# Patient Record
Sex: MALE | Race: WHITE | HISPANIC OR LATINO | Employment: PART TIME | URBAN - METROPOLITAN AREA
[De-identification: names, ages, dates, MRNs, and addresses within clinical notes are randomized per-mention and may not be internally consistent; named-entity substitution may affect disease eponyms.]

---

## 2019-09-09 ENCOUNTER — OFFICE VISIT - HEALTHEAST (OUTPATIENT)
Dept: ADDICTION MEDICINE | Facility: CLINIC | Age: 29
End: 2019-09-09

## 2019-09-09 ENCOUNTER — AMBULATORY - HEALTHEAST (OUTPATIENT)
Dept: ADDICTION MEDICINE | Facility: CLINIC | Age: 29
End: 2019-09-09

## 2019-09-09 DIAGNOSIS — F15.20 AMPHETAMINE USE DISORDER, SEVERE (H): ICD-10-CM

## 2019-09-09 DIAGNOSIS — F12.20 CANNABIS USE DISORDER, MODERATE, DEPENDENCE (H): ICD-10-CM

## 2019-09-11 ENCOUNTER — OFFICE VISIT - HEALTHEAST (OUTPATIENT)
Dept: ADDICTION MEDICINE | Facility: CLINIC | Age: 29
End: 2019-09-11

## 2019-09-11 ENCOUNTER — AMBULATORY - HEALTHEAST (OUTPATIENT)
Dept: ADDICTION MEDICINE | Facility: CLINIC | Age: 29
End: 2019-09-11

## 2019-09-11 DIAGNOSIS — F15.20 AMPHETAMINE USE DISORDER, SEVERE (H): ICD-10-CM

## 2019-09-16 ENCOUNTER — OFFICE VISIT - HEALTHEAST (OUTPATIENT)
Dept: ADDICTION MEDICINE | Facility: CLINIC | Age: 29
End: 2019-09-16

## 2019-09-16 DIAGNOSIS — F15.20 AMPHETAMINE USE DISORDER, SEVERE (H): ICD-10-CM

## 2019-09-17 ENCOUNTER — AMBULATORY - HEALTHEAST (OUTPATIENT)
Dept: ADDICTION MEDICINE | Facility: CLINIC | Age: 29
End: 2019-09-17

## 2019-09-18 ENCOUNTER — OFFICE VISIT - HEALTHEAST (OUTPATIENT)
Dept: ADDICTION MEDICINE | Facility: CLINIC | Age: 29
End: 2019-09-18

## 2019-09-18 DIAGNOSIS — F15.20 AMPHETAMINE USE DISORDER, SEVERE (H): ICD-10-CM

## 2019-09-23 ENCOUNTER — OFFICE VISIT - HEALTHEAST (OUTPATIENT)
Dept: ADDICTION MEDICINE | Facility: CLINIC | Age: 29
End: 2019-09-23

## 2019-09-23 DIAGNOSIS — F15.20 AMPHETAMINE USE DISORDER, SEVERE (H): ICD-10-CM

## 2019-09-24 ENCOUNTER — AMBULATORY - HEALTHEAST (OUTPATIENT)
Dept: ADDICTION MEDICINE | Facility: CLINIC | Age: 29
End: 2019-09-24

## 2019-10-01 ENCOUNTER — AMBULATORY - HEALTHEAST (OUTPATIENT)
Dept: ADDICTION MEDICINE | Facility: CLINIC | Age: 29
End: 2019-10-01

## 2019-10-02 ENCOUNTER — OFFICE VISIT - HEALTHEAST (OUTPATIENT)
Dept: ADDICTION MEDICINE | Facility: CLINIC | Age: 29
End: 2019-10-02

## 2019-10-02 ENCOUNTER — AMBULATORY - HEALTHEAST (OUTPATIENT)
Dept: ADDICTION MEDICINE | Facility: CLINIC | Age: 29
End: 2019-10-02

## 2019-10-02 ENCOUNTER — AMBULATORY - HEALTHEAST (OUTPATIENT)
Dept: LAB | Facility: CLINIC | Age: 29
End: 2019-10-02

## 2019-10-02 DIAGNOSIS — F15.20 AMPHETAMINE USE DISORDER, SEVERE (H): ICD-10-CM

## 2019-10-02 LAB
AMPHETAMINES UR QL SCN: ABNORMAL
BARBITURATES UR QL: ABNORMAL
BENZODIAZ UR QL: ABNORMAL
CANNABINOIDS UR QL SCN: ABNORMAL
COCAINE UR QL: ABNORMAL
CREAT UR-MCNC: 169.8 MG/DL
ETHANOL UR CFM-MCNC: <10 MG/DL
METHADONE UR QL SCN: ABNORMAL
OPIATES UR QL SCN: ABNORMAL
OXYCODONE UR QL: ABNORMAL
PCP UR QL SCN: ABNORMAL

## 2019-10-07 ENCOUNTER — OFFICE VISIT - HEALTHEAST (OUTPATIENT)
Dept: ADDICTION MEDICINE | Facility: CLINIC | Age: 29
End: 2019-10-07

## 2019-10-07 DIAGNOSIS — F15.20 AMPHETAMINE USE DISORDER, SEVERE (H): ICD-10-CM

## 2019-10-08 ENCOUNTER — AMBULATORY - HEALTHEAST (OUTPATIENT)
Dept: ADDICTION MEDICINE | Facility: CLINIC | Age: 29
End: 2019-10-08

## 2019-10-09 ENCOUNTER — OFFICE VISIT - HEALTHEAST (OUTPATIENT)
Dept: ADDICTION MEDICINE | Facility: CLINIC | Age: 29
End: 2019-10-09

## 2019-10-09 ENCOUNTER — COMMUNICATION - HEALTHEAST (OUTPATIENT)
Dept: ADDICTION MEDICINE | Facility: CLINIC | Age: 29
End: 2019-10-09

## 2019-10-09 DIAGNOSIS — F15.20 AMPHETAMINE USE DISORDER, SEVERE (H): ICD-10-CM

## 2019-10-14 ENCOUNTER — AMBULATORY - HEALTHEAST (OUTPATIENT)
Dept: LAB | Facility: CLINIC | Age: 29
End: 2019-10-14

## 2019-10-14 ENCOUNTER — OFFICE VISIT - HEALTHEAST (OUTPATIENT)
Dept: ADDICTION MEDICINE | Facility: CLINIC | Age: 29
End: 2019-10-14

## 2019-10-14 DIAGNOSIS — F15.20 AMPHETAMINE USE DISORDER, SEVERE (H): ICD-10-CM

## 2019-10-14 LAB
AMPHETAMINES UR QL SCN: NORMAL
BARBITURATES UR QL: NORMAL
BENZODIAZ UR QL: NORMAL
CANNABINOIDS UR QL SCN: NORMAL
COCAINE UR QL: NORMAL
CREAT UR-MCNC: 23.8 MG/DL
ETHANOL UR CFM-MCNC: <10 MG/DL
METHADONE UR QL SCN: NORMAL
OPIATES UR QL SCN: NORMAL
OXYCODONE UR QL: NORMAL
PCP UR QL SCN: NORMAL

## 2019-10-15 ENCOUNTER — AMBULATORY - HEALTHEAST (OUTPATIENT)
Dept: ADDICTION MEDICINE | Facility: CLINIC | Age: 29
End: 2019-10-15

## 2019-10-16 ENCOUNTER — AMBULATORY - HEALTHEAST (OUTPATIENT)
Dept: LAB | Facility: CLINIC | Age: 29
End: 2019-10-16

## 2019-10-16 ENCOUNTER — OFFICE VISIT - HEALTHEAST (OUTPATIENT)
Dept: ADDICTION MEDICINE | Facility: CLINIC | Age: 29
End: 2019-10-16

## 2019-10-16 DIAGNOSIS — F15.20 AMPHETAMINE USE DISORDER, SEVERE (H): ICD-10-CM

## 2019-10-16 LAB
AMPHETAMINES UR QL SCN: NORMAL
BARBITURATES UR QL: NORMAL
BENZODIAZ UR QL: NORMAL
CANNABINOIDS UR QL SCN: NORMAL
COCAINE UR QL: NORMAL
CREAT UR-MCNC: 23.9 MG/DL
ETHANOL UR CFM-MCNC: <10 MG/DL
METHADONE UR QL SCN: NORMAL
OPIATES UR QL SCN: NORMAL
OXYCODONE UR QL: NORMAL
PCP UR QL SCN: NORMAL

## 2019-10-21 ENCOUNTER — OFFICE VISIT - HEALTHEAST (OUTPATIENT)
Dept: ADDICTION MEDICINE | Facility: CLINIC | Age: 29
End: 2019-10-21

## 2019-10-21 DIAGNOSIS — F15.20 AMPHETAMINE USE DISORDER, SEVERE (H): ICD-10-CM

## 2019-10-22 ENCOUNTER — AMBULATORY - HEALTHEAST (OUTPATIENT)
Dept: ADDICTION MEDICINE | Facility: CLINIC | Age: 29
End: 2019-10-22

## 2019-10-25 ENCOUNTER — OFFICE VISIT - HEALTHEAST (OUTPATIENT)
Dept: ADDICTION MEDICINE | Facility: CLINIC | Age: 29
End: 2019-10-25

## 2019-10-25 DIAGNOSIS — F15.20 AMPHETAMINE USE DISORDER, SEVERE (H): ICD-10-CM

## 2019-10-28 ENCOUNTER — OFFICE VISIT - HEALTHEAST (OUTPATIENT)
Dept: ADDICTION MEDICINE | Facility: CLINIC | Age: 29
End: 2019-10-28

## 2019-10-28 DIAGNOSIS — F15.20 AMPHETAMINE USE DISORDER, SEVERE (H): ICD-10-CM

## 2019-10-30 ENCOUNTER — OFFICE VISIT - HEALTHEAST (OUTPATIENT)
Dept: ADDICTION MEDICINE | Facility: CLINIC | Age: 29
End: 2019-10-30

## 2019-10-30 DIAGNOSIS — F15.20 AMPHETAMINE USE DISORDER, SEVERE (H): ICD-10-CM

## 2019-10-31 ENCOUNTER — AMBULATORY - HEALTHEAST (OUTPATIENT)
Dept: ADDICTION MEDICINE | Facility: CLINIC | Age: 29
End: 2019-10-31

## 2019-11-01 ENCOUNTER — OFFICE VISIT - HEALTHEAST (OUTPATIENT)
Dept: ADDICTION MEDICINE | Facility: CLINIC | Age: 29
End: 2019-11-01

## 2019-11-01 DIAGNOSIS — F15.20 AMPHETAMINE USE DISORDER, SEVERE (H): ICD-10-CM

## 2019-11-05 ENCOUNTER — AMBULATORY - HEALTHEAST (OUTPATIENT)
Dept: ADDICTION MEDICINE | Facility: CLINIC | Age: 29
End: 2019-11-05

## 2019-11-08 ENCOUNTER — AMBULATORY - HEALTHEAST (OUTPATIENT)
Dept: ADDICTION MEDICINE | Facility: CLINIC | Age: 29
End: 2019-11-08

## 2019-11-08 ENCOUNTER — OFFICE VISIT - HEALTHEAST (OUTPATIENT)
Dept: ADDICTION MEDICINE | Facility: CLINIC | Age: 29
End: 2019-11-08

## 2019-11-08 ENCOUNTER — AMBULATORY - HEALTHEAST (OUTPATIENT)
Dept: LAB | Facility: CLINIC | Age: 29
End: 2019-11-08

## 2019-11-08 DIAGNOSIS — F15.20 AMPHETAMINE USE DISORDER, SEVERE (H): ICD-10-CM

## 2019-11-08 LAB
AMPHETAMINES UR QL SCN: NORMAL
BARBITURATES UR QL: NORMAL
BENZODIAZ UR QL: NORMAL
CANNABINOIDS UR QL SCN: NORMAL
COCAINE UR QL: NORMAL
CREAT UR-MCNC: 22.3 MG/DL
ETHANOL UR CFM-MCNC: <10 MG/DL
METHADONE UR QL SCN: NORMAL
OPIATES UR QL SCN: NORMAL
OXYCODONE UR QL: NORMAL
PCP UR QL SCN: NORMAL

## 2019-11-12 ENCOUNTER — AMBULATORY - HEALTHEAST (OUTPATIENT)
Dept: ADDICTION MEDICINE | Facility: CLINIC | Age: 29
End: 2019-11-12

## 2019-11-13 ENCOUNTER — OFFICE VISIT - HEALTHEAST (OUTPATIENT)
Dept: ADDICTION MEDICINE | Facility: CLINIC | Age: 29
End: 2019-11-13

## 2019-11-13 DIAGNOSIS — F15.20 AMPHETAMINE USE DISORDER, SEVERE (H): ICD-10-CM

## 2019-11-14 ENCOUNTER — OFFICE VISIT - HEALTHEAST (OUTPATIENT)
Dept: ADDICTION MEDICINE | Facility: CLINIC | Age: 29
End: 2019-11-14

## 2019-11-14 DIAGNOSIS — F15.20 AMPHETAMINE USE DISORDER, SEVERE (H): ICD-10-CM

## 2019-11-15 ENCOUNTER — OFFICE VISIT - HEALTHEAST (OUTPATIENT)
Dept: ADDICTION MEDICINE | Facility: CLINIC | Age: 29
End: 2019-11-15

## 2019-11-15 DIAGNOSIS — F15.20 AMPHETAMINE USE DISORDER, SEVERE (H): ICD-10-CM

## 2019-11-18 ENCOUNTER — OFFICE VISIT - HEALTHEAST (OUTPATIENT)
Dept: ADDICTION MEDICINE | Facility: CLINIC | Age: 29
End: 2019-11-18

## 2019-11-18 DIAGNOSIS — F15.20 AMPHETAMINE USE DISORDER, SEVERE (H): ICD-10-CM

## 2019-11-19 ENCOUNTER — AMBULATORY - HEALTHEAST (OUTPATIENT)
Dept: ADDICTION MEDICINE | Facility: CLINIC | Age: 29
End: 2019-11-19

## 2019-11-20 ENCOUNTER — OFFICE VISIT - HEALTHEAST (OUTPATIENT)
Dept: ADDICTION MEDICINE | Facility: CLINIC | Age: 29
End: 2019-11-20

## 2019-11-20 DIAGNOSIS — F15.20 AMPHETAMINE USE DISORDER, SEVERE (H): ICD-10-CM

## 2019-11-21 ENCOUNTER — AMBULATORY - HEALTHEAST (OUTPATIENT)
Dept: BEHAVIORAL HEALTH | Facility: CLINIC | Age: 29
End: 2019-11-21

## 2019-11-22 ENCOUNTER — AMBULATORY - HEALTHEAST (OUTPATIENT)
Dept: ADDICTION MEDICINE | Facility: CLINIC | Age: 29
End: 2019-11-22

## 2019-11-26 ENCOUNTER — AMBULATORY - HEALTHEAST (OUTPATIENT)
Dept: ADDICTION MEDICINE | Facility: CLINIC | Age: 29
End: 2019-11-26

## 2019-12-02 ENCOUNTER — COMMUNICATION - HEALTHEAST (OUTPATIENT)
Dept: ADDICTION MEDICINE | Facility: CLINIC | Age: 29
End: 2019-12-02

## 2019-12-03 ENCOUNTER — AMBULATORY - HEALTHEAST (OUTPATIENT)
Dept: ADDICTION MEDICINE | Facility: CLINIC | Age: 29
End: 2019-12-03

## 2019-12-04 ENCOUNTER — COMMUNICATION - HEALTHEAST (OUTPATIENT)
Dept: ADDICTION MEDICINE | Facility: CLINIC | Age: 29
End: 2019-12-04

## 2019-12-05 ENCOUNTER — COMMUNICATION - HEALTHEAST (OUTPATIENT)
Dept: ADDICTION MEDICINE | Facility: CLINIC | Age: 29
End: 2019-12-05

## 2019-12-06 ENCOUNTER — OFFICE VISIT - HEALTHEAST (OUTPATIENT)
Dept: ADDICTION MEDICINE | Facility: CLINIC | Age: 29
End: 2019-12-06

## 2019-12-06 ENCOUNTER — AMBULATORY - HEALTHEAST (OUTPATIENT)
Dept: LAB | Facility: CLINIC | Age: 29
End: 2019-12-06

## 2019-12-06 DIAGNOSIS — F15.20 AMPHETAMINE USE DISORDER, SEVERE (H): ICD-10-CM

## 2019-12-06 LAB
AMPHETAMINES UR QL SCN: NORMAL
BARBITURATES UR QL: NORMAL
BENZODIAZ UR QL: NORMAL
CANNABINOIDS UR QL SCN: NORMAL
COCAINE UR QL: NORMAL
CREAT UR-MCNC: 42.4 MG/DL
ETHANOL UR CFM-MCNC: <10 MG/DL
METHADONE UR QL SCN: NORMAL
OPIATES UR QL SCN: NORMAL
OXYCODONE UR QL: NORMAL
PCP UR QL SCN: NORMAL

## 2019-12-09 ENCOUNTER — OFFICE VISIT - HEALTHEAST (OUTPATIENT)
Dept: ADDICTION MEDICINE | Facility: CLINIC | Age: 29
End: 2019-12-09

## 2019-12-09 DIAGNOSIS — F15.20 AMPHETAMINE USE DISORDER, SEVERE (H): ICD-10-CM

## 2019-12-10 ENCOUNTER — AMBULATORY - HEALTHEAST (OUTPATIENT)
Dept: ADDICTION MEDICINE | Facility: CLINIC | Age: 29
End: 2019-12-10

## 2019-12-17 ENCOUNTER — AMBULATORY - HEALTHEAST (OUTPATIENT)
Dept: ADDICTION MEDICINE | Facility: CLINIC | Age: 29
End: 2019-12-17

## 2019-12-17 ENCOUNTER — COMMUNICATION - HEALTHEAST (OUTPATIENT)
Dept: ADDICTION MEDICINE | Facility: CLINIC | Age: 29
End: 2019-12-17

## 2019-12-18 ENCOUNTER — OFFICE VISIT - HEALTHEAST (OUTPATIENT)
Dept: ADDICTION MEDICINE | Facility: CLINIC | Age: 29
End: 2019-12-18

## 2019-12-18 DIAGNOSIS — F15.20 AMPHETAMINE USE DISORDER, SEVERE (H): ICD-10-CM

## 2019-12-20 ENCOUNTER — OFFICE VISIT - HEALTHEAST (OUTPATIENT)
Dept: ADDICTION MEDICINE | Facility: CLINIC | Age: 29
End: 2019-12-20

## 2019-12-20 DIAGNOSIS — F15.20 AMPHETAMINE USE DISORDER, SEVERE (H): ICD-10-CM

## 2019-12-24 ENCOUNTER — AMBULATORY - HEALTHEAST (OUTPATIENT)
Dept: ADDICTION MEDICINE | Facility: CLINIC | Age: 29
End: 2019-12-24

## 2019-12-30 ENCOUNTER — OFFICE VISIT - HEALTHEAST (OUTPATIENT)
Dept: ADDICTION MEDICINE | Facility: CLINIC | Age: 29
End: 2019-12-30

## 2019-12-30 DIAGNOSIS — F15.20 AMPHETAMINE USE DISORDER, SEVERE (H): ICD-10-CM

## 2019-12-31 ENCOUNTER — AMBULATORY - HEALTHEAST (OUTPATIENT)
Dept: ADDICTION MEDICINE | Facility: CLINIC | Age: 29
End: 2019-12-31

## 2020-01-03 ENCOUNTER — OFFICE VISIT - HEALTHEAST (OUTPATIENT)
Dept: ADDICTION MEDICINE | Facility: CLINIC | Age: 30
End: 2020-01-03

## 2020-01-03 DIAGNOSIS — F15.20 AMPHETAMINE USE DISORDER, SEVERE (H): ICD-10-CM

## 2020-01-07 ENCOUNTER — AMBULATORY - HEALTHEAST (OUTPATIENT)
Dept: ADDICTION MEDICINE | Facility: CLINIC | Age: 30
End: 2020-01-07

## 2020-01-08 ENCOUNTER — AMBULATORY - HEALTHEAST (OUTPATIENT)
Dept: BEHAVIORAL HEALTH | Facility: CLINIC | Age: 30
End: 2020-01-08

## 2020-01-08 ENCOUNTER — OFFICE VISIT - HEALTHEAST (OUTPATIENT)
Dept: ADDICTION MEDICINE | Facility: CLINIC | Age: 30
End: 2020-01-08

## 2020-01-08 DIAGNOSIS — F15.20 AMPHETAMINE USE DISORDER, SEVERE (H): ICD-10-CM

## 2020-01-10 ENCOUNTER — OFFICE VISIT - HEALTHEAST (OUTPATIENT)
Dept: ADDICTION MEDICINE | Facility: CLINIC | Age: 30
End: 2020-01-10

## 2020-01-10 DIAGNOSIS — F15.20 AMPHETAMINE USE DISORDER, SEVERE (H): ICD-10-CM

## 2020-01-13 ENCOUNTER — COMMUNICATION - HEALTHEAST (OUTPATIENT)
Dept: ADDICTION MEDICINE | Facility: CLINIC | Age: 30
End: 2020-01-13

## 2020-01-14 ENCOUNTER — AMBULATORY - HEALTHEAST (OUTPATIENT)
Dept: ADDICTION MEDICINE | Facility: CLINIC | Age: 30
End: 2020-01-14

## 2020-01-15 ENCOUNTER — OFFICE VISIT - HEALTHEAST (OUTPATIENT)
Dept: ADDICTION MEDICINE | Facility: CLINIC | Age: 30
End: 2020-01-15

## 2020-01-15 DIAGNOSIS — F15.20 AMPHETAMINE USE DISORDER, SEVERE (H): ICD-10-CM

## 2020-01-17 ENCOUNTER — OFFICE VISIT - HEALTHEAST (OUTPATIENT)
Dept: ADDICTION MEDICINE | Facility: CLINIC | Age: 30
End: 2020-01-17

## 2020-01-17 ENCOUNTER — AMBULATORY - HEALTHEAST (OUTPATIENT)
Dept: ADDICTION MEDICINE | Facility: CLINIC | Age: 30
End: 2020-01-17

## 2020-01-17 DIAGNOSIS — F15.20 AMPHETAMINE USE DISORDER, SEVERE (H): ICD-10-CM

## 2020-01-22 ENCOUNTER — AMBULATORY - HEALTHEAST (OUTPATIENT)
Dept: ADDICTION MEDICINE | Facility: CLINIC | Age: 30
End: 2020-01-22

## 2020-01-24 ENCOUNTER — OFFICE VISIT - HEALTHEAST (OUTPATIENT)
Dept: ADDICTION MEDICINE | Facility: CLINIC | Age: 30
End: 2020-01-24

## 2020-01-24 DIAGNOSIS — F15.20 AMPHETAMINE USE DISORDER, SEVERE (H): ICD-10-CM

## 2020-01-27 ENCOUNTER — OFFICE VISIT - HEALTHEAST (OUTPATIENT)
Dept: ADDICTION MEDICINE | Facility: CLINIC | Age: 30
End: 2020-01-27

## 2020-01-27 DIAGNOSIS — F15.20 AMPHETAMINE USE DISORDER, SEVERE (H): ICD-10-CM

## 2020-01-28 ENCOUNTER — AMBULATORY - HEALTHEAST (OUTPATIENT)
Dept: ADDICTION MEDICINE | Facility: CLINIC | Age: 30
End: 2020-01-28

## 2020-01-30 ENCOUNTER — AMBULATORY - HEALTHEAST (OUTPATIENT)
Dept: ADDICTION MEDICINE | Facility: CLINIC | Age: 30
End: 2020-01-30

## 2020-01-31 ENCOUNTER — OFFICE VISIT - HEALTHEAST (OUTPATIENT)
Dept: ADDICTION MEDICINE | Facility: CLINIC | Age: 30
End: 2020-01-31

## 2020-01-31 DIAGNOSIS — F15.20 AMPHETAMINE USE DISORDER, SEVERE (H): ICD-10-CM

## 2020-02-04 ENCOUNTER — AMBULATORY - HEALTHEAST (OUTPATIENT)
Dept: ADDICTION MEDICINE | Facility: CLINIC | Age: 30
End: 2020-02-04

## 2020-02-06 ENCOUNTER — OFFICE VISIT - HEALTHEAST (OUTPATIENT)
Dept: ADDICTION MEDICINE | Facility: CLINIC | Age: 30
End: 2020-02-06

## 2020-02-06 DIAGNOSIS — F15.20 AMPHETAMINE USE DISORDER, SEVERE (H): ICD-10-CM

## 2020-02-07 ENCOUNTER — COMMUNICATION - HEALTHEAST (OUTPATIENT)
Dept: ADDICTION MEDICINE | Facility: CLINIC | Age: 30
End: 2020-02-07

## 2020-02-11 ENCOUNTER — AMBULATORY - HEALTHEAST (OUTPATIENT)
Dept: ADDICTION MEDICINE | Facility: CLINIC | Age: 30
End: 2020-02-11

## 2020-02-19 ENCOUNTER — AMBULATORY - HEALTHEAST (OUTPATIENT)
Dept: ADDICTION MEDICINE | Facility: CLINIC | Age: 30
End: 2020-02-19

## 2020-02-20 ENCOUNTER — OFFICE VISIT - HEALTHEAST (OUTPATIENT)
Dept: ADDICTION MEDICINE | Facility: CLINIC | Age: 30
End: 2020-02-20

## 2020-02-20 DIAGNOSIS — F15.20 AMPHETAMINE USE DISORDER, SEVERE (H): ICD-10-CM

## 2020-02-26 ENCOUNTER — AMBULATORY - HEALTHEAST (OUTPATIENT)
Dept: ADDICTION MEDICINE | Facility: CLINIC | Age: 30
End: 2020-02-26

## 2020-02-27 ENCOUNTER — OFFICE VISIT - HEALTHEAST (OUTPATIENT)
Dept: ADDICTION MEDICINE | Facility: CLINIC | Age: 30
End: 2020-02-27

## 2020-02-27 DIAGNOSIS — F15.20 AMPHETAMINE USE DISORDER, SEVERE (H): ICD-10-CM

## 2020-03-05 ENCOUNTER — AMBULATORY - HEALTHEAST (OUTPATIENT)
Dept: ADDICTION MEDICINE | Facility: CLINIC | Age: 30
End: 2020-03-05

## 2020-03-05 ENCOUNTER — AMBULATORY - HEALTHEAST (OUTPATIENT)
Dept: LAB | Facility: CLINIC | Age: 30
End: 2020-03-05

## 2020-03-05 DIAGNOSIS — F15.20 AMPHETAMINE USE DISORDER, SEVERE (H): ICD-10-CM

## 2020-03-05 LAB
AMPHETAMINES UR QL SCN: NORMAL
BARBITURATES UR QL: NORMAL
BENZODIAZ UR QL: NORMAL
CANNABINOIDS UR QL SCN: NORMAL
COCAINE UR QL: NORMAL
CREAT UR-MCNC: 41 MG/DL
ETHANOL UR CFM-MCNC: <10 MG/DL
METHADONE UR QL SCN: NORMAL
OPIATES UR QL SCN: NORMAL
OXYCODONE UR QL: NORMAL
PCP UR QL SCN: NORMAL

## 2020-03-06 ENCOUNTER — COMMUNICATION - HEALTHEAST (OUTPATIENT)
Dept: ADDICTION MEDICINE | Facility: CLINIC | Age: 30
End: 2020-03-06

## 2020-03-12 ENCOUNTER — AMBULATORY - HEALTHEAST (OUTPATIENT)
Dept: ADDICTION MEDICINE | Facility: CLINIC | Age: 30
End: 2020-03-12

## 2020-03-17 ENCOUNTER — COMMUNICATION - HEALTHEAST (OUTPATIENT)
Dept: ADDICTION MEDICINE | Facility: CLINIC | Age: 30
End: 2020-03-17

## 2020-03-19 ENCOUNTER — AMBULATORY - HEALTHEAST (OUTPATIENT)
Dept: ADDICTION MEDICINE | Facility: CLINIC | Age: 30
End: 2020-03-19

## 2020-03-19 ENCOUNTER — COMMUNICATION - HEALTHEAST (OUTPATIENT)
Dept: ADDICTION MEDICINE | Facility: CLINIC | Age: 30
End: 2020-03-19

## 2020-03-25 ENCOUNTER — COMMUNICATION - HEALTHEAST (OUTPATIENT)
Dept: ADDICTION MEDICINE | Facility: CLINIC | Age: 30
End: 2020-03-25

## 2020-03-26 ENCOUNTER — AMBULATORY - HEALTHEAST (OUTPATIENT)
Dept: ADDICTION MEDICINE | Facility: CLINIC | Age: 30
End: 2020-03-26

## 2020-03-26 ENCOUNTER — OFFICE VISIT - HEALTHEAST (OUTPATIENT)
Dept: ADDICTION MEDICINE | Facility: CLINIC | Age: 30
End: 2020-03-26

## 2020-03-26 DIAGNOSIS — F15.20 AMPHETAMINE USE DISORDER, SEVERE (H): ICD-10-CM

## 2020-04-02 ENCOUNTER — COMMUNICATION - HEALTHEAST (OUTPATIENT)
Dept: ADDICTION MEDICINE | Facility: CLINIC | Age: 30
End: 2020-04-02

## 2020-04-03 ENCOUNTER — AMBULATORY - HEALTHEAST (OUTPATIENT)
Dept: ADDICTION MEDICINE | Facility: CLINIC | Age: 30
End: 2020-04-03

## 2021-06-01 NOTE — PROGRESS NOTES
"Intake Note:     D) Ricky Rowland is a 29 y.o.   Other male who is referred to DOP via Probation with funding from [employer] [place]. Patient orientated x 3. Patient meets criteria for Cannabis Use Disorder, moderate (F12.20) and Stimulant Use Disorder, severe amphetamine type substance (F15.20).  Patient appears appropriate for DOP.   A) Met with patient for 50 minutes.  Completed intake assessment and preliminary paperwork. Patient was given and explained counselor & supervisor license number and contact info, Patient Bill of Rights, program rules/regulations, Program Abuse Prevention Plan, confidentiality & HIPPA policies, grievance procedure, presented ROIs, TB & HIV/AIDS policies & resources, and Vulnerable Adult policy.   Conducted Vulnerable Adult Assessment.   R)No special Vulnerable Adult needed at this time.  Patient signed and agreed to counselor & supervisor license number and contact info, Patient Bill of Rights, group rules/regulations, Program Abuse Prevention Plan, confidentiality & HIPPA policies, grievance procedure,  ROIs, TB & HIV/AIDS policies & resources, and Vulnerable Adult policy. Patient scored Low Risk on C-SSRS screen. Patient denied suicidal ideation/intent/plan/means at this time.     Opioid Use Disorder: No   Provided \"Options for Opioid Treatment in [place] and Overdose Prevention\" No     Dimension #1 - Withdrawal Potential - Risk 1. Patient reports daily use of methamphetamine with his last endorsed use being on 9/9/19, about 1 hour prior to intake. Patient endorses weekly use of alcohol, with his last use being on 9/7/19. He denies any other pattern of other substance use at this time. Patient denies current withdrawal symptoms or concerns. Patient shows no physical signs or symptoms of intoxication or withdrawal. He may be at risk of moderate symptoms due to his recent use. He does not appear to be at risk of severe withdrawal at this time. Patient is oriented x4. "     Dimension #2 - Biomedical - Risk 0. Patient denies health issues or concerns. He reports that he is unsure if he has a PCP at this time, but does have insurance and so appears able to get his medical needs met and addressed as necessary. Patient denies immediate medical needs or concerns. He appears to be in adequate physical health and functioning at this time.     Dimension #3 - Emotional , Behavioral and Cognitive - Risk 2. Patient reports mental health diagnoses of ADHD, OCD, and depression. He denies current mental health services or medications. His mental health does not appear stable or managed at this time. Patient endorses using meth due to not having medication for his ADHD. Patient does appear to struggle with impulse control as evidence by his substance use despite being on probation, as well as his endorsed problems with gambling. Patient denies any suicidal or homicidal thoughts or plans. Patient is oriented x4.      Dimension #4 - Readiness for Change - Risk 2. Patient reports he is here due to probation. He states that he does feel that he is required to be here, and he does not feel that his use is a problem. Patient verbalizes that he is unsure if he will be able to stop using despite being in treatment. He does not appear genuinely motivated for treatment or change at this time. Patient verbalizes that he is not willing to follow recommendations at this time if he is referred to a higher level of care, although did verbalize understanding that a referral to a higher level of care would be imminent if he continued to use. He appears to lack motivation for treatment and change at this time, and is in treatment solely due to probation.     Dimension #5 - Relapse Potential - Risk 4. Patient reports active daily use of meth at this time. He therefore currently appears to lack the necessary skills to arrest his use and prevent relapse without support and structure. Patient appears to lack some  insight into his use and the impact it has had on his life as he continues to use despite probation. Patient ongoing use up until this intake appears to indicate an increased risk of relapse at this time. Patient does appear to have some knowledge of addiction and recovery as he endorses numerous treatments within the past year. It does not appear that he utilizes that knowledge to help maintain sobriety at this time.     Dimension #6 - Recovery Environment - Risk 3. Patient is currently unemployed and so lacks structured and meaningful activity for his daily life. Patient reports a stable place to live at this time, however he indicated that his significant other does also use, so it does not appear that it is the most supportive living environment at this time. Patient reports that he does not currently have any support in his life for his sobriety. Patient is currently on probation for terroristic threats.     T) Explained counselor & supervisor license number and contact info, Patient Bill of Rights, program rules/regulations, Program Abuse Prevention Plan, confidentiality & HIPPA policies, grievance procedure, presented ROIs, TB & HIV/AIDS policies & resources, and Vulnerable Adult policy. Patient expected to start group on 9/11/19.      AMERICA Briones  9/9/2019, 10:43 AM

## 2021-06-01 NOTE — PROGRESS NOTES
Ricky Rowland attended 3 hours of group today.     The group topic was Medical Aspects, patient was responsive to topic.     Patients engagement in the group session: medium     Total group size: 7      Roberto Richter  10/2/2019, 12:32 PM

## 2021-06-01 NOTE — PROGRESS NOTES
Individual Treatment Plan    Patient  Name: Ricky Rowland  MRN: [id number]   : 1990  Admit Date: 19  Date of Initial Service Plan: 19  Tentative Discharge Date: 3/13/20  [facility]  Diagnoses: Stimulant Use Disorder, Amphetamine Type, Severe (F15.20), Cannabis Use Disorder, Moderate (F12.20)    Dimension 1: Acute Intoxication/Withdrawal Potential, Risk level: 1  Problem Statement from Comprehensive Assessment:   Patient reports daily use of methamphetamine with his last endorsed use being on 19, about 1 hour prior to intake. Patient endorses weekly use of alcohol, with his last use being on 19. He denies any other pattern of other substance use at this time. Patient denies current withdrawal symptoms or concerns. Patient shows no physical signs or symptoms of intoxication or withdrawal. He may be at risk of moderate symptoms due to his recent use. He does not appear to be at risk of severe withdrawal at this time. Patient is oriented x4.     Problem: Patient reports date of last use of methamphetamine to be 19, and last use of cannabis to be 19.  Goal: Maintain abstinence  Must be reached to complete treatment? Yes  Methods/Strategies (must include amount and frequency):   1. Attend group Monday, Wednesday and Friday 9:30am-12:30pm.   2. Share thoughts, feelings, and urges to use.   Target Date: 3/13/20  Completion Date:       Dimension 2: Biomedical Conditions/Complications, Risk level: 0  Problem Statement from Comprehensive Assessment:  Patient denies health issues or concerns. He reports that he is unsure if he has a PCP at this time, but does have insurance and so appears able to get his medical needs met and addressed as necessary. Patient denies immediate medical needs or concerns. He appears to be in adequate physical health and functioning at this time.       Problem: Patient reports no current health issues or concerns.  Goal: Maintain stable physical  health and functioning.  Must be reached to complete treatment? yes  Methods/Strategies (must include amount and frequency):   1 Obtain a PCP.  2. Follow recommendations from your personal care provider regarding medical concerns.   3. Inform staff immediately of any changes in your health that may affect your active participation in group therapy or attendance.   Target Date: 3/13/20  Completion Date:     Problem: Patient reports current tobacco use.   Goal: Patient to receive information about smoking cessation.   Must be reached to complete treatment? No  Methods/Strategies (must include amount and frequency):   1. Staff to provide patient with nicotine cessation information and help on how to quit use.   2. Patient to report any progress on stopping nicotine use to staff.   Target Date: 3/13/20  Completion Date:       Dimension 3: Emotional/Behavioral/Cognitive, Risk level: 2  Problem Statement from Comprehensive Assessment:  Patient reports mental health diagnoses of ADHD, OCD, and depression. He denies current mental health services or medications. His mental health does not appear stable or managed at this time. Patient endorses using meth due to not having medication for his ADHD. Patient does appear to struggle with impulse control as evidence by his substance use despite being on probation, as well as his endorsed problems with gambling. Patient denies any suicidal or homicidal thoughts or plans. Patient is oriented x4.     Problem: Patient has  ADHD, OCD, and depression diagnoses   Goal: Manage mental health symptoms   Must be reached to complete treatment? Yes  Methods/Strategies (must include amount and frequency):   1. Patient to obtain mental health services as appropriate (therapy, psychiatry, etc).  2. Patient to take all mental health medication as prescribed.  3. Patient to report any new or worsening mental health symptoms or concerns to counselor immediately.   Target Date: 3/13/20  Completion  Date:     Dimension 4: Readiness to Change, Risk level 2  Problem Statement from Comprehensive Assessment:  Patient reports he is here due to probation. He states that he does feel that he is required to be here, and he does not feel that his use is a problem. Patient verbalizes that he is unsure if he will be able to stop using despite being in treatment. He does not appear genuinely motivated for treatment or change at this time. Patient verbalizes that he is not willing to follow recommendations at this time if he is referred to a higher level of care, although did verbalize understanding that a referral to a higher level of care would be imminent if he continued to use. He appears to lack motivation for treatment and change at this time, and is in treatment solely due to probation.     Problem: Patient reports he is here fore treatment due to probation.   Goal: Follow through with intentions to treat chemical dependency concerns while meeting OP treatment expectations in order to graduate successfully from the program.   Must be reached to complete treatment? Yes   Methods/Strategies (must include amount and frequency):   1. Complete all requested assignments.   2. If for any reason you will not be in group or will be tardy please contact staff at [phone]  Target Date: 3/13/20  Completion Date:     Dimension 5: Relapse/Continued Use/Continued Problem Potential, Risk level: 4  Problem Statement from Comprehensive Assessment:  Patient reports active daily use of meth at this time. He therefore currently appears to lack the necessary skills to arrest his use and prevent relapse without support and structure. Patient appears to lack some insight into his use and the impact it has had on his life as he continues to use despite probation. Patient ongoing use up until this intake appears to indicate an increased risk of relapse at this time. Patient does appear to have some knowledge of addiction and recovery  as he endorses numerous treatments within the past year. It does not appear that he utilizes that knowledge to help maintain sobriety at this time.     Problem: Patient lacks skills to maintain sobriety.   Goal: Patient to gain skills to help prevent relapse.   Must be reached to complete treatment? Yes  Methods/Strategies (must include amount and frequency):   1. Patient to share in daily check-in any urges and addictive thinking to better understand his pattern of use and to prevent relapse in the future.   2. Patient to identify 5 beliefs and/or behaviors that have blocked him from staying sober in the past. Identify at least 5 alternative thoughts and behaviors more conducive to recovery.  3. Patient to identify 5 needs that were met by substance use, and 5 ways those needs can be met without substance use.  4. Patient to identify at least 5 personal triggers and high-risk situations for relapse.  5. Patient to learn and implement at least 5 personal coping strategies to manage urges to use.  Target Date: 3/13/20  Completion Date:       Dimension 6: Recovery Environment, Risk level: 3  Problem Statement from Comprehensive Assessment:  Patient is currently unemployed and so lacks structured and meaningful activity for his daily life. Patient reports a stable place to live at this time, however he indicated that his significant other does also use, so it does not appear that it is the most supportive living environment at this time. Patient reports that he does not currently have any support in his life for his sobriety. Patient is currently on probation for terroristic threats.      Problem: Lack of sober support and structure.   Goal: Gain sober support and structure.   Must be reached to complete treatment? yes  Methods/Strategies (must include amount and frequency):   1. Explore and identify sober support meetings to regularly attend.   2. Obtain a sponsor prior to phase II of DOP.   3. Patient to develop  weekly schedule of activities, and include other activities that you think will benefit your recovery. Examples could be scheduled time with family, working out, or other activities that you find enjoyable.  Target Date: 3/13/20  Completion Date:     Problem: On probation for a terroristic threats charge   Goal: Comply with probation expectations.   Must be reached to complete treatment? Yes   Methods/Strategies (must include amount and frequency):   1. Attend all scheduled meetings with PO.   2. Comply with all UA requests.  3. Inform counselor immediately of any changes or updates to probation.   Target Date: 3/13/20  Completion Date:        Resources  Resources to which the patient is being referred for problems when problems are to be addressed concurrently by another provider: any providers as identified throughout treatment.       By signing this document, I am acknowledging that I was actively and directly involved in the development of my treatment plan.           Patient  Signature_________________________________________         Date__________________        Staff Signature  AMERICA Briones    Date: 9/9/2019, 11:36 AM

## 2021-06-01 NOTE — PROGRESS NOTES
"Weekly Progress Note  Rowland Ricky  1990  [id number]      D) Pt attended 2 groups this week with 1 absences. Patient attended 0 individual sessions this week. Intake was on 9/9/2019. A) Staff facilitated groups and reviewed tx progress. Ass[facility] for [facility]. R) No VAP needed at this time.   Any significant events, defines as events that impact patients relationship with others inside and outside of treatment Yes: Patient reports probation with [facility]  Indicate any changes or monitoring of physical or mental health problems Yes: Patient reports ADHD, OCD, and Depression.    Indicate involvement by any outside supports Yes: Patient support from family, friends, and probation  IAPP reviewed and modified as needed. NA  Pt working on the following dimensions:  Dimension #1 - Withdrawal Potential - Risk 1. Patient reports daily use of methamphetamine with his last endorsed use being on 9/9/19, about 1 hour prior to intake. Patient endorses weekly use of alcohol, with his last use being on 9/7/19. He denies any other pattern of other substance use at this time. Patient denies current withdrawal symptoms or concerns. Patient shows no physical signs or symptoms of intoxication or withdrawal. He may be at risk of moderate symptoms due to his recent use. He does not appear to be at risk of severe withdrawal at this time. Patient is oriented x4.  Specific goals from treatment plan addressed this week:  1. Maintain abstinence (1:1)  Effectiveness of strategies:  1. Effective: Patient attended group on Wednesday and Monday this week, from 9:30am-12:30pm. Patient reports last date of use was \"two weeks age\", but denies any withdrawal symptoms at this time.    Dimension #2 - Biomedical - Risk 0. Patient denies health issues or concerns. He reports that he is unsure if he has a PCP at this time, but does have insurance and so appears able to get his medical needs met and addressed as necessary. Patient denies immediate medical " needs or concerns. He appears to be in adequate physical health and functioning at this time.  Specific goals from treatment plan addressed this week:  1. Maintain stable physical health and functioning (2:1)  2. Patient to receive information about smoking cessation (2:2)  Effectiveness of strategies:  1. Effective: Patient reports dental care needs but is able to address concerns. He will inform staff immediately of any changes in physical health that may affect active participation in group therapy.   2. Effective: Patient and staff have discussed nicotine cessation information and help on how to quit use. 9/24: Patient report has verbalized that he has not desire to stop smoking cigarettes at this time.    Dimension #3 - Emotional/Behavioral/Cognitive - Risk 2. Patient reports mental health diagnoses of ADHD, OCD, and depression. He denies current mental health services or medications. His mental health does not appear stable or managed at this time. Patient endorses using meth due to not having medication for his ADHD. Patient does appear to struggle with impulse control as evidence by his substance use despite being on probation, as well as his endorsed problems with gambling. Patient denies any suicidal or homicidal thoughts or plans. Patient is oriented x4.  Specific goals from treatment plan addressed this week:  1. Manage mental health symptoms (3:1)  Effectiveness of strategies:  1. Effective: Patient will be linked with [facility] health services as needed (therapy, psychiatry, etc). Patient will report any new or worsening mental health symptoms or concerns to counselor immediately.    Dimension #4 - Treatment Acceptance/Resistance - Risk 2. Patient reports he is here due to probation. He states that he does feel that he is required to be here, and he does not feel that his use is a problem. Patient verbalizes that he is unsure if he will be able to stop using despite being in treatment. He does  "not appear genuinely motivated for treatment or change at this time. Patient verbalizes that he is not willing to follow recommendations at this time if he is referred to a higher level of care, although did verbalize understanding that a referral to a higher level of care would be imminent if he continued to use. He appears to lack motivation for treatment and change at this time, and is in treatment solely due to probation.  Specific goals from treatment plan addressed this week:  1. Follow through with intentions to treat chemical dependency concerns while meeting OP treatment expectations in order to graduate successfully from the program (4:1)  Effectiveness of strategies:  1. Effective: Patient attended 2 group sessions this week. He will complete all requested assignments, and contacts staff at [phone] if unable to attend group for any reason.     Dimension #5 - Relapse Potential - Risk 4. Patient reports active daily use of meth at this time. He therefore currently appears to lack the necessary skills to arrest his use and prevent relapse without support and structure. Patient appears to lack some insight into his use and the impact it has had on his life as he continues to use despite probation. Patient ongoing use up until this intake appears to indicate an increased risk of relapse at this time. Patient does appear to have some knowledge of addiction and recovery as he endorses numerous treatments within the past year. It does not appear that he utilizes that knowledge to help maintain sobriety at this time  Specific goals from treatment plan addressed this week:  1. Patient to gain skills to help prevent relapse (5:1)  Effectiveness of strategies:  1. Effective: Patient reports last illicit use was \"two weeks ago.\" He has began to share in daily check-in any urges and addictive thinking to better understand his pattern of use and to prevent relapse in the future. Patient expresses ability to " identify 5 beliefs, or behaviors that have challenged him from staying sober in the past, and is also able to identify at least 5 alternative thoughts and behaviors more conducive to recovery. Patient will continue to identify needs that were met by substance use, and ways those needs can be met without substance use. Patient to identify at least 5 personal triggers and high-risk situations for relapse, and implement at least 5 personal coping strategies to manage urges to use    Dimension #6 - Recovery Environment - Risk 3. Patient is currently unemployed and so lacks structured and meaningful activity for his daily life. Patient reports a stable place to live at this time, however he indicated that his significant other does also use, so it does not appear that it is the most supportive living environment at this time. Patient reports that he does not currently have any support in his life for his sobriety. Patient is currently on probation for terroristic threats  Specific goals from treatment plan addressed this week:  1. Gain sober support and structure (6:1)  2. Comply with probation expectations (6:2)  Effectiveness of strategies:  1. Emerging: Patient is willing to explore and identify sober support meetings to regularly attend. Patient will obtain a sponsor prior to phase II of DOP, and develop weekly schedule of activities, including other activities that will benefit his recovery. Examples could be scheduled time with family, working out, or other activities that you find enjoyable  2. Patient will attend all scheduled meetings with PO. Comply with all UA requests, and inform counselor immediately of any changes or updates to probation    T) Treatment plan updated no.  Patient notified and in agreement NA.  Patient educated on Acceptance and Relapse. Patient has completed 12 of 84 program hours at this time. Projected discharge date is 3/13/2020. [facility] plan [facility].     Roberto BENZ  "[place]  9/24/2019, 9:56 AM        Psycho-Educational Curriculum  Date Attended  Psycho-Educational Curriculum  Date Attended    Acceptance   Shame/Guilt     1st Step   Anger/Rage     Affirmations   Mental Health     Automatic Negative Thoughts   Anxiety     Cross Addiction   Co-Occurring Disorders     Stages of Change   Paola/Bipolar     Anonymous People-Video 9/13/2019     Radical Acceptance 9/18/2019           Relapse   Trauma      Addictive Thoughts   Victim Identity     Coping Skills   Sober Structure     Relapse Prevention   Continuum of Care     The Stages of Relapse 9/23/2019                 Medical Aspects   Non-12 Step Support     Brain/Neurotransmitters   Priorities     Medication Compliance   Spirituality     MARÍA Alcohol/Drug Research   Weekend Planner       The 8 Dimensions of Wellness 9/11/2019         Physical Health   Educational Videos     Post Acute Withdrawal   1st Step     Pregnancy and Drug Use   2nd Step     Sexual Health   Assertive Communication     Short-Term/Long-Term Effects   My name is Red THOMSON    Relationships   Cross Addiction     Assertive Communication   God As We Understood Him     Boundaries   HBO Relapse     Codependence    HBO What Is Addiction     Defense Mechanisms    Medical Aspects 1     Family Roles   Medical Aspects 2     Goodbye Letter   [employer] Geographic: Stress     Intimacy   PBS Depression Out of the Shadows     Needs/Dealbreakers in Relationships   The Anonymous People    Socialization Skills   Macy     Feelings   Nickolas Sheth \"Highjacked Brain\"    ABC Model of Emotion   Antonio Novak Humor in Tx    Grief and Loss   The Mindfulness Movie    Healthy vs. Unhealthy Feelings   Red THOMSON documentary     Meditation/Mindfulness  Weekly     Overconfidence/Complacency       Resentments       Stress         "

## 2021-06-01 NOTE — PROGRESS NOTES
"Patient has been placed on attendance/treatment success contract due to struggles with attendance. The patient has had 5 \"No shows\", since 9/13/2019. Patient expressed understanding and agreed that he has exhausted his absences from group, and cannot miss any more group sessions, unless it is absolutely necessary, or he will be discharged. Patient read the contract and signed.  "

## 2021-06-01 NOTE — PROGRESS NOTES
"Addiction Services - Initial Services Plan     Patient  Name: Ricky Rowland  MRN: [id number]   : 1990  Admit Date: 2019       Patient describes their immediate need: To learn recovery skills to prevent relapse. Patient denies all other immediate needs.     Are there any immediate Safety Needs such as (physical, stability, mobility):  Pt is able to get medical care as needed. Pt denies immediate concerns.     Immediate Health Needs and Plan:   Continue care with PCP, and seek medical attention as necessary.  Take all medication as prescribed    Vulnerable Adult: No     Issues to be addressed in the first sessions:   Ground techniques  Anger management  Sober network.     Patient strengths and needs:   Strengths identified as \"I'm strong, quick thinker, outgoing, and caring.\"  Needs identified as \"temper, addiction, and prioritizing.\"     Plan for patient for time between intake and completion of the treatment plan:   Attend all group therapy sessions as directed, complete all written and oral assignments as directed, and remain clean and sober. A relapse, if any, must be reported to staff immediately in order to ensure you are receiving the proper level of care. If you cannot attend a group therapy session you must call contact information provided in intake folder and leave a message before or during group hours.         Vulnerable Adult Review   [X] Review of the Facility Abuse Prevention plan was reviewed with the patient   [X] No Individual Abuse Plan is necessary   [ ] In addition to the Facility Abuse Prevention plan, an Individual Abuse Plan will be put in place       Staff Name/Title: AMERICA Briones   Date: 2019  Time: 10:43 AM        "

## 2021-06-01 NOTE — PROGRESS NOTES
"Weekly Progress Note: The patient did not attend group sessions for the week of 9/25/19 to 9/30/2019.   Ricky Rowland  1990  [id number]      D) Pt attended 2 groups this week with 1 absences. Patient attended 0 individual sessions this week. Intake was on 9/9/2019. A) Staff facilitated groups and reviewed tx progress. Assessed for [facility]. R) No VAP needed at this time.   Any significant events, defines as events that impact patients relationship with others inside and outside of treatment Yes: Patient reports probation with [facility]  Indicate any changes or monitoring of physical or mental health problems Yes: Patient reports ADHD, OCD, and Depression.    Indicate involvement by any outside supports Yes: Patient support from family, friends, and probation  IAPP reviewed and modified as needed. NA  Pt working on the following dimensions:  Dimension #1 - Withdrawal Potential - Risk 1. Patient reports daily use of methamphetamine with his last endorsed use being on 9/9/19, about 1 hour prior to intake. Patient endorses weekly use of alcohol, with his last use being on 9/7/19. He denies any other pattern of other substance use at this time. Patient denies current withdrawal symptoms or concerns. Patient shows no physical signs or symptoms of intoxication or withdrawal. He may be at risk of moderate symptoms due to his recent use. He does not appear to be at risk of severe withdrawal at this time. Patient is oriented x4.  Specific goals from treatment plan addressed this week:  1. Maintain abstinence (1:1)  Effectiveness of strategies:  1. Emerging: Patient did not attend group this week, from 9:30am-12:30pm. Patient reports last date of use was \"two weeks age\", but denies any withdrawal symptoms at this time.    Dimension #2 - Biomedical - Risk 0. Patient denies health issues or concerns. He reports that he is unsure if he has a PCP at this time, but does have insurance and so appears able to get his medical needs " met and addressed as necessary. Patient denies immediate medical needs or concerns. He appears to be in adequate physical health and functioning at this time.  Specific goals from treatment plan addressed this week:  1. Maintain stable physical health and functioning (2:1)  2. Patient to receive information about smoking cessation (2:2)  Effectiveness of strategies:  1. Emerging: Patient reports dental care needs but is able to address concerns. He will inform staff immediately of any changes in physical health that may affect active participation in group therapy.   2. Effective: Patient and staff have discussed nicotine cessation information and help on how to quit use. 9/24: Patient report has verbalized that he has not desire to stop smoking cigarettes at this time.    Dimension #3 - Emotional/Behavioral/Cognitive - Risk 2. Patient reports mental health diagnoses of ADHD, OCD, and depression. He denies current mental health services or medications. His mental health does not appear stable or managed at this time. Patient endorses using meth due to not having medication for his ADHD. Patient does appear to struggle with impulse control as evidence by his substance use despite being on probation, as well as his endorsed problems with gambling. Patient denies any suicidal or homicidal thoughts or plans. Patient is oriented x4.  Specific goals from treatment plan addressed this week:  1. Manage mental health symptoms (3:1)  Effectiveness of strategies:  1. Effective: Patient will be linked with [facility] health services as needed (therapy, psychiatry, etc). Patient will report any new or worsening mental health symptoms or concerns to counselor immediately.    Dimension #4 - Treatment Acceptance/Resistance - Risk 2. Patient reports he is here due to probation. He states that he does feel that he is required to be here, and he does not feel that his use is a problem. Patient verbalizes that he is unsure if he  will be able to stop using despite being in treatment. He does not appear genuinely motivated for treatment or change at this time. Patient verbalizes that he is not willing to follow recommendations at this time if he is referred to a higher level of care, although did verbalize understanding that a referral to a higher level of care would be imminent if he continued to use. He appears to lack motivation for treatment and change at this time, and is in treatment solely due to probation.  Specific goals from treatment plan addressed this week:  1. Follow through with intentions to treat chemical dependency concerns while meeting OP treatment expectations in order to graduate successfully from the program (4:1)  Effectiveness of strategies:  1. Not effective: The patient did not attend group sessions for the week of 9/25/19 to 9/30/2019. He will be scheduled for a 1 x 1 counseling session and put on attendance success contract to adhere to attendance policy for treatment. Patient will continue to work on all requested assignments, and contacts staff at [phone] if unable to attend group for any reason.     Dimension #5 - Relapse Potential - Risk 4. Patient reports active daily use of meth at this time. He therefore currently appears to lack the necessary skills to arrest his use and prevent relapse without support and structure. Patient appears to lack some insight into his use and the impact it has had on his life as he continues to use despite probation. Patient ongoing use up until this intake appears to indicate an increased risk of relapse at this time. Patient does appear to have some knowledge of addiction and recovery as he endorses numerous treatments within the past year. It does not appear that he utilizes that knowledge to help maintain sobriety at this time  Specific goals from treatment plan addressed this week:  1. Patient to gain skills to help prevent relapse (5:1)  Effectiveness of  "strategies:  1. Emerging: Patient reports last illicit use was \"two weeks ago.\" He has began to share in daily check-in any urges and addictive thinking to better understand his pattern of use and to prevent relapse in the future. Patient expresses ability to identify 5 beliefs, or behaviors that have challenged him from staying sober in the past, and is also able to identify at least 5 alternative thoughts and behaviors more conducive to recovery. Patient will continue to identify needs that were met by substance use, and ways those needs can be met without substance use. Patient to identify at least 5 personal triggers and high-risk situations for relapse, and implement at least 5 personal coping strategies to manage urges to use    Dimension #6 - Recovery Environment - Risk 3. Patient is currently unemployed and so lacks structured and meaningful activity for his daily life. Patient reports a stable place to live at this time, however he indicated that his significant other does also use, so it does not appear that it is the most supportive living environment at this time. Patient reports that he does not currently have any support in his life for his sobriety. Patient is currently on probation for terroristic threats  Specific goals from treatment plan addressed this week:  1. Gain sober support and structure (6:1)  2. Comply with probation expectations (6:2)  Effectiveness of strategies:  1. Emerging: Patient is willing to explore and identify sober support meetings to regularly attend. Patient will obtain a sponsor prior to phase II of DOP, and develop weekly schedule of activities, including other activities that will benefit his recovery. Examples could be scheduled time with family, working out, or other activities that you find enjoyable  2. Emerging: Patient will attend all scheduled meetings with PO. Comply with all UA requests, and inform counselor immediately of any changes or updates to " "probation    T) Treatment plan updated no.  Patient notified and in agreement NA.  Patient educated on topic this week due to non-attendance. Patient has completed 12 of 84 program hours at this time. Projected discharge date is 3/13/2020. [facility] plan [facility].     Roberto Richter  10/1/2019, 2:17 PM        Psycho-Educational Curriculum  Date Attended  Psycho-Educational Curriculum  Date Attended    Acceptance   Shame/Guilt     1st Step   Anger/Rage     Affirmations   Mental Health     Automatic Negative Thoughts   Anxiety     Cross Addiction   Co-Occurring Disorders     Stages of Change   Paola/Bipolar     Anonymous People-Video 9/13/2019     Radical Acceptance 9/18/2019           Relapse   Trauma      Addictive Thoughts   Victim Identity     Coping Skills   Sober Structure     Relapse Prevention   Continuum of Care     The Stages of Relapse 9/23/2019                 Medical Aspects   Non-12 Step Support     Brain/Neurotransmitters   Priorities     Medication Compliance   Spirituality     MARÍA Alcohol/Drug Research   Weekend Planner       The 8 Dimensions of Wellness 9/11/2019         Physical Health   Educational Videos     Post Acute Withdrawal   1st Step     Pregnancy and Drug Use   2nd Step     Sexual Health   Assertive Communication     Short-Term/Long-Term Effects   My name is Red THOMSON    Relationships   Cross Addiction     Assertive Communication   God As We Understood Him     Boundaries   HBO Relapse     Codependence    HBO What Is Addiction     Defense Mechanisms    Medical Aspects 1     Family Roles   Medical Aspects 2     Goodbye Letter   [employer] Geographic: Stress     Intimacy   PBS Depression Out of the Shadows     Needs/Dealbreakers in Relationships   The Anonymous People    Socialization Skills   Superior     Feelings   Nickolas Sheth \"Highjacked Brain\"    ABC Model of Emotion   Antonio Novak Humor in Tx    Grief and Loss   The Mindfulness Movie    Healthy vs. Unhealthy Feelings  "  Red GARCIA. documentary     Meditation/Mindfulness  Weekly     Overconfidence/Complacency       Resentments       Stress

## 2021-06-01 NOTE — PROGRESS NOTES
Ricky Rowland attended 3 hours of group today.     The group topic was Acceptance, patient was responsive to topic.     Patients engagement in the group session: medium     Total group size: 8      Roberto Richter  9/18/2019, 1:55 PM

## 2021-06-01 NOTE — PROGRESS NOTES
Ricky Rowland attended 3 hours of group today.     The group topic was Relapse, patient was responsive to topic.     Patients engagement in the group session: medium     Total group size: 7      Roberto Richter  9/23/2019, 1:48 PM

## 2021-06-01 NOTE — PROGRESS NOTES
Weekly Progress Note  Ricky Rowland  1990  [id number]      D) Pt attended 0 groups this week with 0 absences. Patient attended 1 individual sessions this week for intake on 9/9/2019. A) Staff facilitated groups and reviewed tx progress. [facility] for [facility]. R) No VAP needed at this time.   Any significant events, defines as events that impact patients relationship with others inside and outside of treatment Yes: Patient reports probation with [facility]  Indicate any changes or monitoring of physical or mental health problems Yes: Patient reports ADHD, OCD, and Depression.    Indicate involvement by any outside supports Yes: Patient support from family, friends, and probation  IAPP reviewed and modified as needed. NA  Pt working on the following dimensions:  Dimension #1 - Withdrawal Potential - Risk 1. Patient reports daily use of methamphetamine with his last endorsed use being on 9/9/19, about 1 hour prior to intake. Patient endorses weekly use of alcohol, with his last use being on 9/7/19. He denies any other pattern of other substance use at this time. Patient denies current withdrawal symptoms or concerns. Patient shows no physical signs or symptoms of intoxication or withdrawal. He may be at risk of moderate symptoms due to his recent use. He does not appear to be at risk of severe withdrawal at this time. Patient is oriented x4.  Specific goals from treatment plan addressed this week:  1. Maintain abstinence (1:1)  Effectiveness of strategies:  1. Attend group Monday, Wednesday and Friday 9:30am-12:30pm. Share thoughts, feelings, and urges to use     Dimension #2 - Biomedical - Risk 0. Patient denies health issues or concerns. He reports that he is unsure if he has a PCP at this time, but does have insurance and so appears able to get his medical needs met and addressed as necessary. Patient denies immediate medical needs or concerns. He appears to be in adequate physical health and functioning at this  time.  Specific goals from treatment plan addressed this week:  1. Maintain stable physical health and functioning (2:1)  2. Patient to receive information about smoking cessation (2:2)  Effectiveness of strategies:  1. Obtain a PCP. Follow recommendations from your personal care provider regarding medical concerns. Inform staff immediately of any changes in your health that may affect your active participation in group therapy or attendance   2. Staff to provide patient with nicotine cessation information and help on how to quit use. Patient to report any progress on stopping nicotine use to staff  Dimension #3 - Emotional/Behavioral/Cognitive - Risk 2. Patient reports mental health diagnoses of ADHD, OCD, and depression. He denies current mental health services or medications. His mental health does not appear stable or managed at this time. Patient endorses using meth due to not having medication for his ADHD. Patient does appear to struggle with impulse control as evidence by his substance use despite being on probation, as well as his endorsed problems with gambling. Patient denies any suicidal or homicidal thoughts or plans. Patient is oriented x4.  Specific goals from treatment plan addressed this week:  1. Manage mental health symptoms (3:1)  Effectiveness of strategies:  1. Patient to obtain mental health services as appropriate (therapy, psychiatry, etc). Patient to take all mental health medication as prescribed. Patient to report any new or worsening mental health symptoms or concerns to counselor immediately.    Dimension #4 - Treatment Acceptance/Resistance - Risk 2. Patient reports he is here due to probation. He states that he does feel that he is required to be here, and he does not feel that his use is a problem. Patient verbalizes that he is unsure if he will be able to stop using despite being in treatment. He does not appear genuinely motivated for treatment or change at this time. Patient  verbalizes that he is not willing to follow recommendations at this time if he is referred to a higher level of care, although did verbalize understanding that a referral to a higher level of care would be imminent if he continued to use. He appears to lack motivation for treatment and change at this time, and is in treatment solely due to probation.  Specific goals from treatment plan addressed this week:  1. Follow through with intentions to treat chemical dependency concerns while meeting OP treatment expectations in order to graduate successfully from the program (4:1)  Effectiveness of strategies:  1. Complete all requested assignments. If for any reason you will not be in group or will be tardy please contact staff at [phone])     Dimension #5 - Relapse Potential - Risk 4. Patient reports active daily use of meth at this time. He therefore currently appears to lack the necessary skills to arrest his use and prevent relapse without support and structure. Patient appears to lack some insight into his use and the impact it has had on his life as he continues to use despite probation. Patient ongoing use up until this intake appears to indicate an increased risk of relapse at this time. Patient does appear to have some knowledge of addiction and recovery as he endorses numerous treatments within the past year. It does not appear that he utilizes that knowledge to help maintain sobriety at this time  Specific goals from treatment plan addressed this week:  1. Patient to gain skills to help prevent relapse (5:1)  Effectiveness of strategies:  1. Patient to share in daily check-in any urges and addictive thinking to better understand his pattern of use and to prevent relapse in the future. Patient to identify 5 beliefs and/or behaviors that have blocked him from staying sober in the past. Identify at least 5 alternative thoughts and behaviors more conducive to recovery. Patient to identify 5 needs that were  met by substance use, and 5 ways those needs can be met without substance use. Patient to identify at least 5 personal triggers and high-risk situations for relapse. Patient to learn and implement at least 5 personal coping strategies to manage urges to use    Dimension #6 - Recovery Environment - Risk 3. Patient is currently unemployed and so lacks structured and meaningful activity for his daily life. Patient reports a stable place to live at this time, however he indicated that his significant other does also use, so it does not appear that it is the most supportive living environment at this time. Patient reports that he does not currently have any support in his life for his sobriety. Patient is currently on probation for terroristic threats  Specific goals from treatment plan addressed this week:  1. Gain sober support and structure (6:1)  2. Comply with probation expectations (6:2)  Effectiveness of strategies:  1. Explore and identify sober support meetings to regularly attend. Obtain a sponsor prior to phase II of DOP. Patient to develop weekly schedule of activities, and include other activities that you think will benefit your recovery. Examples could be scheduled time with family, working out, or other activities that you find enjoyable  2. Attend all scheduled meetings with PO. Comply with all UA requests. Inform counselor immediately of any changes or updates to probation    T) Treatment plan updated no.  Patient notified and in agreement NA.  Patient educated on Intake. Patient has completed 0 of 84 program hours at this time. Projected discharge date is 3/13/2020. [facility] plan [facility].     Roberto Richter  9/11/2019, 3:10 PM        Psycho-Educational Curriculum  Date Attended  Psycho-Educational Curriculum  Date Attended    Acceptance   Shame/Guilt     1st Step   Anger/Rage     Affirmations   Mental Health     Automatic Negative Thoughts   Anxiety     Cross Addiction    "Co-Occurring Disorders     Stages of Change   Paola/Bipolar     Relapse   Trauma      Addictive Thoughts   Victim Identity     Coping Skills   Sober Structure     Relapse Prevention   Continuum of Care     Medical Aspects   Non-12 Step Support     Brain/Neurotransmitters   Priorities     Medication Compliance   Spirituality     MARÍA Alcohol/Drug Research   Weekend Planner     Physical Health   Educational Videos     Post Acute Withdrawal   1st Step     Pregnancy and Drug Use   2nd Step     Sexual Health   Assertive Communication     Short-Term/Long-Term Effects   My name is Red THOMSON    Relationships   Cross Addiction     Assertive Communication   God As We Understood Him     Boundaries   HBO Relapse     Codependence    HBO What Is Addiction     Defense Mechanisms    Medical Aspects 1     Family Roles   Medical Aspects 2     Goodbye Letter   [employer]: Stress     Intimacy   PBS Depression Out of the Shadows     Needs/Dealbreakers in Relationships   The Anonymous People    Socialization Skills   [place]     Feelings   Nickolas Sheth \"Highjacked Brain\"    ABC Model of Emotion   Antonio Novak Humor in Tx    Grief and Loss   The Mindfulness Movie    Healthy vs. Unhealthy Feelings   Red THOMSON documentary     Meditation/Mindfulness  Weekly     Overconfidence/Complacency       Resentments       Stress         "

## 2021-06-01 NOTE — PROGRESS NOTES
Ricky Rowland attended 3 hours of group today.     The group topic was Acceptance, patient was responsive to topic.     Patients engagement in the group session: medium     Total group size: 6      Roberto Richter  9/16/2019, 2:24 PM

## 2021-06-01 NOTE — PROGRESS NOTES
Weekly Progress Note  Ricky Rowland  1990  [id number]      D) Pt attended 2 groups this week with 1 absences. Patient attended 0 individual sessions this week. Intake was on 9/9/2019. A) Staff facilitated groups and reviewed tx progress. [facility] for [facility]. R) No VAP needed at this time.   Any significant events, defines as events that impact patients relationship with others inside and outside of treatment Yes: Patient reports probation with [facility]  Indicate any changes or monitoring of physical or mental health problems Yes: Patient reports ADHD, OCD, and Depression.    Indicate involvement by any outside supports Yes: Patient support from family, friends, and probation  IAPP reviewed and modified as needed. NA  Pt working on the following dimensions:  Dimension #1 - Withdrawal Potential - Risk 1. Patient reports daily use of methamphetamine with his last endorsed use being on 9/9/19, about 1 hour prior to intake. Patient endorses weekly use of alcohol, with his last use being on 9/7/19. He denies any other pattern of other substance use at this time. Patient denies current withdrawal symptoms or concerns. Patient shows no physical signs or symptoms of intoxication or withdrawal. He may be at risk of moderate symptoms due to his recent use. He does not appear to be at risk of severe withdrawal at this time. Patient is oriented x4.  Specific goals from treatment plan addressed this week:  1. Maintain abstinence (1:1)  Effectiveness of strategies:  1. Effective: Patient attended group on Wednesday and Monday this week, from 9:30am-12:30pm, and shared his thoughts, feelings, and urges to use. Patient denies any withdrawal symptoms at this time.    Dimension #2 - Biomedical - Risk 0. Patient denies health issues or concerns. He reports that he is unsure if he has a PCP at this time, but does have insurance and so appears able to get his medical needs met and addressed as necessary. Patient denies immediate  medical needs or concerns. He appears to be in adequate physical health and functioning at this time.  Specific goals from treatment plan addressed this week:  1. Maintain stable physical health and functioning (2:1)  2. Patient to receive information about smoking cessation (2:2)  Effectiveness of strategies:  1. Effective: Patient will obtain a PCP and follow recommendations regarding any medical concerns. Patient will inform staff immediately of any changes in physical health that may affect active participation in group therapy.   2. Effective: Patient and staff have discussed nicotine cessation information and help on how to quit use. Patient to report progress to staff as needed    Dimension #3 - Emotional/Behavioral/Cognitive - Risk 2. Patient reports mental health diagnoses of ADHD, OCD, and depression. He denies current mental health services or medications. His mental health does not appear stable or managed at this time. Patient endorses using meth due to not having medication for his ADHD. Patient does appear to struggle with impulse control as evidence by his substance use despite being on probation, as well as his endorsed problems with gambling. Patient denies any suicidal or homicidal thoughts or plans. Patient is oriented x4.  Specific goals from treatment plan addressed this week:  1. Manage mental health symptoms (3:1)  Effectiveness of strategies:  1. Effective: Patient will obtain mental health services as appropriate (therapy, psychiatry, etc), and take all mental health medications as prescribed. Patient will report any new or worsening mental health symptoms or concerns to counselor immediately.    Dimension #4 - Treatment Acceptance/Resistance - Risk 2. Patient reports he is here due to probation. He states that he does feel that he is required to be here, and he does not feel that his use is a problem. Patient verbalizes that he is unsure if he will be able to stop using despite being in  treatment. He does not appear genuinely motivated for treatment or change at this time. Patient verbalizes that he is not willing to follow recommendations at this time if he is referred to a higher level of care, although did verbalize understanding that a referral to a higher level of care would be imminent if he continued to use. He appears to lack motivation for treatment and change at this time, and is in treatment solely due to probation.  Specific goals from treatment plan addressed this week:  1. Follow through with intentions to treat chemical dependency concerns while meeting OP treatment expectations in order to graduate successfully from the program (4:1)  Effectiveness of strategies:  1. Effective: Patient will complete all requested assignments, and contacts staff at [phone] if unable to attend group for any reason.     Dimension #5 - Relapse Potential - Risk 4. Patient reports active daily use of meth at this time. He therefore currently appears to lack the necessary skills to arrest his use and prevent relapse without support and structure. Patient appears to lack some insight into his use and the impact it has had on his life as he continues to use despite probation. Patient ongoing use up until this intake appears to indicate an increased risk of relapse at this time. Patient does appear to have some knowledge of addiction and recovery as he endorses numerous treatments within the past year. It does not appear that he utilizes that knowledge to help maintain sobriety at this time  Specific goals from treatment plan addressed this week:  1. Patient to gain skills to help prevent relapse (5:1)  Effectiveness of strategies:  1. Effective: Patient has began to share in daily check-in any urges and addictive thinking to better understand his pattern of use and to prevent relapse in the future. Patient will identify 5 beliefs and/or behaviors that have challenged him from staying sober in the  past, and identify at least 5 alternative thoughts and behaviors more conducive to recovery. Patient to identify 5 needs that were met by substance use, and 5 ways those needs can be met without substance use. Patient to identify at least 5 personal triggers and high-risk situations for relapse, and implement at least 5 personal coping strategies to manage urges to use    Dimension #6 - Recovery Environment - Risk 3. Patient is currently unemployed and so lacks structured and meaningful activity for his daily life. Patient reports a stable place to live at this time, however he indicated that his significant other does also use, so it does not appear that it is the most supportive living environment at this time. Patient reports that he does not currently have any support in his life for his sobriety. Patient is currently on probation for terroristic threats  Specific goals from treatment plan addressed this week:  1. Gain sober support and structure (6:1)  2. Comply with probation expectations (6:2)  Effectiveness of strategies:  1. Effective: Patient will explore and identify sober support meetings to regularly attend. Patient will obtain a sponsor prior to phase II of DOP, and develop weekly schedule of activities, including other activities that will benefit his recovery. Examples could be scheduled time with family, working out, or other activities that you find enjoyable  2. Patient will attend all scheduled meetings with PO. Comply with all UA requests, and inform counselor immediately of any changes or updates to probation    T) Treatment plan updated no.  Patient notified and in agreement NA.  Patient educated on Sober Structure and Acceptance. Patient has completed 6 of 84 program hours at this time. Projected discharge date is 3/13/2020. [facility] plan [facility].     Roberto Richter  9/17/2019, 9:59 AM        Psycho-Educational Curriculum  Date Attended  Psycho-Educational Curriculum   "Date Attended    Acceptance   Shame/Guilt     1st Step   Anger/Rage     Affirmations   Mental Health     Automatic Negative Thoughts   Anxiety     Cross Addiction   Co-Occurring Disorders     Stages of Change   Paola/Bipolar     Anonymous People-Video 9/13/2019                 Relapse   Trauma      Addictive Thoughts   Victim Identity     Coping Skills   Sober Structure     Relapse Prevention   Continuum of Care     Medical Aspects   Non-12 Step Support     Brain/Neurotransmitters   Priorities     Medication Compliance   Spirituality     MARÍA Alcohol/Drug Research   Weekend Planner       The 8 Dimensions of Wellness 9/11/2019         Physical Health   Educational Videos     Post Acute Withdrawal   1st Step     Pregnancy and Drug Use   2nd Step     Sexual Health   Assertive Communication     Short-Term/Long-Term Effects   My name is Red THOMSON    Relationships   Cross Addiction     Assertive Communication   God As We Understood Him     Boundaries   HBO Relapse     Codependence    HBO What Is Addiction     Defense Mechanisms    Medical Aspects 1     Family Roles   Medical Aspects 2     Goodbye Letter   [employer]: Stress     Intimacy   PBS Depression Out of the Shadows     Needs/Dealbreakers in Relationships   The Anonymous People    Socialization Skills   [place]     Feelings   Nickolas Sheth \"Highjacked Brain\"    ABC Model of Emotion   Antonio Novak Humor in Tx    Grief and Loss   The Mindfulness Movie    Healthy vs. Unhealthy Feelings   Red THOMSON documentary     Meditation/Mindfulness  Weekly     Overconfidence/Complacency       Resentments       Stress         "

## 2021-06-01 NOTE — PROGRESS NOTES
Ricky Rowland attended 3 hours of group today. The patient attended first DOP session today    The group topic was Sober Structure, patient was responsive to topic.     Patients engagement in the group session: medium     Total group size: 5      Roberto Richter  9/11/2019, 12:45 PM

## 2021-06-02 NOTE — PROGRESS NOTES
Ricky Rowland attended 3 hours of group today.     The group topic was Feelings, patient was responsive to topic.     Patients engagement in the group session: medium     Total group size: 7      Roberto Richter  10/7/2019, 12:54 PM

## 2021-06-02 NOTE — PROGRESS NOTES
Ricky Rowland attended 3 hours of group today.     The group topic was Mental Health, patient was responsive to topic.     Patient's engagement in the group session: medium     Total group size: 4      Robetro Richter  10/25/2019, 1:44 PM

## 2021-06-02 NOTE — PROGRESS NOTES
Ricky Rowland attended 3 hours of group today.     The group topic was Relationships, patient was responsive to topic.     Patient's engagement in the group session: medium     Total group size: 5      Roberto Richter  10/16/2019, 12:51 PM

## 2021-06-02 NOTE — PROGRESS NOTES
Weekly Progress Note: The patient did not attend group sessions for the week of 9/25/19 to 9/30/2019.   Ricky Rowland  1990  [id number]      D) Pt attended 2 groups this week with 1 absences. Patient attended 0 individual sessions this week. Intake was on 9/9/2019. A) Staff facilitated groups and reviewed tx progress. Assessed for [facility]. R) No VAP needed at this time.   Any significant events, defines as events that impact patients relationship with others inside and outside of treatment Yes: Patient reports probation with [facility]  Indicate any changes or monitoring of physical or mental health problems Yes: Patient reports ADHD, OCD, and Depression.    Indicate involvement by any outside supports Yes: Patient support from family, friends, and probation  IAPP reviewed and modified as needed. NA  Pt working on the following dimensions:  Dimension #1 - Withdrawal Potential - Risk 1. Patient reports daily use of methamphetamine with his last endorsed use being on 9/9/19, about 1 hour prior to intake. Patient endorses weekly use of alcohol, with his last use being on 9/7/19. He denies any other pattern of other substance use at this time. Patient denies current withdrawal symptoms or concerns. Patient shows no physical signs or symptoms of intoxication or withdrawal. He may be at risk of moderate symptoms due to his recent use. He does not appear to be at risk of severe withdrawal at this time. Patient is oriented x4.  Specific goals from treatment plan addressed this week:  1. Maintain abstinence (1:1)  Effectiveness of strategies:  1. Emerging: Patient maintains last date of use as 10/1/2019, and denies any withdrawal/intoxication concerns at this time.     Dimension #2 - Biomedical - Risk 0. Patient denies health issues or concerns. He reports that he is unsure if he has a PCP at this time, but does have insurance and so appears able to get his medical needs met and addressed as necessary. Patient denies  immediate medical needs or concerns. He appears to be in adequate physical health and functioning at this time.  Specific goals from treatment plan addressed this week:  1. Maintain stable physical health and functioning (2:1)  2. Patient to receive information about smoking cessation (2:2)  Effectiveness of strategies:  1. Emerging: Patient reports need to repair broken tooth, but says he is able to access services to address situation. Patient will initiate services and follow recommendations for treatment.  2. 9/24: Patient has verbalized that he has no desire to stop smoking cigarettes at this time.    Dimension #3 - Emotional/Behavioral/Cognitive - Risk 2. Patient reports mental health diagnoses of ADHD, OCD, and depression. He denies current mental health services or medications. His mental health does not appear stable or managed at this time. Patient endorses using meth due to not having medication for his ADHD. Patient does appear to struggle with impulse control as evidence by his substance use despite being on probation, as well as his endorsed problems with gambling. Patient denies any suicidal or homicidal thoughts or plans. Patient is oriented x4.  Specific goals from treatment plan addressed this week:  1. Manage mental health symptoms (3:1)  Effectiveness of strategies:  1. Effective: Patient reports ADHD, OCD, and Depression. Patient will be connected with [facility] services as needed (therapy, psychiatry, etc), to establish care.    Dimension #4 - Treatment Acceptance/Resistance - Risk 2. Patient reports he is here due to probation. He states that he does feel that he is required to be here, and he does not feel that his use is a problem. Patient verbalizes that he is unsure if he will be able to stop using despite being in treatment. He does not appear genuinely motivated for treatment or change at this time. Patient verbalizes that he is not willing to follow recommendations at this  time if he is referred to a higher level of care, although did verbalize understanding that a referral to a higher level of care would be imminent if he continued to use. He appears to lack motivation for treatment and change at this time, and is in treatment solely due to probation.  Specific goals from treatment plan addressed this week:  1. Follow through with intentions to treat chemical dependency concerns while meeting OP treatment expectations in order to graduate successfully from the program (4:1)  Effectiveness of strategies:  1. Emerging: Patient attended 2 group sessions this week. Patient has verbalized his commitment to adhere to treatment expectations and complete treatment accordingly. Patient will continue to work on assignments, and contacts staff at [phone] if unable to attend group for any reason.     Dimension #5 - Relapse Potential - Risk 4. Patient reports active daily use of meth at this time. He therefore currently appears to lack the necessary skills to arrest his use and prevent relapse without support and structure. Patient appears to lack some insight into his use and the impact it has had on his life as he continues to use despite probation. Patient ongoing use up until this intake appears to indicate an increased risk of relapse at this time. Patient does appear to have some knowledge of addiction and recovery as he endorses numerous treatments within the past year. It does not appear that he utilizes that knowledge to help maintain sobriety at this time  Specific goals from treatment plan addressed this week:  1. Patient to gain skills to help prevent relapse (5:1)  Effectiveness of strategies:  1. Emerging: Patient denies any relapse/continued use and reports last illicit use on 10/1/2019. Patient will continue to identify his personal triggers and high-risk situations for relapse, and implement personal coping strategies to prevent future use.    Dimension #6 - Recovery  Environment - Risk 3. Patient is currently unemployed and so lacks structured and meaningful activity for his daily life. Patient reports a stable place to live at this time, however he indicated that his significant other does also use, so it does not appear that it is the most supportive living environment at this time. Patient reports that he does not currently have any support in his life for his sobriety. Patient is currently on probation for terroristic threats  Specific goals from treatment plan addressed this week:  1. Gain sober support and structure (6:1)  2. Comply with probation expectations (6:2)  Effectiveness of strategies:  1. Emerging: Patient reports ability to explore and identify sober support meetings to regularly attend. Patient will obtain a sponsor prior to phase II of DOP, and develop weekly schedule of activities that will benefit his recovery.  2. Emerging: Patient is willing to attend all scheduled meetings with PO and omply with all UA requests. Patient will inform counselor immediately of any changes or updates to probation.   T) Treatment plan updated no.  Patient notified and in agreement NA.  Patient educated on Feelings and Relationships. Patient has completed 24 of 84 program hours at this time. Projected discharge date is 3/13/2020. [facility] plan [facility].     Roberto Richter  10/15/2019, 11:37 AM        Psycho-Educational Curriculum  Date Attended  Psycho-Educational Curriculum  Date Attended    Acceptance   Shame/Guilt     1st Step   Anger/Rage     Affirmations   Mental Health     Automatic Negative Thoughts   Anxiety     Cross Addiction   Co-Occurring Disorders     Stages of Change   Paola/Bipolar     Anonymous People-Video 9/13/2019     Radical Acceptance 9/18/2019           Relapse   Trauma      Addictive Thoughts   Victim Identity     Coping Skills   Sober Structure     Relapse Prevention   Continuum of Care     The Stages of Relapse 9/23/2019          "        Medical Aspects   Non-12 Step Support     Brain/Neurotransmitters   Priorities     Medication Compliance   Spirituality     MARÍA Alcohol/Drug Research   Weekend Planner     Spirituality 10/2/2019 The 8 Dimensions of Wellness 9/11/2019         Physical Health   Educational Videos     Post Acute Withdrawal   1st Step     Pregnancy and Drug Use   2nd Step     Sexual Health   Assertive Communication     Short-Term/Long-Term Effects   My name is Red THOMSON    Relationships   Cross Addiction     Healthy Vs Unhealthy Relationships 10/14/2019           Assertive Communication   God As We Understood Him     Boundaries   HBO Relapse     Codependence    HBO What Is Addiction     Defense Mechanisms    Medical Aspects 1     Family Roles   Medical Aspects 2     Goodbye Letter   [employer] Geographic: Stress     Intimacy   PBS Depression Out of the Shadows     Needs/Dealbreakers in Relationships   The Anonymous People    Socialization Skills   [place]     Feelings   Nickolas Sheth \"Highjacked Brain\"    Identifying Feelings 10/7/2019     Life Satisfaction Checklist 10/9/2019     ABC Model of Emotion   Antonio Novak Humor in Tx    Grief and Loss   The Mindfulness Movie    Healthy vs. Unhealthy Feelings   Red THOMSON documentary     Meditation/Mindfulness  Weekly     Overconfidence/Complacency       Resentments       Stress         "

## 2021-06-02 NOTE — PROGRESS NOTES
Weekly Progress Note: The patient did not attend group sessions for the week of 9/25/19 to 9/30/2019.   Ricky Rowland  1990  [id number]      D) Pt attended 2 groups this week with 1 absences. Patient attended 0 individual sessions this week. Intake was on 9/9/2019. A) Staff facilitated groups and reviewed tx progress. Assessed for [facility]. R) No VAP needed at this time.   Any significant events, defines as events that impact patients relationship with others inside and outside of treatment Yes: Patient reports probation with [facility]  Indicate any changes or monitoring of physical or mental health problems Yes: Patient reports ADHD, OCD, and Depression.    Indicate involvement by any outside supports Yes: Patient support from family, friends, and probation  IAPP reviewed and modified as needed. NA  Pt working on the following dimensions:  Dimension #1 - Withdrawal Potential - Risk 1. Patient reports daily use of methamphetamine with his last endorsed use being on 9/9/19, about 1 hour prior to intake. Patient endorses weekly use of alcohol, with his last use being on 9/7/19. He denies any other pattern of other substance use at this time. Patient denies current withdrawal symptoms or concerns. Patient shows no physical signs or symptoms of intoxication or withdrawal. He may be at risk of moderate symptoms due to his recent use. He does not appear to be at risk of severe withdrawal at this time. Patient is oriented x4.  Specific goals from treatment plan addressed this week:  1. Maintain abstinence (1:1)  Effectiveness of strategies:  1. Emerging: Patient reports last date of use was 10/1/2019, but denies any withdrawal symptoms and does no appear to be intoxicated at this time.     Dimension #2 - Biomedical - Risk 0. Patient denies health issues or concerns. He reports that he is unsure if he has a PCP at this time, but does have insurance and so appears able to get his medical needs met and addressed as  necessary. Patient denies immediate medical needs or concerns. He appears to be in adequate physical health and functioning at this time.  Specific goals from treatment plan addressed this week:  1. Maintain stable physical health and functioning (2:1)  2. Patient to receive information about smoking cessation (2:2)  Effectiveness of strategies:  1. Emerging: Patient reports dental care needs for a broken tooth, but is able to access services to address situation. He will follow recommendations for treatment and inform staff immediately of any changes in physical health.  2. 9/24: Patient has verbalized that he has no desire to stop smoking cigarettes at this time.    Dimension #3 - Emotional/Behavioral/Cognitive - Risk 2. Patient reports mental health diagnoses of ADHD, OCD, and depression. He denies current mental health services or medications. His mental health does not appear stable or managed at this time. Patient endorses using meth due to not having medication for his ADHD. Patient does appear to struggle with impulse control as evidence by his substance use despite being on probation, as well as his endorsed problems with gambling. Patient denies any suicidal or homicidal thoughts or plans. Patient is oriented x4.  Specific goals from treatment plan addressed this week:  1. Manage mental health symptoms (3:1)  Effectiveness of strategies:  1. Effective: Patient will be connected with [facility] services as needed (therapy, psychiatry, etc). Patient will report any new or worsening mental health symptoms or concerns to counselor immediately. Patient reports no significant concerns for this week.    Dimension #4 - Treatment Acceptance/Resistance - Risk 2. Patient reports he is here due to probation. He states that he does feel that he is required to be here, and he does not feel that his use is a problem. Patient verbalizes that he is unsure if he will be able to stop using despite being in  treatment. He does not appear genuinely motivated for treatment or change at this time. Patient verbalizes that he is not willing to follow recommendations at this time if he is referred to a higher level of care, although did verbalize understanding that a referral to a higher level of care would be imminent if he continued to use. He appears to lack motivation for treatment and change at this time, and is in treatment solely due to probation.  Specific goals from treatment plan addressed this week:  1. Follow through with intentions to treat chemical dependency concerns while meeting OP treatment expectations in order to graduate successfully from the program (4:1)  Effectiveness of strategies:  1. Emerging: The patient attended 2 group sessions this week. Patient has reiterated his commitment to adhere to treatment expectations and complete treatment accordingly. Patient will continue to work on assignments, and contacts staff at [phone] if unable to attend group for any reason.     Dimension #5 - Relapse Potential - Risk 4. Patient reports active daily use of meth at this time. He therefore currently appears to lack the necessary skills to arrest his use and prevent relapse without support and structure. Patient appears to lack some insight into his use and the impact it has had on his life as he continues to use despite probation. Patient ongoing use up until this intake appears to indicate an increased risk of relapse at this time. Patient does appear to have some knowledge of addiction and recovery as he endorses numerous treatments within the past year. It does not appear that he utilizes that knowledge to help maintain sobriety at this time  Specific goals from treatment plan addressed this week:  1. Patient to gain skills to help prevent relapse (5:1)  Effectiveness of strategies:  1. Emerging: Patient reports last illicit use was 10/1/2019. Patient denies any further use. Patient will continue  to identify his personal triggers and high-risk situations for relapse, and implement personal coping strategies to manage urges to use.    Dimension #6 - Recovery Environment - Risk 3. Patient is currently unemployed and so lacks structured and meaningful activity for his daily life. Patient reports a stable place to live at this time, however he indicated that his significant other does also use, so it does not appear that it is the most supportive living environment at this time. Patient reports that he does not currently have any support in his life for his sobriety. Patient is currently on probation for terroristic threats  Specific goals from treatment plan addressed this week:  1. Gain sober support and structure (6:1)  2. Comply with probation expectations (6:2)  Effectiveness of strategies:  1. Emerging: Patient is willing to explore and identify sober support meetings to regularly attend. Patient will obtain a sponsor prior to phase II of DOP, and develop weekly schedule of activities, including other activities that will benefit his recovery. Examples could be scheduled time with family, working out, or other activities that you find enjoyable No concerns reported for this week!  2. Emerging: Patient will attend all scheduled meetings with PO. Comply with all UA requests, and inform counselor immediately of any changes or updates to probation. Patient has no concerns at this time.    T) Treatment plan updated no.  Patient notified and in agreement NA.  Patient educated on Medical Aspects and Feelings. Patient has completed 18 of 84 program hours at this time. Projected discharge date is 3/13/2020. [facility] plan [facility].     Roberto Richter  10/8/2019, 9:15 AM        Psycho-Educational Curriculum  Date Attended  Psycho-Educational Curriculum  Date Attended    Acceptance   Shame/Guilt     1st Step   Anger/Rage     Affirmations   Mental Health     Automatic Negative Thoughts   Anxiety   "   Cross Addiction   Co-Occurring Disorders     Stages of Change   Paola/Bipolar     Anonymous People-Video 9/13/2019     Radical Acceptance 9/18/2019           Relapse   Trauma      Addictive Thoughts   Victim Identity     Coping Skills   Sober Structure     Relapse Prevention   Continuum of Care     The Stages of Relapse 9/23/2019                 Medical Aspects   Non-12 Step Support     Brain/Neurotransmitters   Priorities     Medication Compliance   Spirituality     MARÍA Alcohol/Drug Research   Weekend Planner     Spirituality 10/2/2019 The 8 Dimensions of Wellness 9/11/2019         Physical Health   Educational Videos     Post Acute Withdrawal   1st Step     Pregnancy and Drug Use   2nd Step     Sexual Health   Assertive Communication     Short-Term/Long-Term Effects   My name is Red THOMSON    Relationships   Cross Addiction     Assertive Communication   God As We Understood Him     Boundaries   HBO Relapse     Codependence    HBO What Is Addiction     Defense Mechanisms    Medical Aspects 1     Family Roles   Medical Aspects 2     Goodbye Letter   [employer] Geographic: Stress     Intimacy   PBS Depression Out of the Shadows     Needs/Dealbreakers in Relationships   The Anonymous People    Socialization Skills   [place]     Feelings   Nickolas Sheth \"Highjacked Brain\"    Identifying Feelings 10/7/2019           ABC Model of Emotion   Antonio Novak Humor in Tx    Grief and Loss   The Mindfulness Movie    Healthy vs. Unhealthy Feelings   Red THOMSON documentary     Meditation/Mindfulness  Weekly     Overconfidence/Complacency       Resentments       Stress         "

## 2021-06-02 NOTE — PROGRESS NOTES
Ricky Rowland attended 3 hours of group today.     The group topic was Feelings, patient was responsive to topic.     Patients engagement in the group session: medium     Total group size: 8      Roberto Richter  10/9/2019, 12:42 PM

## 2021-06-02 NOTE — PROGRESS NOTES
Ricky Rowland attended 2 hours of group today.     The group topic was Sober Structure, patient was responsive to topic.     Patient's engagement in the group session: medium     Total group size: 8      Roberto Richter  10/28/2019, 12:49 PM

## 2021-06-02 NOTE — PROGRESS NOTES
Patient was scheduled for 1 x 1 counseling session with his counselor today at 1:30 PM but did not show. The was scheduled for a 1 x 1 session last week and also failed to show. Patient does not offer any tangible reasons for missing appointments and does not call. The patient is on a treatment success contract which was explained to him before he signed. Patient is on probation with [facility]. Writer called to contact patient at 2:10 PM, but he did not answer. He will be given the opportunity tomorrow, 10/23/2019, during group time, to give reason why he did not show for today's appointment.

## 2021-06-02 NOTE — PROGRESS NOTES
Ricky Rowland attended 2 hours of group today.     The group topic was Mental Health, patient was responsive to topic.     Patient's engagement in the group session: medium     Total group size: 5      Roberto Richter  10/21/2019, 12:39 PM

## 2021-06-02 NOTE — PROGRESS NOTES
Weekly Progress Note: Late Entry for week of 10/23/19 to 10/28/2019.   Ricky Rowland  1990  [id number]      D) Pt attended 2 groups this week with 1 absences. Patient attended 0 individual sessions this week. Intake was on 9/9/2019. A) Staff facilitated groups and reviewed tx progress. Assessed for [facility]. R) No VAP needed at this time.   Any significant events, defines as events that impact patients relationship with others inside and outside of treatment Yes: Patient reports probation with [facility]  Indicate any changes or monitoring of physical or mental health problems Yes: Patient reports ADHD, OCD, and Depression.    Indicate involvement by any outside supports Yes: Patient support from family, friends, and probation  IAPP reviewed and modified as needed. NA  Pt working on the following dimensions:  Dimension #1 - Withdrawal Potential - Risk 1. Patient reports daily use of methamphetamine with his last endorsed use being on 9/9/19, about 1 hour prior to intake. Patient endorses weekly use of alcohol, with his last use being on 9/7/19. He denies any other pattern of other substance use at this time. Patient denies current withdrawal symptoms or concerns. Patient shows no physical signs or symptoms of intoxication or withdrawal. He may be at risk of moderate symptoms due to his recent use. He does not appear to be at risk of severe withdrawal at this time. Patient is oriented x4.  Specific goals from treatment plan addressed this week:  1. Maintain abstinence (1:1)  Effectiveness of strategies:  1. Effective: Patient reported last time of use on 10/1/2019, and denies any withdrawal/intoxication concerns at this time. Patient shared that he is making efforts to be more compliant with treatment expectations.    Dimension #2 - Biomedical - Risk 0. Patient denies health issues or concerns. He reports that he is unsure if he has a PCP at this time, but does have insurance and so appears able to get his medical  needs met and addressed as necessary. Patient denies immediate medical needs or concerns. He appears to be in adequate physical health and functioning at this time.  Specific goals from treatment plan addressed this week:  1. Maintain stable physical health and functioning (2:1)  2. Patient to receive information about smoking cessation (2:2)  Effectiveness of strategies:  1. Emerging: Patient reports that his physical health is stable, with no immediate concerns this week. Patient reports ability to access health care services as needed.   2. 9/24: Patient has verbalized that he has no desire to stop smoking cigarettes at this time.    Dimension #3 - Emotional/Behavioral/Cognitive - Risk 2. Patient reports mental health diagnoses of ADHD, OCD, and depression. He denies current mental health services or medications. His mental health does not appear stable or managed at this time. Patient endorses using meth due to not having medication for his ADHD. Patient does appear to struggle with impulse control as evidence by his substance use despite being on probation, as well as his endorsed problems with gambling. Patient denies any suicidal or homicidal thoughts or plans. Patient is oriented x4.  Specific goals from treatment plan addressed this week:  1. Manage mental health symptoms (3:1)  Effectiveness of strategies:  1. Effective: Patient reports ADHD, OCD, and Depression. Patient was scheduled for a 1 x 1 counseling session to discuss mental health services but did not show. He will be reschedule as soon as possible.    Dimension #4 - Treatment Acceptance/Resistance - Risk 2. Patient reports he is here due to probation. He states that he does feel that he is required to be here, and he does not feel that his use is a problem. Patient verbalizes that he is unsure if he will be able to stop using despite being in treatment. He does not appear genuinely motivated for treatment or change at this time. Patient  verbalizes that he is not willing to follow recommendations at this time if he is referred to a higher level of care, although did verbalize understanding that a referral to a higher level of care would be imminent if he continued to use. He appears to lack motivation for treatment and change at this time, and is in treatment solely due to probation.  Specific goals from treatment plan addressed this week:  1. Follow through with intentions to treat chemical dependency concerns while meeting OP treatment expectations in order to graduate successfully from the program (4:1)  Effectiveness of strategies:  1. Emerging: Patient attended 2 group sessions this week, with 1 excuse absence due to a self-reported illness. Patient will continue to contacts staff at [phone] if unable to attend group for any reason.     Dimension #5 - Relapse Potential - Risk 4. Patient reports active daily use of meth at this time. He therefore currently appears to lack the necessary skills to arrest his use and prevent relapse without support and structure. Patient appears to lack some insight into his use and the impact it has had on his life as he continues to use despite probation. Patient ongoing use up until this intake appears to indicate an increased risk of relapse at this time. Patient does appear to have some knowledge of addiction and recovery as he endorses numerous treatments within the past year. It does not appear that he utilizes that knowledge to help maintain sobriety at this time  Specific goals from treatment plan addressed this week:  1. Patient to gain skills to help prevent relapse (5:1)  Effectiveness of strategies:  1. Emerging: Patient denies any relapse/continued use since 10/1/2019. Patient reports ability to identify personal triggers and high-risk situations for relapse. Patient is able to implement personal coping strategies to prevent future use.    Dimension #6 - Recovery Environment - Risk 3.  Patient is currently unemployed and so lacks structured and meaningful activity for his daily life. Patient reports a stable place to live at this time, however he indicated that his significant other does also use, so it does not appear that it is the most supportive living environment at this time. Patient reports that he does not currently have any support in his life for his sobriety. Patient is currently on probation for terroristic threats  Specific goals from treatment plan addressed this week:  1. Gain sober support and structure (6:1)  2. Comply with probation expectations (6:2)  Effectiveness of strategies:  1. Emerging: Patient is willing to identify and discuss sober support meetings in group. Patient also verbalized willingness to obtain a sponsor prior to phase II of DOP. He will continue to identify and implement activities that will benefit his recovery.  2. Emerging: Patient is willing to attend scheduled meetings with PO and comply with all UA requests. Patient reports stable housing at this time but no employment. He will inform counselor of any changes in his current living arrangement that may interfere with treatment or probation requirements.  T) Treatment plan updated no.  Patient notified and in agreement NA.  Patient educated on Mental Health and Sober Support. Patient has completed 34 of 84 program hours at this time. Projected discharge date is 3/13/2020. [facility] plan [facility].     Roberto Richter  10/31/2019, 1:45 PM        Psycho-Educational Curriculum  Date Attended  Psycho-Educational Curriculum  Date Attended    Acceptance   Shame/Guilt     1st Step   Anger/Rage     Affirmations   Mental Health     Automatic Negative Thoughts   Anxiety     Cross Addiction   Co-Occurring Disorders     Stages of Change   Paola/Bipolar     Anonymous People-Video 9/13/2019 Positive Traits 10/21/2019   Radical Acceptance 9/18/2019 MH Benefits of Exercise 10/25/2019         Relapse    "Trauma      Addictive Thoughts   Victim Identity     Coping Skills   Sober Structure     Relapse Prevention   Continuum of Care     The Stages of Relapse 9/23/2019 Building Social Support 10/28/2019               Medical Aspects   Non-12 Step Support     Brain/Neurotransmitters   Priorities     Medication Compliance   Spirituality     MARÍA Alcohol/Drug Research   Weekend Planner     Spirituality 10/2/2019 The 8 Dimensions of Wellness 9/11/2019         Physical Health   Educational Videos     Post Acute Withdrawal   1st Step     Pregnancy and Drug Use   2nd Step     Sexual Health   Assertive Communication     Short-Term/Long-Term Effects   My name is Red THOMSON    Relationships   Cross Addiction     Healthy Vs Unhealthy Relationships 10/14/2019     Forming Stable Relationships 10/16/2019     Assertive Communication   God As We Understood Him     Boundaries   HBO Relapse     Codependence    HBO What Is Addiction     Defense Mechanisms    Medical Aspects 1     Family Roles   Medical Aspects 2     Goodbye Letter   [employer] Geographic: Stress     Intimacy   PBS Depression Out of the Shadows     Needs/Dealbreakers in Relationships   The Anonymous People    Socialization Skills   [place]     Feelings   Nickolas Sheth \"Highjacked Brain\"    Identifying Feelings 10/7/2019     Life Satisfaction Checklist 10/9/2019     ABC Model of Emotion   Antonio Novak Humor in Tx    Grief and Loss   The Mindfulness Movie    Healthy vs. Unhealthy Feelings   Red THOMSON documentary     Meditation/Mindfulness  Weekly     Overconfidence/Complacency       Resentments       Stress         "

## 2021-06-02 NOTE — TELEPHONE ENCOUNTER
Writer faxed patient's treatment verification to [facility], per patient's request. Writer also called and talked with PO. He was informed that the treatment verification was faxed. PO with contact writer if fax does not go through.      Roberto Richter [facility]

## 2021-06-02 NOTE — PROGRESS NOTES
Weekly Progress Note: The patient did not attend group sessions for the week of 9/25/19 to 9/30/2019.   Ricky Rowland  1990  [id number]      D) Pt attended 2 groups this week with 1 absences. Patient attended 0 individual sessions this week. Intake was on 9/9/2019. A) Staff facilitated groups and reviewed tx progress. Assessed for [facility]. R) No VAP needed at this time.   Any significant events, defines as events that impact patients relationship with others inside and outside of treatment Yes: Patient reports probation with [facility]  Indicate any changes or monitoring of physical or mental health problems Yes: Patient reports ADHD, OCD, and Depression.    Indicate involvement by any outside supports Yes: Patient support from family, friends, and probation  IAPP reviewed and modified as needed. NA  Pt working on the following dimensions:  Dimension #1 - Withdrawal Potential - Risk 1. Patient reports daily use of methamphetamine with his last endorsed use being on 9/9/19, about 1 hour prior to intake. Patient endorses weekly use of alcohol, with his last use being on 9/7/19. He denies any other pattern of other substance use at this time. Patient denies current withdrawal symptoms or concerns. Patient shows no physical signs or symptoms of intoxication or withdrawal. He may be at risk of moderate symptoms due to his recent use. He does not appear to be at risk of severe withdrawal at this time. Patient is oriented x4.  Specific goals from treatment plan addressed this week:  1. Maintain abstinence (1:1)  Effectiveness of strategies:  1. Emerging: Patient reports abstinence since last date of use on 10/1/2019. Patient denies any withdrawal/intoxication concerns at this time.     Dimension #2 - Biomedical - Risk 0. Patient denies health issues or concerns. He reports that he is unsure if he has a PCP at this time, but does have insurance and so appears able to get his medical needs met and addressed as necessary.  Patient denies immediate medical needs or concerns. He appears to be in adequate physical health and functioning at this time.  Specific goals from treatment plan addressed this week:  1. Maintain stable physical health and functioning (2:1)  2. Patient to receive information about smoking cessation (2:2)  Effectiveness of strategies:  1. Emerging: Patient reports no physical health concerns this week. Patient reports ability to access services to address any health concerns. Patient reports no new concerns at this time.  2. 9/24: Patient has verbalized that he has no desire to stop smoking cigarettes at this time.    Dimension #3 - Emotional/Behavioral/Cognitive - Risk 2. Patient reports mental health diagnoses of ADHD, OCD, and depression. He denies current mental health services or medications. His mental health does not appear stable or managed at this time. Patient endorses using meth due to not having medication for his ADHD. Patient does appear to struggle with impulse control as evidence by his substance use despite being on probation, as well as his endorsed problems with gambling. Patient denies any suicidal or homicidal thoughts or plans. Patient is oriented x4.  Specific goals from treatment plan addressed this week:  1. Manage mental health symptoms (3:1)  Effectiveness of strategies:  1. Effective: Patient reports ADHD, OCD, and Depression. Patient will have a 1 x 1 counseling session to discuss mental health services and to establish care as needed.    Dimension #4 - Treatment Acceptance/Resistance - Risk 2. Patient reports he is here due to probation. He states that he does feel that he is required to be here, and he does not feel that his use is a problem. Patient verbalizes that he is unsure if he will be able to stop using despite being in treatment. He does not appear genuinely motivated for treatment or change at this time. Patient verbalizes that he is not willing to follow recommendations at  this time if he is referred to a higher level of care, although did verbalize understanding that a referral to a higher level of care would be imminent if he continued to use. He appears to lack motivation for treatment and change at this time, and is in treatment solely due to probation.  Specific goals from treatment plan addressed this week:  1. Follow through with intentions to treat chemical dependency concerns while meeting OP treatment expectations in order to graduate successfully from the program (4:1)  Effectiveness of strategies:  1. Emerging: Patient attended 2 group sessions this week. Patient has reiterated his commitment to adhere to treatment expectations and complete as expected. Patient will continue to work on assignments, and contacts staff at [phone] if unable to attend group for any reason.     Dimension #5 - Relapse Potential - Risk 4. Patient reports active daily use of meth at this time. He therefore currently appears to lack the necessary skills to arrest his use and prevent relapse without support and structure. Patient appears to lack some insight into his use and the impact it has had on his life as he continues to use despite probation. Patient ongoing use up until this intake appears to indicate an increased risk of relapse at this time. Patient does appear to have some knowledge of addiction and recovery as he endorses numerous treatments within the past year. It does not appear that he utilizes that knowledge to help maintain sobriety at this time  Specific goals from treatment plan addressed this week:  1. Patient to gain skills to help prevent relapse (5:1)  Effectiveness of strategies:  1. Emerging: Patient reports no relapse/continued use  At this time. Patient reports last illicit use on 10/1/2019, with no further use since. Patient is able to identify personal triggers and high-risk situations for relapse, and implement personal coping strategies to prevent future  use.    Dimension #6 - Recovery Environment - Risk 3. Patient is currently unemployed and so lacks structured and meaningful activity for his daily life. Patient reports a stable place to live at this time, however he indicated that his significant other does also use, so it does not appear that it is the most supportive living environment at this time. Patient reports that he does not currently have any support in his life for his sobriety. Patient is currently on probation for terroristic threats  Specific goals from treatment plan addressed this week:  1. Gain sober support and structure (6:1)  2. Comply with probation expectations (6:2)  Effectiveness of strategies:  1. Emerging: Patient reports ability to explore and identify sober support meetings as needed. He is willing to obtain a sponsor prior to phase II of DOP, and develop weekly schedule of activities that will benefit his recovery.  2. Emerging: Patient will attend all scheduled meetings with PO and comply with all UA requests. Patient will inform counselor immediately of any changes or updates to probation. Patient has no changes to report today.  T) Treatment plan updated no.  Patient notified and in agreement NA.  Patient educated on Relationships and Mental Health. Patient has completed 29 of 84 program hours at this time. Projected discharge date is 3/13/2020. [facility].     Roberto Richter  10/22/2019, 10:25 AM        Psycho-Educational Curriculum  Date Attended  Psycho-Educational Curriculum  Date Attended    Acceptance   Shame/Guilt     1st Step   Anger/Rage     Affirmations   Mental Health     Automatic Negative Thoughts   Anxiety     Cross Addiction   Co-Occurring Disorders     Stages of Change   Paola/Bipolar     Anonymous People-Video 9/13/2019 Positive Traits 10/21/2019   Radical Acceptance 9/18/2019           Relapse   Trauma      Addictive Thoughts   Victim Identity     Coping Skills   Sober Structure    "  Relapse Prevention   Continuum of Care     The Stages of Relapse 9/23/2019                 Medical Aspects   Non-12 Step Support     Brain/Neurotransmitters   Priorities     Medication Compliance   Spirituality     MARÍA Alcohol/Drug Research   Weekend Planner     Spirituality 10/2/2019 The 8 Dimensions of Wellness 9/11/2019         Physical Health   Educational Videos     Post Acute Withdrawal   1st Step     Pregnancy and Drug Use   2nd Step     Sexual Health   Assertive Communication     Short-Term/Long-Term Effects   My name is Red THOMSON    Relationships   Cross Addiction     Healthy Vs Unhealthy Relationships 10/14/2019     Forming Stable Relationships 10/16/2019     Assertive Communication   God As We Understood Him     Boundaries   HBO Relapse     Codependence    HBO What Is Addiction     Defense Mechanisms    Medical Aspects 1     Family Roles   Medical Aspects 2     Goodbye Letter   [employer] Geographic: Stress     Intimacy   PBS Depression Out of the Shadows     Needs/Dealbreakers in Relationships   The Anonymous People    Socialization Skills   [place]     Feelings   Nickolas Sheth \"Highjacked Brain\"    Identifying Feelings 10/7/2019     Life Satisfaction Checklist 10/9/2019     ABC Model of Emotion   Antonio Novak Humor in Tx    Grief and Loss   The Mindfulness Movie    Healthy vs. Unhealthy Feelings   Red THOMSON documentary     Meditation/Mindfulness  Weekly     Overconfidence/Complacency       Resentments       Stress         "

## 2021-06-02 NOTE — PROGRESS NOTES
Ricky Rowland attended 3 hours of group today.     The group topic was Relationships, patient was responsive to topic.     Patient's engagement in the group session: medium     Total group size: 6      Roberto Richter  10/14/2019, 12:47 PM

## 2021-06-02 NOTE — PROGRESS NOTES
Ricky Rowland attended 3 hours of group today.     The group topic was Sober Structure, patient was responsive to topic.     Patient's engagement in the group session: medium     Total group size: 4      Roberto Richter  11/1/2019, 12:45 PM

## 2021-06-02 NOTE — PROGRESS NOTES
Ricky Rowland attended 3 hours of group today.     The group topic was Sober Structure, patient was responsive to topic.     Patient's engagement in the group session: medium     Total group size: 5      Roberto Richter  10/30/2019, 1:28 PM

## 2021-06-03 NOTE — PROGRESS NOTES
Patient submitted a UA sample today for a lab test and the result was negative for all mood altering substances. Writer will continue to monitor and work with patient to ensure that patient does no return to illicit drugs use.

## 2021-06-03 NOTE — TELEPHONE ENCOUNTER
Writer attempted to contact patient on 12/3/2019, but patient did not answer. The patient's PO called from [facility] to report that he had not heard from the patient for weeks and was concerned. He wanted to know if the patient was still active in treatment. Patient is being considered for discharge today if he does not show up for group, or contact his counselor to give reason why he has not been attending group.      AMERICA Durand  
Lior

## 2021-06-03 NOTE — PROGRESS NOTES
Weekly Progress Note:   Ricky Rowland  1990  [id number]      D) Pt attended 1 groups this week with 2 absences. Patient attended 0 individual sessions this week. Intake was on 9/9/2019. A) Staff facilitated groups and reviewed tx progress. Ass[facility] for [facility]. R) No VAP needed at this time.   Any significant events, defines as events that impact patients relationship with others inside and outside of treatment Yes: Patient reports probation with [facility]  Indicate any changes or monitoring of physical or mental health problems Yes: Patient reports ADHD, OCD, and Depression.    Indicate involvement by any outside supports Yes: Patient support from family, friends, and probation  IAPP reviewed and modified as needed. NA  Pt working on the following dimensions:  Dimension #1 - Withdrawal Potential - Risk 1. Patient reports daily use of methamphetamine with his last endorsed use being on 9/9/19, about 1 hour prior to intake. Patient endorses weekly use of alcohol, with his last use being on 9/7/19. He denies any other pattern of other substance use at this time. Patient denies current withdrawal symptoms or concerns. Patient shows no physical signs or symptoms of intoxication or withdrawal. He may be at risk of moderate symptoms due to his recent use. He does not appear to be at risk of severe withdrawal at this time. Patient is oriented x4.  Specific goals from treatment plan addressed this week:  1. Maintain abstinence (1:1)  Effectiveness of strategies:  1. Effective: Patient reports no withdrawal/intoxication concerns this week. Patient reports last use of any mood-altering substances was on 10/1/2019.    Dimension #2 - Biomedical - Risk 0. Patient denies health issues or concerns. He reports that he is unsure if he has a PCP at this time, but does have insurance and so appears able to get his medical needs met and addressed as necessary. Patient denies immediate medical needs or concerns. He appears to be in  adequate physical health and functioning at this time.  Specific goals from treatment plan addressed this week:  1. Maintain stable physical health and functioning (2:1)  2. Patient to receive information about smoking cessation (2:2)  Effectiveness of strategies:  1. Emerging: Patient reports no concerns with his current physical health. Patient has current health insurance and is able to get health care services as needed.   2. 9/24: Patient has verbalized that he has no desire to stop smoking cigarettes at this time.    Dimension #3 - Emotional/Behavioral/Cognitive - Risk 2. Patient reports mental health diagnoses of ADHD, OCD, and depression. He denies current mental health services or medications. His mental health does not appear stable or managed at this time. Patient endorses using meth due to not having medication for his ADHD. Patient does appear to struggle with impulse control as evidence by his substance use despite being on probation, as well as his endorsed problems with gambling. Patient denies any suicidal or homicidal thoughts or plans. Patient is oriented x4.  Specific goals from treatment plan addressed this week:  1. Manage mental health symptoms (3:1)  Effectiveness of strategies:  1. Effective: Patient reports ADHD, OCD, and Depression. Staff will encourage patient to discuss establishing mental health services at [facility].    Dimension #4 - Treatment Acceptance/Resistance - Risk 2. Patient reports he is here due to probation. He states that he does feel that he is required to be here, and he does not feel that his use is a problem. Patient verbalizes that he is unsure if he will be able to stop using despite being in treatment. He does not appear genuinely motivated for treatment or change at this time. Patient verbalizes that he is not willing to follow recommendations at this time if he is referred to a higher level of care, although did verbalize understanding that a  referral to a higher level of care would be imminent if he continued to use. He appears to lack motivation for treatment and change at this time, and is in treatment solely due to probation.  Specific goals from treatment plan addressed this week:  1. Follow through with intentions to treat chemical dependency concerns while meeting OP treatment expectations in order to graduate successfully from the program (4:1)  Effectiveness of strategies:  1. Emerging: Patient attended 1 group sessions this week, with 2 absences. Patient has completed 43 hours of treatment time in [facility]. He will continue to contacts staff at [phone] whenever he is unable to attend group for any reason.     Dimension #5 - Relapse Potential - Risk 4. Patient reports active daily use of meth at this time. He therefore currently appears to lack the necessary skills to arrest his use and prevent relapse without support and structure. Patient appears to lack some insight into his use and the impact it has had on his life as he continues to use despite probation. Patient ongoing use up until this intake appears to indicate an increased risk of relapse at this time. Patient does appear to have some knowledge of addiction and recovery as he endorses numerous treatments within the past year. It does not appear that he utilizes that knowledge to help maintain sobriety at this time  Specific goals from treatment plan addressed this week:  1. Patient to gain skills to help prevent relapse (5:1)  Effectiveness of strategies:  1. Emerging: Patient reports complete abstinence, with no relapse/continued use since 10/1/2019. Patient has verbalized ability to identify high-risk situations for relapse, and utilize appropriatel coping skills to prevent future use.    Dimension #6 - Recovery Environment - Risk 3. Patient is currently unemployed and so lacks structured and meaningful activity for his daily life. Patient reports a stable place to live at this  time, however he indicated that his significant other does also use, so it does not appear that it is the most supportive living environment at this time. Patient reports that he does not currently have any support in his life for his sobriety. Patient is currently on probation for terroristic threats  Specific goals from treatment plan addressed this week:  1. Gain sober support and structure (6:1)  2. Comply with probation expectations (6:2)  Effectiveness of strategies:  1. Emerging: Patient is able to identify sober support meetings that he may attend. Patient appears to be open to the idea of a sponsor prior to completing phase IIl of DOP. Patient is currently unemployed.  2. Emerging: Patient reports compliance with PO, ans is willing to provide all requested UAs. Patient reports stable housing at this time. He will inform counselor of any changes in his current living arrangement that may interfere with treatment or probation requirements. Patient reports that he and his SO are expectation a baby anytime from  Now.  T) Treatment plan updated no.  Patient notified and in agreement NA.  Patient educated on Acceptance. Patient has completed 43 of 84 program hours at this time. Projected discharge date is 3/13/2020. [facility] plan [facility].     Roberto Richter  11/12/2019, 10:02 AM        Psycho-Educational Curriculum  Date Attended  Psycho-Educational Curriculum  Date Attended    Acceptance   Shame/Guilt     1st Step   Anger/Rage     Affirmations   Mental Health     Automatic Negative Thoughts   Anxiety     Cross Addiction   Co-Occurring Disorders     Stages of Change   Paola/Bipolar     Anonymous People-Video 9/13/2019 Positive Traits 10/21/2019   Radical Acceptance 9/18/2019 MH Benefits of Exercise 10/25/2019   Use History 11/8/2019     Relapse   Trauma      Addictive Thoughts   Victim Identity     Coping Skills   Sober Structure     Relapse Prevention   Continuum of Care     The Stages of  "Relapse 9/23/2019 Building Social Support 10/28/2019     Showing Appreciation 10/30/2019     Setting Boundaries 11/1/2019   Medical Aspects   Non-12 Step Support     Brain/Neurotransmitters   Priorities     Medication Compliance   Spirituality     MARÍA Alcohol/Drug Research   Weekend Planner     Spirituality 10/2/2019 The 8 Dimensions of Wellness 9/11/2019         Physical Health   Educational Videos     Post Acute Withdrawal   1st Step     Pregnancy and Drug Use   2nd Step     Sexual Health   Assertive Communication     Short-Term/Long-Term Effects   My name is Red THOMSON    Relationships   Cross Addiction     Healthy Vs Unhealthy Relationships 10/14/2019     Forming Stable Relationships 10/16/2019     Assertive Communication   God As We Understood Him     Boundaries   HBO Relapse     Codependence    HBO What Is Addiction     Defense Mechanisms    Medical Aspects 1     Family Roles   Medical Aspects 2     Goodbye Letter   [employer] Geographic: Stress     Intimacy   PBS Depression Out of the Shadows     Needs/Dealbreakers in Relationships   The Anonymous People    Socialization Skills   [place]     Feelings   Nickolas Sheth \"Highjacked Brain\"    Identifying Feelings 10/7/2019     Life Satisfaction Checklist 10/9/2019     ABC Model of Emotion   Antonio Novak Humor in Tx    Grief and Loss   The Mindfulness Movie    Healthy vs. Unhealthy Feelings   Red THOMSON documentary     Meditation/Mindfulness  Weekly     Overconfidence/Complacency       Resentments       Stress         "

## 2021-06-03 NOTE — PROGRESS NOTES
Weekly Progress Note:   Ricky Rowland  1990  [id number]      D) Pt attended 2 groups this week with 1 absences. Patient attended 0 individual sessions this week. Intake was on 9/9/2019. A) Staff facilitated groups and reviewed tx progress. [facility] for [facility]. R) No VAP needed at this time.   Any significant events, defines as events that impact patients relationship with others inside and outside of treatment Yes: Patient reports probation with [facility]  Indicate any changes or monitoring of physical or mental health problems Yes: Patient reports ADHD, OCD, and Depression.    Indicate involvement by any outside supports Yes: Patient support from family, friends, and probation  IAPP reviewed and modified as needed. NA  Pt working on the following dimensions:  Dimension #1 - Withdrawal Potential - Risk 1. Patient reports daily use of methamphetamine with his last endorsed use being on 9/9/19, about 1 hour prior to intake. Patient endorses weekly use of alcohol, with his last use being on 9/7/19. He denies any other pattern of other substance use at this time. Patient denies current withdrawal symptoms or concerns. Patient shows no physical signs or symptoms of intoxication or withdrawal. He may be at risk of moderate symptoms due to his recent use. He does not appear to be at risk of severe withdrawal at this time. Patient is oriented x4.  Specific goals from treatment plan addressed this week:  1. Maintain abstinence (1:1)  Effectiveness of strategies:  1. Effective: Patient denies any withdrawal/intoxication concerns this week. Patient reports last date of use on 10/1/2019, with no further use since.    Dimension #2 - Biomedical - Risk 0. Patient denies health issues or concerns. He reports that he is unsure if he has a PCP at this time, but does have insurance and so appears able to get his medical needs met and addressed as necessary. Patient denies immediate medical needs or concerns. He appears to be in  adequate physical health and functioning at this time.  Specific goals from treatment plan addressed this week:  1. Maintain stable physical health and functioning (2:1)  2. Patient to receive information about smoking cessation (2:2)  Effectiveness of strategies:  1. Emerging: Patient reports no current concerns with his physical health. Patient reports ability to get health care services as needed.   2. 9/24: Patient has verbalized that he has no desire to stop smoking cigarettes at this time.    Dimension #3 - Emotional/Behavioral/Cognitive - Risk 2. Patient reports mental health diagnoses of ADHD, OCD, and depression. He denies current mental health services or medications. His mental health does not appear stable or managed at this time. Patient endorses using meth due to not having medication for his ADHD. Patient does appear to struggle with impulse control as evidence by his substance use despite being on probation, as well as his endorsed problems with gambling. Patient denies any suicidal or homicidal thoughts or plans. Patient is oriented x4.  Specific goals from treatment plan addressed this week:  1. Manage mental health symptoms (3:1)  Effectiveness of strategies:  1. Effective: Patient reports ADHD, OCD, and Depression. Patient was scheduled for a 1 x 1 counseling session twice to discuss mental health services but did not show. Staff will continue to have discussions with patient regarding mental health services.    Dimension #4 - Treatment Acceptance/Resistance - Risk 2. Patient reports he is here due to probation. He states that he does feel that he is required to be here, and he does not feel that his use is a problem. Patient verbalizes that he is unsure if he will be able to stop using despite being in treatment. He does not appear genuinely motivated for treatment or change at this time. Patient verbalizes that he is not willing to follow recommendations at this time if he is referred to a  higher level of care, although did verbalize understanding that a referral to a higher level of care would be imminent if he continued to use. He appears to lack motivation for treatment and change at this time, and is in treatment solely due to probation.  Specific goals from treatment plan addressed this week:  1. Follow through with intentions to treat chemical dependency concerns while meeting OP treatment expectations in order to graduate successfully from the program (4:1)  Effectiveness of strategies:  1. Emerging: Patient attended 2 group sessions this week, with 1 excuse absence due to a court date for his significant other. Patient has completed 40 hours of treatment time in [facility]. He will continue to contacts staff at [phone] whenever he is unable to attend group for any reason.     Dimension #5 - Relapse Potential - Risk 4. Patient reports active daily use of meth at this time. He therefore currently appears to lack the necessary skills to arrest his use and prevent relapse without support and structure. Patient appears to lack some insight into his use and the impact it has had on his life as he continues to use despite probation. Patient ongoing use up until this intake appears to indicate an increased risk of relapse at this time. Patient does appear to have some knowledge of addiction and recovery as he endorses numerous treatments within the past year. It does not appear that he utilizes that knowledge to help maintain sobriety at this time  Specific goals from treatment plan addressed this week:  1. Patient to gain skills to help prevent relapse (5:1)  Effectiveness of strategies:  1. Emerging: Patient reports last relapse/continued use was on 10/1/2019. Patient is able to identify high-risk situations for relapse, and utilize appropriatel coping skills to prevent future use.    Dimension #6 - Recovery Environment - Risk 3. Patient is currently unemployed and so lacks structured and  meaningful activity for his daily life. Patient reports a stable place to live at this time, however he indicated that his significant other does also use, so it does not appear that it is the most supportive living environment at this time. Patient reports that he does not currently have any support in his life for his sobriety. Patient is currently on probation for terroristic threats  Specific goals from treatment plan addressed this week:  1. Gain sober support and structure (6:1)  2. Comply with probation expectations (6:2)  Effectiveness of strategies:  1. Emerging: Patient is able to identify and discuss sober support meetings in group. He has also verbalized willingness to obtain a sponsor prior to completing phase IIl of DOP. Patient is currently unemployed.  2. Emerging: Patient has reported compliance with PO. Patient will comply with all requested UAs. Patient reports stable housing at this time. He will inform counselor of any changes in his current living arrangement that may interfere with treatment or probation requirements.  T) Treatment plan updated no.  Patient notified and in agreement NA.  Patient educated on Sober Support. Patient has completed 40 of 84 program hours at this time. Projected discharge date is 3/13/2020. [facility] plan [facility].     Roberto Richter  11/5/2019, 11:34 AM        Psycho-Educational Curriculum  Date Attended  Psycho-Educational Curriculum  Date Attended    Acceptance   Shame/Guilt     1st Step   Anger/Rage     Affirmations   Mental Health     Automatic Negative Thoughts   Anxiety     Cross Addiction   Co-Occurring Disorders     Stages of Change   Paola/Bipolar     Anonymous People-Video 9/13/2019 Positive Traits 10/21/2019   Radical Acceptance 9/18/2019 MH Benefits of Exercise 10/25/2019         Relapse   Trauma      Addictive Thoughts   Victim Identity     Coping Skills   Sober Structure     Relapse Prevention   Continuum of Care     The Stages  "of Relapse 9/23/2019 Building Social Support 10/28/2019     Showing Appreciation 10/30/2019     Setting Boundaries 11/1/2019   Medical Aspects   Non-12 Step Support     Brain/Neurotransmitters   Priorities     Medication Compliance   Spirituality     MARÍA Alcohol/Drug Research   Weekend Planner     Spirituality 10/2/2019 The 8 Dimensions of Wellness 9/11/2019         Physical Health   Educational Videos     Post Acute Withdrawal   1st Step     Pregnancy and Drug Use   2nd Step     Sexual Health   Assertive Communication     Short-Term/Long-Term Effects   My name is Red THOMSON    Relationships   Cross Addiction     Healthy Vs Unhealthy Relationships 10/14/2019     Forming Stable Relationships 10/16/2019     Assertive Communication   God As We Understood Him     Boundaries   HBO Relapse     Codependence    HBO What Is Addiction     Defense Mechanisms    Medical Aspects 1     Family Roles   Medical Aspects 2     Goodbye Letter   [employer]: Stress     Intimacy   PBS Depression Out of the Shadows     Needs/Dealbreakers in Relationships   The Anonymous People    Socialization Skills   [place]     Feelings   Nickolas Sheth \"Highjacked Brain\"    Identifying Feelings 10/7/2019     Life Satisfaction Checklist 10/9/2019     ABC Model of Emotion   Antonio Novak Humor in Tx    Grief and Loss   The Mindfulness Movie    Healthy vs. Unhealthy Feelings   Red THOMSON documentary     Meditation/Mindfulness  Weekly     Overconfidence/Complacency       Resentments       Stress         "

## 2021-06-03 NOTE — PROGRESS NOTES
Weekly Progress Note:   Ricky Rowland  1990  [id number]      D) Pt attended 1 groups this week with 2 absences. Patient attended 0 individual sessions this week. Intake was on 9/9/2019. A) Staff facilitated groups and reviewed tx progress. Assessed for [facility]. R) No VAP needed at this time.   Any significant events, defines as events that impact patients relationship with others inside and outside of treatment Yes: Patient reports probation with [facility]  Indicate any changes or monitoring of physical or mental health problems Yes: Patient reports ADHD, OCD, and Depression.    Indicate involvement by any outside supports Yes: Patient support from family, friends, and probation  IAPP reviewed and modified as needed. NA  Pt working on the following dimensions:  Dimension #1 - Withdrawal Potential - Risk 1. Patient reports daily use of methamphetamine with his last endorsed use being on 9/9/19, about 1 hour prior to intake. Patient endorses weekly use of alcohol, with his last use being on 9/7/19. He denies any other pattern of other substance use at this time. Patient denies current withdrawal symptoms or concerns. Patient shows no physical signs or symptoms of intoxication or withdrawal. He may be at risk of moderate symptoms due to his recent use. He does not appear to be at risk of severe withdrawal at this time. Patient is oriented x4.  Specific goals from treatment plan addressed this week:  1. Maintain abstinence (1:1)  Effectiveness of strategies:  1. Effective: Patient reports no illicit use since 10/1/2019, with no withdrawal/intoxication concerns reported at this time.    Dimension #2 - Biomedical - Risk 0. Patient denies health issues or concerns. He reports that he is unsure if he has a PCP at this time, but does have insurance and so appears able to get his medical needs met and addressed as necessary. Patient denies immediate medical needs or concerns. He appears to be in adequate physical health and  functioning at this time.  Specific goals from treatment plan addressed this week:  1. Maintain stable physical health and functioning (2:1)  2. Patient to receive information about smoking cessation (2:2)  Effectiveness of strategies:  1. Emerging: Patient has no current concerns about his physical. He reports health insurance through [place], [place], and is able to get health care services as needed.  2. 9/24: Patient has verbalized that he has no desire to stop smoking cigarettes at this time. Reports smoking daily.    Dimension #3 - Emotional/Behavioral/Cognitive - Risk 2. Patient reports mental health diagnoses of ADHD, OCD, and depression. He denies current mental health services or medications. His mental health does not appear stable or managed at this time. Patient endorses using meth due to not having medication for his ADHD. Patient does appear to struggle with impulse control as evidence by his substance use despite being on probation, as well as his endorsed problems with gambling. Patient denies any suicidal or homicidal thoughts or plans. Patient is oriented x4.  Specific goals from treatment plan addressed this week:  1. Manage mental health symptoms (3:1)  Effectiveness of strategies:  1. Effective: Patient reports ADHD, OCD, and Depression. Patient was scheduled for mental health assessment at [facility] on 11/21/19, but he did not show. He will be rescheduled asap.    Dimension #4 - Treatment Acceptance/Resistance - Risk 2. Patient reports he is here due to probation. He states that he does feel that he is required to be here, and he does not feel that his use is a problem. Patient verbalizes that he is unsure if he will be able to stop using despite being in treatment. He does not appear genuinely motivated for treatment or change at this time. Patient verbalizes that he is not willing to follow recommendations at this time if he is referred to a higher level of care, although did  verbalize understanding that a referral to a higher level of care would be imminent if he continued to use. He appears to lack motivation for treatment and change at this time, and is in treatment solely due to probation.  Specific goals from treatment plan addressed this week:  1. Follow through with intentions to treat chemical dependency concerns while meeting OP treatment expectations in order to graduate successfully from the program (4:1)  Effectiveness of strategies:  1. Emerging: Patient attended 1 group sessions this week, with 0 absences. Patient has completed 56 hours of treatment at this time. He will continue to contacts staff at [phone] whenever he is unable to attend group for any reason.     Dimension #5 - Relapse Potential - Risk 4. Patient reports active daily use of meth at this time. He therefore currently appears to lack the necessary skills to arrest his use and prevent relapse without support and structure. Patient appears to lack some insight into his use and the impact it has had on his life as he continues to use despite probation. Patient ongoing use up until this intake appears to indicate an increased risk of relapse at this time. Patient does appear to have some knowledge of addiction and recovery as he endorses numerous treatments within the past year. It does not appear that he utilizes that knowledge to help maintain sobriety at this time  Specific goals from treatment plan addressed this week:  1. Patient to gain skills to help prevent relapse (5:1)  Effectiveness of strategies:  1. Emerging: Patient reports continued abstinence, with no relapse/continued use since 10/1/2019. Patient is able to identify high-risk situations for relapse, and utilize appropriate coping skills to prevent future use.      Dimension #6 - Recovery Environment - Risk 3. Patient is currently unemployed and so lacks structured and meaningful activity for his daily life. Patient reports a stable  place to live at this time, however he indicated that his significant other does also use, so it does not appear that it is the most supportive living environment at this time. Patient reports that he does not currently have any support in his life for his sobriety. Patient is currently on probation for terroristic threats  Specific goals from treatment plan addressed this week:  1. Gain sober support and structure (6:1)  2. Comply with probation expectations (6:2)  Effectiveness of strategies:  1. Emerging: Patient is able to identify sober support meetings to attend. He appears to be open to the idea of a sponsor prior to completing phase IIl of DOP. Patient is currently unemployed, but has his financial needs met.   2. Emerging: Patient reports compliance with PO, and is willing to provide all requested UAs. Patient reports stable housing at this time. He will inform counselor of any changes in his current living arrangement that may interfere with treatment or probation requirements. Patient has no current concerns in this area.  T) Treatment plan updated no.  Patient notified and in agreement NA.  Patient educated on Medical Aspects. Patient has completed 56 of 84 program hours at this time. Projected discharge date is 3/13/2020. [facility] plan [facility].     Roberto Richter  11/26/2019, 10:54 AM        Psycho-Educational Curriculum  Date Attended  Psycho-Educational Curriculum  Date Attended    Acceptance   Shame/Guilt     1st Step   Anger/Rage     Affirmations   Mental Health     Automatic Negative Thoughts   Anxiety     Cross Addiction   Co-Occurring Disorders     Stages of Change   Paola/Bipolar     Anonymous People-Video 9/13/2019 Positive Traits 10/21/2019   Radical Acceptance 9/18/2019 MH Benefits of Exercise 10/25/2019   Use History 11/8/2019     Relapse   Trauma      Addictive Thoughts   Victim Identity     Coping Skills   Sober Structure     Relapse Prevention   Continuum of Care    "  The Stages of Relapse 9/23/2019 Building Social Support 10/28/2019   Shame and Guilt 11/13/2019 Showing Appreciation 10/30/2019   Man-in-the-Glass 11/15/2019 Setting Boundaries 11/1/2019         Medical Aspects   Non-12 Step Support     Brain/Neurotransmitters   Priorities     Medication Compliance   Spirituality     MARÍA Alcohol/Drug Research   Weekend Planner     Spirituality 10/2/2019 The 8 Dimensions of Wellness 9/11/2019   Self-Care Assessment 11/18/2019     Symptoms of Stress 11/20/2019           Physical Health   Educational Videos     Post Acute Withdrawal   1st Step     Pregnancy and Drug Use   2nd Step     Sexual Health   Assertive Communication     Short-Term/Long-Term Effects   My name is Red THOMSON    Relationships   Cross Addiction     Healthy Vs Unhealthy Relationships 10/14/2019     Forming Stable Relationships 10/16/2019     Assertive Communication   God As We Understood Him     Boundaries   HBO Relapse     Codependence    HBO What Is Addiction     Defense Mechanisms    Medical Aspects 1     Family Roles   Medical Aspects 2     Goodbye Letter   National Geographic: Stress     Intimacy   PBS Depression Out of the Shadows     Needs/Dealbreakers in Relationships   The Anonymous People    Socialization Skills   [place]     Feelings   Nickolas Sheth \"Highjacked Brain\"    Identifying Feelings 10/7/2019     Life Satisfaction Checklist 10/9/2019     ABC Model of Emotion   Antonio Novak Humor in Tx    Grief and Loss   The Mindfulness Movie    Healthy vs. Unhealthy Feelings   Red THOMSON documentary     Meditation/Mindfulness  Weekly     Overconfidence/Complacency       Resentments       Stress         "

## 2021-06-03 NOTE — TELEPHONE ENCOUNTER
Patient's PO called from [facility] to inquire about how patient is doing in treatment. PO reports that he has not heard from the patient lately and wondered if patient is still active in treatment. The patient last presented to treatment on 11/20/2019, and has not attended since. Writer will try to contact patient on 12/3/2019, and will consider a discharge if unable to contact patient.      AMERICA Durand

## 2021-06-03 NOTE — PROGRESS NOTES
Met with this patient for a 1 x 1 counseling session today, to discuss concerns over his poor attendance to group. The patient started DOP treatment on 9/11/2019, for Substance Use Disorder and has completed 47 hours. Patient reports main reason for inconsistent attendance to group is the pregnancy of his significant other who is expecting to give birth to the baby any day from now. He reports that his pregnant girlfriend is having lots of complications and he has to stay around to help her with chores and other things she may want. However, he states his commitment to follow through with treatment because probation requires him to do so or he goes to [facility]. Patient also states that he also wants to complete treatment because he wants a better life for himself. Patient reports that he is expecting his second child and he wants to be a good father and be in his children's lives.   Patient reports that he has not seen a doctor in years, for his physical check and would like to set appointment to see a doctor at [facility] outpatient clinic in [place]. He also was encouraged to establish mental health services to address any issues with his diagnoses of ADHD, OCD, and Depression. He was given all the necessary information he needs to set these appointments and he promised to begin immediately after the 1 x 1 session today. Patient has current health insurance through [place], with supplemental insurance through [facility]. Patient reports that he lives with his girlfriend in a stable housing environment, but has some stress from his pregnant girlfriend who appears to be very demanding. He is currently unemployment and thinks he may not be able to find a job soon, due to being on probation for terrorristic threat charges against him, until 2021. Patient has promised to improve his attendance and participation in treatment because he finds treatment to be beneficial.

## 2021-06-03 NOTE — PROGRESS NOTES
Ricky Rowland attended 3 hours of group today.     The group topic was Medical Aspects, patient was responsive to topic.     Patient's engagement in the group session: medium     Total group size: 6      Roberto Richter  11/20/2019, 3:12 PM

## 2021-06-03 NOTE — PROGRESS NOTES
Ricky Rowland attended 3 hours of group today. Patient was scheduled for mental health evaluation on Thursday, 11/21/2019.    The group topic was Relapse, patient was responsive to topic.     Patient's engagement in the group session: medium     Total group size: 5      Roberto Richter  11/15/2019, 1:37 PM

## 2021-06-03 NOTE — PROGRESS NOTES
Ricky Rowland attended 3 hours of group today.     The group topic was Acceptance, patient was responsive to topic.     Patient's engagement in the group session: medium     Total group size: 6      Roberto Richter  11/8/2019, 12:51 PM

## 2021-06-03 NOTE — PROGRESS NOTES
Ricky Rowland attended 3 hours of group today.     The group topic was Relapse, patient was responsive to topic.     Patient's engagement in the group session: medium     Total group size: 4      Roberto Richter  11/13/2019, 1:14 PM

## 2021-06-03 NOTE — PROGRESS NOTES
Ricky Rowland attended 3 hours of group today.     The group topic was Medical Aspects, patient was responsive to topic.     Patient's engagement in the group session: medium     Total group size: 7      Roberto Richter  11/18/2019, 1:43 PM

## 2021-06-03 NOTE — PROGRESS NOTES
Weekly Progress Note:   Ricky Rowland  1990  [id number]      D) Pt attended 3 groups this week with 0 absences. Patient attended 1 individual sessions this week. Intake was on 9/9/2019. A) Staff facilitated groups and reviewed tx progress. Assessed for [facility]. R) No VAP needed at this time.   Any significant events, defines as events that impact patients relationship with others inside and outside of treatment Yes: Patient reports probation with [facility]  Indicate any changes or monitoring of physical or mental health problems Yes: Patient reports ADHD, OCD, and Depression.    Indicate involvement by any outside supports Yes: Patient support from family, friends, and probation  IAPP reviewed and modified as needed. NA  Pt working on the following dimensions:  Dimension #1 - Withdrawal Potential - Risk 1. Patient reports daily use of methamphetamine with his last endorsed use being on 9/9/19, about 1 hour prior to intake. Patient endorses weekly use of alcohol, with his last use being on 9/7/19. He denies any other pattern of other substance use at this time. Patient denies current withdrawal symptoms or concerns. Patient shows no physical signs or symptoms of intoxication or withdrawal. He may be at risk of moderate symptoms due to his recent use. He does not appear to be at risk of severe withdrawal at this time. Patient is oriented x4.  Specific goals from treatment plan addressed this week:  1. Maintain abstinence (1:1)  Effectiveness of strategies:  1. Effective: Patient reports no withdrawal/intoxication at this time; says last illicit use was on10/1/2019.     Dimension #2 - Biomedical - Risk 0. Patient denies health issues or concerns. He reports that he is unsure if he has a PCP at this time, but does have insurance and so appears able to get his medical needs met and addressed as necessary. Patient denies immediate medical needs or concerns. He appears to be in adequate physical health and functioning at  this time.  Specific goals from treatment plan addressed this week:  1. Maintain stable physical health and functioning (2:1)  2. Patient to receive information about smoking cessation (2:2)  Effectiveness of strategies:  1. Emerging: Patient reports current health insurance through [place], [place], and is able to get health care services as needed.  Patient reports no concerns at this time.  2. 9/24: Patient has verbalized that he has no desire to stop smoking cigarettes at this time.    Dimension #3 - Emotional/Behavioral/Cognitive - Risk 2. Patient reports mental health diagnoses of ADHD, OCD, and depression. He denies current mental health services or medications. His mental health does not appear stable or managed at this time. Patient endorses using meth due to not having medication for his ADHD. Patient does appear to struggle with impulse control as evidence by his substance use despite being on probation, as well as his endorsed problems with gambling. Patient denies any suicidal or homicidal thoughts or plans. Patient is oriented x4.  Specific goals from treatment plan addressed this week:  1. Manage mental health symptoms (3:1)  Effectiveness of strategies:  1. Effective: Patient reports ADHD, OCD, and Depression. Patient has scheduled mental health appointment for services at [facility]. Patient demonstrates ability to fully function.    Dimension #4 - Treatment Acceptance/Resistance - Risk 2. Patient reports he is here due to probation. He states that he does feel that he is required to be here, and he does not feel that his use is a problem. Patient verbalizes that he is unsure if he will be able to stop using despite being in treatment. He does not appear genuinely motivated for treatment or change at this time. Patient verbalizes that he is not willing to follow recommendations at this time if he is referred to a higher level of care, although did verbalize understanding that a referral  to a higher level of care would be imminent if he continued to use. He appears to lack motivation for treatment and change at this time, and is in treatment solely due to probation.  Specific goals from treatment plan addressed this week:  1. Follow through with intentions to treat chemical dependency concerns while meeting OP treatment expectations in order to graduate successfully from the program (4:1)  Effectiveness of strategies:  1. Emerging: Patient attended 3 group sessions this week, with 0 absences. Patient attended 1 individual session, has completed 53 hours of treatment at this time. He will continue to contacts staff at [phone] whenever he is unable to attend group for any reason.     Dimension #5 - Relapse Potential - Risk 4. Patient reports active daily use of meth at this time. He therefore currently appears to lack the necessary skills to arrest his use and prevent relapse without support and structure. Patient appears to lack some insight into his use and the impact it has had on his life as he continues to use despite probation. Patient ongoing use up until this intake appears to indicate an increased risk of relapse at this time. Patient does appear to have some knowledge of addiction and recovery as he endorses numerous treatments within the past year. It does not appear that he utilizes that knowledge to help maintain sobriety at this time  Specific goals from treatment plan addressed this week:  1. Patient to gain skills to help prevent relapse (5:1)  Effectiveness of strategies:  1. Emerging: Patient reports continued abstinence, with no relapse/continued use since 10/1/2019. Patient demonstrates ability to identify high-risk situations for relapse, and utilize appropriate coping skills to prevent future use. Patient reports motivation for change at 8/10.    Dimension #6 - Recovery Environment - Risk 3. Patient is currently unemployed and so lacks structured and meaningful  activity for his daily life. Patient reports a stable place to live at this time, however he indicated that his significant other does also use, so it does not appear that it is the most supportive living environment at this time. Patient reports that he does not currently have any support in his life for his sobriety. Patient is currently on probation for terroristic threats  Specific goals from treatment plan addressed this week:  1. Gain sober support and structure (6:1)  2. Comply with probation expectations (6:2)  Effectiveness of strategies:  1. Emerging: Patient is able to identify sober support meetings but has not reported attending any. He appears to be open to the idea of a sponsor prior to completing phase IIl of DOP. Patient is currently unemployed, but has his financial needs met. He does not report any new concerns at this time.  2. Emerging: Patient reports compliance with PO, ans is willing to provide all requested UAs. Patient reports stable housing at this time. He will inform counselor of any changes in his current living arrangement that may interfere with treatment or probation requirements. Patient reports that he and his SO are expectation a baby anytime from now. Patient reports no new concerns for this week.  T) Treatment plan updated no.  Patient notified and in agreement NA.  Patient educated on Relapse and Medical Aspects. Patient has completed 53 of 84 program hours at this time. Projected discharge date is 3/13/2020. [facility].     Roberto Richter  11/19/2019, 1:26 PM        Psycho-Educational Curriculum  Date Attended  Psycho-Educational Curriculum  Date Attended    Acceptance   Shame/Guilt     1st Step   Anger/Rage     Affirmations   Mental Health     Automatic Negative Thoughts   Anxiety     Cross Addiction   Co-Occurring Disorders     Stages of Change   Paola/Bipolar     Anonymous People-Video 9/13/2019 Positive Traits 10/21/2019   Radical  "Acceptance 9/18/2019 MH Benefits of Exercise 10/25/2019   Use History 11/8/2019     Relapse   Trauma      Addictive Thoughts   Victim Identity     Coping Skills   Sober Structure     Relapse Prevention   Continuum of Care     The Stages of Relapse 9/23/2019 Building Social Support 10/28/2019   Shame and Guilt 11/13/2019 Showing Appreciation 10/30/2019   Man-in-the-Glass 11/15/2019 Setting Boundaries 11/1/2019         Medical Aspects   Non-12 Step Support     Brain/Neurotransmitters   Priorities     Medication Compliance   Spirituality     MARÍA Alcohol/Drug Research   Weekend Planner     Spirituality 10/2/2019 The 8 Dimensions of Wellness 9/11/2019   Self-Care Assessment 11/18/2019                 Physical Health   Educational Videos     Post Acute Withdrawal   1st Step     Pregnancy and Drug Use   2nd Step     Sexual Health   Assertive Communication     Short-Term/Long-Term Effects   My name is Red THOMSON    Relationships   Cross Addiction     Healthy Vs Unhealthy Relationships 10/14/2019     Forming Stable Relationships 10/16/2019     Assertive Communication   God As We Understood Him     Boundaries   HBO Relapse     Codependence    HBO What Is Addiction     Defense Mechanisms    Medical Aspects 1     Family Roles   Medical Aspects 2     Goodbye Letter   National Geographic: Stress     Intimacy   PBS Depression Out of the Shadows     Needs/Dealbreakers in Relationships   The Anonymous People    Socialization Skills   [place]     Feelings   Nickolas Sheth \"Highjacked Brain\"    Identifying Feelings 10/7/2019     Life Satisfaction Checklist 10/9/2019     ABC Model of Emotion   Antnoio Novak Humor in Tx    Grief and Loss   The Mindfulness Movie    Healthy vs. Unhealthy Feelings   Red THOMSON documentary     Meditation/Mindfulness  Weekly     Overconfidence/Complacency       Resentments       Stress         "

## 2021-06-03 NOTE — PROGRESS NOTES
"Patient was a \"No Show\" for his mental health appointment with Prabhakar RAMIRES Yesterday. He also was a [id number]-[id number]-[id number]-Show for group today. Staff called to contact patient today at telephone number [phone], but there was no answer.          AMERICA Durand  "

## 2021-06-03 NOTE — PROGRESS NOTES
The patient did not attend group sessions this week. No progress note will generated for this week due to 0 group attendance.

## 2021-06-04 NOTE — TELEPHONE ENCOUNTER
Patient called late yesterday, 12/4/2019, to report that he has not been presenting to treatment because his pregnant girlfriend has been having complications and they had been at the hospital. Patient reports that their baby was delivered on the early morning of yesterday and both mother and child are doing well. Patient will resume treatment on 12/6/2019.    AMERICA Durand

## 2021-06-04 NOTE — PROGRESS NOTES
"Met with this patient in a 1 x 1 counseling session today for about 65 minutes. Patient was one of only two people that presented for DOP group today. He participated in a check-in session and also discussed the topic on 'Relapse Prevention Plan.' Patient discussed his biggest lesson learned in 2019; that, \"I have helped and taken care of people all my life and this new year is time for me to take care of myself.\" The patient had the opportunity to reflect on his life by looking at his current responsibilities, including having two children to take care of. Patient states that his youngest son was born on 12/3/2019, and that he has to do things right to be able to support his children. Patient denies any current crisis in his life, except his ongoing probation.  "

## 2021-06-04 NOTE — PROGRESS NOTES
Weekly Progress Note:   Ricky Rowland  1990  [id number]      D) Pt attended 2 groups this week with 0 absences. Patient attended 0 individual sessions this week. Intake was on 9/9/2019. A) Staff facilitated groups and reviewed tx progress. Assessed for [facility]. R) No VAP needed at this time.   Any significant events, defines as events that impact patients relationship with others inside and outside of treatment Yes: Patient reports probation with [facility]  Indicate any changes or monitoring of physical or mental health problems Yes: Patient reports ADHD, OCD, and Depression. Patient reports a new baby added to his family.    Indicate involvement by any outside supports Yes: Patient support from family, friends, and probation  IAPP reviewed and modified as needed. NA  Pt working on the following dimensions:  Dimension #1 - Withdrawal Potential - Risk 0. Patient reports daily use of methamphetamine with his last endorsed use being on 9/9/19, about 1 hour prior to intake. Patient endorses weekly use of alcohol, with his last use being on 9/7/19. He denies any other pattern of other substance use at this time. Patient denies current withdrawal symptoms or concerns. Patient shows no physical signs or symptoms of intoxication or withdrawal. He may be at risk of moderate symptoms due to his recent use. He does not appear to be at risk of severe withdrawal at this time. Patient is oriented x4.  Specific goals from treatment plan addressed this week:  1. Maintain abstinence (1:1)  Effectiveness of strategies:  1. Effective: Patient has denied any withdrawal/intoxication concerns, with no illicit use since 10/1/2019. Patient will provide UAs for lab tests as requested.    Dimension #2 - Biomedical - Risk 0. Patient denies health issues or concerns. He reports that he is unsure if he has a PCP at this time, but does have insurance and so appears able to get his medical needs met and addressed as necessary. Patient denies  immediate medical needs or concerns. He appears to be in adequate physical health and functioning at this time.  Specific goals from treatment plan addressed this week:  1. Maintain stable physical health and functioning (2:1)  2. Patient to receive information about smoking cessation (2:2)  Effectiveness of strategies:  1. Emerging: Patient reports health insurance through [place], [place], and is able to get health care services as needed. Patient reports no current physical health concerns that need to be addressed.  2. 9/24: Patient has verbalized that he has no desire to stop smoking cigarettes at this time. Reports smoking daily.    Dimension #3 - Emotional/Behavioral/Cognitive - Risk 2. Patient reports mental health diagnoses of ADHD, OCD, and depression. He denies current mental health services or medications. His mental health does not appear stable or managed at this time. Patient endorses using meth due to not having medication for his ADHD. Patient does appear to struggle with impulse control as evidence by his substance use despite being on probation, as well as his endorsed problems with gambling. Patient denies any suicidal or homicidal thoughts or plans. Patient is oriented x4.  Specific goals from treatment plan addressed this week:  1. Manage mental health symptoms (3:1)  Effectiveness of strategies:  1. Effective: Patient reports ADHD, OCD, and Depression. Patient was scheduled for mental health assessment at [facility] on 11/21/19, but he did not show. Patient does not appear to be concerned about mental health needs at this time.    Dimension #4 - Treatment Acceptance/Resistance - Risk 1 . Patient reports he is here due to probation. He states that he does feel that he is required to be here, and he does not feel that his use is a problem. Patient verbalizes that he is unsure if he will be able to stop using despite being in treatment. He does not appear genuinely motivated for  treatment or change at this time. Patient verbalizes that he is not willing to follow recommendations at this time if he is referred to a higher level of care, although did verbalize understanding that a referral to a higher level of care would be imminent if he continued to use. He appears to lack motivation for treatment and change at this time, and is in treatment solely due to probation.  Specific goals from treatment plan addressed this week:  1. Follow through with intentions to treat chemical dependency concerns while meeting OP treatment expectations in order to graduate successfully from the program (4:1)  Effectiveness of strategies:  1. Effective: Patient attended 2 group sessions this week, with 0 absences. Patient has completed 68 hours of treatment at this time. He will continue to contact staff at [phone] whenever he is unable to attend group for any reason.     Dimension #5 - Relapse Potential - Risk 3. Patient reports active daily use of meth prior to treatment. He therefore appears to lack the necessary skills to arrest use and prevent relapse without support and structure. Patient reports some insight into his use and the impact it has had on his life, including probation. Patient reported last use  On 10/1/2019; indicates persistent risk of relapse if no structure. Patient does appear to have some knowledge of addiction and recovery as he endorses numerous treatments within the past year. It does not appear that he utilizes that knowledge to help maintain sobriety at this time  Specific goals from treatment plan addressed this week:  1. Patient to gain skills to help prevent relapse (5:1)  Effectiveness of strategies:  1. Emerging: Patient appears to demonstrate continued abstinence, with no relapse/continued use since 10/1/2019, as evidenced by negative UA results. Patient is able to identify high-risk situations for relapse, and utilize appropriate coping skills to prevent future  use. Patient is meeting treatment expectations and there are no concerns at this time.      Dimension #6 - Recovery Environment - Risk 3. Patient is currently unemployed and so lacks structured and meaningful activity for his daily life. Patient reports a stable place to live at this time. However, he it appears that he needs more support in his living environment, as his pregnant girlfriend used occasionally until close to the time of giving birth. Patient reports that he does not currently have any support in his life for his sobriety. Patient is currently on probation for terroristic threats.  Specific goals from treatment plan addressed this week:  1. Gain sober support and structure (6:1)  2. Comply with probation expectations (6:2)  Effectiveness of strategies:  1. Emerging: Patient is able to identify sober support meetings to attend. He appears to be open to the idea of a sponsor prior to completing phase IIl of DOP. Patient is currently unemployed, but has his financial needs met through family and social service benefits. Patient reports every thing is going well at this time.  2. Emerging: Patient reports compliance with PO, and is willing to provide all requested UAs. Patient reports stable housing at this time. He will inform counselor of any changes in his current living arrangement that may interfere with treatment or probation requirements. Patient reports a recent addition to his family; significant other gave birth to a baby boy on 12/3/2019. Patient reports no other changes in this area.  T) Treatment plan updated Yes.  Patient notified and in agreement Yes  Patient educated on Sober Structure. Patient has completed 68 of 84 program hours at this time. Projected discharge date is 3/13/2020. [facility] plan [facility].     Roberto Richter  12/24/2019, 10:45 AM        Psycho-Educational Curriculum  Date Attended  Psycho-Educational Curriculum  Date Attended    Acceptance    "Shame/Guilt     1st Step   Anger/Rage     Affirmations   Mental Health     Automatic Negative Thoughts   Anxiety     Cross Addiction   Co-Occurring Disorders     Stages of Change   Paola/Bipolar     Anonymous People-Video 9/13/2019 Positive Traits 10/21/2019   Radical Acceptance 9/18/2019 MH Benefits of Exercise 10/25/2019   Use History 11/8/2019 Strengths Discussions 12/9/2019   Relapse   Trauma      Addictive Thoughts   Victim Identity     Coping Skills   Sober Structure     Relapse Prevention   Continuum of Care     The Stages of Relapse 9/23/2019 Building Social Support 10/28/2019   Shame and Guilt 11/13/2019 Showing Appreciation 10/30/2019   Man-in-the-Glass 11/15/2019 Setting Boundaries 11/1/2019     Dimensions of Wellness 12/20/2019     Routine and Structure 12/18/2019   Medical Aspects   Non-12 Step Support     Brain/Neurotransmitters   Priorities     Medication Compliance   Spirituality     MARÍA Alcohol/Drug Research   Weekend Planner     Spirituality 10/2/2019 The 8 Dimensions of Wellness 9/11/2019   Self-Care Assessment 11/18/2019     Symptoms of Stress 11/20/2019           Physical Health   Educational Videos     Post Acute Withdrawal   1st Step     Pregnancy and Drug Use   2nd Step     Sexual Health   Assertive Communication     Short-Term/Long-Term Effects   My name is Red THOMSON    Relationships   Cross Addiction     Healthy Vs Unhealthy Relationships 10/14/2019     Forming Stable Relationships 10/16/2019     Assertive Communication   God As We Understood Him     Boundaries   HBO Relapse     Codependence    HBO What Is Addiction     Defense Mechanisms    Medical Aspects 1     Family Roles   Medical Aspects 2     Goodbye Letter   [employer] Geographic: Stress     Intimacy   PBS Depression Out of the Shadows     Needs/Dealbreakers in Relationships   The Anonymous People    Socialization Skills   [place]     Feelings   Nickolas Sheth \"Highjacked Brain\"    Feeling Emotions 12/6/209     Identifying Feelings " 10/7/2019     Life Satisfaction Checklist 10/9/2019     ABC Model of Emotion   Antonio Novak Humor in Tx    Grief and Loss   The Mindfulness Movie    Healthy vs. Unhealthy Feelings   Red THOMSON documentary     Meditation/Mindfulness  Weekly     Overconfidence/Complacency       Resentments       Stress

## 2021-06-04 NOTE — PROGRESS NOTES
Weekly Progress Note:   Ricky Rowland  1990  [id number]      D) Pt attended 2 groups this week with 0 absences. Patient attended 0 individual sessions this week. Intake was on 9/9/2019. A) Staff facilitated groups and reviewed tx progress. Assessed for [facility]. R) No VAP needed at this time.   Any significant events, defines as events that impact patients relationship with others inside and outside of treatment Yes: Patient reports probation with [facility]  Indicate any changes or monitoring of physical or mental health problems Yes: Patient reports ADHD, OCD, and Depression. Patient reports a new baby added to his family.    Indicate involvement by any outside supports Yes: Patient support from family, friends, and probation  IAPP reviewed and modified as needed. NA  Pt working on the following dimensions:  Dimension #1 - Withdrawal Potential - Risk 0. Patient reports daily use of methamphetamine with his last endorsed use being on 9/9/19, about 1 hour prior to intake. Patient endorses weekly use of alcohol, with his last use being on 9/7/19. He denies any other pattern of other substance use at this time. Patient denies current withdrawal symptoms or concerns. Patient shows no physical signs or symptoms of intoxication or withdrawal. He may be at risk of moderate symptoms due to his recent use. He does not appear to be at risk of severe withdrawal at this time. Patient is oriented x4.  Specific goals from treatment plan addressed this week:  1. Maintain abstinence (1:1)  Effectiveness of strategies:  1. Effective: Patient has denied any withdrawal/intoxication concerns at this time. He reports last illicit use on 10/1/2019, with no further use. Patient will provide UAs for lab tests as requested.    Dimension #2 - Biomedical - Risk 0. Patient denies health issues or concerns. He reports that he is unsure if he has a PCP at this time, but does have insurance and so appears able to get his medical needs met and  addressed as necessary. Patient denies immediate medical needs or concerns. He appears to be in adequate physical health and functioning at this time.  Specific goals from treatment plan addressed this week:  1. Maintain stable physical health and functioning (2:1)  2. Patient to receive information about smoking cessation (2:2)  Effectiveness of strategies:  1. Emerging: Patient reports health insurance through [place], [place], and is able to get health care services as needed. Patient denies having any physical health concerns that need to be addressed at this time.  2. 9/24: Patient has verbalized that he has no desire to stop smoking cigarettes at this time. Reports smoking daily.    Dimension #3 - Emotional/Behavioral/Cognitive - Risk 2. Patient reports mental health diagnoses of ADHD, OCD, and depression. He denies current mental health services or medications. His mental health does not appear stable or managed at this time. Patient endorses using meth due to not having medication for his ADHD. Patient does appear to struggle with impulse control as evidence by his substance use despite being on probation, as well as his endorsed problems with gambling. Patient denies any suicidal or homicidal thoughts or plans. Patient is oriented x4.  Specific goals from treatment plan addressed this week:  1. Manage mental health symptoms (3:1)  Effectiveness of strategies:  1. Effective: Patient reports ADHD, OCD, and Depression. Patient was scheduled for mental health assessment at [facility] on 11/21/19, but he did not show. He will be rescheduled for assessment as soon as a therapist is available..    Dimension #4 - Treatment Acceptance/Resistance - Risk 1 . Patient reports he is here due to probation. He states that he does feel that he is required to be here, and he does not feel that his use is a problem. Patient verbalizes that he is unsure if he will be able to stop using despite being in treatment.  He does not appear genuinely motivated for treatment or change at this time. Patient verbalizes that he is not willing to follow recommendations at this time if he is referred to a higher level of care, although did verbalize understanding that a referral to a higher level of care would be imminent if he continued to use. He appears to lack motivation for treatment and change at this time, and is in treatment solely due to probation.  Specific goals from treatment plan addressed this week:  1. Follow through with intentions to treat chemical dependency concerns while meeting OP treatment expectations in order to graduate successfully from the program (4:1)  Effectiveness of strategies:  1. Emerging: Patient attended 2 group sessions this week, with 0 absences. Patient has completed 62 hours of treatment at this time. He will continue to contact staff at [phone] whenever he is unable to attend group for any reason.     Dimension #5 - Relapse Potential - Risk 3. Patient reports active daily use of meth prior to treatment. He therefore appears to lack the necessary skills to arrest use and prevent relapse without support and structure. Patient reports some insight into his use and the impact it has had on his life, including probation. Patient reported last use  On 10/1/2019; indicates persistent risk of relapse if no structure. Patient does appear to have some knowledge of addiction and recovery as he endorses numerous treatments within the past year. It does not appear that he utilizes that knowledge to help maintain sobriety at this time  Specific goals from treatment plan addressed this week:  1. Patient to gain skills to help prevent relapse (5:1)  Effectiveness of strategies:  1. Emerging: Patient appears to demonstrate continued abstinence, with no relapse/continued use since 10/1/2019, as evidenced by negative UA results. Patient is able to identify high-risk situations for relapse, and utilize  appropriate coping skills to prevent future use. Patient appears to be meeting expectations by not using illicitly.      Dimension #6 - Recovery Environment - Risk 3. Patient is currently unemployed and so lacks structured and meaningful activity for his daily life. Patient reports a stable place to live at this time. However, he it appears that he needs more support in his living environment, as his pregnant girlfriend used occasionally until close to the time of giving birth. Patient reports that he does not currently have any support in his life for his sobriety. Patient is currently on probation for terroristic threats.  Specific goals from treatment plan addressed this week:  1. Gain sober support and structure (6:1)  2. Comply with probation expectations (6:2)  Effectiveness of strategies:  1. Emerging: Patient is able to identify sober support meetings to attend. He appears to be open to the idea of a sponsor prior to completing phase IIl of DOP. Patient is currently unemployed, but has his financial needs met through family and social service benefits.   2. Emerging: Patient reports compliance with PO, and is willing to provide all requested UAs. Patient reports stable housing at this time. He will inform counselor of any changes in his current living arrangement that may interfere with treatment or probation requirements. Patient reports a recent addition to his family; significant other gave birth to a baby boy on 12/3/2019.  T) Treatment plan updated Yes.  Patient notified and in agreement Yes  Patient educated on Feelings and Mental Health. Patient has completed 62 of 84 program hours at this time. Projected discharge date is 3/13/2020. [facility] plan [facility].     Roberto Richter  12/10/2019, 10:57 AM        Psycho-Educational Curriculum  Date Attended  Psycho-Educational Curriculum  Date Attended    Acceptance   Shame/Guilt     1st Step   Anger/Rage     Affirmations   Mental Health  "    Automatic Negative Thoughts   Anxiety     Cross Addiction   Co-Occurring Disorders     Stages of Change   Paola/Bipolar     Anonymous People-Video 9/13/2019 Positive Traits 10/21/2019   Radical Acceptance 9/18/2019 MH Benefits of Exercise 10/25/2019   Use History 11/8/2019 Strengths Discussions 12/9/2019   Relapse   Trauma      Addictive Thoughts   Victim Identity     Coping Skills   Sober Structure     Relapse Prevention   Continuum of Care     The Stages of Relapse 9/23/2019 Building Social Support 10/28/2019   Shame and Guilt 11/13/2019 Showing Appreciation 10/30/2019   Man-in-the-Glass 11/15/2019 Setting Boundaries 11/1/2019         Medical Aspects   Non-12 Step Support     Brain/Neurotransmitters   Priorities     Medication Compliance   Spirituality     MARÍA Alcohol/Drug Research   Weekend Planner     Spirituality 10/2/2019 The 8 Dimensions of Wellness 9/11/2019   Self-Care Assessment 11/18/2019     Symptoms of Stress 11/20/2019           Physical Health   Educational Videos     Post Acute Withdrawal   1st Step     Pregnancy and Drug Use   2nd Step     Sexual Health   Assertive Communication     Short-Term/Long-Term Effects   My name is Red THOMSON    Relationships   Cross Addiction     Healthy Vs Unhealthy Relationships 10/14/2019     Forming Stable Relationships 10/16/2019     Assertive Communication   God As We Understood Him     Boundaries   HBO Relapse     Codependence    HBO What Is Addiction     Defense Mechanisms    Medical Aspects 1     Family Roles   Medical Aspects 2     Goodbye Letter   [employer] Geographic: Stress     Intimacy   PBS Depression Out of the Shadows     Needs/Dealbreakers in Relationships   The Anonymous People    Socialization Skills   [place]     Feelings   Nickolas Sheth \"Highjacked Brain\"    Feeling Emotions 12/6/209     Identifying Feelings 10/7/2019     Life Satisfaction Checklist 10/9/2019     ABC Model of Emotion   Antonio Novak Humor in Tx    Grief and Loss   The Mindfulness " Movie    Healthy vs. Unhealthy Feelings   Red THOMSON documentary     Meditation/Mindfulness  Weekly     Overconfidence/Complacency       Resentments       Stress

## 2021-06-04 NOTE — PROGRESS NOTES
Weekly Progress Note: Patient started IOP treatment on 9/11/2019  Ricky Rowland  1990  [id number]      D) Pt attended 1 groups this week with 0 absences due to [place] holiday. Patient attended 0 individual sessions this week. Intake was on 9/9/2019. A) Staff facilitated groups and reviewed tx progress. Ass[facility] for [facility]. R) No VAP needed at this time.   Any significant events, defines as events that impact patients relationship with others inside and outside of treatment Yes: Patient reports probation with [facility]  Indicate any changes or monitoring of physical or mental health problems Yes: Patient reports ADHD, OCD, and Depression. Patient reports a new baby added to his family.    Indicate involvement by any outside supports Yes: Patient support from family, friends, and probation  IAPP reviewed and modified as needed. NA  Pt working on the following dimensions:  Dimension #1 - Withdrawal Potential - Risk 0. Patient reports daily use of methamphetamine with his last endorsed use being on 9/9/19, about 1 hour prior to intake. Patient endorses weekly use of alcohol, with his last use being on 9/7/19. He denies any other pattern of other substance use at this time. Patient denies current withdrawal symptoms or concerns. Patient shows no physical signs or symptoms of intoxication or withdrawal. He may be at risk of moderate symptoms due to his recent use. He does not appear to be at risk of severe withdrawal at this time. Patient is oriented x4.  Specific goals from treatment plan addressed this week:  1. Maintain abstinence (1:1)  Effectiveness of strategies:  1. Effective: Patient reports last ilicit use on 10/1/2019, and denies any withdrawal/intoxication concerns at this time. Patient will provide UAs for lab tests as requested. Patient reports abstinence.     Dimension #2 - Biomedical - Risk 0. Patient denies health issues or concerns. He reports that he is unsure if he has a PCP at this time, but does  have insurance and so appears able to get his medical needs met and addressed as necessary. Patient denies immediate medical needs or concerns. He appears to be in adequate physical health and functioning at this time.  Specific goals from treatment plan addressed this week:  1. Maintain stable physical health and functioning (2:1)  2. Patient to receive information about smoking cessation (2:2)  Effectiveness of strategies:  1. Effective: Patient reports health insurance through [place], [place], and is able to get health care services as needed. Patient reports good physical health and says he does not need any services at this time.  2. 9/24: Patient has verbalized that he has no desire to stop smoking cigarettes at this time. Reports smoking daily.    Dimension #3 - Emotional/Behavioral/Cognitive - Risk 2. Patient reports mental health diagnoses of ADHD, OCD, and depression. He denies current mental health services or medications. His mental health does not appear stable or managed at this time. Patient endorses using meth due to not having medication for his ADHD. Patient does appear to struggle with impulse control as evidence by his substance use despite being on probation, as well as his endorsed problems with gambling. Patient denies any suicidal or homicidal thoughts or plans. Patient is oriented x4.  Specific goals from treatment plan addressed this week:  1. Manage mental health symptoms (3:1)  Effectiveness of strategies:  1. Effective: Patient reports ADHD, OCD, and Depression. Patient does not appear to be concerned about mental health needs at this time; says he is doing okay and will seek help as needed.    Dimension #4 - Treatment Acceptance/Resistance - Risk 1 . Patient reports he is here due to probation. He states that he does feel that he is required to be here, and he does not feel that his use is a problem. Patient verbalizes that he is unsure if he will be able to stop using despite being in  treatment. He does not appear genuinely motivated for treatment or change at this time. Patient verbalizes that he is not willing to follow recommendations at this time if he is referred to a higher level of care, although did verbalize understanding that a referral to a higher level of care would be imminent if he continued to use. He appears to lack motivation for treatment and change at this time, and is in treatment solely due to probation.  Specific goals from treatment plan addressed this week:  1. Follow through with intentions to treat chemical dependency concerns while meeting OP treatment expectations in order to graduate successfully from the program (4:1)  Effectiveness of strategies:  1. Effective: Patient attended 1 group sessions this week, with 0 absences due to [place] holiday. Patient has completed 71 hours of treatment at this time. He will continue to contact staff at [phone]), whenever he is unable to attend group for any reason. Patient verbalizes desire to successfully complete treatment.    Dimension #5 - Relapse Potential - Risk 3. Patient reports active daily use of meth prior to treatment. He therefore appears to lack the necessary skills to arrest use and prevent relapse without support and structure. Patient reports some insight into his use and the impact it has had on his life, including probation. Patient reported last use  On 10/1/2019; indicates persistent risk of relapse if no structure. Patient does appear to have some knowledge of addiction and recovery as he endorses numerous treatments within the past year. It does not appear that he utilizes that knowledge to help maintain sobriety at this time  Specific goals from treatment plan addressed this week:  1. Patient to gain skills to help prevent relapse (5:1)  Effectiveness of strategies:  1. Effective: Patient appears to demonstrate continued abstinence, with no relapse/continued use since 10/1/2019, as evidenced by  negative UA results. Patient is able to identify high-risk situations for relapse, and utilize appropriate coping skills to prevent future use. Patient is meeting treatment expectations, as he denies any relapse/continued use since 10/1/2019.      Dimension #6 - Recovery Environment - Risk 3. Patient is currently unemployed and so lacks structured and meaningful activity for his daily life. Patient reports a stable place to live at this time. However, he it appears that he needs more support in his living environment, as his pregnant girlfriend used occasionally until close to the time of giving birth. Patient reports that he does not currently have any support in his life for his sobriety. Patient is currently on probation for terroristic threats.  Specific goals from treatment plan addressed this week:  1. Gain sober support and structure (6:1)  2. Comply with probation expectations (6:2)  Effectiveness of strategies:  1. Effective: Patient is able to identify sober support meetings to attend. He is open to the idea of a sponsor prior to completing phase IIl of DOP. Patient is currently unemployed, but has his financial needs met through family and social service benefits; says every thing is going well at this time.  2. Emerging: Patient reports compliance with PO, and is willing to provide all requested UAs. Patient reports stable housing at this time. He will inform counselor of any changes in his current living arrangement that may interfere with treatment or probation requirements. Patient reports a recent addition to his family; significant other gave birth to a baby boy on 12/3/2019. Patient reports struggles with car repairs but is determined to continue treatment until complete.  T) Treatment plan updated No.  Patient notified and in agreement NA  Patient educated on Relapse Prevention. Patient has completed 71 of 84 program hours at this time. Projected discharge date is 3/13/2020. [facility]  [facility].     Roberto Richter  12/31/2019, 9:36 AM        Psycho-Educational Curriculum  Date Attended  Psycho-Educational Curriculum  Date Attended    Acceptance   Shame/Guilt     1st Step   Anger/Rage     Affirmations   Mental Health     Automatic Negative Thoughts   Anxiety     Cross Addiction   Co-Occurring Disorders     Stages of Change   Paola/Bipolar     Anonymous People-Video 9/13/2019 Positive Traits 10/21/2019   Radical Acceptance 9/18/2019 MH Benefits of Exercise 10/25/2019   Use History 11/8/2019 Strengths Discussions 12/9/2019   Relapse   Trauma      Addictive Thoughts   Victim Identity     Coping Skills   Sober Structure     Relapse Prevention   Continuum of Care     The Stages of Relapse 9/23/2019 Building Social Support 10/28/2019   Shame and Guilt 11/13/2019 Showing Appreciation 10/30/2019   Man-in-the-Glass 11/15/2019 Setting Boundaries 11/1/2019   Laying the Foundation 12/30/2019 Dimensions of Wellness 12/20/2019     Routine and Structure 12/18/2019   Medical Aspects   Non-12 Step Support     Brain/Neurotransmitters   Priorities     Medication Compliance   Spirituality     MARÍA Alcohol/Drug Research   Weekend Planner     Spirituality 10/2/2019 The 8 Dimensions of Wellness 9/11/2019   Self-Care Assessment 11/18/2019     Symptoms of Stress 11/20/2019           Physical Health   Educational Videos     Post Acute Withdrawal   1st Step     Pregnancy and Drug Use   2nd Step     Sexual Health   Assertive Communication     Short-Term/Long-Term Effects   My name is Red THOMSON    Relationships   Cross Addiction     Healthy Vs Unhealthy Relationships 10/14/2019     Forming Stable Relationships 10/16/2019     Assertive Communication   God As We Understood Him     Boundaries   HBO Relapse     Codependence    HBO What Is Addiction     Defense Mechanisms    Medical Aspects 1     Family Roles   Medical Aspects 2     Goodbye Letter   National Geographic: Stress     Intimacy   PBS Depression Out of the  "Shadows     Needs/Dealbreakers in Relationships   The Anonymous People    Socialization Skills   [place]     Feelings   Nickolas Waverly \"Highjacked Brain\"    Feeling Emotions 12/6/209     Identifying Feelings 10/7/2019     Life Satisfaction Checklist 10/9/2019     ABC Model of Emotion   Antonio Novak Humor in Tx    Grief and Loss   The Mindfulness Movie    Healthy vs. Unhealthy Feelings   Red THOMSON documentary     Meditation/Mindfulness  Weekly     Overconfidence/Complacency       Resentments       Stress         "

## 2021-06-04 NOTE — PROGRESS NOTES
Ricky Rowland attended 3 hours of group today.     The group topic was Feelings, patient was responsive to topic.     Patient's engagement in the group session: medium     Total group size: 5      Roberto Richter  12/6/2019, 1:50 PM

## 2021-06-04 NOTE — TELEPHONE ENCOUNTER
"Writer called to contact patient due to his being absent from treatment for one week. The patient was last in group on Monday, 12/9/2019, and has been \"No Call, No Show\" since. He will be discharged from [facility] tomorrow, 12/18/2019, for he does not show up for group, or contact his counselor to report the reason why he has been missing group.      AMERICA Durand  "

## 2021-06-04 NOTE — PROGRESS NOTES
Ricky Rowland attended 3 hours of group today.     The group topic was Sober Structure, patient was responsive to topic.     Patient's engagement in the group session: medium     Total group size: 4      Roberto Richter  12/20/2019, 2:16 PM

## 2021-06-04 NOTE — PROGRESS NOTES
The patient did not attend group this week. No progress note will be generated for this week due to the patient being absent.

## 2021-06-04 NOTE — PROGRESS NOTES
Ricky Rowland attended 3 hours of group today.     The group topic was Mental Health, patient was responsive to topic.     Patient's engagement in the group session: medium     Total group size: 5      Roberto Richter  12/9/2019, 2:17 PM

## 2021-06-04 NOTE — PROGRESS NOTES
Ricky Rowland attended 3 hours of group today.     The group topic was Relapse, patient was responsive to topic.     Patient's engagement in the group session: medium     Total group size: 4      Roberto Richter  12/30/2019, 1:50 PM

## 2021-06-04 NOTE — PROGRESS NOTES
Ricky Rowland attended 3 hours of group today.     The group topic was Sober Structure, patient was responsive to topic.     Patient's engagement in the group session: medium     Total group size: 5      Roberto Richter  12/18/2019, 1:18 PM

## 2021-06-05 NOTE — PROGRESS NOTES
Weekly Progress Note for the week of 02/04/2020: Patient started IOP treatment on 9/11/2019, and will start Phase 3 on 02/06/2020. CORRECTED VERSION!  Ricky Rowland  1990  [id number]      D) Pt attended 2 group sessions this week, with 0 absence. Patient attended 0  individual sessions this week. Intake was on 9/9/2019. A) Staff facilitated groups and reviewed tx progress. [facility] for [facility]. R) No VAP needed at this time.   Any significant events, defines as events that impact patients relationship with others inside and outside of treatment Yes: Patient reports probation with [facility]. Patient reports a new baby added to his family on 12/03/2019.  Indicate any changes or monitoring of physical or mental health problems Yes: Patient reports ADHD, OCD, and Depression.    Indicate involvement by any outside supports Yes: Patient reports support from family, friends, and probation  IAPP reviewed and modified as needed. NA  Pt working on the following dimensions:  Dimension #1 - Withdrawal Potential - Risk 0. Patient reports daily use of methamphetamine with his last endorsed use being on 9/9/19, about 1 hour prior to intake. Patient endorses weekly use of alcohol, with his last use being on 9/7/19. He denies any other pattern of other substance use at this time. Patient denies current withdrawal symptoms or concerns. Patient shows no physical signs or symptoms of intoxication or withdrawal. He may be at risk of moderate symptoms due to his recent use. He does not appear to be at risk of severe withdrawal at this time. Patient is oriented x4.  Specific goals from treatment plan addressed this week:  1. Maintain abstinence (1:1)  Effectiveness of strategies:  1. Effective: Patient reports abstinence since 10/1/2019, and is willing to provide UA as needed. Patient denies any withdrawal/intoxication concerns at this point, and reports no other concerns.    Dimension #2 - Biomedical - Risk 0. Patient denies health  issues or concerns. He reports that he is unsure if he has a PCP at this time, but does have insurance and so appears able to get his medical needs met and addressed as necessary. Patient denies immediate medical needs or concerns. He appears to be in adequate physical health and functioning at this time.  Specific goals from treatment plan addressed this week:  1. Maintain stable physical health and functioning (2:1)  2. Patient to receive information about smoking cessation (2:2)  Effectiveness of strategies:  1. Effective: Patient reports current health insurance through [place], [place]. Patient is able to get health care services as needed; however reports no concerns about his physical health that need to addressed at this time. Patient will inform counselor immediately of any changes that may occur in his physical health.  2. 9/24: Patient has verbalized that he has no desire to stop smoking cigarettes at this time. Reports daily smoking of cigarettes.    Dimension #3 - Emotional/Behavioral/Cognitive - Risk 2. Patient reports mental health diagnoses of ADHD, OCD, and depression. He denies current mental health services or medications. His mental health does not appear stable or managed at this time. Patient endorses using meth due to not having medication for his ADHD. Patient does appear to struggle with impulse control as evidence by his substance use despite being on probation, as well as his endorsed problems with gambling. Patient denies any suicidal or homicidal thoughts or plans. Patient is oriented x4.  Specific goals from treatment plan addressed this week:  1. Manage mental health symptoms (3:1)  Effectiveness of strategies:  1. Effective: Patient reports ADHD, OCD, and Depression. Patient was scheduled twice for mental health assessment but missed them both. He appears stable, and will be rescheduled as needed. Patient reports no concerns that need immediate attention.    Dimension #4 - Treatment  Acceptance/Resistance - Risk 1 . Patient reports he is here due to probation. He states that he does feel that he is required to be here, and he does not feel that his use is a problem. Patient verbalizes that he is unsure if he will be able to stop using despite being in treatment. He does not appear genuinely motivated for treatment or change at this time. Patient verbalizes that he is not willing to follow recommendations at this time if he is referred to a higher level of care, although did verbalize understanding that a referral to a higher level of care would be imminent if he continued to use. He appears to lack motivation for treatment and change at this time, and is in treatment solely due to probation.  Specific goals from treatment plan addressed this week:  1. Follow through with intentions to treat chemical dependency concerns while meeting OP treatment expectations in order to graduate successfully from the program (4:1)  Effectiveness of strategies:  1. Effective: Patient attended 2 group sessions this week. He attended 0 individual counseling sessions. Patient has completed 89 hours of treatment at this time. Patient will start Phase 3 of DOP on 02/06/2020.    Dimension #5 - Relapse Potential - Risk 3. Patient reports active daily use of meth prior to treatment. He therefore appears to lack the necessary skills to arrest use and prevent relapse without support and structure. Patient reports some insight into his use and the impact it has had on his life, including probation. Patient reported last use  On 10/1/2019; indicates persistent risk of relapse if no structure. Patient does appear to have some knowledge of addiction and recovery as he endorses numerous treatments within the past year. It does not appear that he utilizes that knowledge to help maintain sobriety at this time  Specific goals from treatment plan addressed this week:  1. Patient to gain skills to help prevent relapse  (5:1)  Effectiveness of strategies:  1. Effective: Patient reports no relapse/continued use since 10/1/2019. Patient reports utilizing appropriate coping skills to prevent relapse in the future. Patient reports no other concerns at this time.    Dimension #6 - Recovery Environment - Risk 3. Patient is currently unemployed and so lacks structured and meaningful activity for his daily life. Patient reports a stable place to live at this time. However, he it appears that he needs more support in his living environment, as his pregnant girlfriend used occasionally until close to the time of giving birth. Patient reports that he does not currently have any support in his life for his sobriety. Patient is currently on probation for terroristic threats.  Specific goals from treatment plan addressed this week:  1. Gain sober support and structure (6:1)  2. Comply with probation expectations (6:2)  Effectiveness of strategies:  1. Effective: Patient reports that he has completed fixing his vehicle. He reports that he has stayed around sober people to help him stay sober. Patient will start phase IIl of DOP on 02/06/2020.      2. Effective: Patient reports compliance with PO, and is willing to provide all requested UAs. Patient reports stable housing at this time. He will inform counselor of any changes in his current living arrangement that may interfere with treatment or probation requirements. Patient is currently unemployed, but has his financial needs met through family and social service benefits. Patient has no new concerns at this time.  T) Treatment plan updated No.  Patient notified and in agreement NA  Patient educated on Mental Health. Patient has completed 89 of 84 program hours at this time. Projected discharge date is 3/13/2020. [facility].     Roberto Richter  2/4/2020, 11:35 AM        Psycho-Educational Curriculum  Date Attended  Psycho-Educational Curriculum  Date Attended     Acceptance   Shame/Guilt     1st Step   Anger/Rage     Affirmations   Mental Health       Resources: Going to Meetings 01/29/2020     What kind of a Fighter are You 01/31/2020   Automatic Negative Thoughts   Anxiety     Cross Addiction   Co-Occurring Disorders     Stages of Change   Paola/Bipolar     Anonymous People-Video 9/13/2019 Positive Traits 10/21/2019   Radical Acceptance 9/18/2019 MH Benefits of Exercise 10/25/2019   Use History 11/8/2019 Strengths Discussions 12/9/2019   Relapse   Trauma        Positive Steps to Wellbeing 01/27/2020   Addictive Thoughts   Victim Identity     Coping Skills   Sober Structure     Relapse Prevention   Continuum of Care     The Stages of Relapse 9/23/2019 Building Social Support 10/28/2019   Shame and Guilt 11/13/2019 Showing Appreciation 10/30/2019   Man-in-the-Glass 11/15/2019 Setting Boundaries 11/1/2019   Laying the Foundation 12/30/2019 Dimensions of Wellness 12/20/2019   The Serenity Prayer 1/3/2020 Routine and Structure 12/18/2019   Medical Aspects   Non-12 Step Support     Brain/Neurotransmitters   Priorities     Medication Compliance   Spirituality     MARÍA Alcohol/Drug Research   Weekend Planner     Spirituality 10/2/2019 The 8 Dimensions of Wellness 9/11/2019   Self-Care Assessment 11/18/2019     Symptoms of Stress 11/20/2019     What Price Am I Willing to Pay 01/08/2020     Kobe's Story (Video) 01/10/2020     Physical Health   Educational Videos     Post Acute Withdrawal   1st Step     Pregnancy and Drug Use   2nd Step     Sexual Health   Assertive Communication     Short-Term/Long-Term Effects   My name is Red THOMSON    Relationships   Cross Addiction     Healthy Vs Unhealthy Relationships 10/14/2019     Forming Stable Relationships 10/16/2019     Assertive Communication   God As We Understood Him     Boundaries   HBO Relapse     Codependence    HBO What Is Addiction     Defense Mechanisms    Medical Aspects 1     Family Roles   Medical Aspects 2     Goodbye Letter   " [employer] Geographic: Stress     Intimacy   PBS Depression Out of the Shadows     Needs/Dealbreakers in Relationships   The Anonymous People    Socialization Skills   [place]     Feelings   Nickolas Sheth \"Highjacked Brain\"    Thinking Errors 01/24/2020     Feeling Emotions 12/6/209     Identifying Feelings 10/7/2019     Life Satisfaction Checklist 10/9/2019     ABC Model of Emotion   Antonio Novak Humor in Tx    Grief and Loss   The Mindfulness Movie    Healthy vs. Unhealthy Feelings   Red THOMSON documentary     Meditation/Mindfulness  Weekly     Overconfidence/Complacency       Resentments       Stress         "

## 2021-06-05 NOTE — PROGRESS NOTES
Weekly Progress Note: Patient started IOP treatment on 9/11/2019  Ricky Rowland  1990  [id number]      D) Pt attended 2 groups this week with 0 absence. Patient attended 0 individual sessions this week. Intake was on 9/9/2019. A) Staff facilitated groups and reviewed tx progress. Assessed for [facility]. R) No VAP needed at this time.   Any significant events, defines as events that impact patients relationship with others inside and outside of treatment Yes: Patient reports probation with [facility]  Indicate any changes or monitoring of physical or mental health problems Yes: Patient reports ADHD, OCD, and Depression. Patient reports a new baby added to his family on 12/03/2019.    Indicate involvement by any outside supports Yes: Patient support from family, friends, and probation  IAPP reviewed and modified as needed. NA  Pt working on the following dimensions:  Dimension #1 - Withdrawal Potential - Risk 0. Patient reports daily use of methamphetamine with his last endorsed use being on 9/9/19, about 1 hour prior to intake. Patient endorses weekly use of alcohol, with his last use being on 9/7/19. He denies any other pattern of other substance use at this time. Patient denies current withdrawal symptoms or concerns. Patient shows no physical signs or symptoms of intoxication or withdrawal. He may be at risk of moderate symptoms due to his recent use. He does not appear to be at risk of severe withdrawal at this time. Patient is oriented x4.  Specific goals from treatment plan addressed this week:  1. Maintain abstinence (1:1)  Effectiveness of strategies:  1. Effective: Patient reports abstinence since 10/1/2019. Patient denies any withdrawal/intoxication concerns and is providing negative UAs, to indicate no illicit use.     Dimension #2 - Biomedical - Risk 0. Patient denies health issues or concerns. He reports that he is unsure if he has a PCP at this time, but does have insurance and so appears able to get his  medical needs met and addressed as necessary. Patient denies immediate medical needs or concerns. He appears to be in adequate physical health and functioning at this time.  Specific goals from treatment plan addressed this week:  1. Maintain stable physical health and functioning (2:1)  2. Patient to receive information about smoking cessation (2:2)  Effectiveness of strategies:  1. Effective: Patient reports current health insurance through Medicaid, [place], and is able to get health care services as needed. Patient reports good physical health with no concerns.  2. 9/24: Patient has verbalized that he has no desire to stop smoking cigarettes at this time. Reports daily smoking of cigarettes.    Dimension #3 - Emotional/Behavioral/Cognitive - Risk 2. Patient reports mental health diagnoses of ADHD, OCD, and depression. He denies current mental health services or medications. His mental health does not appear stable or managed at this time. Patient endorses using meth due to not having medication for his ADHD. Patient does appear to struggle with impulse control as evidence by his substance use despite being on probation, as well as his endorsed problems with gambling. Patient denies any suicidal or homicidal thoughts or plans. Patient is oriented x4.  Specific goals from treatment plan addressed this week:  1. Manage mental health symptoms (3:1)  Effectiveness of strategies:  1. Effective: Patient reports ADHD, OCD, and Depression. Patient does not appear to be concerned about mental health needs at this time; says he is doing okay and will seek help as needed. Patient has missed two scheduled appointments with [facility] health staff for assessment.    Dimension #4 - Treatment Acceptance/Resistance - Risk 1 . Patient reports he is here due to probation. He states that he does feel that he is required to be here, and he does not feel that his use is a problem. Patient verbalizes that he is unsure if he will be  able to stop using despite being in treatment. He does not appear genuinely motivated for treatment or change at this time. Patient verbalizes that he is not willing to follow recommendations at this time if he is referred to a higher level of care, although did verbalize understanding that a referral to a higher level of care would be imminent if he continued to use. He appears to lack motivation for treatment and change at this time, and is in treatment solely due to probation.  Specific goals from treatment plan addressed this week:  1. Follow through with intentions to treat chemical dependency concerns while meeting OP treatment expectations in order to graduate successfully from the program (4:1)  Effectiveness of strategies:  1. Effective: Patient attended 2 group sessions this week. He attended 0 individual counseling sessions. Patient has completed 78 minutes of treatment at this time.     Dimension #5 - Relapse Potential - Risk 3. Patient reports active daily use of meth prior to treatment. He therefore appears to lack the necessary skills to arrest use and prevent relapse without support and structure. Patient reports some insight into his use and the impact it has had on his life, including probation. Patient reported last use  On 10/1/2019; indicates persistent risk of relapse if no structure. Patient does appear to have some knowledge of addiction and recovery as he endorses numerous treatments within the past year. It does not appear that he utilizes that knowledge to help maintain sobriety at this time  Specific goals from treatment plan addressed this week:  1. Patient to gain skills to help prevent relapse (5:1)  Effectiveness of strategies:  1. Effective: Patient has no reports of any relapse/continued use since 10/1/2019. He is able to identify high-risk situations for relapse, and utilize appropriate coping skills to prevent future use. Patient's  UA results have been negative for illicit use.      Dimension #6 - Recovery Environment - Risk 3. Patient is currently unemployed and so lacks structured and meaningful activity for his daily life. Patient reports a stable place to live at this time. However, he it appears that he needs more support in his living environment, as his pregnant girlfriend used occasionally until close to the time of giving birth. Patient reports that he does not currently have any support in his life for his sobriety. Patient is currently on probation for terroristic threats.  Specific goals from treatment plan addressed this week:  1. Gain sober support and structure (6:1)  2. Comply with probation expectations (6:2)  Effectiveness of strategies:  1. Effective: Patient reports efforts to stay around sober people for support. He is open to the idea of a sponsor prior to completing phase IIl of DOP, as the way to increase his chances of sustained sobriety. Patient is currently unemployed, but has his financial needs met through family and social service benefits.  2. Effective: Patient reports compliance with PO, and is willing to provide all requested UAs. Patient reports stable housing at this time. He will inform counselor of any changes in his current living arrangement that may interfere with treatment or probation requirements. Patient reports a recent addition to his family; significant other gave birth to a baby boy on 12/3/2019. Patient reports challenges with his vehicle but is working to fix the problems. Patient reports current job hunting.  T) Treatment plan updated No.  Patient notified and in agreement NA  Patient educated on Medical Aspects. Patient has completed 78 of 84 program hours at this time. Projected discharge date is 3/13/2020. [facility].     Roberto Richter  1/14/2020, 10:04 AM        Psycho-Educational Curriculum  Date Attended  Psycho-Educational Curriculum  Date Attended    Acceptance   Shame/Guilt     1st Step    Anger/Rage     Affirmations   Mental Health     Automatic Negative Thoughts   Anxiety     Cross Addiction   Co-Occurring Disorders     Stages of Change   Paola/Bipolar     Anonymous People-Video 9/13/2019 Positive Traits 10/21/2019   Radical Acceptance 9/18/2019 MH Benefits of Exercise 10/25/2019   Use History 11/8/2019 Strengths Discussions 12/9/2019   Relapse   Trauma      Addictive Thoughts   Victim Identity     Coping Skills   Sober Structure     Relapse Prevention   Continuum of Care     The Stages of Relapse 9/23/2019 Building Social Support 10/28/2019   Shame and Guilt 11/13/2019 Showing Appreciation 10/30/2019   Man-in-the-Glass 11/15/2019 Setting Boundaries 11/1/2019   Laying the Foundation 12/30/2019 Dimensions of Wellness 12/20/2019   The Serenity Prayer 1/3/2020 Routine and Structure 12/18/2019   Medical Aspects   Non-12 Step Support     Brain/Neurotransmitters   Priorities     Medication Compliance   Spirituality     MARÍA Alcohol/Drug Research   Weekend Planner     Spirituality 10/2/2019 The 8 Dimensions of Wellness 9/11/2019   Self-Care Assessment 11/18/2019     Symptoms of Stress 11/20/2019     What Price Am I Willing to Pay 01/08/2020     Kobe's Story (Video) 01/10/2020     Physical Health   Educational Videos     Post Acute Withdrawal   1st Step     Pregnancy and Drug Use   2nd Step     Sexual Health   Assertive Communication     Short-Term/Long-Term Effects   My name is Red THOMSON    Relationships   Cross Addiction     Healthy Vs Unhealthy Relationships 10/14/2019     Forming Stable Relationships 10/16/2019     Assertive Communication   God As We Understood Him     Boundaries   HBO Relapse     Codependence    HBO What Is Addiction     Defense Mechanisms    Medical Aspects 1     Family Roles   Medical Aspects 2     Goodbye Letter   National Geographic: Stress     Intimacy   PBS Depression Out of the Shadows     Needs/Dealbreakers in Relationships   The Anonymous People    Socialization Skills   Marck    "  Feelings   Nickolas Mcallen \"Highjacked Brain\"    Feeling Emotions 12/6/209     Identifying Feelings 10/7/2019     Life Satisfaction Checklist 10/9/2019     ABC Model of Emotion   Antonio Novak Humor in Tx    Grief and Loss   The Mindfulness Movie    Healthy vs. Unhealthy Feelings   Red THOMSON documentary     Meditation/Mindfulness  Weekly     Overconfidence/Complacency       Resentments       Stress         "

## 2021-06-05 NOTE — PROGRESS NOTES
Ricky Rowland attended 3 hours of group today.     The group topic was Feelings, patient was responsive to topic.     Patient's engagement in the group session: medium     Total group size: 3      Roberto Richter  1/24/2020, 2:03 PM

## 2021-06-05 NOTE — TELEPHONE ENCOUNTER
PO called from [facility] to follow-up on patient's progress in treatment. PO reports that he had just concluded a face-to-face meeting with the patient and wanted to confirm the information that was provided by the patient; that he is doing well in treatment and is now in Phase 3. Writer confirmed information as being true.        AMERICA Durand

## 2021-06-05 NOTE — PROGRESS NOTES
Ricky Rowland attended 3 hours of group today.     The group topic was Medical Aspects, patient was responsive to topic.     Patient's engagement in the group session: medium     Total group size: 3      Roberto Richter  1/10/2020, 1:00 PM

## 2021-06-05 NOTE — PROGRESS NOTES
Message received on 01/08/2020:     Beny Mejia,     You can refer to patient chart, but this patient did not show for today's intake session.  It is his 2nd no show for intake, he was previously scheduled to see Prabhakar in November.  If he should still request and/or benefit from psychotherapy, he should be referred to another behavioral health clinic per no shows.       Thanks,   Abel

## 2021-06-05 NOTE — PROGRESS NOTES
Ricky Rowland attended 3 hours of group today.     The group topic was Mental Health, patient was responsive to topic.     Patient's engagement in the group session: medium     Total group size: 4      Roberto Richter  1/31/2020, 12:48 PM

## 2021-06-05 NOTE — PROGRESS NOTES
Weekly Progress Note for the week of 01/28/2020: Patient started IOP treatment on 9/11/2019  Carmina Rowlandsamuel  1990  [id number]      D) Pt attended 2 group sessions this week, with 0 absence. Patient attended 0  individual sessions this week. Intake was on 9/9/2019. A) Staff facilitated groups and reviewed tx progress. Assessed for [facility]. R) No VAP needed at this time.   Any significant events, defines as events that impact patients relationship with others inside and outside of treatment Yes: Patient reports probation with [facility]. Patient reports a new baby added to his family on 12/03/2019.  Indicate any changes or monitoring of physical or mental health problems Yes: Patient reports ADHD, OCD, and Depression.    Indicate involvement by any outside supports Yes: Patient reports support from family, friends, and probation  IAPP reviewed and modified as needed. NA  Pt working on the following dimensions:  Dimension #1 - Withdrawal Potential - Risk 0. Patient reports daily use of methamphetamine with his last endorsed use being on 9/9/19, about 1 hour prior to intake. Patient endorses weekly use of alcohol, with his last use being on 9/7/19. He denies any other pattern of other substance use at this time. Patient denies current withdrawal symptoms or concerns. Patient shows no physical signs or symptoms of intoxication or withdrawal. He may be at risk of moderate symptoms due to his recent use. He does not appear to be at risk of severe withdrawal at this time. Patient is oriented x4.  Specific goals from treatment plan addressed this week:  1. Maintain abstinence (1:1)  Effectiveness of strategies:  1. Effective: Patient reports abstinence since 10/1/2019, and denies any withdrawal/intoxication concerns for this week. Patient is willing to provide UA samples for testing as needed.    Dimension #2 - Biomedical - Risk 0. Patient denies health issues or concerns. He reports that he is unsure if he has a PCP at this  time, but does have insurance and so appears able to get his medical needs met and addressed as necessary. Patient denies immediate medical needs or concerns. He appears to be in adequate physical health and functioning at this time.  Specific goals from treatment plan addressed this week:  1. Maintain stable physical health and functioning (2:1)  2. Patient to receive information about smoking cessation (2:2)  Effectiveness of strategies:  1. Effective: Patient reports current health insurance through [place], [place]. He is able to get health care services as needed; however reports no concerns about his physical health that need to addressed at this time.  2. 9/24: Patient has verbalized that he has no desire to stop smoking cigarettes at this time. Reports daily smoking of cigarettes.    Dimension #3 - Emotional/Behavioral/Cognitive - Risk 2. Patient reports mental health diagnoses of ADHD, OCD, and depression. He denies current mental health services or medications. His mental health does not appear stable or managed at this time. Patient endorses using meth due to not having medication for his ADHD. Patient does appear to struggle with impulse control as evidence by his substance use despite being on probation, as well as his endorsed problems with gambling. Patient denies any suicidal or homicidal thoughts or plans. Patient is oriented x4.  Specific goals from treatment plan addressed this week:  1. Manage mental health symptoms (3:1)  Effectiveness of strategies:  1. Effective: Patient reports ADHD, OCD, and Depression. Patient was scheduled twice for mental health assessment but missed them both. Patient appears stable, and will be rescheduled as needed.    Dimension #4 - Treatment Acceptance/Resistance - Risk 1 . Patient reports he is here due to probation. He states that he does feel that he is required to be here, and he does not feel that his use is a problem. Patient verbalizes that he is unsure if he will  be able to stop using despite being in treatment. He does not appear genuinely motivated for treatment or change at this time. Patient verbalizes that he is not willing to follow recommendations at this time if he is referred to a higher level of care, although did verbalize understanding that a referral to a higher level of care would be imminent if he continued to use. He appears to lack motivation for treatment and change at this time, and is in treatment solely due to probation.  Specific goals from treatment plan addressed this week:  1. Follow through with intentions to treat chemical dependency concerns while meeting OP treatment expectations in order to graduate successfully from the program (4:1)  Effectiveness of strategies:  1. Effective: Patient attended 2 group sessions this week. He attended 0 individual counseling sessions. Patient has completed 86 hours of treatment at this time.     Dimension #5 - Relapse Potential - Risk 3. Patient reports active daily use of meth prior to treatment. He therefore appears to lack the necessary skills to arrest use and prevent relapse without support and structure. Patient reports some insight into his use and the impact it has had on his life, including probation. Patient reported last use  On 10/1/2019; indicates persistent risk of relapse if no structure. Patient does appear to have some knowledge of addiction and recovery as he endorses numerous treatments within the past year. It does not appear that he utilizes that knowledge to help maintain sobriety at this time  Specific goals from treatment plan addressed this week:  1. Patient to gain skills to help prevent relapse (5:1)  Effectiveness of strategies:  1. Effective: Patient denies any relapse/continued use since 10/1/2019. Patient reports utilizing appropriate coping skills to prevent relapse. Patient's  UA results have been negative for all illicit use.      Dimension #6 - Recovery Environment - Risk 3.  Patient is currently unemployed and so lacks structured and meaningful activity for his daily life. Patient reports a stable place to live at this time. However, he it appears that he needs more support in his living environment, as his pregnant girlfriend used occasionally until close to the time of giving birth. Patient reports that he does not currently have any support in his life for his sobriety. Patient is currently on probation for terroristic threats.  Specific goals from treatment plan addressed this week:  1. Gain sober support and structure (6:1)  2. Comply with probation expectations (6:2)  Effectiveness of strategies:  1. Effective: Patient reports staying very busy this week as he worked on his vehicle. Patient will follow-up with the idea of a sponsor prior to completing phase IIl of DOP, as he wants to increase his chances of staying sober.   2. Effective: Patient reports compliance with PO, and is willing to provide all requested UAs. Patient reports stable housing at this time. He will inform counselor of any changes in his current living arrangement that may interfere with treatment or probation requirements. Patient reports that he continues to work on his vehicle and finally seems to get it up and running. Patient is currently unemployed, but has his financial needs met through family and social service benefits.   T) Treatment plan updated No.  Patient notified and in agreement NA  Patient educated on Feelings and Mental Health. Patient has completed 86 of 84 program hours at this time. Projected discharge date is 3/13/2020. [facility].     Roberto Richter  1/28/2020, 8:38 AM        Psycho-Educational Curriculum  Date Attended  Psycho-Educational Curriculum  Date Attended    Acceptance   Shame/Guilt     1st Step   Anger/Rage     Affirmations   Mental Health     Automatic Negative Thoughts   Anxiety     Cross Addiction   Co-Occurring Disorders     Stages of  "Change   Paola/Bipolar     Anonymous People-Video 9/13/2019 Positive Traits 10/21/2019   Radical Acceptance 9/18/2019 MH Benefits of Exercise 10/25/2019   Use History 11/8/2019 Strengths Discussions 12/9/2019   Relapse   Trauma        Positive Steps to Wellbeing 01/27/2020   Addictive Thoughts   Victim Identity     Coping Skills   Sober Structure     Relapse Prevention   Continuum of Care     The Stages of Relapse 9/23/2019 Building Social Support 10/28/2019   Shame and Guilt 11/13/2019 Showing Appreciation 10/30/2019   Man-in-the-Glass 11/15/2019 Setting Boundaries 11/1/2019   Laying the Foundation 12/30/2019 Dimensions of Wellness 12/20/2019   The Serenity Prayer 1/3/2020 Routine and Structure 12/18/2019   Medical Aspects   Non-12 Step Support     Brain/Neurotransmitters   Priorities     Medication Compliance   Spirituality     MARÍA Alcohol/Drug Research   Weekend Planner     Spirituality 10/2/2019 The 8 Dimensions of Wellness 9/11/2019   Self-Care Assessment 11/18/2019     Symptoms of Stress 11/20/2019     What Price Am I Willing to Pay 01/08/2020     Kobe's Story (Video) 01/10/2020     Physical Health   Educational Videos     Post Acute Withdrawal   1st Step     Pregnancy and Drug Use   2nd Step     Sexual Health   Assertive Communication     Short-Term/Long-Term Effects   My name is Red THOMSON    Relationships   Cross Addiction     Healthy Vs Unhealthy Relationships 10/14/2019     Forming Stable Relationships 10/16/2019     Assertive Communication   God As We Understood Him     Boundaries   HBO Relapse     Codependence    HBO What Is Addiction     Defense Mechanisms    Medical Aspects 1     Family Roles   Medical Aspects 2     Goodbye Letter   National Geographic: Stress     Intimacy   PBS Depression Out of the Shadows     Needs/Dealbreakers in Relationships   The Anonymous People    Socialization Skills   [place]     Feelings   Nickolas Sheth \"Highjacked Brain\"    Thinking Errors 01/24/2020     Feeling Emotions " 12/6/209     Identifying Feelings 10/7/2019     Life Satisfaction Checklist 10/9/2019     ABC Model of Emotion   Antonio Novak Humor in Tx    Grief and Loss   The Mindfulness Movie    Healthy vs. Unhealthy Feelings   Red THOMSON documentary     Meditation/Mindfulness  Weekly     Overconfidence/Complacency       Resentments       Stress

## 2021-06-05 NOTE — TELEPHONE ENCOUNTER
Patient called to report that he will not be attending group today because he has a job opportunity that he would like to follow up with today. Patient will attend a 1:1 counseling session tomorrow, 01/14/2020, to make up for today.      AMERICA Durand

## 2021-06-05 NOTE — PROGRESS NOTES
Weekly Progress Note for the week of 02/02/2020: Patient started IOP treatment on 9/11/2019, and will start Phase 3 on 02/09/2020  Ricky Rowland  1990  [id number]      D) Pt attended 2 group sessions this week, with 0 absence. Patient attended 0  individual sessions this week. Intake was on 9/9/2019. A) Staff facilitated groups and reviewed tx progress. Assessed for [facility]. R) No VAP needed at this time.   Any significant events, defines as events that impact patients relationship with others inside and outside of treatment Yes: Patient reports probation with [facility]. Patient reports a new baby added to his family on 12/03/2019.  Indicate any changes or monitoring of physical or mental health problems Yes: Patient reports ADHD, OCD, and Depression.    Indicate involvement by any outside supports Yes: Patient reports support from family, friends, and probation  IAPP reviewed and modified as needed. NA  Pt working on the following dimensions:  Dimension #1 - Withdrawal Potential - Risk 0. Patient reports daily use of methamphetamine with his last endorsed use being on 9/9/19, about 1 hour prior to intake. Patient endorses weekly use of alcohol, with his last use being on 9/7/19. He denies any other pattern of other substance use at this time. Patient denies current withdrawal symptoms or concerns. Patient shows no physical signs or symptoms of intoxication or withdrawal. He may be at risk of moderate symptoms due to his recent use. He does not appear to be at risk of severe withdrawal at this time. Patient is oriented x4.  Specific goals from treatment plan addressed this week:  1. Maintain abstinence (1:1)  Effectiveness of strategies:  1. Effective: Patient reports abstinence since 10/1/2019, and is willing to provide UA as needed. Patient denies any withdrawal/intoxication concerns at this point, and reports no other concerns.    Dimension #2 - Biomedical - Risk 0. Patient denies health issues or concerns. He  reports that he is unsure if he has a PCP at this time, but does have insurance and so appears able to get his medical needs met and addressed as necessary. Patient denies immediate medical needs or concerns. He appears to be in adequate physical health and functioning at this time.  Specific goals from treatment plan addressed this week:  1. Maintain stable physical health and functioning (2:1)  2. Patient to receive information about smoking cessation (2:2)  Effectiveness of strategies:  1. Effective: Patient reports current health insurance through Medicaid, [place]. Patient is able to get health care services as needed; however reports no concerns about his physical health that need to addressed at this time. Patient will inform counselor immediately of any changes that may occur in his physical health.  2. 9/24: Patient has verbalized that he has no desire to stop smoking cigarettes at this time. Reports daily smoking of cigarettes.    Dimension #3 - Emotional/Behavioral/Cognitive - Risk 2. Patient reports mental health diagnoses of ADHD, OCD, and depression. He denies current mental health services or medications. His mental health does not appear stable or managed at this time. Patient endorses using meth due to not having medication for his ADHD. Patient does appear to struggle with impulse control as evidence by his substance use despite being on probation, as well as his endorsed problems with gambling. Patient denies any suicidal or homicidal thoughts or plans. Patient is oriented x4.  Specific goals from treatment plan addressed this week:  1. Manage mental health symptoms (3:1)  Effectiveness of strategies:  1. Effective: Patient reports ADHD, OCD, and Depression. Patient was scheduled twice for mental health assessment but missed them both. He appears stable, and will be rescheduled as needed. Patient reports no concerns that need immediate attention.    Dimension #4 - Treatment Acceptance/Resistance - Risk  1 . Patient reports he is here due to probation. He states that he does feel that he is required to be here, and he does not feel that his use is a problem. Patient verbalizes that he is unsure if he will be able to stop using despite being in treatment. He does not appear genuinely motivated for treatment or change at this time. Patient verbalizes that he is not willing to follow recommendations at this time if he is referred to a higher level of care, although did verbalize understanding that a referral to a higher level of care would be imminent if he continued to use. He appears to lack motivation for treatment and change at this time, and is in treatment solely due to probation.  Specific goals from treatment plan addressed this week:  1. Follow through with intentions to treat chemical dependency concerns while meeting OP treatment expectations in order to graduate successfully from the program (4:1)  Effectiveness of strategies:  1. Effective: Patient attended 2 group sessions this week. He attended 0 individual counseling sessions. Patient has completed 89 hours of treatment at this time. Patient will start Phase 3 of DOP on 02/09/2020.    Dimension #5 - Relapse Potential - Risk 3. Patient reports active daily use of meth prior to treatment. He therefore appears to lack the necessary skills to arrest use and prevent relapse without support and structure. Patient reports some insight into his use and the impact it has had on his life, including probation. Patient reported last use  On 10/1/2019; indicates persistent risk of relapse if no structure. Patient does appear to have some knowledge of addiction and recovery as he endorses numerous treatments within the past year. It does not appear that he utilizes that knowledge to help maintain sobriety at this time  Specific goals from treatment plan addressed this week:  1. Patient to gain skills to help prevent relapse (5:1)  Effectiveness of strategies:  1.  Effective: Patient reports no relapse/continued use since 10/1/2019. Patient reports utilizing appropriate coping skills to prevent relapse in the future. Patient reports no other concerns at this time.    Dimension #6 - Recovery Environment - Risk 3. Patient is currently unemployed and so lacks structured and meaningful activity for his daily life. Patient reports a stable place to live at this time. However, he it appears that he needs more support in his living environment, as his pregnant girlfriend used occasionally until close to the time of giving birth. Patient reports that he does not currently have any support in his life for his sobriety. Patient is currently on probation for terroristic threats.  Specific goals from treatment plan addressed this week:  1. Gain sober support and structure (6:1)  2. Comply with probation expectations (6:2)  Effectiveness of strategies:  1. Effective: Patient reports that he has completed fixing his vehicle. He reports that he has stayed around sober people to help him stay sober. Patient will start phase IIl of DOP on 02/09/2020.      2. Effective: Patient reports compliance with PO, and is willing to provide all requested UAs. Patient reports stable housing at this time. He will inform counselor of any changes in his current living arrangement that may interfere with treatment or probation requirements. Patient is currently unemployed, but has his financial needs met through family and social service benefits. Patient has no new concerns at this time.  T) Treatment plan updated No.  Patient notified and in agreement NA  Patient educated on Mental Health. Patient has completed 89 of 84 program hours at this time. Projected discharge date is 3/13/2020. [facility].     Roberto Richter  2/4/2020, 9:11 AM        Psycho-Educational Curriculum  Date Attended  Psycho-Educational Curriculum  Date Attended    Acceptance   Shame/Guilt     1st Step    Anger/Rage     Affirmations   Mental Health       Resources: Going to Meetings 01/29/2020     What kind of a Fighter are You 01/31/2020   Automatic Negative Thoughts   Anxiety     Cross Addiction   Co-Occurring Disorders     Stages of Change   Paola/Bipolar     Anonymous People-Video 9/13/2019 Positive Traits 10/21/2019   Radical Acceptance 9/18/2019 MH Benefits of Exercise 10/25/2019   Use History 11/8/2019 Strengths Discussions 12/9/2019   Relapse   Trauma        Positive Steps to Wellbeing 01/27/2020   Addictive Thoughts   Victim Identity     Coping Skills   Sober Structure     Relapse Prevention   Continuum of Care     The Stages of Relapse 9/23/2019 Building Social Support 10/28/2019   Shame and Guilt 11/13/2019 Showing Appreciation 10/30/2019   Man-in-the-Glass 11/15/2019 Setting Boundaries 11/1/2019   Laying the Foundation 12/30/2019 Dimensions of Wellness 12/20/2019   The Serenity Prayer 1/3/2020 Routine and Structure 12/18/2019   Medical Aspects   Non-12 Step Support     Brain/Neurotransmitters   Priorities     Medication Compliance   Spirituality     MARÍA Alcohol/Drug Research   Weekend Planner     Spirituality 10/2/2019 The 8 Dimensions of Wellness 9/11/2019   Self-Care Assessment 11/18/2019     Symptoms of Stress 11/20/2019     What Price Am I Willing to Pay 01/08/2020     Kobe's Story (Video) 01/10/2020     Physical Health   Educational Videos     Post Acute Withdrawal   1st Step     Pregnancy and Drug Use   2nd Step     Sexual Health   Assertive Communication     Short-Term/Long-Term Effects   My name is Red THOMSON    Relationships   Cross Addiction     Healthy Vs Unhealthy Relationships 10/14/2019     Forming Stable Relationships 10/16/2019     Assertive Communication   God As We Understood Him     Boundaries   HBO Relapse     Codependence    HBO What Is Addiction     Defense Mechanisms    Medical Aspects 1     Family Roles   Medical Aspects 2     Goodbye Letter   [employer] Geographic: Stress     Intimacy  "  PBS Depression Out of the Shadows     Needs/Dealbreakers in Relationships   The Anonymous People    Socialization Skills   [place]     Feelings   Nickolas Sheth \"Highjacked Brain\"    Thinking Errors 01/24/2020     Feeling Emotions 12/6/209     Identifying Feelings 10/7/2019     Life Satisfaction Checklist 10/9/2019     ABC Model of Emotion   Antonio Novak Humor in Tx    Grief and Loss   The Mindfulness Movie    Healthy vs. Unhealthy Feelings   Red THOMSON documentary     Meditation/Mindfulness  Weekly     Overconfidence/Complacency       Resentments       Stress         "

## 2021-06-05 NOTE — PROGRESS NOTES
Ricky Rowland attended 3 hours of group today.     The group topic was Medical Aspects, patient was responsive to topic.     Patient's engagement in the group session: medium     Total group size: 3      Roberto Richter  1/8/2020, 1:19 PM

## 2021-06-05 NOTE — PROGRESS NOTES
Patient did not show for intake appointment for psychotherapy.  A review of the patient's record shows that he also did not show for an psychotherapy intake appointment with Prabhakar Vanegas on 11/21/19.    [facility]  Should patient continue to request psychotherapy, he should be referred to alternative setting given his two previous no show appointments.

## 2021-06-05 NOTE — PROGRESS NOTES
Ricky Rowland attended 2 hours of group today.     The group topic was Relapse, patient was responsive to topic.     Patient's engagement in the group session: medium     Total group size: 5      Roberto Richter  2/6/2020, 11:57 AM

## 2021-06-05 NOTE — PROGRESS NOTES
Weekly Progress Note: Patient started IOP treatment on 9/11/2019  Ricky Rowland  1990  [id number]      D) Pt attended 0 groups this week with 0 absences due to [place] holiday. Patient attended 1 individual sessions this week. Intake was on 9/9/2019. A) Staff facilitated groups and reviewed tx progress. Assessed for [facility]. R) No VAP needed at this time.   Any significant events, defines as events that impact patients relationship with others inside and outside of treatment Yes: Patient reports probation with [facility]  Indicate any changes or monitoring of physical or mental health problems Yes: Patient reports ADHD, OCD, and Depression. Patient reports a new baby added to his family.    Indicate involvement by any outside supports Yes: Patient support from family, friends, and probation  IAPP reviewed and modified as needed. NA  Pt working on the following dimensions:  Dimension #1 - Withdrawal Potential - Risk 0. Patient reports daily use of methamphetamine with his last endorsed use being on 9/9/19, about 1 hour prior to intake. Patient endorses weekly use of alcohol, with his last use being on 9/7/19. He denies any other pattern of other substance use at this time. Patient denies current withdrawal symptoms or concerns. Patient shows no physical signs or symptoms of intoxication or withdrawal. He may be at risk of moderate symptoms due to his recent use. He does not appear to be at risk of severe withdrawal at this time. Patient is oriented x4.  Specific goals from treatment plan addressed this week:  1. Maintain abstinence (1:1)  Effectiveness of strategies:  1. Effective: Patient reports abstinence since 10/1/2019. Patient denies any withdrawal/intoxication concerns and is willing to provide UAs for lab tests as requested.     Dimension #2 - Biomedical - Risk 0. Patient denies health issues or concerns. He reports that he is unsure if he has a PCP at this time, but does have insurance and so appears able  to get his medical needs met and addressed as necessary. Patient denies immediate medical needs or concerns. He appears to be in adequate physical health and functioning at this time.  Specific goals from treatment plan addressed this week:  1. Maintain stable physical health and functioning (2:1)  2. Patient to receive information about smoking cessation (2:2)  Effectiveness of strategies:  1. Effective: Patient reports health insurance through [place], [place], and is able to get health care services as needed. Patient reports good physical health and has no concerns at this time.  2. 9/24: Patient has verbalized that he has no desire to stop smoking cigarettes at this time. Reports smoking daily.    Dimension #3 - Emotional/Behavioral/Cognitive - Risk 2. Patient reports mental health diagnoses of ADHD, OCD, and depression. He denies current mental health services or medications. His mental health does not appear stable or managed at this time. Patient endorses using meth due to not having medication for his ADHD. Patient does appear to struggle with impulse control as evidence by his substance use despite being on probation, as well as his endorsed problems with gambling. Patient denies any suicidal or homicidal thoughts or plans. Patient is oriented x4.  Specific goals from treatment plan addressed this week:  1. Manage mental health symptoms (3:1)  Effectiveness of strategies:  1. Effective: Patient reports ADHD, OCD, and Depression. Patient does not appear to be concerned about mental health needs at this time; says he is doing okay and will seek help as needed. No changes reported in this area.    Dimension #4 - Treatment Acceptance/Resistance - Risk 1 . Patient reports he is here due to probation. He states that he does feel that he is required to be here, and he does not feel that his use is a problem. Patient verbalizes that he is unsure if he will be able to stop using despite being in treatment. He does not  appear genuinely motivated for treatment or change at this time. Patient verbalizes that he is not willing to follow recommendations at this time if he is referred to a higher level of care, although did verbalize understanding that a referral to a higher level of care would be imminent if he continued to use. He appears to lack motivation for treatment and change at this time, and is in treatment solely due to probation.  Specific goals from treatment plan addressed this week:  1. Follow through with intentions to treat chemical dependency concerns while meeting OP treatment expectations in order to graduate successfully from the program (4:1)  Effectiveness of strategies:  1. Effective: Patient attended 0 group sessions this week. He attended 1 individual counseling session due to New Year holiday and poor attendance by group members. Patient is expected to resume full attendance now that the holiday season is over.    Dimension #5 - Relapse Potential - Risk 3. Patient reports active daily use of meth prior to treatment. He therefore appears to lack the necessary skills to arrest use and prevent relapse without support and structure. Patient reports some insight into his use and the impact it has had on his life, including probation. Patient reported last use  On 10/1/2019; indicates persistent risk of relapse if no structure. Patient does appear to have some knowledge of addiction and recovery as he endorses numerous treatments within the past year. It does not appear that he utilizes that knowledge to help maintain sobriety at this time  Specific goals from treatment plan addressed this week:  1. Patient to gain skills to help prevent relapse (5:1)  Effectiveness of strategies:  1. Effective: Patient reports no relapse/continued use since 10/1/2019. Patient is able to identify high-risk situations for relapse, and utilize appropriate coping skills to prevent future use. Patient is producing negative UA results.       Dimension #6 - Recovery Environment - Risk 3. Patient is currently unemployed and so lacks structured and meaningful activity for his daily life. Patient reports a stable place to live at this time. However, he it appears that he needs more support in his living environment, as his pregnant girlfriend used occasionally until close to the time of giving birth. Patient reports that he does not currently have any support in his life for his sobriety. Patient is currently on probation for terroristic threats.  Specific goals from treatment plan addressed this week:  1. Gain sober support and structure (6:1)  2. Comply with probation expectations (6:2)  Effectiveness of strategies:  1. Effective: Patient is able to identify sober support meetings to attend. He is open to the idea of a sponsor prior to completing phase IIl of DOP. Patient is currently unemployed, but has his financial needs met through family and social service benefits; says every thing is going well at this time.  2. Effective: Patient reports compliance with PO, and is willing to provide all requested UAs. Patient reports stable housing at this time. He will inform counselor of any changes in his current living arrangement that may interfere with treatment or probation requirements. Patient reports a recent addition to his family; significant other gave birth to a baby boy on 12/3/2019. Patient reports challenges with his vehicle but is working to fix the problems.  T) Treatment plan updated No.  Patient notified and in agreement NA  Patient educated on Relapse Prevention. Patient has completed 72 of 84 program hours at this time. Projected discharge date is 3/13/2020. [facility].     Roberto Richter  1/7/2020, 9:52 AM        Psycho-Educational Curriculum  Date Attended  Psycho-Educational Curriculum  Date Attended    Acceptance   Shame/Guilt     1st Step   Anger/Rage     Affirmations   Mental Health     Automatic  "Negative Thoughts   Anxiety     Cross Addiction   Co-Occurring Disorders     Stages of Change   Paola/Bipolar     Anonymous People-Video 9/13/2019 Positive Traits 10/21/2019   Radical Acceptance 9/18/2019 MH Benefits of Exercise 10/25/2019   Use History 11/8/2019 Strengths Discussions 12/9/2019   Relapse   Trauma      Addictive Thoughts   Victim Identity     Coping Skills   Sober Structure     Relapse Prevention   Continuum of Care     The Stages of Relapse 9/23/2019 Building Social Support 10/28/2019   Shame and Guilt 11/13/2019 Showing Appreciation 10/30/2019   Man-in-the-Glass 11/15/2019 Setting Boundaries 11/1/2019   Laying the Foundation 12/30/2019 Dimensions of Wellness 12/20/2019   The Serenity Prayer 1/3/2020 Routine and Structure 12/18/2019   Medical Aspects   Non-12 Step Support     Brain/Neurotransmitters   Priorities     Medication Compliance   Spirituality     MARÍA Alcohol/Drug Research   Weekend Planner     Spirituality 10/2/2019 The 8 Dimensions of Wellness 9/11/2019   Self-Care Assessment 11/18/2019     Symptoms of Stress 11/20/2019           Physical Health   Educational Videos     Post Acute Withdrawal   1st Step     Pregnancy and Drug Use   2nd Step     Sexual Health   Assertive Communication     Short-Term/Long-Term Effects   My name is Red THOMSON    Relationships   Cross Addiction     Healthy Vs Unhealthy Relationships 10/14/2019     Forming Stable Relationships 10/16/2019     Assertive Communication   God As We Understood Him     Boundaries   HBO Relapse     Codependence    HBO What Is Addiction     Defense Mechanisms    Medical Aspects 1     Family Roles   Medical Aspects 2     Goodbye Letter   National Geographic: Stress     Intimacy   PBS Depression Out of the Shadows     Needs/Dealbreakers in Relationships   The Anonymous People    Socialization Skills   [place]     Feelings   Nickolas Sheth \"Highjacked Brain\"    Feeling Emotions 12/6/209     Identifying Feelings 10/7/2019     Life Satisfaction " Checklist 10/9/2019     ABC Model of Emotion   Antonio Novak Humor in Tx    Grief and Loss   The Mindfulness Movie    Healthy vs. Unhealthy Feelings   Red THOMSON documentary     Meditation/Mindfulness  Weekly     Overconfidence/Complacency       Resentments       Stress

## 2021-06-05 NOTE — PROGRESS NOTES
Patient was not scheduled for group today but stopped by to report that a dog had bit him in the the head and he was on his way to [facility] to get some sutures. The patient will attend group on Friday, 01/24/2020, and process what led to him being bitten by a dog.

## 2021-06-05 NOTE — PROGRESS NOTES
Weekly Progress Note for the week of 01/20/2020: Patient started IOP treatment on 9/11/2019  Carmina Rowlandsamuel  1990  [id number]      D) Pt attended 0 groups this week with 0 absence. Patient attended 2 individual sessions this week. Intake was on 9/9/2019. A) Staff facilitated groups and reviewed tx progress. Assessed for [facility]. R) No VAP needed at this time.   Any significant events, defines as events that impact patients relationship with others inside and outside of treatment Yes: Patient reports probation with [facility]. Patient reports a new baby added to his family on 12/03/2019.  Indicate any changes or monitoring of physical or mental health problems Yes: Patient reports ADHD, OCD, and Depression.    Indicate involvement by any outside supports Yes: Patient reports support from family, friends, and probation  IAPP reviewed and modified as needed. NA  Pt working on the following dimensions:  Dimension #1 - Withdrawal Potential - Risk 0. Patient reports daily use of methamphetamine with his last endorsed use being on 9/9/19, about 1 hour prior to intake. Patient endorses weekly use of alcohol, with his last use being on 9/7/19. He denies any other pattern of other substance use at this time. Patient denies current withdrawal symptoms or concerns. Patient shows no physical signs or symptoms of intoxication or withdrawal. He may be at risk of moderate symptoms due to his recent use. He does not appear to be at risk of severe withdrawal at this time. Patient is oriented x4.  Specific goals from treatment plan addressed this week:  1. Maintain abstinence (1:1)  Effectiveness of strategies:  1. Effective: Patient appears to be meeting the goal of abstinence, as evidenced by no report of illicit use  since 10/1/2019. Patient denies any withdrawal/intoxication concerns and is providing negative UAs    Dimension #2 - Biomedical - Risk 0. Patient denies health issues or concerns. He reports that he is unsure if he has  a PCP at this time, but does have insurance and so appears able to get his medical needs met and addressed as necessary. Patient denies immediate medical needs or concerns. He appears to be in adequate physical health and functioning at this time.  Specific goals from treatment plan addressed this week:  1. Maintain stable physical health and functioning (2:1)  2. Patient to receive information about smoking cessation (2:2)  Effectiveness of strategies:  1. Effective: Patient reports current health insurance through [place], [place], and is able to get health care services as needed. Patient reports good physical health at this time with no changes.  2. 9/24: Patient has verbalized that he has no desire to stop smoking cigarettes at this time. Reports daily smoking of cigarettes.    Dimension #3 - Emotional/Behavioral/Cognitive - Risk 2. Patient reports mental health diagnoses of ADHD, OCD, and depression. He denies current mental health services or medications. His mental health does not appear stable or managed at this time. Patient endorses using meth due to not having medication for his ADHD. Patient does appear to struggle with impulse control as evidence by his substance use despite being on probation, as well as his endorsed problems with gambling. Patient denies any suicidal or homicidal thoughts or plans. Patient is oriented x4.  Specific goals from treatment plan addressed this week:  1. Manage mental health symptoms (3:1)  Effectiveness of strategies:  1. Effective: Patient reports ADHD, OCD, and Depression. Patient does not appear to be concerned about his mental health needs at this time, as evidenced by missing two scheduled appointments with [facility] health staff for assessment. Patient appears stable, and will inform staff to be rescheduled when he is ready.    Dimension #4 - Treatment Acceptance/Resistance - Risk 1 . Patient reports he is here due to probation. He states that he does feel that  he is required to be here, and he does not feel that his use is a problem. Patient verbalizes that he is unsure if he will be able to stop using despite being in treatment. He does not appear genuinely motivated for treatment or change at this time. Patient verbalizes that he is not willing to follow recommendations at this time if he is referred to a higher level of care, although did verbalize understanding that a referral to a higher level of care would be imminent if he continued to use. He appears to lack motivation for treatment and change at this time, and is in treatment solely due to probation.  Specific goals from treatment plan addressed this week:  1. Follow through with intentions to treat chemical dependency concerns while meeting OP treatment expectations in order to graduate successfully from the program (4:1)  Effectiveness of strategies:  1. Effective: Patient attended 0 group sessions this week. He attended 2 individual counseling sessions. Patient has completed 80 hours of treatment at this time.     Dimension #5 - Relapse Potential - Risk 3. Patient reports active daily use of meth prior to treatment. He therefore appears to lack the necessary skills to arrest use and prevent relapse without support and structure. Patient reports some insight into his use and the impact it has had on his life, including probation. Patient reported last use  On 10/1/2019; indicates persistent risk of relapse if no structure. Patient does appear to have some knowledge of addiction and recovery as he endorses numerous treatments within the past year. It does not appear that he utilizes that knowledge to help maintain sobriety at this time  Specific goals from treatment plan addressed this week:  1. Patient to gain skills to help prevent relapse (5:1)  Effectiveness of strategies:  1. Effective: Patient reports no relapse/continued use since 10/1/2019. He is able to identify high-risk situations for relapse, and  utilize appropriate coping skills to prevent future use. Patient's  UA results have been negative for illicit use. Patient reports no new concerns in this area.    Dimension #6 - Recovery Environment - Risk 3. Patient is currently unemployed and so lacks structured and meaningful activity for his daily life. Patient reports a stable place to live at this time. However, he it appears that he needs more support in his living environment, as his pregnant girlfriend used occasionally until close to the time of giving birth. Patient reports that he does not currently have any support in his life for his sobriety. Patient is currently on probation for terroristic threats.  Specific goals from treatment plan addressed this week:  1. Gain sober support and structure (6:1)  2. Comply with probation expectations (6:2)  Effectiveness of strategies:  1. Effective: Patient reports efforts to stay around sober people for support. He is open to the idea of a sponsor prior to completing phase IIl of DOP, as the way to increase his chances of sustained sobriety.   2. Effective: Patient reports compliance with PO, and is willing to provide all requested UAs. Patient reports stable housing at this time. He will inform counselor of any changes in his current living arrangement that may interfere with treatment or probation requirements. Patient reports a recent addition to his family; significant other gave birth to a baby boy on 12/3/2019. Patient reports challenges with his vehicle but is working to fix the problems. Patient is currently unemployed, but has his financial needs met through family and social service benefits.He is currently job hunting, and will report progress to his counselor.  T) Treatment plan updated No.  Patient notified and in agreement NA  Patient educated on Medical Aspects. Patient has completed 80 of 84 program hours at this time. Projected discharge date is 3/13/2020. [facility]     Roberto T Salfady  1/21/2020, 11:25 AM        Psycho-Educational Curriculum  Date Attended  Psycho-Educational Curriculum  Date Attended    Acceptance   Shame/Guilt     1st Step   Anger/Rage     Affirmations   Mental Health     Automatic Negative Thoughts   Anxiety     Cross Addiction   Co-Occurring Disorders     Stages of Change   Paola/Bipolar     Anonymous People-Video 9/13/2019 Positive Traits 10/21/2019   Radical Acceptance 9/18/2019 MH Benefits of Exercise 10/25/2019   Use History 11/8/2019 Strengths Discussions 12/9/2019   Relapse   Trauma      Addictive Thoughts   Victim Identity     Coping Skills   Sober Structure     Relapse Prevention   Continuum of Care     The Stages of Relapse 9/23/2019 Building Social Support 10/28/2019   Shame and Guilt 11/13/2019 Showing Appreciation 10/30/2019   Man-in-the-Glass 11/15/2019 Setting Boundaries 11/1/2019   Laying the Foundation 12/30/2019 Dimensions of Wellness 12/20/2019   The Serenity Prayer 1/3/2020 Routine and Structure 12/18/2019   Medical Aspects   Non-12 Step Support     Brain/Neurotransmitters   Priorities     Medication Compliance   Spirituality     MARÍA Alcohol/Drug Research   Weekend Planner     Spirituality 10/2/2019 The 8 Dimensions of Wellness 9/11/2019   Self-Care Assessment 11/18/2019     Symptoms of Stress 11/20/2019     What Price Am I Willing to Pay 01/08/2020     Kobe's Story (Video) 01/10/2020     Physical Health   Educational Videos     Post Acute Withdrawal   1st Step     Pregnancy and Drug Use   2nd Step     Sexual Health   Assertive Communication     Short-Term/Long-Term Effects   My name is Red GARCIABandar    Relationships   Cross Addiction     Healthy Vs Unhealthy Relationships 10/14/2019     Forming Stable Relationships 10/16/2019     Assertive Communication   God As We Understood Him     Boundaries   HBO Relapse     Codependence    HBO What Is Addiction     Defense Mechanisms    Medical Aspects 1     Family Roles   Medical Aspects 2    "  Goodbye Letter   [employer] Geographic: Stress     Intimacy   PBS Depression Out of the Shadows     Needs/Dealbreakers in Relationships   The Anonymous People    Socialization Skills   [place]     Feelings   Nickolas Sheth \"Highjacked Brain\"    Feeling Emotions 12/6/209     Identifying Feelings 10/7/2019     Life Satisfaction Checklist 10/9/2019     ABC Model of Emotion   Antonio Novak Humor in Tx    Grief and Loss   The Mindfulness Movie    Healthy vs. Unhealthy Feelings   Red THOMSON documentary     Meditation/Mindfulness  Weekly     Overconfidence/Complacency       Resentments       Stress         "

## 2021-06-05 NOTE — PROGRESS NOTES
"Met with the patient in a 1 x 1 counseling session today for 70 minutes, as he was one of only two patients that showed up for group session. Patient participated in a check-in, and discussed the topic on Relationships. Patient reports no illicit use or withdrawal concerns at this time. Patient reports having a great relationship with his girlfriend, who he referred to as, \"My Lady\", and expressed gratitude to Godl for the way things are gradually improving in his life. Patient reports that he is still working on his vehicle. Patient reports that he lost his car key last month and has not been able to find it. He is trying to figure out other ways to get the car to start by reconfiguring the ignition system. However, he reports that he has just received information that there is a re-call on his vehicle for ignition problems and he could take advantage of that to resolve the problem. Patient denies any mental health concerns that need to be addressed at this time. Patient also denies any suicidal thoughts at this point. Patient and staff discussed on the topic, \"Most important relationships in your life right now, and why,\" as it relates to the topic for the week, 'Relationship.'  "

## 2021-06-05 NOTE — PROGRESS NOTES
Ricky Rowland attended 3 hours of group today.     The group topic was Mental Health, patient was responsive to topic.     Patient's engagement in the group session: medium     Total group size: 4      Roberto Richter  1/27/2020, 1:30 PM

## 2021-06-05 NOTE — PROGRESS NOTES
"Met with the patient in a 1 x 1 counseling session today for 63 minutes, as he was one of only two patients that showed up for group session. Patient participated in a check-in, and discussed other issues that concerns him. Patient reports no illicit use or withdrawal concerns at this time. He reports some dental health concerns and says he will make an appointment to see a dentist. Patient reports having a great relationship with his PO, and expressed gratitude for all those that are helping to work with him to resolve his current legal and personal problems. Patient reports that he is still working to start his vehicle. Patient reports that he lost his car key last month and has not been able to find it. He is trying to figure out other ways to get the car to start by reconfiguring the ignition system. He reports some stress due to not being able to get his car up and running, but denies any mental health concerns that need to be addressed. Patient also denies any suicidal thoughts at this point. Patient and staff discussed on the topic, \"Standing up for Ourselves,\" as it relates to the topic for the week, 'Relationship.'  "

## 2021-06-06 NOTE — PROGRESS NOTES
Weekly Progress Note for the week of 02/20/2020: Patient started Phase 3 on 02/06/2020.   RowlandCarminasamuel.  1990  [id number]        D) Pt attended 1 group sessions this week, with 0 excused absences. Patient attended 0  individual sessions this week. Intake was on 9/9/2019. A) Staff facilitated groups and reviewed tx progress. Assessed for [facility]. R) No VAP needed at this time.   Any significant events, defines as events that impact patients relationship with others inside and outside of treatment Yes: Patient reports probation with [facility]. Patient reports a new baby added to his family on 12/03/2019.  Indicate any changes or monitoring of physical or mental health problems Yes: Patient reports ADHD, OCD, and Depression.     Indicate involvement by any outside supports Yes: Patient reports support from family, friends, and probation  IAPP reviewed and modified as needed. NA  Pt working on the following dimensions:  Dimension #1 - Withdrawal Potential - Risk 0. Patient reports daily use of methamphetamine with his last endorsed use being on 9/9/19, about 1 hour prior to intake. Patient endorses weekly use of alcohol, with his last use being on 9/7/19. He denies any other pattern of other substance use at this time. Patient denies current withdrawal symptoms or concerns. Patient shows no physical signs or symptoms of intoxication or withdrawal. He may be at risk of moderate symptoms due to his recent use. He does not appear to be at risk of severe withdrawal at this time. Patient is oriented x4.  Specific goals from treatment plan addressed this week:  1. Maintain abstinence (1:1)  Effectiveness of strategies:  1. Effective: Patient reports that his last amphetamines use was on 9/9/2019. Patient reports no concerns with withdrawal or intoxication at this point.     Dimension #2 - Biomedical - Risk 0. Patient denies health issues or concerns. He reports that he is unsure if he has a PCP at this time, but does have  insurance and so appears able to get his medical needs met and addressed as necessary. Patient denies immediate medical needs or concerns. He appears to be in adequate physical health and functioning at this time.  Specific goals from treatment plan addressed this week:  1. Maintain stable physical health and functioning (2:1)  2. Patient to receive information about smoking cessation (2:2)  Effectiveness of strategies:  1. Effective: Patient reports current health insurance through [place], [place]. Patient is able to get health care services as needed. Patient reports good health at this time, and endorses no concerns that need to ne addressed.  2. 9/24: Patient has verbalized that he has no desire to stop smoking cigarettes at this time. Reports daily smoking of cigarettes.     Dimension #3 - Emotional/Behavioral/Cognitive - Risk 2. Patient reports mental health diagnoses of ADHD, OCD, and depression. He denies current mental health services or medications. His mental health does not appear stable or managed at this time. Patient endorses using meth due to not having medication for his ADHD. Patient does appear to struggle with impulse control as evidence by his substance use despite being on probation, as well as his endorsed problems with gambling. Patient denies any suicidal or homicidal thoughts or plans. Patient is oriented x4.  Specific goals from treatment plan addressed this week:  1. Manage mental health symptoms (3:1)  Effectiveness of strategies:  1. Effective: Patient reports ADHD, OCD, and Depression. Patient was scheduled twice for mental health assessment but missed them both. Patient rates his mental health at 8.0 out of 10 this week, and is able to have access to mental health services.      Dimension #4 - Treatment Acceptance/Resistance - Risk 1 . Patient reports he is here due to probation. He states that he does feel that he is required to be here, and he does not feel that his use is a problem.  Patient verbalizes that he is unsure if he will be able to stop using despite being in treatment. He does not appear genuinely motivated for treatment or change at this time. Patient verbalizes that he is not willing to follow recommendations at this time if he is referred to a higher level of care, although did verbalize understanding that a referral to a higher level of care would be imminent if he continued to use. He appears to lack motivation for treatment and change at this time, and is in treatment solely due to probation.  Specific goals from treatment plan addressed this week:  1. Follow through with intentions to treat chemical dependency concerns while meeting OP treatment expectations in order to graduate successfully from the program (4:1)  Effectiveness of strategies:  1. Effective: Patient attended 1 group sessions this week. He attended 0 individual counseling sessions. Patient rates his level of motivation for soberity as 10 out of 10 for this week.      Dimension #5 - Relapse Potential - Risk 3. Patient reports active daily use of meth prior to treatment. He therefore appears to lack the necessary skills to arrest use and prevent relapse without support and structure. Patient reports some insight into his use and the impact it has had on his life, including probation. Patient reported last use  On 10/1/2019; indicates persistent risk of relapse if no structure. Patient does appear to have some knowledge of addiction and recovery as he endorses numerous treatments within the past year. It does not appear that he utilizes that knowledge to help maintain sobriety at this time  Specific goals from treatment plan addressed this week:  1. Patient to gain skills to help prevent relapse (5:1)  Effectiveness of strategies:  1. Effective: Patient reports no relapse/continued use since 09/09/2019. Patient reports utilizing appropriate coping skills to prevent relapse. Patient reports spending his time with  sober people, as the way to avoid relapse in the future.   Dimension #6 - Recovery Environment - Risk 3. Patient is currently unemployed and so lacks structured and meaningful activity for his daily life. Patient reports a stable place to live at this time. However, he it appears that he needs more support in his living environment, as his pregnant girlfriend used occasionally until close to the time of giving birth. Patient reports that he does not currently have any support in his life for his sobriety. Patient is currently on probation for terroristic threats.  Specific goals from treatment plan addressed this week:  1. Gain sober support and structure (6:1)  2. Comply with probation expectations (6:2)  Effectiveness of strategies:  1. Effective: Patient reports that he has stayed around sober people to help him stay sober. 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   2. Effective: Patient reports compliance with PO. Reports stable housing at this time. Patient is committed to working with his counselor and  to successfully complete treatment as expected..     T) Treatment plan updated No.  Patient notified and in agreement NA  Patient educated on Relapse Prevention. Patient has completed 89 of 84 program hours in phases 1 & 2 and 4 hours in phase 3. Projected discharge date is 3/13/2020. [facility].      Roberto Richter  2/26/2020, 12:06 PM             Psycho-Educational Curriculum  Date Attended  Psycho-Educational Curriculum  Date Attended    Acceptance    Shame/Guilt      1st Step    Anger/Rage      Affirmations    Mental Health          Resources: Going to Meetings 01/29/2020       What kind of a Fighter are You 01/31/2020   Automatic Negative Thoughts    Anxiety      Cross Addiction    Co-Occurring Disorders      Stages of Change    Paola/Bipolar      Anonymous People-Video  "9/13/2019 Positive Traits 10/21/2019   Radical Acceptance 9/18/2019 MH Benefits of Exercise 10/25/2019   Use History 11/8/2019 Strengths Discussions 12/9/2019   Relapse    Trauma          Positive Steps to Wellbeing 01/27/2020   Addictive Thoughts    Victim Identity      Coping Skills    Sober Structure      Relapse Prevention    Continuum of Care      The Stages of Relapse 9/23/2019 Building Social Support 10/28/2019   Shame and Guilt 11/13/2019 Showing Appreciation 10/30/2019   Man-in-the-Glass 02/20/2020 Setting Boundaries 11/1/2019   Laying the Foundation 12/30/2019 Dimensions of Wellness 12/20/2019   The Serenity Prayer 1/3/2020 Routine and Structure 12/18/2019   Medical Aspects    Non-12 Step Support      Brain/Neurotransmitters    Priorities      Medication Compliance    Spirituality      MARÍA Alcohol/Drug Research    Weekend Planner      Spirituality 10/2/2019 The 8 Dimensions of Wellness 9/11/2019   Self-Care Assessment 11/18/2019       Symptoms of Stress 11/20/2019       What Price Am I Willing to Pay 01/08/2020       Kobe's Story (Video) 01/10/2020       Physical Health    Educational Videos      Post Acute Withdrawal    1st Step      Pregnancy and Drug Use    2nd Step      Sexual Health    Assertive Communication      Short-Term/Long-Term Effects    My name is Red THOMSON     Relationships    Cross Addiction      Healthy Vs Unhealthy Relationships 10/14/2019       Forming Stable Relationships 10/16/2019       Assertive Communication    God As We Understood Him      Boundaries    HBO Relapse      Codependence    HBO What Is Addiction      Defense Mechanisms    Medical Aspects 1      Family Roles    Medical Aspects 2      Goodbye Letter    National Geographic: Stress      Intimacy    PBS Depression Out of the Shadows      Needs/Dealbreakers in Relationships    The Anonymous People     Socialization Skills    [place]      Feelings    Nickolas Sheth \"Highjacked Brain\"    Thinking Errors 01/24/2020       Feeling " Emotions 12/6/209       Identifying Feelings 10/7/2019       Life Satisfaction Checklist 10/9/2019       ABC Model of Emotion    Antonio Novak Humor in Tx     Grief and Loss    The Mindfulness Movie     Healthy vs. Unhealthy Feelings    Red THOMSON documentary      Meditation/Mindfulness  Weekly       Overconfidence/Complacency          Resentments          Stress

## 2021-06-06 NOTE — PROGRESS NOTES
Ricky Rowland attended 2 hours of group today.     The group topic was Relapse, patient was responsive to topic.     Patient's engagement in the group session: medium     Total group size: 6      Roberto Richter  2/20/2020, 12:05 PM

## 2021-06-06 NOTE — PROGRESS NOTES
Weekly Progress Note for the week of 02/06/2020: Patient started IOP treatment on 9/11/2019, and started Phase 3 on 02/06/2020.   Ricky Rowland  1990  [id number]        D) Pt attended 1 group sessions this week, with 0 absence. Patient attended 0  individual sessions this week. Intake was on 9/9/2019. A) Staff facilitated groups and reviewed tx progress. Assessed for [facility]. R) No VAP needed at this time.   Any significant events, defines as events that impact patients relationship with others inside and outside of treatment Yes: Patient reports probation with [facility]. Patient reports a new baby added to his family on 12/03/2019.  Indicate any changes or monitoring of physical or mental health problems Yes: Patient reports ADHD, OCD, and Depression.     Indicate involvement by any outside supports Yes: Patient reports support from family, friends, and probation  IAPP reviewed and modified as needed. NA  Pt working on the following dimensions:  Dimension #1 - Withdrawal Potential - Risk 0. Patient reports daily use of methamphetamine with his last endorsed use being on 9/9/19, about 1 hour prior to intake. Patient endorses weekly use of alcohol, with his last use being on 9/7/19. He denies any other pattern of other substance use at this time. Patient denies current withdrawal symptoms or concerns. Patient shows no physical signs or symptoms of intoxication or withdrawal. He may be at risk of moderate symptoms due to his recent use. He does not appear to be at risk of severe withdrawal at this time. Patient is oriented x4.  Specific goals from treatment plan addressed this week:  1. Maintain abstinence (1:1)  Effectiveness of strategies:  1. Effective: Patient endorses sobriety since last update. Does not identify urges/triggers.      Dimension #2 - Biomedical - Risk 0. Patient denies health issues or concerns. He reports that he is unsure if he has a PCP at this time, but does have insurance and so appears able  to get his medical needs met and addressed as necessary. Patient denies immediate medical needs or concerns. He appears to be in adequate physical health and functioning at this time.  Specific goals from treatment plan addressed this week:  1. Maintain stable physical health and functioning (2:1)  2. Patient to receive information about smoking cessation (2:2)  Effectiveness of strategies:  1. Effective: Patient reports current health insurance through Medicaid, [place]. Patient is able to get health care services as needed. Patient identifies new tooth pain.  2. 9/24: Patient has verbalized that he has no desire to stop smoking cigarettes at this time. Reports daily smoking of cigarettes.     Dimension #3 - Emotional/Behavioral/Cognitive - Risk 2. Patient reports mental health diagnoses of ADHD, OCD, and depression. He denies current mental health services or medications. His mental health does not appear stable or managed at this time. Patient endorses using meth due to not having medication for his ADHD. Patient does appear to struggle with impulse control as evidence by his substance use despite being on probation, as well as his endorsed problems with gambling. Patient denies any suicidal or homicidal thoughts or plans. Patient is oriented x4.  Specific goals from treatment plan addressed this week:  1. Manage mental health symptoms (3:1)  Effectiveness of strategies:  1. Effective: Patient reports ADHD, OCD, and Depression. Patient was scheduled twice for mental health assessment but missed them both. Patient identifies mental health as a 7.5 or 8.5 out of 10. Patient endorses mental health services.      Dimension #4 - Treatment Acceptance/Resistance - Risk 1 . Patient reports he is here due to probation. He states that he does feel that he is required to be here, and he does not feel that his use is a problem. Patient verbalizes that he is unsure if he will be able to stop using despite being in treatment. He  does not appear genuinely motivated for treatment or change at this time. Patient verbalizes that he is not willing to follow recommendations at this time if he is referred to a higher level of care, although did verbalize understanding that a referral to a higher level of care would be imminent if he continued to use. He appears to lack motivation for treatment and change at this time, and is in treatment solely due to probation.  Specific goals from treatment plan addressed this week:  1. Follow through with intentions to treat chemical dependency concerns while meeting OP treatment expectations in order to graduate successfully from the program (4:1)  Effectiveness of strategies:  1. Effective: Patient attended 1 group sessions this week. He attended 0 individual counseling sessions. Patient identifies his level of motivation for soberity as 8 out of 10 over the past week.      Dimension #5 - Relapse Potential - Risk 3. Patient reports active daily use of meth prior to treatment. He therefore appears to lack the necessary skills to arrest use and prevent relapse without support and structure. Patient reports some insight into his use and the impact it has had on his life, including probation. Patient reported last use  On 10/1/2019; indicates persistent risk of relapse if no structure. Patient does appear to have some knowledge of addiction and recovery as he endorses numerous treatments within the past year. It does not appear that he utilizes that knowledge to help maintain sobriety at this time  Specific goals from treatment plan addressed this week:  1. Patient to gain skills to help prevent relapse (5:1)  Effectiveness of strategies:  1. Effective: Patient reports no relapse/continued use since 10/1/2019. Patient reports utilizing appropriate coping skills to prevent relapse in the future such as being proactive and being mindful with how he spends his time. Patient reports no other concerns at this  time.     Dimension #6 - Recovery Environment - Risk 3. Patient is currently unemployed and so lacks structured and meaningful activity for his daily life. Patient reports a stable place to live at this time. However, he it appears that he needs more support in his living environment, as his pregnant girlfriend used occasionally until close to the time of giving birth. Patient reports that he does not currently have any support in his life for his sobriety. Patient is currently on probation for terroristic threats.  Specific goals from treatment plan addressed this week:  1. Gain sober support and structure (6:1)  2. Comply with probation expectations (6:2)  Effectiveness of strategies:  1. Effective: Patient reports that he has stayed around sober people to help him stay sober.   2. Effective: Patient reports compliance with PO. Reports stable housing at this time. Patient is currently unemployed, but has his financial needs met through family and social service benefits.     T) Treatment plan updated No.  Patient notified and in agreement NA  Patient educated on Mental Health. Patient has completed 89 of 84 program hours of phase 1 & 2 and 1 hour of phase 3. Projected discharge date is 3/13/2020. [facility].      Cookie Santoro  2/11/2020, 12:06 PM             Psycho-Educational Curriculum  Date Attended  Psycho-Educational Curriculum  Date Attended    Acceptance    Shame/Guilt      1st Step    Anger/Rage      Affirmations    Mental Health          Resources: Going to Meetings 01/29/2020       What kind of a Fighter are You 01/31/2020   Automatic Negative Thoughts    Anxiety      Cross Addiction    Co-Occurring Disorders      Stages of Change    Paola/Bipolar      Anonymous People-Video 9/13/2019 Positive Traits 10/21/2019   Radical Acceptance 9/18/2019 MH Benefits of Exercise 10/25/2019   Use History 11/8/2019 Strengths Discussions 12/9/2019   Relapse    Trauma          Positive  "Steps to Wellbeing 01/27/2020   Addictive Thoughts    Victim Identity      Coping Skills    Sober Structure      Relapse Prevention    Continuum of Care      The Stages of Relapse 9/23/2019 Building Social Support 10/28/2019   Shame and Guilt 11/13/2019 Showing Appreciation 10/30/2019   Man-in-the-Glass 11/15/2019 Setting Boundaries 11/1/2019   Laying the Foundation 12/30/2019 Dimensions of Wellness 12/20/2019   The Serenity Prayer 1/3/2020 Routine and Structure 12/18/2019   Medical Aspects    Non-12 Step Support      Brain/Neurotransmitters    Priorities      Medication Compliance    Spirituality      MARÍA Alcohol/Drug Research    Weekend Planner      Spirituality 10/2/2019 The 8 Dimensions of Wellness 9/11/2019   Self-Care Assessment 11/18/2019       Symptoms of Stress 11/20/2019       What Price Am I Willing to Pay 01/08/2020       Kobe's Story (Video) 01/10/2020       Physical Health    Educational Videos      Post Acute Withdrawal    1st Step      Pregnancy and Drug Use    2nd Step      Sexual Health    Assertive Communication      Short-Term/Long-Term Effects    My name is Red THOMSON     Relationships    Cross Addiction      Healthy Vs Unhealthy Relationships 10/14/2019       Forming Stable Relationships 10/16/2019       Assertive Communication    God As We Understood Him      Boundaries    HBO Relapse      Codependence    HBO What Is Addiction      Defense Mechanisms    Medical Aspects 1      Family Roles    Medical Aspects 2      Goodbye Letter    [employer] Geographic: Stress      Intimacy    PBS Depression Out of the Shadows      Needs/Dealbreakers in Relationships    The Anonymous People     Socialization Skills    [employer]      Feelings    Nickolas Sheth \"Highjacked Brain\"    Thinking Errors 01/24/2020       Feeling Emotions 12/6/209       Identifying Feelings 10/7/2019       Life Satisfaction Checklist 10/9/2019       ABC Model of Emotion    Antonio Novak Humor in Tx     Grief and Loss    The Mindfulness Movie "     Healthy vs. Unhealthy Feelings    Red THOMSON documentary      Meditation/Mindfulness  Weekly       Overconfidence/Complacency          Resentments          Stress

## 2021-06-06 NOTE — PROGRESS NOTES
Weekly Progress Note for the week of 02/13/2020: Patient started IOP treatment on 9/11/2019, and started Phase 3 on 02/06/2020.   Ricky Rowland did not attend group on 2/13/2020. Staff is unable to assess progress for this week!  1990  [id number]        D) Pt attended 0 group sessions this week, with 1 unexcused absence. Patient attended 0  individual sessions this week. Intake was on 9/9/2019. A) Staff facilitated groups and reviewed tx progress. Assessed for [facility]. R) No VAP needed at this time.   Any significant events, defines as events that impact patients relationship with others inside and outside of treatment Yes: Patient reports probation with [facility]. Patient reports a new baby added to his family on 12/03/2019.  Indicate any changes or monitoring of physical or mental health problems Yes: Patient reports ADHD, OCD, and Depression.     Indicate involvement by any outside supports Yes: Patient reports support from family, friends, and probation  IAPP reviewed and modified as needed. NA  Pt working on the following dimensions:  Dimension #1 - Withdrawal Potential - Risk 0. Patient reports daily use of methamphetamine with his last endorsed use being on 9/9/19, about 1 hour prior to intake. Patient endorses weekly use of alcohol, with his last use being on 9/7/19. He denies any other pattern of other substance use at this time. Patient denies current withdrawal symptoms or concerns. Patient shows no physical signs or symptoms of intoxication or withdrawal. He may be at risk of moderate symptoms due to his recent use. He does not appear to be at risk of severe withdrawal at this time. Patient is oriented x4.  Specific goals from treatment plan addressed this week:  1. Maintain abstinence (1:1)  Effectiveness of strategies:  1. Effective: Patient endorses sobriety since last update. Does not identify urges/triggers.      Dimension #2 - Biomedical - Risk 0. Patient denies health issues or concerns. He  reports that he is unsure if he has a PCP at this time, but does have insurance and so appears able to get his medical needs met and addressed as necessary. Patient denies immediate medical needs or concerns. He appears to be in adequate physical health and functioning at this time.  Specific goals from treatment plan addressed this week:  1. Maintain stable physical health and functioning (2:1)  2. Patient to receive information about smoking cessation (2:2)  Effectiveness of strategies:  1. Effective: Patient reports current health insurance through Medicaid, [place]. Patient is able to get health care services as needed. Patient identifies new tooth pain.  2. 9/24: Patient has verbalized that he has no desire to stop smoking cigarettes at this time. Reports daily smoking of cigarettes.     Dimension #3 - Emotional/Behavioral/Cognitive - Risk 2. Patient reports mental health diagnoses of ADHD, OCD, and depression. He denies current mental health services or medications. His mental health does not appear stable or managed at this time. Patient endorses using meth due to not having medication for his ADHD. Patient does appear to struggle with impulse control as evidence by his substance use despite being on probation, as well as his endorsed problems with gambling. Patient denies any suicidal or homicidal thoughts or plans. Patient is oriented x4.  Specific goals from treatment plan addressed this week:  1. Manage mental health symptoms (3:1)  Effectiveness of strategies:  1. Effective: Patient reports ADHD, OCD, and Depression. Patient was scheduled twice for mental health assessment but missed them both. Patient identifies mental health as a 7.5 or 8.5 out of 10. Patient endorses mental health services.      Dimension #4 - Treatment Acceptance/Resistance - Risk 1 . Patient reports he is here due to probation. He states that he does feel that he is required to be here, and he does not feel that his use is a problem.  Patient verbalizes that he is unsure if he will be able to stop using despite being in treatment. He does not appear genuinely motivated for treatment or change at this time. Patient verbalizes that he is not willing to follow recommendations at this time if he is referred to a higher level of care, although did verbalize understanding that a referral to a higher level of care would be imminent if he continued to use. He appears to lack motivation for treatment and change at this time, and is in treatment solely due to probation.  Specific goals from treatment plan addressed this week:  1. Follow through with intentions to treat chemical dependency concerns while meeting OP treatment expectations in order to graduate successfully from the program (4:1)  Effectiveness of strategies:  1. Effective: Patient attended 1 group sessions this week. He attended 0 individual counseling sessions. Patient identifies his level of motivation for soberity as 8 out of 10 over the past week.      Dimension #5 - Relapse Potential - Risk 3. Patient reports active daily use of meth prior to treatment. He therefore appears to lack the necessary skills to arrest use and prevent relapse without support and structure. Patient reports some insight into his use and the impact it has had on his life, including probation. Patient reported last use  On 10/1/2019; indicates persistent risk of relapse if no structure. Patient does appear to have some knowledge of addiction and recovery as he endorses numerous treatments within the past year. It does not appear that he utilizes that knowledge to help maintain sobriety at this time  Specific goals from treatment plan addressed this week:  1. Patient to gain skills to help prevent relapse (5:1)  Effectiveness of strategies:  1. Effective: Patient reports no relapse/continued use since 10/1/2019. Patient reports utilizing appropriate coping skills to prevent relapse in the future such as being  proactive and being mindful with how he spends his time. Patient reports no other concerns at this time.     Dimension #6 - Recovery Environment - Risk 3. Patient is currently unemployed and so lacks structured and meaningful activity for his daily life. Patient reports a stable place to live at this time. However, he it appears that he needs more support in his living environment, as his pregnant girlfriend used occasionally until close to the time of giving birth. Patient reports that he does not currently have any support in his life for his sobriety. Patient is currently on probation for terroristic threats.  Specific goals from treatment plan addressed this week:  1. Gain sober support and structure (6:1)  2. Comply with probation expectations (6:2)  Effectiveness of strategies:  1. Effective: Patient reports that he has stayed around sober people to help him stay sober.   2. Effective: Patient reports compliance with PO. Reports stable housing at this time. Patient is currently unemployed, but has his financial needs met through family and social service benefits.     T) Treatment plan updated No.  Patient notified and in agreement NA  Patient educated on Mental Health. Patient has completed 89 of 84 program hours of phase 1 & 2 and 1 hour of phase 3. Projected discharge date is 3/13/2020. [facility].      Cookie Santoro  2/19/2020, 12:06 PM             Psycho-Educational Curriculum  Date Attended  Psycho-Educational Curriculum  Date Attended    Acceptance    Shame/Guilt      1st Step    Anger/Rage      Affirmations    Mental Health          Resources: Going to Meetings 01/29/2020       What kind of a Fighter are You 01/31/2020   Automatic Negative Thoughts    Anxiety      Cross Addiction    Co-Occurring Disorders      Stages of Change    Paola/Bipolar      Anonymous People-Video 9/13/2019 Positive Traits 10/21/2019   Radical Acceptance 9/18/2019  Benefits of Exercise  "10/25/2019   Use History 11/8/2019 Strengths Discussions 12/9/2019   Relapse    Trauma          Positive Steps to Wellbeing 01/27/2020   Addictive Thoughts    Victim Identity      Coping Skills    Sober Structure      Relapse Prevention    Continuum of Care      The Stages of Relapse 9/23/2019 Building Social Support 10/28/2019   Shame and Guilt 11/13/2019 Showing Appreciation 10/30/2019   Man-in-the-Glass 11/15/2019 Setting Boundaries 11/1/2019   Laying the Foundation 12/30/2019 Dimensions of Wellness 12/20/2019   The Serenity Prayer 1/3/2020 Routine and Structure 12/18/2019   Medical Aspects    Non-12 Step Support      Brain/Neurotransmitters    Priorities      Medication Compliance    Spirituality      MARAÍ Alcohol/Drug Research    Weekend Planner      Spirituality 10/2/2019 The 8 Dimensions of Wellness 9/11/2019   Self-Care Assessment 11/18/2019       Symptoms of Stress 11/20/2019       What Price Am I Willing to Pay 01/08/2020       Kobe's Story (Video) 01/10/2020       Physical Health    Educational Videos      Post Acute Withdrawal    1st Step      Pregnancy and Drug Use    2nd Step      Sexual Health    Assertive Communication      Short-Term/Long-Term Effects    My name is Red THOMSON     Relationships    Cross Addiction      Healthy Vs Unhealthy Relationships 10/14/2019       Forming Stable Relationships 10/16/2019       Assertive Communication    God As We Understood Him      Boundaries    HBO Relapse      Codependence    HBO What Is Addiction      Defense Mechanisms    Medical Aspects 1      Family Roles    Medical Aspects 2      Goodbye Letter    [employer] Geographic: Stress      Intimacy    PBS Depression Out of the Shadows      Needs/Dealbreakers in Relationships    The Anonymous People     Socialization Skills    [employer]      Feelings    Nickolas Sheth \"Highjacked Brain\"    Thinking Errors 01/24/2020       Feeling Emotions 12/6/209       Identifying Feelings 10/7/2019       Life Satisfaction Checklist " 10/9/2019       ABC Model of Emotion    Antonio Novak Humor in Tx     Grief and Loss    The Mindfulness Movie     Healthy vs. Unhealthy Feelings    Red THOMSON documentary      Meditation/Mindfulness  Weekly       Overconfidence/Complacency          Resentments          Stress

## 2021-06-06 NOTE — PROGRESS NOTES
Individual Treatment Plan Update    Patient  Name: Ricky Rowland  MRN: [id number]   : 1990  Admit Date: 19  Date of Initial Service Plan: 19  Tentative Discharge Date: 20  Counselor: Roberto Richter  Diagnoses: Stimulant Use Disorder, Amphetamine Type, Severe (F15.20), Cannabis Use Disorder, Moderate (F12.20)    Dimension 1: Acute Intoxication/Withdrawal Potential, Risk level: 0  Problem Statement from Comprehensive Assessment on 2020:   Patient reports daily use of methamphetamine with his last endorsed use being on 19, prior to intake. Patient endorses weekly use of alcohol, with his last use being on 19. He denies any other pattern of other substance use at this time. Patient denies current withdrawal symptoms or concerns. Patient shows no physical signs or symptoms of intoxication or withdrawal. He does not appear to be at risk of intoxication/ withdrawal at this time. Patient is oriented x4.     Problem: Patient reports date of last use of methamphetamine to be 19, and last use of cannabis to be 19.  Goal: Maintain abstinence  Must be reached to complete treatment? Yes  Methods/Strategies (must include amount and frequency):   1. Attend group Thursday, from 9:30 am-11:30 am.   2. Share thoughts, feelings, and urges to use.   Target Date: 20  Completion Date:       Dimension 2: Biomedical Conditions/Complications, Risk level: 0  Problem Statement from Comprehensive Assessment on 2020:  Patient denies health issues or concerns. He reports that he is unsure if he has a PCP at this time, but does have insurance and so appears able to get his medical needs met and addressed as necessary. Patient denies immediate medical needs or concerns. He appears to be in adequate physical health and functioning at this time.       Problem: Patient reports no current health issues or concerns.  Goal: Maintain stable physical health and functioning.  Must be reached to complete  treatment? yes  Methods/Strategies (must include amount and frequency):   1 Obtain a PCP.  2. Follow recommendations from your personal care provider regarding medical concerns.   3. Inform staff immediately of any changes in your health that may affect your active participation in group therapy or attendance.   Target Date: 5/13/20  Completion Date:     Problem: Patient reports current tobacco use.   Goal: Patient to receive information about smoking cessation.   Must be reached to complete treatment? No  Methods/Strategies (must include amount and frequency):   1. Staff to provide patient with nicotine cessation information and help on how to quit use.   2. Patient to report any progress on stopping nicotine use to staff.   Target Date: 3/13/20  Completion Date: 9/24/2019:  Patient has verbalized that he has no desire to stop smoking cigarettes at this time. Reports smoking daily.      Dimension 3: Emotional/Behavioral/Cognitive, Risk level: 2  Problem Statement from Comprehensive Assessment on 03/12/2020:  Patient reports mental health diagnoses of ADHD, OCD, and depression. He denies current mental health services or medications. His mental health appears stable or managed at this time. Patient endorses using meth in the past due to not having medication for his ADHD. Patient does appear to struggle with impulse control as evidence by his substance use despite being on probation, as well as his endorsed problems with gambling. Patient was scheduled for mental health assessment at [facility] on 11/21/19, but he did not show. He was rescheduled for assessment again on 1/30/2020, to meet with a therapist but did not show. Patient denies any suicidal or homicidal thoughts or plans. Patient is oriented x4.     Problem: Patient has  ADHD, OCD, and depression diagnoses   Goal: Manage mental health symptoms   Must be reached to complete treatment? Yes  Methods/Strategies (must include amount and  frequency):   1. Patient to obtain mental health services as appropriate (therapy, psychiatry, etc).  2. Patient to take all mental health medication as prescribed.  3. Patient to report any new or worsening mental health symptoms or concerns to counselor immediately.   Target Date: 5/13/20  Completion Date:     Dimension 4: Readiness to Change, Risk level 1  Problem Statement from Comprehensive Assessment on 03/12/2020: Patient reports he is here due to probation. He states that he does feel that he is required to be here, but does feel that his use is a problem. Patient verbalizes that he has recognize the need to stop using now that he is in treatment. He does appear to be genuinely motivated for treatment at this time. Patient verbalizes that he is willing to follow recommendations at this time. Patient did verbalize understanding that he will be referred to a higher level of care if he were to continue to use. He appears to display some motivation for treatment and change at this time, and is willing to follow through with probation.    Problem: Patient reports he is here fore treatment due to probation.   Goal: Follow through with intentions to treat chemical dependency concerns while meeting OP treatment expectations in order to graduate successfully from the program.   Must be reached to complete treatment? Yes   Methods/Strategies (must include amount and frequency):   1. Complete all requested assignments.   2. If for any reason you will not be in group or will be tardy please contact staff at [phone])  Target Date: 5/13/20  Completion Date:     Dimension 5: Relapse/Continued Use/Continued Problem Potential, Risk level: 3  Problem Statement from Comprehensive Assessment on 03/12/2020:  Patient reports active daily use of meth prior to treatment. He however appears to display some necessary skills to arrest use and prevent relapse some support and structure. Patient reports some insight into his  use and the impact it has had on his life, including probation. Patient reported last use on 10/1/2019; indicates persistent risk of relapse if no structure. Patient does appear to have some knowledge of addiction and recovery as he endorses numerous treatments within the past year. Patient reports that he utilizes his knowledge of past treatment to help maintain sobriety at this time.     Problem: Patient needs increased skills to maintain sobriety.   Goal: Patient to increase skills to help prevent relapse.   Must be reached to complete treatment? Yes  Methods/Strategies (must include amount and frequency):   1. Patient to share in daily check-in any urges and addictive thinking to better understand his pattern of use and to prevent relapse in the future.   2. Patient to learn and implement some personal coping strategies to manage urges to use.  Target Date: 5/13/20  Completion Date:       Dimension 6: Recovery Environment, Risk level: 3  Problem Statement from Comprehensive Assessment on 03/12/2020:  Patient is currently unemployed and so lacks structured and meaningful activity for his daily life. Patient reports a stable place to live at this time. However, he appears that he needs more support in his living environment. Patient reports that he continues to look for support in his life and for his sobriety. Patient is currently on probation for terroristic threats.      Problem: Increased sober support and structure.   Goal: Gain sober support and structure.   Must be reached to complete treatment? yes  Methods/Strategies (must include amount and frequency):   1. Explore and identify sober support meetings to regularly attend.   2. Obtain a sponsor prior to end of of DOP, pr upon completion of treatment  3. Patient to develop weekly schedule of activities, and include other activities that you think will benefit your recovery. Examples could be scheduled time with family, working out, or other activities that  you find enjoyable.  Target Date: 5/13/20  Completion Date:     Problem: On probation for a terroristic threats charge   Goal: Comply with probation expectations.   Must be reached to complete treatment? Yes   Methods/Strategies (must include amount and frequency):   1. Attend all scheduled meetings with PO.   2. Comply with all UA requests.  3. Inform counselor immediately of any changes or updates to probation.   Target Date: 5/13/20  Completion Date:        Resources  Resources to which the patient is being referred for problems when problems are to be addressed concurrently by another provider: any providers as identified throughout treatment.       By signing this document, I am acknowledging that I was actively and directly involved in the development of my treatment plan.           Patient  Signature_________________________________________         Date__________________        Staff Signature  Roberto Richter    Date: 3/12/2020, 11:36 AM

## 2021-06-06 NOTE — PROGRESS NOTES
Individual Treatment Plan Update    Patient  Name: Ricky Rowland  MRN: [id number]   : 1990  Admit Date: 19  Date of Initial Service Plan: 19  Tentative Discharge Date: 20  Counselor: Roberto Richter  Diagnoses: Stimulant Use Disorder, Amphetamine Type, Severe (F15.20), Cannabis Use Disorder, Moderate (F12.20)    Dimension 1: Acute Intoxication/Withdrawal Potential, Risk level: 0  Problem Statement from Comprehensive Assessment on 2020:   Patient reports daily use of methamphetamine with his last endorsed use being on 19, prior to intake. Patient endorses weekly use of alcohol, with his last use being on 19. He denies any other pattern of other substance use at this time. Patient denies current withdrawal symptoms or concerns. Patient shows no physical signs or symptoms of intoxication or withdrawal. He does not appear to be at risk of intoxication/ withdrawal at this time. Patient is oriented x4.     Problem: Patient reports date of last use of methamphetamine to be 19, and last use of cannabis to be 19.  Goal: Maintain abstinence  Must be reached to complete treatment? Yes  Methods/Strategies (must include amount and frequency):   1. Attend group Monday, & Wednesday or Friday 9:30am-12:30pm.   2. Share thoughts, feelings, and urges to use.   Target Date: 20  Completion Date:       Dimension 2: Biomedical Conditions/Complications, Risk level: 0  Problem Statement from Comprehensive Assessment on 2020:  Patient denies health issues or concerns. He reports that he is unsure if he has a PCP at this time, but does have insurance and so appears able to get his medical needs met and addressed as necessary. Patient denies immediate medical needs or concerns. He appears to be in adequate physical health and functioning at this time.       Problem: Patient reports no current health issues or concerns.  Goal: Maintain stable physical health and functioning.  Must be  reached to complete treatment? yes  Methods/Strategies (must include amount and frequency):   1 Obtain a PCP.  2. Follow recommendations from your personal care provider regarding medical concerns.   3. Inform staff immediately of any changes in your health that may affect your active participation in group therapy or attendance.   Target Date: 5/13/20  Completion Date:     Problem: Patient reports current tobacco use.   Goal: Patient to receive information about smoking cessation.   Must be reached to complete treatment? No  Methods/Strategies (must include amount and frequency):   1. Staff to provide patient with nicotine cessation information and help on how to quit use.   2. Patient to report any progress on stopping nicotine use to staff.   Target Date: 3/13/20  Completion Date: 9/24/2019:  Patient has verbalized that he has no desire to stop smoking cigarettes at this time. Reports smoking daily.      Dimension 3: Emotional/Behavioral/Cognitive, Risk level: 2  Problem Statement from Comprehensive Assessment on 03/12/2020:  Patient reports mental health diagnoses of ADHD, OCD, and depression. He denies current mental health services or medications. His mental health appears stable or managed at this time. Patient endorses using meth in the past due to not having medication for his ADHD. Patient does appear to struggle with impulse control as evidence by his substance use despite being on probation, as well as his endorsed problems with gambling. Patient was scheduled for mental health assessment at [facility] on 11/21/19, but he did not show. He was rescheduled for assessment again on 1/30/2020, to meet with a therapist but did not show. Patient denies any suicidal or homicidal thoughts or plans. Patient is oriented x4.     Problem: Patient has  ADHD, OCD, and depression diagnoses   Goal: Manage mental health symptoms   Must be reached to complete treatment? Yes  Methods/Strategies (must  include amount and frequency):   1. Patient to obtain mental health services as appropriate (therapy, psychiatry, etc).  2. Patient to take all mental health medication as prescribed.  3. Patient to report any new or worsening mental health symptoms or concerns to counselor immediately.   Target Date: 5/13/20  Completion Date:     Dimension 4: Readiness to Change, Risk level 1  Problem Statement from Comprehensive Assessment on 03/12/2020: Patient reports he is here due to probation. He states that he does feel that he is required to be here, but does feel that his use is a problem. Patient verbalizes that he has recognize the need to stop using now that he is in treatment. He does appear to be genuinely motivated for treatment at this time. Patient verbalizes that he is willing to follow recommendations at this time. Patient did verbalize understanding that he will be referred to a higher level of care if he were to continue to use. He appears to display some motivation for treatment and change at this time, and is willing to follow through with probation.    Problem: Patient reports he is here fore treatment due to probation.   Goal: Follow through with intentions to treat chemical dependency concerns while meeting OP treatment expectations in order to graduate successfully from the program.   Must be reached to complete treatment? Yes   Methods/Strategies (must include amount and frequency):   1. Complete all requested assignments.   2. If for any reason you will not be in group or will be tardy please contact staff at [phone])  Target Date: 5/13/20  Completion Date:     Dimension 5: Relapse/Continued Use/Continued Problem Potential, Risk level: 3  Problem Statement from Comprehensive Assessment on 03/12/2020:  Patient reports active daily use of meth prior to treatment. He however appears to display some necessary skills to arrest use and prevent relapse some support and structure. Patient reports some  insight into his use and the impact it has had on his life, including probation. Patient reported last use on 10/1/2019; indicates persistent risk of relapse if no structure. Patient does appear to have some knowledge of addiction and recovery as he endorses numerous treatments within the past year. Patient reports that he utilizes his knowledge of past treatment to help maintain sobriety at this time.     Problem: Patient needs increased skills to maintain sobriety.   Goal: Patient to increase skills to help prevent relapse.   Must be reached to complete treatment? Yes  Methods/Strategies (must include amount and frequency):   1. Patient to share in daily check-in any urges and addictive thinking to better understand his pattern of use and to prevent relapse in the future.   2. Patient to learn and implement some personal coping strategies to manage urges to use.  Target Date: 5/13/20  Completion Date:       Dimension 6: Recovery Environment, Risk level: 3  Problem Statement from Comprehensive Assessment on 03/12/2020:  Patient is currently unemployed and so lacks structured and meaningful activity for his daily life. Patient reports a stable place to live at this time. However, he appears that he needs more support in his living environment. Patient reports that he continues to look for support in his life and for his sobriety. Patient is currently on probation for terroristic threats.      Problem: Increased sober support and structure.   Goal: Gain sober support and structure.   Must be reached to complete treatment? yes  Methods/Strategies (must include amount and frequency):   1. Explore and identify sober support meetings to regularly attend.   2. Obtain a sponsor prior to phase II of DOP.   3. Patient to develop weekly schedule of activities, and include other activities that you think will benefit your recovery. Examples could be scheduled time with family, working out, or other activities that you find  enjoyable.  Target Date: 5/13/20  Completion Date:     Problem: On probation for a terroristic threats charge   Goal: Comply with probation expectations.   Must be reached to complete treatment? Yes   Methods/Strategies (must include amount and frequency):   1. Attend all scheduled meetings with PO.   2. Comply with all UA requests.  3. Inform counselor immediately of any changes or updates to probation.   Target Date: 5/13/20  Completion Date:        Resources  Resources to which the patient is being referred for problems when problems are to be addressed concurrently by another provider: any providers as identified throughout treatment.       By signing this document, I am acknowledging that I was actively and directly involved in the development of my treatment plan.           Patient  Signature_________________________________________         Date__________________        Staff Signature  Roberto Richter    Date: 3/12/2020, 11:36 AM

## 2021-06-06 NOTE — PROGRESS NOTES
Weekly Progress Note for the week of 03/05/2020: Patient started Phase 3 on 02/06/2020.   Rowland Ricky.  1990  [id number]        D) Pt attended 1 group session this week, with 0 excused absences. Patient attended 0  individual sessions this week. Intake was on 9/9/2019. A) Staff facilitated groups and reviewed tx progress. Assessed for [facility]. R) No VAP needed at this time.   Any significant events, defines as events that impact patients relationship with others inside and outside of treatment Yes: Patient reports probation with [facility]. Patient reports a new baby added to his family on 12/03/2019.  Indicate any changes or monitoring of physical or mental health problems Yes: Patient reports ADHD, OCD, and Depression.     Indicate involvement by any outside supports Yes: Patient reports support from family, friends, and probation  IAPP reviewed and modified as needed. NA  Pt working on the following dimensions:  Dimension #1 - Withdrawal Potential - Risk 0. Patient reports daily use of methamphetamine with his last endorsed use being on 9/9/19, about 1 hour prior to intake. Patient endorses weekly use of alcohol, with his last use being on 9/7/19. He denies any other pattern of other substance use at this time. Patient denies current withdrawal symptoms or concerns. Patient shows no physical signs or symptoms of intoxication or withdrawal. He may be at risk of moderate symptoms due to his recent use. He does not appear to be at risk of severe withdrawal at this time. Patient is oriented x4.  Specific goals from treatment plan addressed this week:  1. Maintain abstinence (1:1)  Effectiveness of strategies:  1. Effective: Patient reports abstinence from amphetamines use since 9/9/2019. Patient reports no withdrawal/intoxication concerns at this time, and is meeting expectations for abstinence.     Dimension #2 - Biomedical - Risk 0. Patient denies health issues or concerns. He reports that he is unsure if he has  a PCP at this time, but does have insurance and so appears able to get his medical needs met and addressed as necessary. Patient denies immediate medical needs or concerns. He appears to be in adequate physical health and functioning at this time.  Specific goals from treatment plan addressed this week:  1. Maintain stable physical health and functioning (2:1)  2. Patient to receive information about smoking cessation (2:2)  Effectiveness of strategies:  1. Effective: Patient reports current health insurance through [place], [place], and is able to get health care services as needed. Patient reports no physical health concerns this week.  2. 9/24: Patient has verbalized that he has no desire to stop smoking cigarettes at this time. Reports daily smoking of cigarettes.     Dimension #3 - Emotional/Behavioral/Cognitive - Risk 2. Patient reports mental health diagnoses of ADHD, OCD, and depression. He denies current mental health services or medications. His mental health does not appear stable or managed at this time. Patient endorses using meth due to not having medication for his ADHD. Patient does appear to struggle with impulse control as evidence by his substance use despite being on probation, as well as his endorsed problems with gambling. Patient denies any suicidal or homicidal thoughts or plans. Patient is oriented x4.  Specific goals from treatment plan addressed this week:  1. Manage mental health symptoms (3:1)  Effectiveness of strategies:  1. Effective: Patient reports a diagnosis of ADHD, OCD, and Depression. Patient was scheduled twice for mental health assessment but missed them both. Patient rates his mental health at 9.5 out of 10 this week, and reports access to mental health services as needed.      Dimension #4 - Treatment Acceptance/Resistance - Risk 1 . Patient reports he is here due to probation. He states that he does feel that he is required to be here, and he does not feel that his use is a  problem. Patient verbalizes that he is unsure if he will be able to stop using despite being in treatment. He does not appear genuinely motivated for treatment or change at this time. Patient verbalizes that he is not willing to follow recommendations at this time if he is referred to a higher level of care, although did verbalize understanding that a referral to a higher level of care would be imminent if he continued to use. He appears to lack motivation for treatment and change at this time, and is in treatment solely due to probation.  Specific goals from treatment plan addressed this week:  1. Follow through with intentions to treat chemical dependency concerns while meeting OP treatment expectations in order to graduate successfully from the program (4:1)  Effectiveness of strategies:  1. Effective: Patient attended 1 group session this week. He attended 0 individual counseling sessions. Patient rates his level of motivation for soberity as 10 out of 10 for this week. Patient is displaying lots of positive attitude towards his treatment and verbalizes his commitment to complete treatment as required.      Dimension #5 - Relapse Potential - Risk 3. Patient reports active daily use of meth prior to treatment. He therefore appears to lack the necessary skills to arrest use and prevent relapse without support and structure. Patient reports some insight into his use and the impact it has had on his life, including probation. Patient reported last use  On 10/1/2019; indicates persistent risk of relapse if no structure. Patient does appear to have some knowledge of addiction and recovery as he endorses numerous treatments within the past year. It does not appear that he utilizes that knowledge to help maintain sobriety at this time  Specific goals from treatment plan addressed this week:  1. Patient to gain skills to help prevent relapse (5:1)  Effectiveness of strategies:  1. Effective: Patient reports no  relapse/continued use since 09/09/2019. Patient reports appropriate coping skills to prevent relapse. He reports spending his time with sober people, as the way to avoid relapse in the future. Patient reports his family and new born baby as motivation to implement some positive changes in his life.    Dimension #6 - Recovery Environment - Risk 3. Patient is currently unemployed and may lack structured and meaningful activity for his daily life. Patient reports a stable place to live at this time. However, he it appears that he needs more support in his living environment, as his pregnant girlfriend used occasionally until close to the time of giving birth. Patient reports that he does not currently have any support in his life for his sobriety. Patient is currently on probation for terroristic threats.  Specific goals from treatment plan addressed this week:  1. Gain sober support and structure (6:1)  2. Comply with probation expectations (6:2)  Effectiveness of strategies:  1. Effective: Patient reports that he has stayed around sober people to help him remain sober. 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 Patient reports 2 sober meetings this week.   2. Effective: Patient reports compliance with PO. Reports stable housing at this time. Patient is committed to working with his counselor and  to successfully complete treatment as expected.  Patient denies having any new concerns that need to be addressed in this area at this moment.    T) Treatment plan updated Yes.  Patient notified and in agreement Yes  Patient educated on Relapse Prevention. Patient has completed 89 of 84 program hours in phases 1 & 2 and 8 hours in phase 3. Projected discharge date is 5/13/2020. [facility].      Roberto Richter  3/12/2020, 12:06 PM             Psycho-Educational Curriculum  Date Attended   Psycho-Educational Curriculum  Date Attended    Acceptance    Shame/Guilt      1st Step    Anger/Rage      Affirmations    Mental Health          Resources: Going to Meetings 01/29/2020       What kind of a Fighter are You 01/31/2020   Automatic Negative Thoughts    Anxiety      Cross Addiction    Co-Occurring Disorders      Stages of Change    Paola/Bipolar      Anonymous People-Video 9/13/2019 Positive Traits 10/21/2019   Radical Acceptance 9/18/2019 MH Benefits of Exercise 10/25/2019   Use History 11/8/2019 Strengths Discussions 12/9/2019   Relapse    Trauma      Smoke Cessation 02/27/2020 Positive Steps to Wellbeing 01/27/2020   Addictive Thoughts    Victim Identity      Coping Skills    Sober Structure      Relapse Prevention    Continuum of Care      The Stages of Relapse 9/23/2019 Building Social Support 10/28/2019   Shame and Guilt 11/13/2019 Showing Appreciation 10/30/2019   Man-in-the-Glass 02/20/2020 Setting Boundaries 11/1/2019   Laying the Foundation 12/30/2019 Dimensions of Wellness 12/20/2019   Positive Change 03/05/2020     The Serenity Prayer 1/3/2020 Routine and Structure 12/18/2019   Medical Aspects    Non-12 Step Support      Brain/Neurotransmitters    Priorities      Medication Compliance    Spirituality      MARÍA Alcohol/Drug Research    Weekend Planner      Spirituality 10/2/2019 The 8 Dimensions of Wellness 9/11/2019   Self-Care Assessment 11/18/2019       Symptoms of Stress 11/20/2019       What Price Am I Willing to Pay 01/08/2020       Kobe's Story (Video) 01/10/2020       Physical Health    Educational Videos      Post Acute Withdrawal    1st Step      Pregnancy and Drug Use    2nd Step      Sexual Health    Assertive Communication      Short-Term/Long-Term Effects    My name is Red THOMSON     Relationships    Cross Addiction      Healthy Vs Unhealthy Relationships 10/14/2019       Forming Stable Relationships 10/16/2019       Assertive Communication    God As We Understood Him     "  Boundaries    HBO Relapse      Codependence    HBO What Is Addiction      Defense Mechanisms    Medical Aspects 1      Family Roles    Medical Aspects 2      Goodbye Letter    National Geographic: Stress      Intimacy    PBS Depression Out of the Shadows      Needs/Dealbreakers in Relationships    The Anonymous People     Socialization Skills    [place]      Feelings    Nickolas Sheth \"Highjacked Brain\"    Thinking Errors 01/24/2020       Feeling Emotions 12/6/209       Identifying Feelings 10/7/2019       Life Satisfaction Checklist 10/9/2019       ABC Model of Emotion    Antonio Novak Humor in Tx     Grief and Loss    The Mindfulness Movie     Healthy vs. Unhealthy Feelings    Red THOMSON documentary      Meditation/Mindfulness  Weekly       Overconfidence/Complacency          Resentments          Stress           "

## 2021-06-06 NOTE — PROGRESS NOTES
Weekly Progress Note for the week of 02/27/2020: Patient started Phase 3 on 02/06/2020.   Ricky Rowland.  1990  [id number]        D) Pt attended 1 group sessions this week, with 0 excused absences. Patient attended 0  individual sessions this week. Intake was on 9/9/2019. A) Staff facilitated groups and reviewed tx progress. Assessed for [facility]. R) No VAP needed at this time.   Any significant events, defines as events that impact patients relationship with others inside and outside of treatment Yes: Patient reports probation with [facility]. Patient reports a new baby added to his family on 12/03/2019.  Indicate any changes or monitoring of physical or mental health problems Yes: Patient reports ADHD, OCD, and Depression.     Indicate involvement by any outside supports Yes: Patient reports support from family, friends, and probation  IAPP reviewed and modified as needed. NA  Pt working on the following dimensions:  Dimension #1 - Withdrawal Potential - Risk 0. Patient reports daily use of methamphetamine with his last endorsed use being on 9/9/19, about 1 hour prior to intake. Patient endorses weekly use of alcohol, with his last use being on 9/7/19. He denies any other pattern of other substance use at this time. Patient denies current withdrawal symptoms or concerns. Patient shows no physical signs or symptoms of intoxication or withdrawal. He may be at risk of moderate symptoms due to his recent use. He does not appear to be at risk of severe withdrawal at this time. Patient is oriented x4.  Specific goals from treatment plan addressed this week:  1. Maintain abstinence (1:1)  Effectiveness of strategies:  1. Effective: Patient maintains last use of amphetamines was on 9/9/2019. He reports no withdrawal/intoxication concerns since he began [facility] treatment.     Dimension #2 - Biomedical - Risk 0. Patient denies health issues or concerns. He reports that he is unsure if he has a PCP at this time, but does have  insurance and so appears able to get his medical needs met and addressed as necessary. Patient denies immediate medical needs or concerns. He appears to be in adequate physical health and functioning at this time.  Specific goals from treatment plan addressed this week:  1. Maintain stable physical health and functioning (2:1)  2. Patient to receive information about smoking cessation (2:2)  Effectiveness of strategies:  1. Effective: Patient reports current health insurance through Medicaid, [place]. Patient is able to get health care services as needed. Patient reports some concerns with tooth pain today and promised to contact a dental clinic to address concerns.   2. 9/24: Patient has verbalized that he has no desire to stop smoking cigarettes at this time. Reports daily smoking of cigarettes.     Dimension #3 - Emotional/Behavioral/Cognitive - Risk 2. Patient reports mental health diagnoses of ADHD, OCD, and depression. He denies current mental health services or medications. His mental health does not appear stable or managed at this time. Patient endorses using meth due to not having medication for his ADHD. Patient does appear to struggle with impulse control as evidence by his substance use despite being on probation, as well as his endorsed problems with gambling. Patient denies any suicidal or homicidal thoughts or plans. Patient is oriented x4.  Specific goals from treatment plan addressed this week:  1. Manage mental health symptoms (3:1)  Effectiveness of strategies:  1. Effective: Patient reports a diagnosis of ADHD, OCD, and Depression. Patient was scheduled twice for mental health assessment but missed them both. Patient rates his mental health at 9.0 out of 10 this week, and is able to have access to mental health services as needed.      Dimension #4 - Treatment Acceptance/Resistance - Risk 1 . Patient reports he is here due to probation. He states that he does feel that he is required to be here, and  he does not feel that his use is a problem. Patient verbalizes that he is unsure if he will be able to stop using despite being in treatment. He does not appear genuinely motivated for treatment or change at this time. Patient verbalizes that he is not willing to follow recommendations at this time if he is referred to a higher level of care, although did verbalize understanding that a referral to a higher level of care would be imminent if he continued to use. He appears to lack motivation for treatment and change at this time, and is in treatment solely due to probation.  Specific goals from treatment plan addressed this week:  1. Follow through with intentions to treat chemical dependency concerns while meeting OP treatment expectations in order to graduate successfully from the program (4:1)  Effectiveness of strategies:  1. Effective: Patient attended 1 group session this week. He attended 0 individual counseling sessions. Patient rates his level of motivation for soberity as 10 out of 10 for this week, and reports no new concerns.      Dimension #5 - Relapse Potential - Risk 3. Patient reports active daily use of meth prior to treatment. He therefore appears to lack the necessary skills to arrest use and prevent relapse without support and structure. Patient reports some insight into his use and the impact it has had on his life, including probation. Patient reported last use  On 10/1/2019; indicates persistent risk of relapse if no structure. Patient does appear to have some knowledge of addiction and recovery as he endorses numerous treatments within the past year. It does not appear that he utilizes that knowledge to help maintain sobriety at this time  Specific goals from treatment plan addressed this week:  1. Patient to gain skills to help prevent relapse (5:1)  Effectiveness of strategies:  1. Effective: Patient reports no relapse/continued use since 09/09/2019. Patient reports utilizing appropriate  coping skills to prevent relapse. He reports spending his time with sober people, as the way to avoid relapse in the future. Patient is verbalizing desire to be successful at staying sober this time around.    Dimension #6 - Recovery Environment - Risk 3. Patient is currently unemployed and so lacks structured and meaningful activity for his daily life. Patient reports a stable place to live at this time. However, he it appears that he needs more support in his living environment, as his pregnant girlfriend used occasionally until close to the time of giving birth. Patient reports that he does not currently have any support in his life for his sobriety. Patient is currently on probation for terroristic threats.  Specific goals from treatment plan addressed this week:  1. Gain sober support and structure (6:1)  2. Comply with probation expectations (6:2)  Effectiveness of strategies:  1. Effective: Patient reports that he has stayed around sober people to help him remain sober. 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 Patient reports 1 sober meeting this week.   2. Effective: Patient reports compliance with PO. Reports stable housing at this time. Patient is committed to working with his counselor and  to successfully complete treatment as expected.     T) Treatment plan updated No.  Patient notified and in agreement NA  Patient educated on Relapse Prevention. Patient has completed 89 of 84 program hours in phases 1 & 2 and 6 hours in phase 3. Projected discharge date is 3/13/2020. [facility].      Roberto Richter  3/5/2020, 12:06 PM             Psycho-Educational Curriculum  Date Attended  Psycho-Educational Curriculum  Date Attended    Acceptance    Shame/Guilt      1st Step    Anger/Rage      Affirmations    Mental Health          Resources: Going to Meetings 01/29/2020       What kind  of a Fighter are You 01/31/2020   Automatic Negative Thoughts    Anxiety      Cross Addiction    Co-Occurring Disorders      Stages of Change    Paola/Bipolar      Anonymous People-Video 9/13/2019 Positive Traits 10/21/2019   Radical Acceptance 9/18/2019 MH Benefits of Exercise 10/25/2019   Use History 11/8/2019 Strengths Discussions 12/9/2019   Relapse    Trauma      Smoke Cessation 02/27/2020 Positive Steps to Wellbeing 01/27/2020   Addictive Thoughts    Victim Identity      Coping Skills    Sober Structure      Relapse Prevention    Continuum of Care      The Stages of Relapse 9/23/2019 Building Social Support 10/28/2019   Shame and Guilt 11/13/2019 Showing Appreciation 10/30/2019   Man-in-the-Glass 02/20/2020 Setting Boundaries 11/1/2019   Laying the Foundation 12/30/2019 Dimensions of Wellness 12/20/2019   The Serenity Prayer 1/3/2020 Routine and Structure 12/18/2019   Medical Aspects    Non-12 Step Support      Brain/Neurotransmitters    Priorities      Medication Compliance    Spirituality      MARÍA Alcohol/Drug Research    Weekend Planner      Spirituality 10/2/2019 The 8 Dimensions of Wellness 9/11/2019   Self-Care Assessment 11/18/2019       Symptoms of Stress 11/20/2019       What Price Am I Willing to Pay 01/08/2020       Kobe's Story (Video) 01/10/2020       Physical Health    Educational Videos      Post Acute Withdrawal    1st Step      Pregnancy and Drug Use    2nd Step      Sexual Health    Assertive Communication      Short-Term/Long-Term Effects    My name is Red GARCIABandar     Relationships    Cross Addiction      Healthy Vs Unhealthy Relationships 10/14/2019       Forming Stable Relationships 10/16/2019       Assertive Communication    God As We Understood Him      Boundaries    HBO Relapse      Codependence    HBO What Is Addiction      Defense Mechanisms    Medical Aspects 1      Family Roles    Medical Aspects 2      Goodbye Letter    National Geographic: Stress      Intimacy    PBS Depression Out  "of the Shadows      Needs/Dealbreakers in Relationships    The Anonymous People     Socialization Skills    [place]      Feelings    Nickolas Sheth \"Highjacked Brain\"    Thinking Errors 01/24/2020       Feeling Emotions 12/6/209       Identifying Feelings 10/7/2019       Life Satisfaction Checklist 10/9/2019       ABC Model of Emotion    Antonio Novak Humor in Tx     Grief and Loss    The Mindfulness Movie     Healthy vs. Unhealthy Feelings    Red THOMSON documentary      Meditation/Mindfulness  Weekly       Overconfidence/Complacency          Resentments          Stress           "

## 2021-06-06 NOTE — PROGRESS NOTES
Ricky Rowland attended 2 hours of group today.     The group topic was Relapse, patient was responsive to topic.     Patient's engagement in the group session: medium     Total group size: 4      Roberto Richter  2/27/2020, 12:00 PM

## 2021-06-06 NOTE — TELEPHONE ENCOUNTER
Staff called to inform PO that there will be no group therapy at [facility], beginning 3/18/2020, until further notice. This decision is taken by the management of [facility] to prevent the spread of COVID-19. Treatment staff will remain accessible to patient via telephone contacts, as needed.      AMERICA Durand

## 2021-06-06 NOTE — TELEPHONE ENCOUNTER
Writer called to update patient's PO at [facility], with the patient's progress in treatment. Patient started [place]'s [facility] treatment on 09/11/2019, and is now in phase 3 of [facility].  Writer left a message for PO to call back if he wants more information.    AMERICA Durand

## 2021-06-07 NOTE — PROGRESS NOTES
Weekly Progress Note for the week of 03/12/2020: Patient started Phase 3 on 02/06/2020.   Ricky Rowland. Did not attend group session on 03/12/2020. He will not attend face-to-face group sessions at [facility] due Coronavirus, from 03/18/2020, until further notice.  1990        Current progress notes reflect the week of 03/05/2020.  [id number]        D) Pt attended 0 group session this week, with 1 unexcused absence. Patient attended 0  individual sessions this week. Intake was on 9/9/2019. A) Staff facilitated groups and reviewed tx progress. Assessed for [facility]. R) No VAP needed at this time.   Any significant events, defines as events that impact patients relationship with others inside and outside of treatment Yes: Patient reports probation with [facility]. Patient reports a new baby added to his family on 12/03/2019.  Indicate any changes or monitoring of physical or mental health problems Yes: Patient reports ADHD, OCD, and Depression.     Indicate involvement by any outside supports Yes: Patient reports support from family, friends, and probation  IAPP reviewed and modified as needed. NA  Pt working on the following dimensions:  Dimension #1 - Withdrawal Potential - Risk 0. Patient reports daily use of methamphetamine with his last endorsed use being on 9/9/19, about 1 hour prior to intake. Patient endorses weekly use of alcohol, with his last use being on 9/7/19. He denies any other pattern of other substance use at this time. Patient denies current withdrawal symptoms or concerns. Patient shows no physical signs or symptoms of intoxication or withdrawal. He may be at risk of moderate symptoms due to his recent use. He does not appear to be at risk of severe withdrawal at this time. Patient is oriented x4.  Specific goals from treatment plan addressed this week:  1. Maintain abstinence (1:1)  Effectiveness of strategies:  1. Effective: Patient reports abstinence from amphetamines use since 9/9/2019. Patient  reports no withdrawal/intoxication concerns at this time, and is meeting expectations for abstinence.     Dimension #2 - Biomedical - Risk 0. Patient denies health issues or concerns. He reports that he is unsure if he has a PCP at this time, but does have insurance and so appears able to get his medical needs met and addressed as necessary. Patient denies immediate medical needs or concerns. He appears to be in adequate physical health and functioning at this time.  Specific goals from treatment plan addressed this week:  1. Maintain stable physical health and functioning (2:1)  2. Patient to receive information about smoking cessation (2:2)  Effectiveness of strategies:  1. Effective: Patient reports current health insurance through Medicaid, [place], and is able to get health care services as needed. Patient reports no physical health concerns this week.  2. 9/24: Patient has verbalized that he has no desire to stop smoking cigarettes at this time. Reports daily smoking of cigarettes.     Dimension #3 - Emotional/Behavioral/Cognitive - Risk 2. Patient reports mental health diagnoses of ADHD, OCD, and depression. He denies current mental health services or medications. His mental health does not appear stable or managed at this time. Patient endorses using meth due to not having medication for his ADHD. Patient does appear to struggle with impulse control as evidence by his substance use despite being on probation, as well as his endorsed problems with gambling. Patient denies any suicidal or homicidal thoughts or plans. Patient is oriented x4.  Specific goals from treatment plan addressed this week:  1. Manage mental health symptoms (3:1)  Effectiveness of strategies:  1. Effective: Patient reports a diagnosis of ADHD, OCD, and Depression. Patient was scheduled twice for mental health assessment but missed them both. Patient rates his mental health at 9.5 out of 10 this week, and reports access to mental health  services as needed.      Dimension #4 - Treatment Acceptance/Resistance - Risk 1 . Patient reports he is here due to probation. He states that he does feel that he is required to be here, and he does not feel that his use is a problem. Patient verbalizes that he is unsure if he will be able to stop using despite being in treatment. He does not appear genuinely motivated for treatment or change at this time. Patient verbalizes that he is not willing to follow recommendations at this time if he is referred to a higher level of care, although did verbalize understanding that a referral to a higher level of care would be imminent if he continued to use. He appears to lack motivation for treatment and change at this time, and is in treatment solely due to probation.  Specific goals from treatment plan addressed this week:  1. Follow through with intentions to treat chemical dependency concerns while meeting OP treatment expectations in order to graduate successfully from the program (4:1)  Effectiveness of strategies:  1. Effective: Patient attended 1 group session this week. He attended 0 individual counseling sessions. Patient rates his level of motivation for soberity as 10 out of 10 for this week. Patient is displaying lots of positive attitude towards his treatment and verbalizes his commitment to complete treatment as required.      Dimension #5 - Relapse Potential - Risk 3. Patient reports active daily use of meth prior to treatment. He therefore appears to lack the necessary skills to arrest use and prevent relapse without support and structure. Patient reports some insight into his use and the impact it has had on his life, including probation. Patient reported last use  On 10/1/2019; indicates persistent risk of relapse if no structure. Patient does appear to have some knowledge of addiction and recovery as he endorses numerous treatments within the past year. It does not appear that he utilizes that knowledge  to help maintain sobriety at this time  Specific goals from treatment plan addressed this week:  1. Patient to gain skills to help prevent relapse (5:1)  Effectiveness of strategies:  1. Effective: Patient reports no relapse/continued use since 09/09/2019. Patient reports appropriate coping skills to prevent relapse. He reports spending his time with sober people, as the way to avoid relapse in the future. Patient reports his family and new born baby as motivation to implement some positive changes in his life.    Dimension #6 - Recovery Environment - Risk 3. Patient is currently unemployed and may lack structured and meaningful activity for his daily life. Patient reports a stable place to live at this time. However, he it appears that he needs more support in his living environment, as his pregnant girlfriend used occasionally until close to the time of giving birth. Patient reports that he does not currently have any support in his life for his sobriety. Patient is currently on probation for terroristic threats.  Specific goals from treatment plan addressed this week:  1. Gain sober support and structure (6:1)  2. Comply with probation expectations (6:2)  Effectiveness of strategies:  1. Effective: Patient reports that he has stayed around sober people to help him remain sober. 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 Patient reports 2 sober meetings this week.   2. Effective: Patient reports compliance with PO. Reports stable housing at this time. Patient is committed to working with his counselor and  to successfully complete treatment as expected.  Patient denies having any new concerns that need to be addressed in this area at this moment.    T) Treatment plan updated Yes.  Patient notified and in agreement Yes  Patient educated on Relapse Prevention. Patient has completed 89 of 84 program hours in phases 1 & 2 and  8 hours in phase 3. Projected discharge date is 5/13/2020. Current discharge plan is [facility].      Roberto Richter  3/19/2020, 12:06 PM             Psycho-Educational Curriculum  Date Attended  Psycho-Educational Curriculum  Date Attended    Acceptance    Shame/Guilt      1st Step    Anger/Rage      Affirmations    Mental Health          Resources: Going to Meetings 01/29/2020       What kind of a Fighter are You 01/31/2020   Automatic Negative Thoughts    Anxiety      Cross Addiction    Co-Occurring Disorders      Stages of Change    Paola/Bipolar      Anonymous People-Video 9/13/2019 Positive Traits 10/21/2019   Radical Acceptance 9/18/2019 MH Benefits of Exercise 10/25/2019   Use History 11/8/2019 Strengths Discussions 12/9/2019   Relapse    Trauma      Smoke Cessation 02/27/2020 Positive Steps to Wellbeing 01/27/2020   Addictive Thoughts    Victim Identity      Coping Skills    Sober Structure      Relapse Prevention    Continuum of Care      The Stages of Relapse 9/23/2019 Building Social Support 10/28/2019   Shame and Guilt 11/13/2019 Showing Appreciation 10/30/2019   Man-in-the-Glass 02/20/2020 Setting Boundaries 11/1/2019   Laying the Foundation 12/30/2019 Dimensions of Wellness 12/20/2019   Positive Change 03/05/2020     The Serenity Prayer 1/3/2020 Routine and Structure 12/18/2019   Medical Aspects    Non-12 Step Support      Brain/Neurotransmitters    Priorities      Medication Compliance    Spirituality      MARÍA Alcohol/Drug Research    Weekend Planner      Spirituality 10/2/2019 The 8 Dimensions of Wellness 9/11/2019   Self-Care Assessment 11/18/2019       Symptoms of Stress 11/20/2019       What Price Am I Willing to Pay 01/08/2020       Kobe's Story (Video) 01/10/2020       Physical Health    Educational Videos      Post Acute Withdrawal    1st Step      Pregnancy and Drug Use    2nd Step      Sexual Health    Assertive Communication      Short-Term/Long-Term Effects    My name is Red THOMSON  "    Relationships    Cross Addiction      Healthy Vs Unhealthy Relationships 10/14/2019       Forming Stable Relationships 10/16/2019       Assertive Communication    God As We Understood Him      Boundaries    HBO Relapse      Codependence    HBO What Is Addiction      Defense Mechanisms    Medical Aspects 1      Family Roles    Medical Aspects 2      Goodbye Letter    [employer]: Stress      Intimacy    PBS Depression Out of the Shadows      Needs/Dealbreakers in Relationships    The Anonymous People     Socialization Skills    [place]      Feelings    Nickolas Sheth \"Highjacked Brain\"    Thinking Errors 01/24/2020       Feeling Emotions 12/6/209       Identifying Feelings 10/7/2019       Life Satisfaction Checklist 10/9/2019       ABC Model of Emotion    Antonio Novak Humor in [place]     Grief and Loss    The Mindfulness Movie     Healthy vs. Unhealthy Feelings    Red THOMSON documentary      Meditation/Mindfulness  Weekly       Overconfidence/Complacency          Resentments          Stress           "

## 2021-06-07 NOTE — TELEPHONE ENCOUNTER
"Patient is not receiving calls at this time; answering machine says, \"the person you are trying to reach is not receiving calls at this time.\"      AMERICA Durand  "

## 2021-06-07 NOTE — TELEPHONE ENCOUNTER
Late entry for 3/20/2020: Writer called twice to contact patient for his scheduled Tele-visit but patient did not answered. Patient was scheduled for his Tele-visit on 3/19/2020, but he did not answered his phone, and did not return the messages that were left for him to call back. He was rescheduled for Friday, 3/20/2020, but writer was unable to contact him again after calling twice and leaving voice-mail messages.        AMERICA Durand

## 2021-06-07 NOTE — PROGRESS NOTES
"Individual Phone Session    Ricky Rowland is a 30 y.o. male who is being evaluated via telephone visit.      The patient has been notified of the following:      \"We have found that certain health care needs can be provided without the need for a face to face visit.  This service lets us provide the care you need with a phone conversation. I will have full access to your [facility] medical record during this entire phone call. I will be taking notes for your medical record. Since this is like an office visit, we will bill your insurance company for this service. There are potential benefits and risks of telephone visits (e.g. limits to patient confidentiality) that differ from in-person visits.?  Confidentiality still applies for telephone services, and nobody will record the visit.  It is important to be in a quiet, private space that is free of distractions (including cell phone or other devices) during the visit.?If during the course of the call I believe a telephone visit is not appropriate, you will not be charged for this service\"    Counselor and patient spoke via phone for 0 minutes to discuss treatment progress.     Call Notes: Counselor contacted patient for continuity of care during suspension of in person services. Patient was called twice and voice messages left for him to call back. Patient called back once when counselor was out of the office but did state reason why it has been difficult to get hold of him. Counselor then called patient again but he did not answer. A message was left for him to call back again but he did not return call.    Patient treatment was reviewed. Changes were not made. Patient was actively and directly involved in the treatment plan review and updates. I have reviewed the note as documented above.  This accurately captures the substance of my conversation with the patient.      Call Started at: 1:30 PM  Call Ended at: 1:01 PM    Patient's engagement in the telephone " visit: NA     Supervising MD: Dr. Augusta Richter, [facility]  3/26/2020, 4:06 PM

## 2021-06-07 NOTE — PROGRESS NOTES
Weekly Progress Note for the week of 03/12-19/2020: Patient started Phase 3 on 02/06/2020.   Ricky Rowland. Did not attend group session on 03/12/2020. He will not attend face-to-face group sessions at [facility] due to oronavirus, from 03/18/2020, until further notice.  1990        Current progress notes reflect the week of 03/05/2020.  [id number]        D) Pt participated in 0 Tele-visit sessions this week, due to not answering his calls and not returning messages. Patient attended 0  individual sessions this week. Intake was on 9/9/2019. A) Staff facilitated groups and reviewed tx progress. Assessed for [facility]. R) No VAP needed at this time.   Any significant events, defines as events that impact patients relationship with others inside and outside of treatment Yes: Patient reports probation with [facility]. Patient reports a new baby added to his family on 12/03/2019.  Indicate any changes or monitoring of physical or mental health problems Yes: Patient reports ADHD, OCD, and Depression.     Indicate involvement by any outside supports Yes: Patient reports support from family, friends, and probation  IAPP reviewed and modified as needed. NA  Pt working on the following dimensions:  Dimension #1 - Withdrawal Potential - Risk 0. Patient reports daily use of methamphetamine with his last endorsed use being on 9/9/19, about 1 hour prior to intake. Patient endorses weekly use of alcohol, with his last use being on 9/7/19. He denies any other pattern of other substance use at this time. Patient denies current withdrawal symptoms or concerns. Patient shows no physical signs or symptoms of intoxication or withdrawal. He may be at risk of moderate symptoms due to his recent use. He does not appear to be at risk of severe withdrawal at this time. Patient is oriented x4.  Specific goals from treatment plan addressed this week:  1. Maintain abstinence (1:1)  Effectiveness of strategies:  1. Effective: Patient reports  abstinence from amphetamines use since 9/9/2019. Patient reports no withdrawal/intoxication concerns at this time, and is meeting expectations for abstinence.     Dimension #2 - Biomedical - Risk 0. Patient denies health issues or concerns. He reports that he is unsure if he has a PCP at this time, but does have insurance and so appears able to get his medical needs met and addressed as necessary. Patient denies immediate medical needs or concerns. He appears to be in adequate physical health and functioning at this time.  Specific goals from treatment plan addressed this week:  1. Maintain stable physical health and functioning (2:1)  2. Patient to receive information about smoking cessation (2:2)  Effectiveness of strategies:  1. Effective: Patient reports current health insurance through Medicaid, [place], and is able to get health care services as needed. Patient reports no physical health concerns this week.  2. 9/24: Patient has verbalized that he has no desire to stop smoking cigarettes at this time. Reports daily smoking of cigarettes.     Dimension #3 - Emotional/Behavioral/Cognitive - Risk 2. Patient reports mental health diagnoses of ADHD, OCD, and depression. He denies current mental health services or medications. His mental health does not appear stable or managed at this time. Patient endorses using meth due to not having medication for his ADHD. Patient does appear to struggle with impulse control as evidence by his substance use despite being on probation, as well as his endorsed problems with gambling. Patient denies any suicidal or homicidal thoughts or plans. Patient is oriented x4.  Specific goals from treatment plan addressed this week:  1. Manage mental health symptoms (3:1)  Effectiveness of strategies:  1. Effective: Patient reports a diagnosis of ADHD, OCD, and Depression. Patient was scheduled twice for mental health assessment but missed them both. Patient rates his mental health at 9.5 out of  10 this week, and reports access to mental health services as needed.      Dimension #4 - Treatment Acceptance/Resistance - Risk 1 . Patient reports he is here due to probation. He states that he does feel that he is required to be here, and he does not feel that his use is a problem. Patient verbalizes that he is unsure if he will be able to stop using despite being in treatment. He does not appear genuinely motivated for treatment or change at this time. Patient verbalizes that he is not willing to follow recommendations at this time if he is referred to a higher level of care, although did verbalize understanding that a referral to a higher level of care would be imminent if he continued to use. He appears to lack motivation for treatment and change at this time, and is in treatment solely due to probation.  Specific goals from treatment plan addressed this week:  1. Follow through with intentions to treat chemical dependency concerns while meeting OP treatment expectations in order to graduate successfully from the program (4:1)  Effectiveness of strategies:  1. Effective: Patient attended 1 group session this week. He attended 0 individual counseling sessions. Patient rates his level of motivation for soberity as 10 out of 10 for this week. Patient is displaying lots of positive attitude towards his treatment and verbalizes his commitment to complete treatment as required.      Dimension #5 - Relapse Potential - Risk 3. Patient reports active daily use of meth prior to treatment. He therefore appears to lack the necessary skills to arrest use and prevent relapse without support and structure. Patient reports some insight into his use and the impact it has had on his life, including probation. Patient reported last use  On 10/1/2019; indicates persistent risk of relapse if no structure. Patient does appear to have some knowledge of addiction and recovery as he endorses numerous treatments within the past year.  It does not appear that he utilizes that knowledge to help maintain sobriety at this time  Specific goals from treatment plan addressed this week:  1. Patient to gain skills to help prevent relapse (5:1)  Effectiveness of strategies:  1. Effective: Patient reports no relapse/continued use since 09/09/2019. Patient reports appropriate coping skills to prevent relapse. He reports spending his time with sober people, as the way to avoid relapse in the future. Patient reports his family and new born baby as motivation to implement some positive changes in his life.    Dimension #6 - Recovery Environment - Risk 3. Patient is currently unemployed and may lack structured and meaningful activity for his daily life. Patient reports a stable place to live at this time. However, he it appears that he needs more support in his living environment, as his pregnant girlfriend used occasionally until close to the time of giving birth. Patient reports that he does not currently have any support in his life for his sobriety. Patient is currently on probation for terroristic threats.  Specific goals from treatment plan addressed this week:  1. Gain sober support and structure (6:1)  2. Comply with probation expectations (6:2)  Effectiveness of strategies:  1. Effective: Patient reports that he has stayed around sober people to help him remain sober. 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 Patient reports 2 sober meetings this week.   2. Effective: Patient reports compliance with PO. Reports stable housing at this time. Patient is committed to working with his counselor and  to successfully complete treatment as expected.  Patient denies having any new concerns that need to be addressed in this area at this moment.    T) Treatment plan updated Yes.  Patient notified and in agreement Yes  Patient educated on Relapse Prevention. Patient has  completed 89 of 84 program hours in phases 1 & 2 and 8 hours in phase 3. Projected discharge date is 5/13/2020. Current discharge plan is [facility].      Roberto Richter  3/26/2020, 12:06 PM             Psycho-Educational Curriculum  Date Attended  Psycho-Educational Curriculum  Date Attended    Acceptance    Shame/Guilt      1st Step    Anger/Rage      Affirmations    Mental Health          Resources: Going to Meetings 01/29/2020       What kind of a Fighter are You 01/31/2020   Automatic Negative Thoughts    Anxiety      Cross Addiction    Co-Occurring Disorders      Stages of Change    Paola/Bipolar      Anonymous People-Video 9/13/2019 Positive Traits 10/21/2019   Radical Acceptance 9/18/2019 MH Benefits of Exercise 10/25/2019   Use History 11/8/2019 Strengths Discussions 12/9/2019   Relapse    Trauma      Smoke Cessation 02/27/2020 Positive Steps to Wellbeing 01/27/2020   Addictive Thoughts    Victim Identity      Coping Skills    Sober Structure      Relapse Prevention    Continuum of Care      The Stages of Relapse 9/23/2019 Building Social Support 10/28/2019   Shame and Guilt 11/13/2019 Showing Appreciation 10/30/2019   Man-in-the-Glass 02/20/2020 Setting Boundaries 11/1/2019   Laying the Foundation 12/30/2019 Dimensions of Wellness 12/20/2019   Positive Change 03/05/2020     The Serenity Prayer 1/3/2020 Routine and Structure 12/18/2019   Medical Aspects    Non-12 Step Support      Brain/Neurotransmitters    Priorities      Medication Compliance    Spirituality      MARÍA Alcohol/Drug Research    Weekend Planner      Spirituality 10/2/2019 The 8 Dimensions of Wellness 9/11/2019   Self-Care Assessment 11/18/2019       Symptoms of Stress 11/20/2019       What Price Am I Willing to Pay 01/08/2020       Kobe's Story (Video) 01/10/2020       Physical Health    Educational Videos      Post Acute Withdrawal    1st Step      Pregnancy and Drug Use    2nd Step      Sexual Health    Assertive Communication     "  Short-Term/Long-Term Effects    My name is Red THOMSON     Relationships    Cross Addiction      Healthy Vs Unhealthy Relationships 10/14/2019       Forming Stable Relationships 10/16/2019       Assertive Communication    God As We Understood Him      Boundaries    HBO Relapse      Codependence    HBO What Is Addiction      Defense Mechanisms    Medical Aspects 1      Family Roles    Medical Aspects 2      Goodbye Letter    [employer] Geographic: Stress      Intimacy    PBS Depression Out of the Shadows      Needs/Dealbreakers in Relationships    The Anonymous People     Socialization Skills    [place]      Feelings    Nickolas Sheth \"Highjacked Brain\"    Thinking Errors 01/24/2020       Feeling Emotions 12/6/209       Identifying Feelings 10/7/2019       Life Satisfaction Checklist 10/9/2019       ABC Model of Emotion    Antonio Novak Humor in [place]     Grief and Loss    The Mindfulness Movie     Healthy vs. Unhealthy Feelings    Red THOMSON documentary      Meditation/Mindfulness  Weekly       Overconfidence/Complacency          Resentments          Stress           "

## 2021-06-07 NOTE — PROGRESS NOTES
"OUTPATIENT SUBSTANCE USE DISORDER TREATMENT  DISCHARGE SUMMARY      Name:  Ricky Rowland   :  [place] Domingo    Admit Date: 2019   Discharge Date: 4/3/2020   :  1990   Hours Completed: 97   Initial Diagnosis:  Stimulant Use Disorder, severe amphetamine type substance (F15.20)   Final Diagnosis:  Stimulant Use Disorder, severe amphetamine type substance (F15.20)   Discharge Address:    [address] [place] [address]   Funding Source:    MEDICAID [place]     Program:  [facility]    Discharge Type:  Absent Without Leave (AWOL)    Patient was receiving residential services at the time of discharge:   No      Reasons for and circumstances of service termination:  Staff has not been able to connect with patient; last contact was on 3/5/2020. Staff has tried to contact patient several times and his answering machine says, \"The person you are trying to reach is not answering calls at this time.\"       If program discharge status was At Staff Request, the license quiros must identify the following:    Other interested parties conferred with: not applicable    Referrals provided: not applicable    Alternatives considered and attempted before deciding to discharge:  Several phone calls, notified , Consulted with other [facility] treatment staff.      Dimension/Course of Treatment/Individualized Care:   1.  Withdrawal Potential - Intake Risk level -  0 Discharge Risk level - 0  Narrative supporting risk description:  Patient has no reported use om record during the time in treatment.  Treatment plan goals and progress towards those goals:  Goal was to maintain abstinence: Patient reports no use throughout time in treatment. Patient appears to have met this goal/expectation for abstinence, as evidenced by no illicit use reported while in treatment.     2.  Biomedical Conditions and Complications - Intake Risk level -  0 Discharge Risk level - 0  Narrative " supporting risk description:  Patient denied having any physical health concerns.  Treatment plan goals and progress towards those goals:  Goal was to maintain stable physical health and functioning: Patient reports no physical health concerns while in treatment. Patient reports ability to get health care services as needed.      3.  Emotional/Behavioral/Cognitive Conditions and Complications - Intake Risk level -  2 Discharge Risk level - 2  Narrative supporting risk description:  Patient reports mental diagnoses of ADHD, OCD and Depression.  Treatment plan goals and progress towards those goals:  Goal was to manage mental health symptoms: Patient reports that he was able to establish services to manage his mental health symptoms; however failed to attend 2 appointments for mental health assessment with staff at [facility]. Patient was offered the opportunity to reschedule and he said he would let staff know when he was ready. He did not follow up as promised to reschedule appointment.     4.  Readiness for Change - Intake Risk level -  1 Discharge Risk level - 1  Narrative supporting risk description:  Patient presented to treatment due to probation in [facility].  Treatment plan goals and progress towards those goals:  Goal was to follow through with intentions to treat chemical dependency concerns while meeting OP treatment expectations in order to graduate successfully from the program: Patient may have met about 70% of this goal, as he has completed phases 1 & 2 of the program. However, patient has not presented for current treatment arrangement via phone-visits/video-visits, as the only way to connect with treatment at this time, due to COVID-19 concerns. Patient has not connected with treatment staff since 3/5/2020.     5.  Relapse/Continued Use/Continued Problem Potential - Intake Risk level -  3 Discharge Risk level - 2  Narrative supporting risk description:  Patient reports increased coping skills  "during time in treatment.  Treatment plan goals and progress towards those goals:  Goal was to gain skills to help prevent relapse in the future: Patient reported no relapse/continue use throughout time in treatment. He provided UA samples for lab tests that have been consistently negative for all illicit drugs and alcohol. Patient however has the potential for relapse/continued use if not in a structured environment with sober daily routine.     6.  Recovery Environment - Intake Risk level -  3 Discharge Risk level - 2  Narrative supporting risk description:  Patient is on probation and current unemployed.  Treatment plan goals and progress towards those goals:  Goal was to gain sober support structure and comply with probation expectations: Patient is currently unemployed; however reports stable housing, with support from girlfriend and other family members. Patient is still on probation in [facility], and is required to abide by all expectations. Patient has not been connected with treatment staff since 3/5/2020, and his current telephone number on file is not allowing calls/messages to go through.       Strengths: identified as, \"I'm strong, quick thinker, outgoing, and caring.\"     Needs: identified as, \"temper, addiction, and prioritizing.\"      Services Provided: Intake, assessment, treatment planning, education, group discussion, film, lectures, 1x1 therapy, and recommendations at discharge.        Program Involvement: Fair  Attendance: Fair  Ability to relate in group/   Other program activities: Fair  Assignment Completion: Fair  Overall Behavior: Good  Reported Family/Significant   Other Involvement: Fair    Prognosis: Fair      Recommendations       Attend 12 Step Meetings, Obtain/Retain 12 Step Program Sponsor, Identify and Maintain a Sober Social and Network of Friends    Mental Health Referral  Mental Health Evaluation and Med Compliance    Physical Health Referral:    Establish services with a " primary care provicer    Counselor Name and Title:  Roberto Richter [facility]       Date:  4/3/2020  Time:  9:07 AM

## 2021-06-07 NOTE — TELEPHONE ENCOUNTER
Staff called patient twice and left voice mail messages each time, with a call-back number, but the patient did not call back. Patient has been rescheduled for his telephone visit on 03/20/2020, at 9:00 AM.      AMERICA Durand

## 2021-07-04 NOTE — PROGRESS NOTES
"Rule 31 by Kelly Whiting LADC at 2019 10:30 AM     Author: Kelly Whiting LADC Service: -- Author Type: Licensed Alcohol and Drug Counselor    Filed: 9/10/2019  8:56 AM Encounter Date: 2019 Status: Addendum    : Kelly Whiting LADC (Licensed Alcohol and Drug Counselor)    Related Notes: Original Note by Kelly Whiting LADC (Licensed Alcohol and Drug Counselor) filed at 9/10/2019  8:53 AM         [facility] Assessment   Date: 2019        : AMERICA Briones    Name: Ricky Rowland  Address: [address] [place] [address]  Phone: [phone] (home)   Referral Source: Probation  : 1990  Age: 29 y.o.  Race/Ethnicity: Other  Marital Status:   Employment: Unemployed; looking for work                                                                                                                       Level of Education: Some college   Socio-economic (yearly Income) Status: None other than food stamps  Sexual Orientation: identifies as a heterosexual   Last 4 digits of Social Security: 6071    Is assistance required in the ability to read and understand written material?   no    Reason for seeking services:    Patient reports he is here because;   \"probation.\"     Dimension I Acute intoxication/Withdrawal Potential:    Symptomology (past 12 months, check all that apply)  increased tolerance, binges, AM use, weekly intoxication, medicinal use, repeated family or social problems and family history of addiction    Observed or reported (withdrawal symptoms, check all that apply)  none reported or displayed    Chemical use most recent 12 months outside a facility and other significant use history (client self-report)  Primary Drug Used  Age of First Use  Most Recent Pattern of Use and Duration    Date of last use  Time if substance use in the last 30 days Withdrawal Potential? Requiring special care  Method of use   (oral, smoked, snort, IV, etc)    Alcohol   " Denies 2017      Marijuana/Hashish  14 Every 3-4 days. 1/2 joint per time 20 PM  Smoking   Cocaine/Crack   Denies       Meth/Amphetamines  27 Meth- 3.5 grams per day. Daily.  2019 1000  Smoking   Heroin   Denies       Other Opiates/Synthetics   Denies       Inhalants   Denies       Benzodiazepines   Denies       Hallucinogens   Denies       Barbiturates/Sedatives/Hypnotics   Denies       Over-the-Counter Drugs   Denies       Other  14 K2 accidentially 1x About 1 week ago AFT  Smoking   Nicotine  14 Daily. 10 cigarettes per day.  2019 1000  Smoking       Dimension I Risk Ratin  Reason Risk Rating Assigned: Patient reports daily use of methamphetamine with his last endorsed use being on 20, about 1 hour prior to intake. Patient endorses weekly use of alcohol, with his last use being on 19. He denies any other pattern of other substance use at this time. Patient denies current withdrawal symptoms or concerns. Patient shows no physical signs or symptoms of intoxication or withdrawal. He may be at risk of moderate symptoms due to his recent use. He does not appear to be at risk of severe withdrawal at this time. Patient is oriented x4.         Dimension II Biomedical Conditions:    Any known health conditions: No    Ever previously treated/diagnosed with any eating disorder?  no     List Health Concerns/Conditions Reported: NA    Does patient indicate awareness of any association between substance use and listed health concerns/conditions? No    Physical/Health Conditions which are associated with substance use: Denies    Are Health Concerns/Conditions being treated? No  By Whom? Patient reports that he is unsure if has a PCP at this time.     Patient Self-Reported Medications:  Patient denies any current medications.     Are you pregnant: NA OB care received:NA CPS call needed: No    Dimension II Risk Ratin  Reason Risk Rating Assigned: Patient denies health issues or concerns. He reports  "that he is unsure if he has a PCP at this time, but does have insurance and so appears able to get his medical needs met and addressed as necessary. Patient denies immediate medical needs or concerns. He appears to be in adequate physical health and functioning at this time.         Dimension III Emotional/Behavioral/Cognitive:    Oriented to person, place, time, situation?  Yes     Current Mental Health Services: no    Past Hospitalization for MH or psychiatric problems: No    How many Hospitalizations: 0   Last Hospitalization; date and location: NA      Past or Current Issues with Gambling (Explain): yes - All types. About 1x per month.     Prior Treatment for Gambling: No     MH Diagnoses:    ADHD  OCD  Depression     Psychiatrist: None     Clinic: NA      Current Psychotropic Medications:  Denies    Taking medications as prescribed:  NA   Medications Helpful: NA    Current Suicidal Ideation: No  If yes, any plan? NA What is plan?:   Denies    Previous Suicide Attempts?  No   Explain: Denies     Current Homicidal Ideation: No  If yes, any plan? NA  What is plan?: Denies    Previous Homicide Attempts? No Explain: Denies    Suicidal/Homicidal Ideation in last 30 days? No  Explain: Denies     Hazardous behavior engaged in which placed self or others in danger (i.e., operating a motor vehicle, unsafe sex, sharing needles, etc.)?     Riding bike without helmet    Family history of substance and/or mental health diagnosis/issues?  Yes  Explain:   Substance Use;  Mom- marijuana, cocaine, speed  Dad- marijuana, cocaine, speed  \"Everyone uses everything.\"    Mental Health;  Sister- unsure of diagonses.      History of abuse (Physical, Emotional, Sexual)? No  Explain: Denies       Dimension III Risk Ratin  Reason Risk Rating Assigned: Patient reports mental health diagnoses of ADHD, OCD, and depression. He denies current mental health services or medications. His mental health does not appear stable or managed at this " time. Patient endorses using meth due to not having medication for his ADHD. Patient does appear to struggle with impulse control as evidence by his substance use despite being on probation, as well as his endorsed problems with gambling. Patient denies any suicidal or homicidal thoughts or plans. Patient is oriented x4.         Dimension IV Readiness to Change:    Mandated, or coerced into assessment or treatment:  Yes    Does client feel there is a problem:   No    Verbalization of need/desire to change:   Yes     Willing to follow treatment recommendations: No     Impression of : (Check all that apply):    ambivalent about change, minimal awareness and low motivation    Are there any spiritual, cultural, or other special needs to be addressed for client to be successful in treatment? no        Dimension IV Risk Ratin  Reason Risk Rating Assigned: Patient reports he is here due to probation. He states that he does feel that he is required to be here, and he does not feel that his use is a problem. Patient verbalizes that he is unsure if he will be able to stop using despite being in treatment. He does not appear genuinely motivated for treatment or change at this time. Patient verbalizes that he is not willing to follow recommendations at this time if he is referred to a higher level of care, although did verbalize understanding that a referral to a higher level of care would be imminent if he continued to use. He appears to lack motivation for treatment and change at this time, and is in treatment solely due to probation.         Dimension V Relapse/Continued Use/Continued Problem Potential     Client age at First Treatment: 28    Lifetime # of CD Treatments:  3-4  List program, dates, and status of completion (within last five years): [facility], [facility], [facility], Workhouse    Longest Period of Abstinence: 8 months (just recently)    How did you accomplish this? Just staying busy, working, [facility], workhouse  "    Circumstances which led to Relapse: not having adderall    Risk Taking/Problem Behaviors Related to Use and/or Under the Influence: Riding bike without a helmet.       Dimension V Risk Ratin  Reason Risk Rating Assigned: Patient reports active daily use of meth at this time. He therefore currently appears to lack the necessary skills to arrest his use and prevent relapse without support and structure. Patient appears to lack some insight into his use and the impact it has had on his life as he continues to use despite probation. Patient ongoing use up until this intake appears to indicate an increased risk of relapse at this time. Patient does appear to have some knowledge of addiction and recovery as he endorses numerous treatments within the past year. It does not appear that he utilizes that knowledge to help maintain sobriety at this time.         Dimension VI Recovery Environment   Family support:  No  Peer Sober Support:  No    Current living circumstances:  Patient currently lives in a home with his significant other (\"baby mom\")      Environment supportive of recovery:  No; she uses, but is going to treatment at [facility] today.     Specific activities participating in which do not involve substance use:  Riding his bike    Specific activities participating in which do involve substance use:  Fixing/refurbishing/selling things  Planning things/making lists of things he needs to do    People, things that threaten recovery: yes - emotions, ADHD    Expected family involvement during treatment services:  None    Current Legal Involvement:  Currently on probation for terroristic threats    Legal Consequences related to use: terroristic threats, domestic assaults     Occupational/Academic consequences related to use: None    Current ability to function in a work and/or education setting: Yes; currently looking for a job    Current support network for recovery (including community-based recovery support): " None    Do you belong to a [place]: No Which [place]?   Reside on reservation: No     Dimension VI Risk Rating: 3 Reason Risk Rating Assigned: Patient is currently unemployed and so lacks structured and meaningful activity for his daily life. Patient reports a stable place to live at this time, however he indicated that his significant other does also use, so it does not appear that it is the most supportive living environment at this time. Patient reports that he does not currently have any support in his life for his sobriety. Patient is currently on probation for terroristic threats.           DSM-V Criteria for Substance Abuse  Instructions:  Determine whether the client currently meets the criteria for a Substance Use Disorder using the diagnostic criteria in the  DSM-V, pp. 481-589. Current means during the most recent 12 months outside a facility that controls access to substances.    Category of substance Severity ICD-10 Code/DSM V Code  Alcohol Use Disorder Mild  Moderate  Severe (F10.10) (305.00)  (F10.20) (303.90)  (F10.20) (303.90)   Cannabis Use Disorder Mild  Moderate  Severe (F12.10) (305.20)  (F12.20) (304.30)  (F12.20) (304.30)   Hallucinogen Use Disorder Mild  Moderate  Severe (F16.10) (305.30)  (F16.20) (304.50)  (F16.20) (304.50)   Inhalant Use Disorder Mild  Moderate  Severe (F18.10) (305.90)  (F18.20) (304.60)  (F18.20) (304.60)   Opioid Use Disorder Mild  Moderate  Severe (F11.10) (305.50)  (F11.20) (304.00)  (F11.20) (304.00)   Sedative, Hypnotic, or Anxiolytic Use Disorder Mild  Moderate  Severe (F13.10) (305.40)  (F13.20) (304.10)  (F13.20) (304.10)   Stimulant Related Disorders Mild              Moderate              Severe   (F15.10) (305.70) Amphetamine type substance  (F14.10) (305.60) Cocaine  (F15.10) (305.70) Other or unspecified stimulant    (F15.20) (304.40) Amphetamine type substance  (F14.20) (304.20) Cocaine  (F15.20) (304.40) Other or unspecified stimulant    (F15.20) (304.40)  Amphetamine type substance  (F14.20) (304.20) Cocaine  (F15.20) (304.40) Other or unspecified stimulant   DisorderTobacco use Disorder Mild  Moderate  Severe (Z72.0) (305.1)  (F17.200) (305.1)  (F17.200) (305.1)   Other (or unknown) Substance Use Disorder Mild  Moderate  Severe (F19.10) (305.90)  (F19.20) (304.90)  (F19.20) (304.90)     Diagnostic Impression: Cannabis Use Disorder, moderate (F12.20) and Stimulant Use Disorder, severe amphetamine type substance (F15.20)    Assessment Completed Within 3 Sessions of Admission: Yes  If NO, date assessment to be completed noted in Treatment Plan:       Signature of Counselor: AMERICA Briones  Date and Time of Signature: 9/10/2019, 8:53 AM

## 2022-11-04 ENCOUNTER — HOSPITAL ENCOUNTER (EMERGENCY)
Facility: CLINIC | Age: 32
Discharge: HOME | End: 2022-11-04
Admitting: PHYSICIAN ASSISTANT
Payer: COMMERCIAL

## 2022-11-04 VITALS
RESPIRATION RATE: 19 BRPM | DIASTOLIC BLOOD PRESSURE: 99 MMHG | WEIGHT: 155 LBS | SYSTOLIC BLOOD PRESSURE: 166 MMHG | TEMPERATURE: 98.9 F | OXYGEN SATURATION: 100 % | HEART RATE: 93 BPM

## 2022-11-04 DIAGNOSIS — K04.7 PERIAPICAL ABSCESS: ICD-10-CM

## 2022-11-04 PROCEDURE — 99284 EMERGENCY DEPT VISIT MOD MDM: CPT

## 2022-11-04 RX ORDER — OXYCODONE HYDROCHLORIDE 5 MG/1
5 TABLET ORAL EVERY 6 HOURS PRN
Qty: 8 TABLET | Refills: 0 | Status: SHIPPED | OUTPATIENT
Start: 2022-11-04 | End: 2022-11-07

## 2022-11-04 RX ORDER — IBUPROFEN 600 MG/1
600 TABLET, FILM COATED ORAL EVERY 8 HOURS PRN
Qty: 30 TABLET | Refills: 0 | Status: SHIPPED | OUTPATIENT
Start: 2022-11-04

## 2022-11-04 ASSESSMENT — ENCOUNTER SYMPTOMS
FACIAL SWELLING: 1
VOMITING: 0
TROUBLE SWALLOWING: 0
DIARRHEA: 0
FEVER: 0
CHILLS: 0

## 2022-11-04 NOTE — ED TRIAGE NOTES
Pt arrives with mouth swelling and pain from a broken tooth. Was seen at his dentist yesterday and started on amoxicillin but is unable to handle the pain.      Triage Assessment     Row Name 11/04/22 8948       Triage Assessment (Adult)    Airway WDL WDL       Respiratory WDL    Respiratory WDL WDL       Skin Circulation/Temperature WDL    Skin Circulation/Temperature WDL WDL       Cardiac WDL    Cardiac WDL WDL       Peripheral/Neurovascular WDL    Peripheral Neurovascular WDL WDL       Cognitive/Neuro/Behavioral WDL    Cognitive/Neuro/Behavioral WDL WDL

## 2022-11-04 NOTE — DISCHARGE INSTRUCTIONS
Abscess was drained. Switch from amoxicillin to augmentin. Ibuprofen 600 mg every 8 hours, oxycodone 5 mg every 6 hours, tylenol 1000 mg every 8 hours (found over the counter).     You need to see a dentist. You will have to talk to them about a payment plan. This can't wait and needs to be seen next week.     You have received several dental resources so call them to schedule.     If you develop increased facial swelling, fevers, vomiting and unable to keep antibiotics down, or any other concerning symptoms return to ED.

## 2022-11-04 NOTE — ED PROVIDER NOTES
EMERGENCY DEPARTMENT ENCOUNTER      NAME: Ricky Rowland  AGE: 32 year old male  YOB: 1990  MRN: [id number]  EVALUATION DATE & TIME: 11/4/2022  4:11 PM    PCP: No Ref-Primary, Physician    ED PROVIDER: Lydia Villa PA-C      Chief Complaint   Patient presents with     Dental Pain         FINAL IMPRESSION:  1. Periapical abscess          MEDICAL DECISION MAKING:    Pertinent Labs & Imaging studies reviewed. (See chart for details)  32 year old male presents to the Emergency Department for evaluation of left upper dental pain and facial swelling. Tooth broke a while back, started having pain and swelling a few days ago. Saw dentist yesterday and was referred to oral surgeon. Only oral surgeon who takes his insurance is currently not taking new clients and will be several weeks and patient stating he can't afford to go somewhere else. Patient denies any fevers, chills, vomiting.     Vitals reviewed and notable for elevated blood pressure, likely secondary to pain. Afebrile, non-toxic appearing. On exam, he is uncomfortable appearing. Tooth #14 broken at the gum line. Associated soft tissue swelling, erythema and fluctuance of the surrounding gingiva. Significant tenderness to palpation. Mild left sided facial swelling. No malocclusion. Tolerating secretion without difficulty. Differential diagnosis includes but not limited to pulpitis, dental caries, periapical abscess, osteomyelitis of the jaw, orofacial space infections, gingivitis, periodontitis.     Area of fluctuance along the gingiva consistent with periapical abscess. Dental block followed by I&D performed. Small amount of purulent drainage was expressed. Patient feeling significantly better. I am going to change him from amoxicillin to augmentin for broader coverage. I am going to prescribe him a few tablets of oxycodone for severe pain not relieved with tylenol and ibuprofen. He was given dental resource packet and as we discussed, will have to  discuss payment plan options with dentist as this can not wait several weeks. We discussed return precautions and patient discharged home in stable condition.     Medical Decision Making    Supplemental history from: N/A    External Record(s) Reviewed: N/A    Differential Diagnosis: See MDM charting for differential considered.     I performed an independent interpretation of the: N/A    Discussed with radiology regarding test interpretation: N/A    Discussion of management with another provider: N/A    The following testing was considered but ultimately not selected: CT Imaging: pt with facial swelling. Palpable fluctuance confirming abscess that was ultimately drained. Imaging deferred at this time.    I considered prescription management with: Antibiotic    The patient's care impacted: None    Consideration of Admission/Observation: N/A - Patient discharged without consideration for admission    Care significantly affected by Social Determinants of Health including: Access to Medical Care and Low Income    0 minutes of critical care time     ED COURSE:  4:24 PM I met with the patient, obtained history, performed an initial exam, and discussed options and plan for diagnostics and treatment here in the ED.  4:40 PM Patient discharged after being provided with extensive anticipatory guidance and given return precautions, importance of PCP follow-up emphasized.    At the conclusion of the encounter I discussed the results of all of the tests and the disposition. The questions were answered. The patient acknowledged understanding and was agreeable with the care plan.     MEDICATIONS GIVEN IN THE EMERGENCY:  Medications - No data to display    NEW PRESCRIPTIONS STARTED AT TODAY'S ER VISIT  Discharge Medication List as of 11/4/2022  4:46 PM      START taking these medications    Details   amoxicillin-clavulanate (AUGMENTIN) 875-125 MG tablet Take 1 tablet by mouth 2 times daily for 7 days, Disp-14 tablet, R-0, Local  Print      ibuprofen (ADVIL/MOTRIN) 600 MG tablet Take 1 tablet (600 mg) by mouth every 8 hours as needed for moderate pain, Disp-30 tablet, R-0, Local Print      oxyCODONE (ROXICODONE) 5 MG tablet Take 1 tablet (5 mg) by mouth every 6 hours as needed for pain, Disp-8 tablet, R-0, Local Print                  =================================================================    HPI:    Patient information was obtained from: Patient    Use of Interpretor: N/A      Ricky Rowland is a 32 year old male with a pertinent history of ADHD who presents to this ED via private vehicle for evaluation of dental pain.     Patient presenting with left upper dental pain.  The tooth broke off several months ago however over the last few days he has had increased pain and facial swelling.  He saw a dentist yesterday and was referred to an oral surgeon and started on amoxicillin.  He states he was not prescribed anything for pain.  When he called the oral surgeon they were not currently taking new patients and that is the only oral surgeon in the state that takes the patient's insurance.  Patient states he does not have money to go to an out of network oral surgeon.  He denies any fevers, chills, vomiting, diarrhea, difficulty swallowing.    REVIEW OF SYSTEMS:  Review of Systems   Constitutional: Negative for chills and fever.   HENT: Positive for dental problem (left upper) and facial swelling. Negative for trouble swallowing.    Gastrointestinal: Negative for diarrhea and vomiting.   All other systems reviewed and are negative.      PAST MEDICAL HISTORY:  Past Medical History:   Diagnosis Date     Attention deficit disorder with hyperactivity(314.01)     ADHD  ( also had learning disability)     Mild intermittent asthma     Asthma       PAST SURGICAL HISTORY:  No past surgical history on file.        CURRENT MEDICATIONS:    No current facility-administered medications for this encounter.    Current Outpatient Medications:       amoxicillin-clavulanate (AUGMENTIN) 875-125 MG tablet, Take 1 tablet by mouth 2 times daily for 7 days, Disp: 14 tablet, Rfl: 0     ibuprofen (ADVIL/MOTRIN) 600 MG tablet, Take 1 tablet (600 mg) by mouth every 8 hours as needed for moderate pain, Disp: 30 tablet, Rfl: 0     oxyCODONE (ROXICODONE) 5 MG tablet, Take 1 tablet (5 mg) by mouth every 6 hours as needed for pain, Disp: 8 tablet, Rfl: 0     ADDERALL XR# 20 MG OR CP24, 2 tabs po q AM, Disp: 60, Rfl: 0     ALBUTEROL INHALER 17GM, 2 Puffs [id number] prn, Disp: 2, Rfl: 3     ALBUTEROL SULFATE (2.5 MG/3ML) 0.083% IN NEBU, ONE NEBULIZATION 4 TIMES DAILY AS NEEDED, Disp: 1 BOX, Rfl: 1 YEAR     oxyCODONE-acetaminophen (PERCOCET) 5-325 MG per tablet, Take 1-2 tablets by mouth every 4 hours as needed for moderate to severe pain, Disp: 15 tablet, Rfl: 0      ALLERGIES:  Allergies   Allergen Reactions     Cats        FAMILY HISTORY:  No family history on file.    SOCIAL HISTORY:   Social History     Socioeconomic History     Marital status:    Tobacco Use     Smoking status: Every Day   Substance and Sexual Activity     Alcohol use: No     Drug use: No   Social History Narrative    Adopted by foster parents.       VITALS:  Patient Vitals for the past 24 hrs:   BP Temp Pulse Resp SpO2 Weight   11/04/22 1534 (!) 166/99 -- -- -- -- --   11/04/22 1532 -- 98.9  F (37.2  C) 93 19 100 % 70.3 kg (155 lb)       PHYSICAL EXAM  Constitutional: Well developed, Well nourished, NAD, uncomfortable appearing.  HENT: Normocephalic, Atraumatic. Tooth #14 broken at the gum line. Associated soft tissue swelling, erythema and fluctuance of the surrounding gingiva. Significant tenderness to palpation. Mild left sided facial swelling. No malocclusion. Tolerating secretion without difficulty.  Neck: Normal range of motion, No tenderness, Supple, No stridor.  Eyes: Conjunctiva normal, No discharge.   Respiratory: Normal breath sounds, No respiratory distress, No wheezing, Speaks full  sentences easily. No cough.  Cardiovascular: Normal heart rate, Regular rhythm, No murmurs, No rubs, No gallops. Chest wall nontender.  GI: non-distended.  Musculoskeletal: No edema. No cyanosis, No clubbing. Good range of motion in all major joints.  Integument: Warm, Dry, No erythema, No rash. No petechiae.  Neurologic: Alert & oriented x 3, Normal motor function, Normal sensory function, No focal deficits noted. Normal gait.  Psychiatric: Affect normal, Judgment normal, Mood normal. Cooperative.      PROCEDURES:   PROCEDURE: Dental Nerve Block   INDICATIONS: Dental pain   PROCEDURE PROVIDER: Lydia Villa PA-C   SITE: Left posterior superior Alveolar Nerve to achieve anesthesia of Tooth #14     MEDICATION: 1.8 mL of 0.5% Bupivacaine without epinephrine   NOTE: The usual landmarks were identified. Area was aspirated and there was no return of blood.  I then injected the medication into the site.    COMPLICATIONS: Patient tolerated procedure well, without complication       PROCEDURE: Incision and Drainage   INDICATIONS: Localized abscess   PROCEDURE PROVIDER: Lydia Villa PA-C   SITE: Tooth #14   MEDICATION: See dental block procedure note   NOTE: The area of maximal fluctuance was opened with a # 11 Blade ([place]) using a Single Straight incision to allow for drainage.  The abscess was drained.  The abscess cavity was bluntly explored to separate any loculations. No Packing was placed into the abscess cavity.  A sterile dressing was placed over the area.   COMPLEXITY: Simple    Simple = single, furuncle, paronychia, superficial  Complex = multiple or abscess requiring probing, loculations, packing placement   COMPLICATIONS: Patient tolerated procedure well, without complication         Lydia Villa PA-C  Emergency Medicine  [facility]  11/4/2022     Lydia Villa PA-C  11/05/22 0749

## 2022-12-23 ENCOUNTER — HOSPITAL ENCOUNTER (EMERGENCY)
Facility: CLINIC | Age: 32
Discharge: HOME | End: 2022-12-23
Attending: EMERGENCY MEDICINE | Admitting: EMERGENCY MEDICINE
Payer: COMMERCIAL

## 2022-12-23 VITALS
OXYGEN SATURATION: 93 % | SYSTOLIC BLOOD PRESSURE: 129 MMHG | TEMPERATURE: 98.3 F | RESPIRATION RATE: 16 BRPM | DIASTOLIC BLOOD PRESSURE: 96 MMHG | HEART RATE: 90 BPM

## 2022-12-23 DIAGNOSIS — T65.894A TOXIC EFFECT OF PEPPER SPRAY, UNDETERMINED INTENT, INITIAL ENCOUNTER: ICD-10-CM

## 2022-12-23 PROCEDURE — 99283 EMERGENCY DEPT VISIT LOW MDM: CPT

## 2022-12-23 RX ORDER — PROPARACAINE HYDROCHLORIDE 5 MG/ML
2 SOLUTION/ DROPS OPHTHALMIC ONCE
Status: DISCONTINUED | OUTPATIENT
Start: 2022-12-23 | End: 2022-12-23

## 2022-12-23 ASSESSMENT — ENCOUNTER SYMPTOMS
EYE REDNESS: 1
EYE PAIN: 1

## 2022-12-23 ASSESSMENT — ACTIVITIES OF DAILY LIVING (ADL): ADLS_ACUITY_SCORE: 35

## 2022-12-23 NOTE — DISCHARGE INSTRUCTIONS
If further discomfort returns you can continue flush your eyes.  You can also use natural tear drops to help wet the eyes.  Please follow up with ophthalmology for a recheck.
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
## 2024-01-08 ENCOUNTER — OFFICE VISIT (OUTPATIENT)
Dept: URGENT CARE | Facility: URGENT CARE | Age: 34
End: 2024-01-08
Payer: COMMERCIAL

## 2024-01-08 VITALS
WEIGHT: 157.3 LBS | DIASTOLIC BLOOD PRESSURE: 78 MMHG | SYSTOLIC BLOOD PRESSURE: 128 MMHG | OXYGEN SATURATION: 99 % | TEMPERATURE: 98.9 F | HEART RATE: 107 BPM

## 2024-01-08 DIAGNOSIS — Z20.2 CHLAMYDIA CONTACT: ICD-10-CM

## 2024-01-08 DIAGNOSIS — Z20.2 GONORRHEA CONTACT, TREATED: ICD-10-CM

## 2024-01-08 DIAGNOSIS — R30.0 DYSURIA: ICD-10-CM

## 2024-01-08 DIAGNOSIS — Z11.3 SCREEN FOR STD (SEXUALLY TRANSMITTED DISEASE): Primary | ICD-10-CM

## 2024-01-08 LAB
ALBUMIN UR-MCNC: 30 MG/DL
APPEARANCE UR: ABNORMAL
BACTERIA #/AREA URNS HPF: ABNORMAL /HPF
BILIRUB UR QL STRIP: NEGATIVE
COLOR UR AUTO: YELLOW
GLUCOSE UR STRIP-MCNC: NEGATIVE MG/DL
HGB UR QL STRIP: ABNORMAL
KETONES UR STRIP-MCNC: ABNORMAL MG/DL
LEUKOCYTE ESTERASE UR QL STRIP: ABNORMAL
NITRATE UR QL: POSITIVE
PH UR STRIP: 5.5 [PH] (ref 5–7)
RBC #/AREA URNS AUTO: ABNORMAL /HPF
SP GR UR STRIP: >=1.03 (ref 1–1.03)
SQUAMOUS #/AREA URNS AUTO: ABNORMAL /LPF
UROBILINOGEN UR STRIP-ACNC: 0.2 E.U./DL
WBC #/AREA URNS AUTO: ABNORMAL /HPF

## 2024-01-08 PROCEDURE — 87491 CHLMYD TRACH DNA AMP PROBE: CPT | Performed by: NURSE PRACTITIONER

## 2024-01-08 PROCEDURE — 87591 N.GONORRHOEAE DNA AMP PROB: CPT | Performed by: NURSE PRACTITIONER

## 2024-01-08 PROCEDURE — 87086 URINE CULTURE/COLONY COUNT: CPT | Performed by: NURSE PRACTITIONER

## 2024-01-08 PROCEDURE — 81001 URINALYSIS AUTO W/SCOPE: CPT | Performed by: NURSE PRACTITIONER

## 2024-01-08 PROCEDURE — 96372 THER/PROPH/DIAG INJ SC/IM: CPT | Performed by: NURSE PRACTITIONER

## 2024-01-08 PROCEDURE — 99204 OFFICE O/P NEW MOD 45 MIN: CPT | Mod: 25 | Performed by: NURSE PRACTITIONER

## 2024-01-08 RX ORDER — CEFTRIAXONE SODIUM 1 G
500 VIAL (EA) INJECTION ONCE
Status: COMPLETED | OUTPATIENT
Start: 2024-01-08 | End: 2024-01-08

## 2024-01-08 RX ORDER — DOXYCYCLINE 100 MG/1
100 CAPSULE ORAL 2 TIMES DAILY
Qty: 14 CAPSULE | Refills: 0 | Status: SHIPPED | OUTPATIENT
Start: 2024-01-08 | End: 2024-01-15

## 2024-01-08 RX ADMIN — Medication 500 MG: at 16:01

## 2024-01-08 NOTE — PROGRESS NOTES
Assessment & Plan     Screen for STD (sexually transmitted disease)  - Chlamydia trachomatis/Neisseria gonorrhoeae by PCR - Clinic Collect    Dysuria  - UA Macroscopic with reflex to Microscopic and Culture - Lab Collect  - UA Macroscopic with reflex to Microscopic and Culture - Lab Collect  - Urine Microscopic Exam  - Urine Culture    Chlamydia contact  - doxycycline hyclate (VIBRAMYCIN) 100 MG capsule  Dispense: 14 capsule; Refill: 0    Gonorrhea contact, treated  - cefTRIAXone (ROCEPHIN) in lidocaine 1% (PF) for IM administration only 500 mg     Rocephin x 1 and doxycycline twice a day x 7 days.        Return in about 1 week (around 1/15/2024) for with regular provider if symptoms persist.    [facility]    Subjective     Aero is a 33 year old male who presents to clinic today for the following health issues:  Chief Complaint   Patient presents with    STD     Start yesterday sx right sided flank pain, moderated discharge  tx cranberry juice and blueberries      HPI    STI testing.    Has some penis discharge - yellow cloudy.    Some dysuria.    Single partner - no condoms.        Review of Systems  Constitutional, HEENT, cardiovascular, pulmonary, GI, , musculoskeletal, neuro, skin, endocrine and psych systems are negative, except as otherwise noted.      Objective    /78   Pulse 107   Temp 98.9  F (37.2  C)   Wt 71.4 kg (157 lb 4.8 oz)   SpO2 99%   Physical Exam   GENERAL: healthy, alert and no distress  RESP: lungs clear to auscultation - no rales, rhonchi or wheezes  CV: regular rate and rhythm, normal S1 S2, no S3 or S4, no murmur, click or rub, no peripheral edema and peripheral pulses strong  MS: no gross musculoskeletal defects noted, no edema  SKIN: no suspicious lesions or rashes

## 2024-01-08 NOTE — PATIENT INSTRUCTIONS
No results found for any visits on 01/08/24.    I discussed the transmission and risks associated with STD's as well as appropriate prevention, screening and treatment.

## 2024-01-09 LAB
BACTERIA UR CULT: NORMAL
C TRACH DNA SPEC QL PROBE+SIG AMP: NEGATIVE
N GONORRHOEA DNA SPEC QL NAA+PROBE: NEGATIVE

## 2024-02-07 ENCOUNTER — MYC REFILL (OUTPATIENT)
Dept: FAMILY MEDICINE | Facility: CLINIC | Age: 34
End: 2024-02-07

## 2024-02-07 ENCOUNTER — VIRTUAL VISIT (OUTPATIENT)
Dept: URGENT CARE | Facility: CLINIC | Age: 34
End: 2024-02-07
Payer: COMMERCIAL

## 2024-02-07 ENCOUNTER — TELEPHONE (OUTPATIENT)
Dept: FAMILY MEDICINE | Facility: CLINIC | Age: 34
End: 2024-02-07

## 2024-02-07 DIAGNOSIS — N30.00 ACUTE CYSTITIS WITHOUT HEMATURIA: Primary | ICD-10-CM

## 2024-02-07 DIAGNOSIS — R30.0 BURNING WITH URINATION: ICD-10-CM

## 2024-02-07 DIAGNOSIS — R36.9 PENILE DISCHARGE: Primary | ICD-10-CM

## 2024-02-07 DIAGNOSIS — N30.00 ACUTE CYSTITIS WITHOUT HEMATURIA: ICD-10-CM

## 2024-02-07 LAB
ALBUMIN UR-MCNC: NEGATIVE MG/DL
APPEARANCE UR: ABNORMAL
BACTERIA #/AREA URNS HPF: ABNORMAL /HPF
BILIRUB UR QL STRIP: NEGATIVE
COLOR UR AUTO: YELLOW
GLUCOSE UR STRIP-MCNC: NEGATIVE MG/DL
HGB UR QL STRIP: ABNORMAL
KETONES UR STRIP-MCNC: NEGATIVE MG/DL
LEUKOCYTE ESTERASE UR QL STRIP: ABNORMAL
NITRATE UR QL: POSITIVE
PH UR STRIP: 7 [PH] (ref 5–7)
RBC #/AREA URNS AUTO: ABNORMAL /HPF
SP GR UR STRIP: 1.02 (ref 1–1.03)
SQUAMOUS #/AREA URNS AUTO: ABNORMAL /LPF
T PALLIDUM AB SER QL: NONREACTIVE
UROBILINOGEN UR STRIP-ACNC: 0.2 E.U./DL
WBC #/AREA URNS AUTO: ABNORMAL /HPF
WBC CLUMPS #/AREA URNS HPF: PRESENT /HPF

## 2024-02-07 PROCEDURE — 81001 URINALYSIS AUTO W/SCOPE: CPT

## 2024-02-07 PROCEDURE — 87340 HEPATITIS B SURFACE AG IA: CPT

## 2024-02-07 PROCEDURE — 87086 URINE CULTURE/COLONY COUNT: CPT

## 2024-02-07 PROCEDURE — 86803 HEPATITIS C AB TEST: CPT

## 2024-02-07 PROCEDURE — 87491 CHLMYD TRACH DNA AMP PROBE: CPT

## 2024-02-07 PROCEDURE — 87591 N.GONORRHOEAE DNA AMP PROB: CPT

## 2024-02-07 PROCEDURE — 36415 COLL VENOUS BLD VENIPUNCTURE: CPT

## 2024-02-07 PROCEDURE — 99213 OFFICE O/P EST LOW 20 MIN: CPT | Mod: 95

## 2024-02-07 PROCEDURE — 86780 TREPONEMA PALLIDUM: CPT

## 2024-02-07 PROCEDURE — 87186 SC STD MICRODIL/AGAR DIL: CPT

## 2024-02-07 PROCEDURE — 87389 HIV-1 AG W/HIV-1&-2 AB AG IA: CPT

## 2024-02-07 RX ORDER — CEPHALEXIN 500 MG/1
500 CAPSULE ORAL 2 TIMES DAILY
Qty: 14 CAPSULE | Refills: 0 | Status: CANCELLED | OUTPATIENT
Start: 2024-02-07

## 2024-02-07 RX ORDER — CEPHALEXIN 500 MG/1
500 CAPSULE ORAL 2 TIMES DAILY
Qty: 14 CAPSULE | Refills: 0 | Status: SHIPPED | OUTPATIENT
Start: 2024-02-07 | End: 2024-02-09

## 2024-02-07 NOTE — PROGRESS NOTES
Ricky is a 33 year old male who presents for a billable video visit.    ASSESSMENT/PLAN:  Diagnoses and all orders for this visit:    Penile discharge  -     UA Macroscopic with reflex to Microscopic and Culture - Lab Collect; Future  -     NEISSERIA GONORRHOEA PCR  -     CHLAMYDIA TRACHOMATIS PCR  -     Treponema Abs w Reflex to RPR and Titer; Future  -     HIV Antigen Antibody Combo; Future  -     Hepatitis C Screen Reflex to HCV RNA Quant and Genotype; Future  -     Hepatitis B surface antigen; Future    Burning with urination  -     UA Macroscopic with reflex to Microscopic and Culture - Lab Collect; Future  -     NEISSERIA GONORRHOEA PCR  -     CHLAMYDIA TRACHOMATIS PCR  -     Treponema Abs w Reflex to RPR and Titer; Future  -     HIV Antigen Antibody Combo; Future        Discussed with patient we will have to repeat labs as his previous urinalysis showed uti but his gonorrhea and Chlamydia were both negative.  I let patient know I will not be sending in antibiotics without appropriate lab work as penile discharge is not common with a UTI.  I recommended full std work up.  Orders placed and he will get those labs done today so we can appropriately treat immediately.    Follow up with primary care provider with any problems, questions or concerns or if symptoms worsen or fail to improve. Patient agreed to plan and verbalized understanding.     SUBJECTIVE:  Penile discharge and burning with urination x 1 month. He was seen in urgent care 1/8/24, due to discharge and STD screening, treated with doxycycline and ceftriaxone. He reports his girlfriend was as well. They then had intercourse while being treated and the symptoms returned. He now reports penile discharge, burning with urination and pain ascending to his sides. He wanted to be given the same treatment as previously.       ROS: Pertinent ROS neg other than the symptoms noted above in the HPI.     OBJECTIVE:  Vitals not done due to this being a virtual  visit    GENERAL: healthy, alert and no distress  EYES: Eyes grossly normal to inspection,conjunctivae and sclerae normal  RESP: Able to speak in complete sentences, no audible wheeze or cough  SKIN: no suspicious lesions or rashes  NEURO: mentation intact and speech normal  PSYCH: mentation appears normal, affect normal/bright    Video-Visit Details    Type of service:  Video Visit  Joined the call at 2/7/2024, 8:02:25 am.  Left the call at 2/7/2024, 8:14:02 am.    Originating Location: Home    Distant Location:  [facility]     Platform used for Video Visit: Stephanie Rogers PA-C

## 2024-02-08 LAB
C TRACH DNA SPEC QL NAA+PROBE: NEGATIVE
HBV SURFACE AG SERPL QL IA: NONREACTIVE
HCV AB SERPL QL IA: NONREACTIVE
HIV 1+2 AB+HIV1 P24 AG SERPL QL IA: NONREACTIVE
N GONORRHOEA DNA SPEC QL NAA+PROBE: NEGATIVE

## 2024-02-09 ENCOUNTER — NURSE TRIAGE (OUTPATIENT)
Dept: NURSING | Facility: CLINIC | Age: 34
End: 2024-02-09
Payer: COMMERCIAL

## 2024-02-09 DIAGNOSIS — N30.00 ACUTE CYSTITIS WITHOUT HEMATURIA: ICD-10-CM

## 2024-02-09 LAB — BACTERIA UR CULT: ABNORMAL

## 2024-02-09 RX ORDER — CEPHALEXIN 500 MG/1
500 CAPSULE ORAL 2 TIMES DAILY
Qty: 14 CAPSULE | Refills: 0 | Status: ON HOLD | OUTPATIENT
Start: 2024-02-09 | End: 2024-06-28

## 2024-02-10 NOTE — TELEPHONE ENCOUNTER
Pt calling requesting Rx Cephalexin that went to [place], [place] be transferred to [place], [place].  Rx faxed per RN Refill protocol.  Last Written Prescription Date:  2/7/24  Last Fill Quantity: 14,  # refills: 0   Last office visit provider:  2/7/24     Requested Prescriptions   Signed Prescriptions Disp Refills    cephALEXin (KEFLEX) 500 MG capsule 14 capsule 0     Sig: Take 1 capsule (500 mg) by mouth 2 times daily       There is no refill protocol information for this order          Darcie Em 02/09/24 6:42 PM  Darcie Em RN  [facility] Nurse Advisor    Reason for Disposition   [1] Prescription prescribed recently is not at pharmacy AND [2] triager has access to patient's EMR AND [3] prescription is recorded in the EMR    Additional Information   Negative: New-onset or worsening symptoms, see that guideline (e.g., diarrhea, runny nose, sore throat)   Negative: Medicine question not related to refill or renewal   Negative: Caller (e.g., patient or pharmacist) requesting information about a new medicine   Negative: Caller requesting information unrelated to medicine   Negative: [1] Prescription refill request for ESSENTIAL medicine (i.e., likelihood of harm to patient if not taken) AND [2] triager unable to refill per department policy   Negative: [1] Prescription not at pharmacy AND [2] was prescribed by PCP recently  (Exception: Triager has access to EMR and prescription is recorded there. Go to Home Care and confirm for pharmacy.)   Negative: [1] Pharmacy calling with prescription questions AND [2] triager unable to answer question   Negative: Prescription request for new medicine (not a refill)   Negative: Caller requesting a CONTROLLED substance prescription refill (e.g., narcotics, ADHD medicines)   Negative: [1] Prescription refill request for NON-ESSENTIAL medicine (i.e., no harm to patient if med not taken) AND [2] triager unable to refill per department policy   Negative: [1]  Caller has NON-URGENT medicine question about med that PCP prescribed AND [2] triager unable to answer question    Protocols used: Medication Refill and Renewal Call-A-AH

## 2024-02-10 NOTE — TELEPHONE ENCOUNTER
Pt did not  his medication when it was sent it. Informed pt already and he picked up his medication yesterday.     ZARA Banks, [facility] February 10, 2024 10:19 AM

## 2024-03-02 ENCOUNTER — HEALTH MAINTENANCE LETTER (OUTPATIENT)
Age: 34
End: 2024-03-02

## 2024-06-28 ENCOUNTER — APPOINTMENT (OUTPATIENT)
Dept: ULTRASOUND IMAGING | Facility: CLINIC | Age: 34
End: 2024-06-28
Attending: EMERGENCY MEDICINE
Payer: COMMERCIAL

## 2024-06-28 ENCOUNTER — APPOINTMENT (OUTPATIENT)
Dept: ULTRASOUND IMAGING | Facility: CLINIC | Age: 34
End: 2024-06-28
Attending: PHYSICIAN ASSISTANT
Payer: COMMERCIAL

## 2024-06-28 ENCOUNTER — HOSPITAL ENCOUNTER (INPATIENT)
Facility: CLINIC | Age: 34
LOS: 8 days | Discharge: HOME | End: 2024-07-06
Attending: EMERGENCY MEDICINE | Admitting: INTERNAL MEDICINE
Payer: COMMERCIAL

## 2024-06-28 DIAGNOSIS — N45.3 EPIDIDYMOORCHITIS: ICD-10-CM

## 2024-06-28 LAB
ALBUMIN UR-MCNC: NEGATIVE MG/DL
ALBUMIN UR-MCNC: NEGATIVE MG/DL
ANION GAP SERPL CALCULATED.3IONS-SCNC: 15 MMOL/L (ref 7–15)
APPEARANCE UR: ABNORMAL
APPEARANCE UR: ABNORMAL
BACTERIA #/AREA URNS HPF: ABNORMAL /HPF
BACTERIA #/AREA URNS HPF: ABNORMAL /HPF
BASOPHILS # BLD MANUAL: 0 10E3/UL (ref 0–0.2)
BASOPHILS NFR BLD MANUAL: 0 %
BILIRUB UR QL STRIP: NEGATIVE
BILIRUB UR QL STRIP: NEGATIVE
BUN SERPL-MCNC: 10.3 MG/DL (ref 6–20)
C TRACH DNA SPEC QL PROBE+SIG AMP: NEGATIVE
CALCIUM SERPL-MCNC: 9.3 MG/DL (ref 8.6–10)
CHLORIDE SERPL-SCNC: 102 MMOL/L (ref 98–107)
COLOR UR AUTO: YELLOW
COLOR UR AUTO: YELLOW
CREAT SERPL-MCNC: 0.9 MG/DL (ref 0.67–1.17)
DEPRECATED HCO3 PLAS-SCNC: 24 MMOL/L (ref 22–29)
EGFRCR SERPLBLD CKD-EPI 2021: >90 ML/MIN/1.73M2
EOSINOPHIL # BLD MANUAL: 0 10E3/UL (ref 0–0.7)
EOSINOPHIL NFR BLD MANUAL: 0 %
ERYTHROCYTE [DISTWIDTH] IN BLOOD BY AUTOMATED COUNT: 13.3 % (ref 10–15)
GLUCOSE SERPL-MCNC: 96 MG/DL (ref 70–99)
GLUCOSE UR STRIP-MCNC: NEGATIVE MG/DL
GLUCOSE UR STRIP-MCNC: NEGATIVE MG/DL
HCT VFR BLD AUTO: 49.6 % (ref 40–53)
HGB BLD-MCNC: 16.4 G/DL (ref 13.3–17.7)
HGB UR QL STRIP: NEGATIVE
HGB UR QL STRIP: NEGATIVE
HOLD SPECIMEN: NORMAL
KETONES UR STRIP-MCNC: 20 MG/DL
KETONES UR STRIP-MCNC: 20 MG/DL
LEUKOCYTE ESTERASE UR QL STRIP: ABNORMAL
LEUKOCYTE ESTERASE UR QL STRIP: ABNORMAL
LYMPHOCYTES # BLD MANUAL: 3 10E3/UL (ref 0.8–5.3)
LYMPHOCYTES NFR BLD MANUAL: 15 %
MCH RBC QN AUTO: 29.5 PG (ref 26.5–33)
MCHC RBC AUTO-ENTMCNC: 33.1 G/DL (ref 31.5–36.5)
MCV RBC AUTO: 89 FL (ref 78–100)
MONOCYTES # BLD MANUAL: 1.2 10E3/UL (ref 0–1.3)
MONOCYTES NFR BLD MANUAL: 6 %
MUCOUS THREADS #/AREA URNS LPF: PRESENT /LPF
MUCOUS THREADS #/AREA URNS LPF: PRESENT /LPF
N GONORRHOEA DNA SPEC QL NAA+PROBE: NEGATIVE
NEUTROPHILS # BLD MANUAL: 16 10E3/UL (ref 1.6–8.3)
NEUTROPHILS NFR BLD MANUAL: 79 %
NITRATE UR QL: NEGATIVE
NITRATE UR QL: NEGATIVE
NRBC # BLD AUTO: 0 10E3/UL
NRBC BLD AUTO-RTO: 0 /100
PH UR STRIP: 6 [PH] (ref 5–7)
PH UR STRIP: 6 [PH] (ref 5–7)
PLAT MORPH BLD: ABNORMAL
PLATELET # BLD AUTO: 298 10E3/UL (ref 150–450)
POTASSIUM SERPL-SCNC: 3.6 MMOL/L (ref 3.4–5.3)
RBC # BLD AUTO: 5.56 10E6/UL (ref 4.4–5.9)
RBC MORPH BLD: ABNORMAL
RBC URINE: 1 /HPF
RBC URINE: 1 /HPF
SODIUM SERPL-SCNC: 141 MMOL/L (ref 135–145)
SP GR UR STRIP: 1.02 (ref 1–1.03)
SP GR UR STRIP: 1.02 (ref 1–1.03)
UROBILINOGEN UR STRIP-MCNC: 2 MG/DL
UROBILINOGEN UR STRIP-MCNC: 2 MG/DL
WBC # BLD AUTO: 20.3 10E3/UL (ref 4–11)
WBC URINE: 22 /HPF
WBC URINE: 22 /HPF

## 2024-06-28 PROCEDURE — 85007 BL SMEAR W/DIFF WBC COUNT: CPT | Performed by: EMERGENCY MEDICINE

## 2024-06-28 PROCEDURE — 76870 US EXAM SCROTUM: CPT | Mod: 77

## 2024-06-28 PROCEDURE — 87086 URINE CULTURE/COLONY COUNT: CPT | Performed by: PHYSICIAN ASSISTANT

## 2024-06-28 PROCEDURE — 96361 HYDRATE IV INFUSION ADD-ON: CPT

## 2024-06-28 PROCEDURE — 250N000011 HC RX IP 250 OP 636: Performed by: EMERGENCY MEDICINE

## 2024-06-28 PROCEDURE — 85025 COMPLETE CBC W/AUTO DIFF WBC: CPT | Performed by: EMERGENCY MEDICINE

## 2024-06-28 PROCEDURE — 87491 CHLMYD TRACH DNA AMP PROBE: CPT | Performed by: PHYSICIAN ASSISTANT

## 2024-06-28 PROCEDURE — 87040 BLOOD CULTURE FOR BACTERIA: CPT | Performed by: PHYSICIAN ASSISTANT

## 2024-06-28 PROCEDURE — G0378 HOSPITAL OBSERVATION PER HR: HCPCS

## 2024-06-28 PROCEDURE — 250N000013 HC RX MED GY IP 250 OP 250 PS 637: Performed by: PHYSICIAN ASSISTANT

## 2024-06-28 PROCEDURE — 93976 VASCULAR STUDY: CPT | Mod: 77

## 2024-06-28 PROCEDURE — 258N000003 HC RX IP 258 OP 636: Performed by: PHYSICIAN ASSISTANT

## 2024-06-28 PROCEDURE — 99255 IP/OBS CONSLTJ NEW/EST HI 80: CPT | Performed by: STUDENT IN AN ORGANIZED HEALTH CARE EDUCATION/TRAINING PROGRAM

## 2024-06-28 PROCEDURE — 258N000003 HC RX IP 258 OP 636: Performed by: EMERGENCY MEDICINE

## 2024-06-28 PROCEDURE — 76870 US EXAM SCROTUM: CPT

## 2024-06-28 PROCEDURE — 96365 THER/PROPH/DIAG IV INF INIT: CPT

## 2024-06-28 PROCEDURE — 93976 VASCULAR STUDY: CPT

## 2024-06-28 PROCEDURE — 250N000011 HC RX IP 250 OP 636: Performed by: INTERNAL MEDICINE

## 2024-06-28 PROCEDURE — 250N000011 HC RX IP 250 OP 636: Performed by: PHYSICIAN ASSISTANT

## 2024-06-28 PROCEDURE — 120N000001 HC R&B MED SURG/OB

## 2024-06-28 PROCEDURE — 96375 TX/PRO/DX INJ NEW DRUG ADDON: CPT

## 2024-06-28 PROCEDURE — 36415 COLL VENOUS BLD VENIPUNCTURE: CPT | Performed by: PHYSICIAN ASSISTANT

## 2024-06-28 PROCEDURE — 81001 URINALYSIS AUTO W/SCOPE: CPT | Performed by: HOSPITALIST

## 2024-06-28 PROCEDURE — 99223 1ST HOSP IP/OBS HIGH 75: CPT | Performed by: PHYSICIAN ASSISTANT

## 2024-06-28 PROCEDURE — 80048 BASIC METABOLIC PNL TOTAL CA: CPT | Performed by: EMERGENCY MEDICINE

## 2024-06-28 PROCEDURE — 81001 URINALYSIS AUTO W/SCOPE: CPT | Performed by: PHYSICIAN ASSISTANT

## 2024-06-28 PROCEDURE — 36415 COLL VENOUS BLD VENIPUNCTURE: CPT | Performed by: EMERGENCY MEDICINE

## 2024-06-28 PROCEDURE — 96376 TX/PRO/DX INJ SAME DRUG ADON: CPT

## 2024-06-28 PROCEDURE — 99285 EMERGENCY DEPT VISIT HI MDM: CPT | Mod: 25

## 2024-06-28 RX ORDER — NALOXONE HYDROCHLORIDE 0.4 MG/ML
0.4 INJECTION, SOLUTION INTRAMUSCULAR; INTRAVENOUS; SUBCUTANEOUS
Status: DISCONTINUED | OUTPATIENT
Start: 2024-06-28 | End: 2024-07-06 | Stop reason: HOSPADM

## 2024-06-28 RX ORDER — LIDOCAINE 40 MG/G
CREAM TOPICAL
Status: DISCONTINUED | OUTPATIENT
Start: 2024-06-28 | End: 2024-07-06 | Stop reason: HOSPADM

## 2024-06-28 RX ORDER — KETOROLAC TROMETHAMINE 15 MG/ML
15 INJECTION, SOLUTION INTRAMUSCULAR; INTRAVENOUS ONCE
Status: COMPLETED | OUTPATIENT
Start: 2024-06-28 | End: 2024-06-28

## 2024-06-28 RX ORDER — NALOXONE HYDROCHLORIDE 0.4 MG/ML
0.2 INJECTION, SOLUTION INTRAMUSCULAR; INTRAVENOUS; SUBCUTANEOUS
Status: DISCONTINUED | OUTPATIENT
Start: 2024-06-28 | End: 2024-07-06 | Stop reason: HOSPADM

## 2024-06-28 RX ORDER — ONDANSETRON 2 MG/ML
4 INJECTION INTRAMUSCULAR; INTRAVENOUS EVERY 6 HOURS PRN
Status: DISCONTINUED | OUTPATIENT
Start: 2024-06-28 | End: 2024-07-06 | Stop reason: HOSPADM

## 2024-06-28 RX ORDER — PROCHLORPERAZINE MALEATE 5 MG
10 TABLET ORAL EVERY 6 HOURS PRN
Status: DISCONTINUED | OUTPATIENT
Start: 2024-06-28 | End: 2024-07-06 | Stop reason: HOSPADM

## 2024-06-28 RX ORDER — DOXYCYCLINE 100 MG/10ML
100 INJECTION, POWDER, LYOPHILIZED, FOR SOLUTION INTRAVENOUS EVERY 12 HOURS
Status: DISCONTINUED | OUTPATIENT
Start: 2024-06-28 | End: 2024-06-28

## 2024-06-28 RX ORDER — HYDROMORPHONE HYDROCHLORIDE 1 MG/ML
.5-1 INJECTION, SOLUTION INTRAMUSCULAR; INTRAVENOUS; SUBCUTANEOUS
Status: DISCONTINUED | OUTPATIENT
Start: 2024-06-28 | End: 2024-06-29

## 2024-06-28 RX ORDER — ONDANSETRON 4 MG/1
4 TABLET, ORALLY DISINTEGRATING ORAL EVERY 6 HOURS PRN
Status: DISCONTINUED | OUTPATIENT
Start: 2024-06-28 | End: 2024-07-06 | Stop reason: HOSPADM

## 2024-06-28 RX ORDER — ACETAMINOPHEN 325 MG/1
650 TABLET ORAL EVERY 4 HOURS PRN
Status: DISCONTINUED | OUTPATIENT
Start: 2024-06-28 | End: 2024-07-06 | Stop reason: HOSPADM

## 2024-06-28 RX ORDER — IBUPROFEN 600 MG/1
600 TABLET, FILM COATED ORAL 4 TIMES DAILY
Status: COMPLETED | OUTPATIENT
Start: 2024-06-28 | End: 2024-07-03

## 2024-06-28 RX ORDER — ACETAMINOPHEN 650 MG/1
650 SUPPOSITORY RECTAL EVERY 4 HOURS PRN
Status: DISCONTINUED | OUTPATIENT
Start: 2024-06-28 | End: 2024-07-06 | Stop reason: HOSPADM

## 2024-06-28 RX ORDER — HYDROMORPHONE HYDROCHLORIDE 1 MG/ML
0.5 INJECTION, SOLUTION INTRAMUSCULAR; INTRAVENOUS; SUBCUTANEOUS ONCE
Status: COMPLETED | OUTPATIENT
Start: 2024-06-28 | End: 2024-06-28

## 2024-06-28 RX ORDER — LORAZEPAM 2 MG/ML
1 INJECTION INTRAMUSCULAR ONCE
Status: COMPLETED | OUTPATIENT
Start: 2024-06-28 | End: 2024-06-28

## 2024-06-28 RX ORDER — LEVOFLOXACIN 5 MG/ML
750 INJECTION, SOLUTION INTRAVENOUS EVERY 24 HOURS
Status: DISCONTINUED | OUTPATIENT
Start: 2024-06-28 | End: 2024-06-29

## 2024-06-28 RX ORDER — OXYCODONE HYDROCHLORIDE 5 MG/1
5-10 TABLET ORAL EVERY 4 HOURS PRN
Status: DISCONTINUED | OUTPATIENT
Start: 2024-06-28 | End: 2024-07-05

## 2024-06-28 RX ORDER — AMOXICILLIN 250 MG
1 CAPSULE ORAL 2 TIMES DAILY
Status: DISCONTINUED | OUTPATIENT
Start: 2024-06-28 | End: 2024-07-06 | Stop reason: HOSPADM

## 2024-06-28 RX ORDER — ONDANSETRON 2 MG/ML
4 INJECTION INTRAMUSCULAR; INTRAVENOUS ONCE
Status: COMPLETED | OUTPATIENT
Start: 2024-06-28 | End: 2024-06-28

## 2024-06-28 RX ORDER — LORAZEPAM 2 MG/ML
.5-1 INJECTION INTRAMUSCULAR EVERY 4 HOURS PRN
Status: DISCONTINUED | OUTPATIENT
Start: 2024-06-28 | End: 2024-07-06 | Stop reason: HOSPADM

## 2024-06-28 RX ORDER — AMOXICILLIN 250 MG
1 CAPSULE ORAL AT BEDTIME
Status: DISCONTINUED | OUTPATIENT
Start: 2024-06-28 | End: 2024-06-28

## 2024-06-28 RX ORDER — CEFTRIAXONE 2 G/1
2 INJECTION, POWDER, FOR SOLUTION INTRAMUSCULAR; INTRAVENOUS ONCE
Status: COMPLETED | OUTPATIENT
Start: 2024-06-28 | End: 2024-06-28

## 2024-06-28 RX ORDER — KETOROLAC TROMETHAMINE 15 MG/ML
15 INJECTION, SOLUTION INTRAMUSCULAR; INTRAVENOUS EVERY 6 HOURS PRN
Status: DISCONTINUED | OUTPATIENT
Start: 2024-06-28 | End: 2024-06-28

## 2024-06-28 RX ORDER — PROCHLORPERAZINE 25 MG
25 SUPPOSITORY, RECTAL RECTAL EVERY 12 HOURS PRN
Status: DISCONTINUED | OUTPATIENT
Start: 2024-06-28 | End: 2024-07-06 | Stop reason: HOSPADM

## 2024-06-28 RX ORDER — SALIVA STIMULANT COMB. NO.3
2 SPRAY, NON-AEROSOL (ML) MUCOUS MEMBRANE 4 TIMES DAILY PRN
Status: DISCONTINUED | OUTPATIENT
Start: 2024-06-28 | End: 2024-07-06 | Stop reason: HOSPADM

## 2024-06-28 RX ORDER — DEXTROSE MONOHYDRATE, SODIUM CHLORIDE, AND POTASSIUM CHLORIDE 50; 1.49; 4.5 G/1000ML; G/1000ML; G/1000ML
INJECTION, SOLUTION INTRAVENOUS CONTINUOUS
Status: DISCONTINUED | OUTPATIENT
Start: 2024-06-28 | End: 2024-06-30

## 2024-06-28 RX ORDER — KETOROLAC TROMETHAMINE 15 MG/ML
15 INJECTION, SOLUTION INTRAMUSCULAR; INTRAVENOUS 4 TIMES DAILY
Status: COMPLETED | OUTPATIENT
Start: 2024-06-28 | End: 2024-07-04

## 2024-06-28 RX ORDER — CEFTRIAXONE 2 G/1
2 INJECTION, POWDER, FOR SOLUTION INTRAMUSCULAR; INTRAVENOUS EVERY 24 HOURS
Status: DISCONTINUED | OUTPATIENT
Start: 2024-06-29 | End: 2024-06-29

## 2024-06-28 RX ORDER — BISACODYL 10 MG
10 SUPPOSITORY, RECTAL RECTAL DAILY PRN
Status: DISCONTINUED | OUTPATIENT
Start: 2024-06-28 | End: 2024-07-06 | Stop reason: HOSPADM

## 2024-06-28 RX ADMIN — HYDROMORPHONE HYDROCHLORIDE 0.5 MG: 1 INJECTION, SOLUTION INTRAMUSCULAR; INTRAVENOUS; SUBCUTANEOUS at 09:28

## 2024-06-28 RX ADMIN — KETOROLAC TROMETHAMINE 15 MG: 15 INJECTION, SOLUTION INTRAMUSCULAR; INTRAVENOUS at 20:38

## 2024-06-28 RX ADMIN — SENNOSIDES AND DOCUSATE SODIUM 1 TABLET: 8.6; 5 TABLET ORAL at 10:38

## 2024-06-28 RX ADMIN — SODIUM CHLORIDE 1000 ML: 9 INJECTION, SOLUTION INTRAVENOUS at 05:47

## 2024-06-28 RX ADMIN — POTASSIUM CHLORIDE, DEXTROSE MONOHYDRATE AND SODIUM CHLORIDE: 150; 5; 450 INJECTION, SOLUTION INTRAVENOUS at 17:42

## 2024-06-28 RX ADMIN — HYDROMORPHONE HYDROCHLORIDE 0.5 MG: 1 INJECTION, SOLUTION INTRAMUSCULAR; INTRAVENOUS; SUBCUTANEOUS at 05:46

## 2024-06-28 RX ADMIN — KETOROLAC TROMETHAMINE 15 MG: 15 INJECTION, SOLUTION INTRAMUSCULAR; INTRAVENOUS at 05:47

## 2024-06-28 RX ADMIN — ONDANSETRON 4 MG: 2 INJECTION INTRAMUSCULAR; INTRAVENOUS at 05:45

## 2024-06-28 RX ADMIN — HYDROMORPHONE HYDROCHLORIDE 0.5 MG: 1 INJECTION, SOLUTION INTRAMUSCULAR; INTRAVENOUS; SUBCUTANEOUS at 11:46

## 2024-06-28 RX ADMIN — DOXYCYCLINE 100 MG: 100 INJECTION, POWDER, LYOPHILIZED, FOR SOLUTION INTRAVENOUS at 08:56

## 2024-06-28 RX ADMIN — LEVOFLOXACIN 750 MG: 5 INJECTION, SOLUTION INTRAVENOUS at 20:41

## 2024-06-28 RX ADMIN — CEFTRIAXONE 2 G: 2 INJECTION, POWDER, FOR SOLUTION INTRAMUSCULAR; INTRAVENOUS at 08:13

## 2024-06-28 RX ADMIN — POTASSIUM CHLORIDE, DEXTROSE MONOHYDRATE AND SODIUM CHLORIDE: 150; 5; 450 INJECTION, SOLUTION INTRAVENOUS at 09:55

## 2024-06-28 RX ADMIN — LORAZEPAM 1 MG: 2 INJECTION INTRAMUSCULAR; INTRAVENOUS at 06:00

## 2024-06-28 ASSESSMENT — ACTIVITIES OF DAILY LIVING (ADL)
ADLS_ACUITY_SCORE: 38
ADLS_ACUITY_SCORE: 35
ADLS_ACUITY_SCORE: 37
ADLS_ACUITY_SCORE: 35
ADLS_ACUITY_SCORE: 38
ADLS_ACUITY_SCORE: 37
ADLS_ACUITY_SCORE: 35
ADLS_ACUITY_SCORE: 35
ADLS_ACUITY_SCORE: 38

## 2024-06-28 ASSESSMENT — COLUMBIA-SUICIDE SEVERITY RATING SCALE - C-SSRS
6. HAVE YOU EVER DONE ANYTHING, STARTED TO DO ANYTHING, OR PREPARED TO DO ANYTHING TO END YOUR LIFE?: NO
2. HAVE YOU ACTUALLY HAD ANY THOUGHTS OF KILLING YOURSELF IN THE PAST MONTH?: NO
1. IN THE PAST MONTH, HAVE YOU WISHED YOU WERE DEAD OR WISHED YOU COULD GO TO SLEEP AND NOT WAKE UP?: NO

## 2024-06-28 NOTE — ED PROVIDER NOTES
Emergency Department Note      History of Present Illness     Chief Complaint   Testicular/scrotal Pain      LOPEZ Rowland is a 34 year old male who presents with girlfriend for evaluation of left testicular pain. The patient reports he has been experiencing his pain for about four days. The patient's girlfriend adds that his pain has been constant but worsened suddenly last night. The patient reports onset began when he fell stretching his groin and hurting his leg. He has been taking tylenol to relive his pain with some success. The patient also endorses dysuria. Denies nausea and vomiting. Denies history of an undescended testicle and past abdominal surgeries.  Patient denies any new sexual contacts or known exposure to gonorrhea/chlamydia.    Independent Historian   Significant other reports additional information noted in the history above.    Review of External Notes   None    Past Medical History     Medical History and Problem List   ADHD  Asthma     Medications   Keflex  Albuterol     Physical Exam     Patient Vitals for the past 24 hrs:   BP Temp Temp src Pulse Resp SpO2   06/28/24 0630 (!) 145/97 -- -- 108 -- 98 %   06/28/24 0625 (!) 148/99 -- -- 111 -- 95 %   06/28/24 0603 -- -- -- -- -- 97 %   06/28/24 0558 -- -- -- -- -- 96 %   06/28/24 0555 -- -- -- -- -- 94 %   06/28/24 0553 -- -- -- -- -- 91 %   06/28/24 0530 (!) 146/99 98.7  F (37.1  C) Temporal (!) 129 26 99 %     Physical Exam    Eyes:    Conjunctiva normal  Neck:     Supple, no meningismus.     CV:     Regular rate and rhythm.      No murmurs, rubs or gallops.      PULM:    Clear to auscultation bilateral.       No respiratory distress.      Good air exchange.  ABD:    Soft, non-distended.       No abdominal tenderness.     No pulsatile masses.       No rebound, guarding or rigidity.  :   Ministerio stage V male, circumcised genitalia.     No inguinal lymphadenopathy.     No urethral discharge.     Absent cremasteric reflex  bilateral     Markedly enlarged left testicle with severe diffuse tenderness  MSK:     No gross deformity to all four extremities.   LYMPH:   No cervical lymphadenopathy.  NEURO:   Somnolent (evaluated after analgesics)  Good muscular tone, no atrophy.      Speech is clear  Skin:    Warm, dry and intact.        Diagnostics     Lab Results   Labs Ordered and Resulted from Time of ED Arrival to Time of ED Departure   CBC WITH PLATELETS AND DIFFERENTIAL - Abnormal       Result Value    WBC Count 20.3 (*)     RBC Count 5.56      Hemoglobin 16.4      Hematocrit 49.6      MCV 89      MCH 29.5      MCHC 33.1      RDW 13.3      Platelet Count 298      NRBCs per 100 WBC 0      Absolute NRBCs 0.0     MANUAL DIFFERENTIAL - Abnormal    % Neutrophils 79      % Lymphocytes 15      % Monocytes 6      % Eosinophils 0      % Basophils 0      Absolute Neutrophils 16.0 (*)     Absolute Lymphocytes 3.0      Absolute Monocytes 1.2      Absolute Eosinophils 0.0      Absolute Basophils 0.0      RBC Morphology Confirmed RBC Indices      Platelet Assessment        Value: Automated Count Confirmed. Platelet morphology is normal.   BASIC METABOLIC PANEL - Normal    Sodium 141      Potassium 3.6      Chloride 102      Carbon Dioxide (CO2) 24      Anion Gap 15      Urea Nitrogen 10.3      Creatinine 0.90      GFR Estimate >90      Calcium 9.3      Glucose 96     ROUTINE UA WITH MICROSCOPIC   CHLAMYDIA TRACHOMATIS/NEISSERIA GONORRHOEAE BY PCR       Imaging   US Testicular & Scrotum w Doppler Ltd   Final Result   IMPRESSION:   Hyperemia of the left testicle and epididymis may be due to epididymoorchitis. However, the left spermatic cord has a somewhat swirled appearance which raises the possibility of intermittent torsion. Urologic consultation recommended.      I discussed the findings with Dr. Quijano of the ED at 7:00 AM on 6/20/2024.          Independent Interpretation   None    ED Course      Medications Administered   Medications  "  cefTRIAXone (ROCEPHIN) 2 g vial to attach to  ml bag for ADULTS or NS 50 ml bag for PEDS (has no administration in time range)   doxycycline (VIBRAMYCIN) 100 mg vial to attach to  mL bag (has no administration in time range)   ketorolac (TORADOL) injection 15 mg (15 mg Intravenous $Given 6/28/24 0547)   HYDROmorphone (PF) (DILAUDID) injection 0.5 mg (0.5 mg Intravenous $Given 6/28/24 0546)   ondansetron (ZOFRAN) injection 4 mg (4 mg Intravenous $Given 6/28/24 0545)   sodium chloride 0.9% BOLUS 1,000 mL (0 mLs Intravenous Stopped 6/28/24 0646)   LORazepam (ATIVAN) injection 1 mg (1 mg Intravenous $Given 6/28/24 0600)       Procedures   Procedures     Discussion of Management   Lashonda MONTERO    Social Determinants of Health adding to complexity of care   None    ED Course   ED Course as of 06/28/24 0935   Fri Jun 28, 2024   0635 I obtained history and performed physical exam as noted above.    0712 I spoke with radiology.   0732 I spoke with Dr. Decker from urology regarding looking at the patient's ultra sound.   0813 I spoke with Dr. Mandujano, of the hospitalist team, regarding the patient. They accepted the patient for admission.         Medical Decision Making / Diagnosis       ANABEL Rowland is a 34 year old male presents with 4 days of left testicular pain.  Patient has marked swelling and tenderness on exam.  I evaluated the patient after he had received multiple rounds of analgesics and was much improved to a point where he is able to sleep.  He has marked leukocytosis.  Urinalysis and STI testing pending.  Ultrasound reveals epididymo-orchitis but there is also a \"swirling of the spermatic cord\" which could represent intermittent torsion.  Currently there is blood flow to the testicle. I spoke with Dr. Zhu of urology who has recommended observation stay, IV antibiotics for epididymoorchitis and suggest repeat ultrasound if patient's pain has significantly worsened to evaluate for " radiographic signs of torsion.  Patient safe for transfer the floor.    Disposition   The patient was admitted to the hospital.     Diagnosis     ICD-10-CM    1. Epididymoorchitis  N45.3            Discharge Medications   New Prescriptions    No medications on file         Scribe Disclosure:  Rakel CHAN, am serving as a scribe at 7:06 AM on 6/28/2024 to document services personally performed by Gildardo Quijano MD based on my observations and the provider's statements to me.        Gildardo Quijano MD  06/28/24 0945

## 2024-06-28 NOTE — H&P
[facility]    History and Physical - Hospitalist Service       Date of Admission:  6/28/2024    Assessment & Plan      Ricky Rowland is a 34 year old male with history of EColi UTI 2/2024 (resistant to Unasyn), gonorrhea 5/2021, h/o dental abscesses, and ADHD complicated by history of amphetamine abuse disorder who presents to [facility] ED on 6/28/2020 for with 4 days left testicular pain.  Pain nearly intractable in the ED, improving with several doses IV narcotics.  WBC 20.3 with ANC 16. Testicular U/S suggestive of epididymoorchitis with possible evolving testicular torsion.  Empirically started on Ceftriaxone + doxycycline; admission requested.      Epididymoorchitis with possible evolving left testicular torsion  Pain currently controlled. Discussed with Urology who recommends keeping NPO for time being in case needs Urgent OR later today for torsion.  They are planning for repeat U/S this afternoon to assess.   -- Continue ceftriaxone/doxy  -- Given h/o prior EColi UTI, need UA/UCx in addition to chlamydia/gonorrhea PCR  -- NPO   -- Repeat CBC in AM to ensure WBC downtrending     H/o amphetamine abuse 2020  Noted. Left treatment AMA. No s/sx acute intoxication or being under the influence, however, is quite sedated from receiving IV dilaudid.            Diet: NPO for Medical/Clinical Reasons Except for: Meds    DVT Prophylaxis: Ambulate every shift  Kim Catheter: Not present  Lines: None     Cardiac Monitoring: None  Code Status:  FULL    Clinically Significant Risk Factors Present on Admission                                         Disposition Plan     Medically Ready for Discharge: Anticipated Tomorrow         The patient's care was discussed with the Attending Physician, Dr. Jc, Bedside Nurse, Patient, and Urology Consultant(s).    Lashonda Mandujano PA-C  Hospitalist Service  [facility]  Securely message with [employer] (more info)  Text page via [employer]  Rick/Directory     ______________________________________________________________________    Chief Complaint   Scrotal pain    History is obtained from the patient    History of Present Illness   Ricky Rowland is a 34 year old male with history of EColi UTI 2/2024 (resistant to Unasyn), gonorrhea 5/2021, h/o dental abscesses, and ADHD complicated by history of amphetamine abuse disorder who presents to [facility] ED on 6/28/2020 for with 4 days left testicular pain.  Pain began somewhat insidiously. No trauma, however, patient does remember getting his pant leg caught while climbing up on a dock at work causing him to fall onto his shins. He isn't sure if he suffered testicular injury from twisting of his jeans during the injury; didn't have immediate pain after fall. Pain has progressed and in last 24 hours is nearly intractable. Required several doses IV narcotics and ativan in ED to obtain control.  Afebrile with VSS on presentation however WBC 20.3 with ANC 16. Testicular U/S suggestive of epididymoorchitis with possible evolving testicular torsion.  Empirically started on Ceftriaxone + doxycycline; admission requested.       Past Medical History    Past Medical History:   Diagnosis Date    Attention deficit disorder with hyperactivity(314.01)     ADHD  ( also had learning disability)    Mild intermittent asthma     Asthma   Dental abscesses  EColi UTI 2/2024  Chlamydia 5/2021    Past Surgical History   Appendectomy    Prior to Admission Medications  (PTA meds not yet verified by pharmacy but patient is not on stimulants or antibiotics prior to coming in)  Prior to Admission Medications   Prescriptions Last Dose Informant Patient Reported? Taking?   ALBUTEROL SULFATE (2.5 MG/3ML) 0.083% IN NEBU   No No   Sig: ONE NEBULIZATION 4 TIMES DAILY AS NEEDED   Patient not taking: Reported on 1/8/2024   ibuprofen (ADVIL/MOTRIN) 600 MG tablet   No No   Sig: Take 1 tablet (600 mg) by mouth every 8 hours as needed for moderate  pain      Facility-Administered Medications: None        Review of Systems    A comprehensive 14 point review of systems was undertaken with pertinent positives and negatives as above and otherwise unremarkable.       Social History   I have reviewed this patient's social history and updated it with pertinent information if needed.  Social History     Tobacco Use    Smoking status: Every Day    Smokeless tobacco: Never   Vaping Use    Vaping status: Every Day    Substances: Nicotine   Substance Use Topics    Alcohol use: No    Drug use: No     Vapes daily. Drinks 3-4 shots per day.     Family History       Denies family history of cardiac disease, stroke, or diabetes.      Allergies   Allergies   Allergen Reactions    Cats         Physical Exam   Vital Signs: Temp: 98.7  F (37.1  C) Temp src: Temporal BP: (!) 138/99 Pulse: 103   Resp: 26 SpO2: 97 % O2 Device: Nasal cannula Oxygen Delivery: 2 LPM  Weight: 0 lbs 0 oz    GENERAL:  Pleasant, cooperative, alert but sleepy. NAD. Nontoxic. Well developed, well nourished.   HEENT: Normocephalic, atraumatic.  Extra occular mm intact.  Sclera clear. PERRL.  Mucous membranes a bit tacky  PULMONOLOGY: Clear to auscultation bilaterally  CARDIAC: Regular rate and rhythm.  No appreciated murmur.     ABDOMEN: Soft, nontender     exam deferred (Urology to see next)  MUSCULOSKELETAL:  Moving x 4 spontaneously with CMS intact x4.  Normal bulk and tone.  No LE edema.     NEURO: Alert and oriented x3.  CN II-XII grossly intact and symmetric.  No ataxia or tremor.  Nonfocal.      Medical Decision Making       90 MINUTES SPENT BY ME on the date of service doing chart review, history, exam, documentation & further activities per the note.      Data     I have personally reviewed the following data over the past 24 hrs:    20.3 (H)  \   16.4   / 298     141 102 10.3 /  96   3.6 24 0.90 \       Imaging results reviewed over the past 24 hrs:   Recent Results (from the past 24 hour(s))   US  Testicular & Scrotum w Doppler Ltd    Narrative    EXAM: US TESTICULAR AND SCROTUM WITH DOPPLER LIMITED  LOCATION: [facility]  DATE: 6/28/2024    INDICATION: L scrotal pain  COMPARISON: None.  TECHNIQUE: Ultrasound of scrotum with color flow and spectral Doppler with waveform analysis performed.    FINDINGS:    RIGHT: Right testicle measures 5.6 x 3.1 x 2.0 cm. Homogeneous testicular echotexture. No testicular mass. Arterial and venous blood flow of the right testicle is demonstrated on Doppler evaluation. Normal epididymis. No hydrocele. No varicocele.    LEFT: Left testicle measures 4.9 x 3.3 x 3.4 cm. Homogeneous testicular echotexture. No testicular mass. Hyperemia of the left testicle and epididymis on color Doppler evaluation. Arterial and venous blood flow of the left testicle is demonstrated on   Doppler evaluation. Small hydrocele. No varicocele.    The sonographer performed additional imaging of the left spermatic cord. On cine ultrasound images, the left spermatic cord has a somewhat swirled appearance which suggests the possibility of intermittent torsion.      Impression    IMPRESSION:  Hyperemia of the left testicle and epididymis may be due to epididymoorchitis. However, the left spermatic cord has a somewhat swirled appearance which raises the possibility of intermittent torsion. Urologic consultation recommended.    I discussed the findings with Dr. Quijano of the ED at 7:00 AM on 6/20/2024.

## 2024-06-28 NOTE — ED TRIAGE NOTES
Pt to ER with c/o left sided testicular pain with acute onset pt crying and screaming in pain in triage, pt then adds that the pain has actually been going on for 4 days

## 2024-06-28 NOTE — ED NOTES
[facility]  ED Nurse Handoff Report    ED Chief complaint: Testicular/scrotal Pain  . ED Diagnosis:   Final diagnoses:   Epididymoorchitis       Allergies:   Allergies   Allergen Reactions    Cats        Code Status: Full Code    Activity level - Baseline/Home:  independent.  Activity Level - Current:   standby.   Lift room needed: No.   Bariatric: No   Needed: No   Isolation: No.   Infection: Not Applicable.     Respiratory status: Nasal cannula    Vital Signs (within 30 minutes):   Vitals:    06/28/24 0625 06/28/24 0630 06/28/24 0700 06/28/24 0800   BP: (!) 148/99 (!) 145/97 (!) 145/98 (!) 138/99   Pulse: 111 108 111 103   Resp:       Temp:       TempSrc:       SpO2: 95% 98% 98% 97%       Cardiac Rhythm:  ,      Pain level:    Patient confused: No.   Patient Falls Risk: patient and family education and activity supervised.   Elimination Status: Has voided     Patient Report - Initial Complaint: Testicular pain.   Focused Assessment: A&O. Presents with left testicular pain and swelling. Pain has been going on for 4 days, but worsened overnight. Dilaudid, ativan and Toradol given with good relief. On 2LPM oxygen due to desatting after meds.  IVAbx started.      Abnormal Results:   Labs Ordered and Resulted from Time of ED Arrival to Time of ED Departure   CBC WITH PLATELETS AND DIFFERENTIAL - Abnormal       Result Value    WBC Count 20.3 (*)     RBC Count 5.56      Hemoglobin 16.4      Hematocrit 49.6      MCV 89      MCH 29.5      MCHC 33.1      RDW 13.3      Platelet Count 298      NRBCs per 100 WBC 0      Absolute NRBCs 0.0     MANUAL DIFFERENTIAL - Abnormal    % Neutrophils 79      % Lymphocytes 15      % Monocytes 6      % Eosinophils 0      % Basophils 0      Absolute Neutrophils 16.0 (*)     Absolute Lymphocytes 3.0      Absolute Monocytes 1.2      Absolute Eosinophils 0.0      Absolute Basophils 0.0      RBC Morphology Confirmed RBC Indices      Platelet Assessment         Value: Automated Count Confirmed. Platelet morphology is normal.   BASIC METABOLIC PANEL - Normal    Sodium 141      Potassium 3.6      Chloride 102      Carbon Dioxide (CO2) 24      Anion Gap 15      Urea Nitrogen 10.3      Creatinine 0.90      GFR Estimate >90      Calcium 9.3      Glucose 96     ROUTINE UA WITH MICROSCOPIC   CHLAMYDIA TRACHOMATIS/NEISSERIA GONORRHOEAE BY PCR        US Testicular & Scrotum w Doppler Ltd   Final Result   IMPRESSION:   Hyperemia of the left testicle and epididymis may be due to epididymoorchitis. However, the left spermatic cord has a somewhat swirled appearance which raises the possibility of intermittent torsion. Urologic consultation recommended.      I discussed the findings with Dr. Quijano of the ED at 7:00 AM on 6/20/2024.          Treatments provided: See MAR  Family Comments: At bedside  OBS brochure/video discussed/provided to patient:  No  ED Medications:   Medications   cefTRIAXone (ROCEPHIN) 2 g vial to attach to  ml bag for ADULTS or NS 50 ml bag for PEDS (2 g Intravenous $New Bag 6/28/24 0813)   doxycycline (VIBRAMYCIN) 100 mg vial to attach to  mL bag (has no administration in time range)   ketorolac (TORADOL) injection 15 mg (15 mg Intravenous $Given 6/28/24 0547)   HYDROmorphone (PF) (DILAUDID) injection 0.5 mg (0.5 mg Intravenous $Given 6/28/24 0546)   ondansetron (ZOFRAN) injection 4 mg (4 mg Intravenous $Given 6/28/24 0545)   sodium chloride 0.9% BOLUS 1,000 mL (0 mLs Intravenous Stopped 6/28/24 0646)   LORazepam (ATIVAN) injection 1 mg (1 mg Intravenous $Given 6/28/24 0600)       Drips infusing:  No  For the majority of the shift this patient was Green.   Interventions performed were NA.    Sepsis treatment initiated: No    Cares/treatment/interventions/medications to be completed following ED care: NA    ED Nurse Name: Ignacia Keita RN  8:38 AM

## 2024-06-28 NOTE — PROGRESS NOTES
INTERIM NOTE    Afternoon U/S suggests blood flow to the left testicle concerning for vascular compromise due to edema versus partial intermittent torsion.    Patient seen by Urologist, Dr. Zhu, and patient is adamantly against surgical intervention.  He is demanding to eat.  Appears to not believe, or perhaps understand, that blood flow to his testicle is compromised and without intervention, his testicle will likely die.     UA/UCx not sent when urine sample collected for GC test earlier today. RN to  call down and see if it can be added on.   Patient now febrile to 100.1 and tachy to 120. Evolving sepsis. Getting blood cultures. Discussed with Dr Zhu and will broaden Abx, likely adding Cipro to doxy or a broader Abx.     Am very concerned pain is going to worsen if/when testicle tissue begins to necrose. Added scheduled Advil/toradol.  Ensured patient has PRN oral and IV pain options as well.      Continuing IV fluids at 100 mL/hr (diet as been added per Urology as patient refusing surgical intervention).     Discussed with Dr Zafar who will await UA results and assist with tailoring Abx.    Augustine PAC  [facility] Hospitalist

## 2024-06-28 NOTE — CONSULTS
[facility] Consultation by [facility]    Ricky Rowland MRN# [id number]   Age: 34 year old YOB: 1990     Date of Admission:  6/28/2024    Reason for consult: Testicular pain, concern for possible torsion, epididymalorchitis       Requesting PA/MD: Dr. Quijano       Level of consult: Consult, follow and place orders           Assessment and Plan:   Assessment:   Concern for possible left intermittent testicular torsion, although suspect it is actually only severe epididymoorchitis  Severe left testicular and scrotal pain  ADHD  History of previous urinary tract infection in February 2024      Plan:   -NPO.  -Continue with antibiotics for presumed severe epididymoorchitis.    -Get urinalysis and urine culture.  Also recommend gonorrhea and Chlamydia testing, which is already in process.  -Suspect patient has a urinary bacterial infection that has caused severe epididymal orchitis, given his history of similar symptoms with a positive E. coli urinary tract infection in February.  -Can use scrotal support.  Would avoid ice due to constricting blood flow to the testicular area.  -Pain and nausea management per primary service.  -If patient has severe pain like he did on admission, would consider stat scrotal and testicular ultrasound imaging to reassess for testicular torsion.  Otherwise, we will plan on repeat scrotal and testicular ultrasound imaging this afternoon to reassess for torsion.  Most recent scrotal ultrasound imaging showed good blood flow to the left testicle.  Examination is most consistent with severe infection as opposed to torsion.  -Continue monitor leukocytosis.  -Will check a postvoid residual to ensure patient is adequately emptying his bladder.  -If continued recurrent urinary tract infections, may need further evaluation as an outpatient to identify possible source such as incomplete emptying or stricture.  -Continue to follow along.    AYSE Lobato  [facility]  [phone]               Chief Complaint:   Testicular scrotal/pain     History is obtained from the patient and EMR.         History of Present Illness:   This patient is a 34 year old male who presented to the ER early this morning due to severe left testicular pain.  Patient notes that his pain started approximately 4 days ago and has made it difficult for him to sleep.  Has been constant, but actually worsened last night.  Patient did have a fall earlier this week and had some straining of the groin area.  He did not have direct trauma to the left testicle.    Patient endorses fevers and chills.  He is very sleepy after his pain medicine and is unable to tell me what his temperature was at home.  He denies nausea, vomiting, shortness of breath, or chest pain.  He does note a change in his urination over the last several days.  He endorsed mild hesitancy, dysuria, cloudy urine, and a pink tinge.  Patient does feel like his symptoms are similar to his urinary tract infection in February.  He was also tested for gonorrhea and chlamydia at that time, which was negative.  Patient was appropriately treated with antibiotics for that infection.    He does tell me there is a risk of possible STI.  He denies any recent insertive anal intercourse.  He was started on IV Rocephin as well as doxycycline.  Afebrile with some tachycardia.  Also noted some tachypnea.  WBC 20.3.  Creatinine 0.90 EGFR greater than 90.    Gonorrhea and Chlamydia testing are in process.  Urinalysis has not been obtained.    Scrotal ultrasound imaging showed concern for left-sided epididymal orchitis and possible resolved torsion.    Normal circumcised phallus with no discharge at the meatus.  Right hemiscrotum appears normal.  Nontender.  No palpable testicular masses.  Erythema and significant tenderness with mild palpation of the left hemiscrotum.  Very firm.  Do not appreciate bell clapper deformity.          Past Medical  History:     Past Medical History:   Diagnosis Date    Attention deficit disorder with hyperactivity(314.01)     ADHD  ( also had learning disability)    Mild intermittent asthma     Asthma          Past Surgical History:   No past surgical history on file.          Social History:     Social History     Tobacco Use    Smoking status: Every Day    Smokeless tobacco: Never   Substance Use Topics    Alcohol use: No     Girlfriend.         Family History:   No family history on file.  Family history reviewed.          Allergies:     Allergies   Allergen Reactions    Cats             Medications:     Current Facility-Administered Medications   Medication Dose Route Frequency Provider Last Rate Last Admin    acetaminophen (TYLENOL) tablet 650 mg  650 mg Oral Q4H PRN Lashonda Mandujano PA-C        Or    acetaminophen (TYLENOL) Suppository 650 mg  650 mg Rectal Q4H PRN Lashonda Mandujano PA-C        artificial saliva (BIOTENE MT) solution 2 spray  2 spray Swish & Spit 4x Daily PRN Lashonda Mandujano PA-C        bisacodyl (DULCOLAX) suppository 10 mg  10 mg Rectal Daily PRN Lashonda Mandujano PA-C        [START ON 6/29/2024] cefTRIAXone (ROCEPHIN) 2 g vial to attach to  ml bag for ADULTS or NS 50 ml bag for PEDS  2 g Intravenous Q24H Lashonda Mandujano PA-C        dextrose 5% and 0.45% NaCl + KCl 20 mEq/L infusion   Intravenous Continuous Lashonda Mandujano PA-C 200 mL/hr at 06/28/24 0955 New Bag at 06/28/24 0955    doxycycline (VIBRAMYCIN) 100 mg vial to attach to  mL bag  100 mg Intravenous Q12H Freddy Jc MD        HYDROmorphone (PF) (DILAUDID) injection 0.5-1 mg  0.5-1 mg Intravenous Q2H PRN Lashonda Mandujano PA-C        ketorolac (TORADOL) injection 15 mg  15 mg Intravenous Q6H PRN Lashonda Mandujano PA-C        lidocaine (LMX4) cream   Topical Q1H PRN Lashonda Mandujano PA-C        lidocaine 1 % 0.1-1 mL  0.1-1 mL Other Q1H PRN Lashonda Mandujano PA-C        magnesium hydroxide  (MILK OF MAGNESIA) suspension 15-30 mL  15-30 mL Oral Daily PRN Lashonda Mandujano PA-C        ondansetron (ZOFRAN) injection 4 mg  4 mg Intravenous Q6H PRN Lashonda Mandujano PA-C        Or    ondansetron (ZOFRAN ODT) ODT tab 4 mg  4 mg Oral Q6H PRN Lashonda Mandujano PA-C        oxyCODONE (ROXICODONE) tablet 5-10 mg  5-10 mg Oral Q4H PRN Lashonda Mandujano PA-C        prochlorperazine (COMPAZINE) injection 10 mg  10 mg Intravenous Q6H PRN Lashonda Mandujano PA-C        Or    prochlorperazine (COMPAZINE) tablet 10 mg  10 mg Oral Q6H PRN Lashonda Mandujano PA-C        Or    prochlorperazine (COMPAZINE) suppository 25 mg  25 mg Rectal Q12H PRN Lashonda Mandujano PA-C        senna-docusate (SENOKOT-S/PERICOLACE) 8.6-50 MG per tablet 1 tablet  1 tablet Oral BID Lashonda Mandujano PA-C   1 tablet at 06/28/24 1038    sodium chloride (PF) 0.9% PF flush 3 mL  3 mL Intracatheter q1 min prn Lashonda Mandujano PA-C        sodium chloride (PF) 0.9% PF flush 3 mL  3 mL Intracatheter Q8H Lashonda Mandujano PA-C         Current Outpatient Medications   Medication Sig Dispense Refill    ADDERALL XR# 20 MG OR CP24 2 tabs po q AM (Patient not taking: Reported on 1/8/2024) 60 0    ALBUTEROL INHALER 17GM 2 Puffs [id number] prn 2 3    ALBUTEROL SULFATE (2.5 MG/3ML) 0.083% IN NEBU ONE NEBULIZATION 4 TIMES DAILY AS NEEDED (Patient not taking: Reported on 1/8/2024) 1 BOX 1 YEAR    cephALEXin (KEFLEX) 500 MG capsule Take 1 capsule (500 mg) by mouth 2 times daily 14 capsule 0    ibuprofen (ADVIL/MOTRIN) 600 MG tablet Take 1 tablet (600 mg) by mouth every 8 hours as needed for moderate pain 30 tablet 0    oxyCODONE-acetaminophen (PERCOCET) 5-325 MG per tablet Take 1-2 tablets by mouth every 4 hours as needed for moderate to severe pain (Patient not taking: Reported on 1/8/2024) 15 tablet 0             Review of Systems:   A comprehensive 10-point review of systems was performed and found to be negative except as described in  the HPI.     BP (!) 146/87   Pulse 103   Temp 98.7  F (37.1  C) (Temporal)   Resp 26   SpO2 98%   PSYCH: NAD  EYES: EOMI  MOUTH: MMM  NECK: Supple, no notable adenopathy  RESP: Unlabored breathing  CARDIAC: No LE edema, mild tachycardia by radial pulse  SKIN: Warm, no rashes  ABD: soft, Nontender  NEURO: AAO x3, but very sleepy  URO: Normal circumcised phallus with no discharge at the meatus.  Right hemiscrotum appears normal.  Nontender.  No palpable testicular masses.  Erythema and significant tenderness with mild palpation of the left hemiscrotum.  Very firm.  Do not appreciate bell clapper deformity.         Data:     Lab Results   Component Value Date    WBC 20.3 (H) 06/28/2024    HGB 16.4 06/28/2024    HCT 49.6 06/28/2024    MCV 89 06/28/2024     06/28/2024     Lab Results   Component Value Date    CR 0.90 06/28/2024     UA: not collect yet  GC: in process     US Testicular & Scrotum w Doppler Ltd    Result Date: 6/28/2024  EXAM: US TESTICULAR AND SCROTUM WITH DOPPLER LIMITED LOCATION: [facility] DATE: 6/28/2024 INDICATION: L scrotal pain COMPARISON: None. TECHNIQUE: Ultrasound of scrotum with color flow and spectral Doppler with waveform analysis performed. FINDINGS: RIGHT: Right testicle measures 5.6 x 3.1 x 2.0 cm. Homogeneous testicular echotexture. No testicular mass. Arterial and venous blood flow of the right testicle is demonstrated on Doppler evaluation. Normal epididymis. No hydrocele. No varicocele. LEFT: Left testicle measures 4.9 x 3.3 x 3.4 cm. Homogeneous testicular echotexture. No testicular mass. Hyperemia of the left testicle and epididymis on color Doppler evaluation. Arterial and venous blood flow of the left testicle is demonstrated on Doppler evaluation. Small hydrocele. No varicocele. The sonographer performed additional imaging of the left spermatic cord. On cine ultrasound images, the left spermatic cord has a somewhat swirled appearance which suggests  the possibility of intermittent torsion.     IMPRESSION: Hyperemia of the left testicle and epididymis may be due to epididymoorchitis. However, the left spermatic cord has a somewhat swirled appearance which raises the possibility of intermittent torsion. Urologic consultation recommended. I discussed the findings with Dr. Quijano of the ED at 7:00 AM on 6/20/2024.

## 2024-06-28 NOTE — PROGRESS NOTES
[facility]    ED Boarding Nurse Handoff Addendum Report:    Date/time: 6/28/2024, 12:40 PM    Activity Level: assist of 1    Fall Risk: Yes:  patient and family education, assistive device/personal items within reach, and room door open    Active Infusions: D5 1/2NS K20    Current Meds Due: N/A    Current care needs: Pain management     Oxygen requirements (liters/min and/or FiO2): 2L    Respiratory status: Nasal cannula    Vital signs (within last 30 minutes):    Vitals:    06/28/24 0928 06/28/24 1040 06/28/24 1146 06/28/24 1214   BP:  (!) 146/87 (!) 146/97 (!) 153/98   Pulse:    103   Resp:   22 18   Temp:       TempSrc:       SpO2: 98% 98% 99% 94%       Focused assessment within last 30 minutes:    Pt is drowsy from pain medications but arouses to voice, VSS. 2L NC sating 98%. IV dilaudid given x2. Scrotum has +2 edema. Urology following. Rocephin and Doxycycline for ABX. NPO. Need UA. Transferred to room 248.     ED Boarding Nurse name: Nadira Wasserman RN

## 2024-06-28 NOTE — PLAN OF CARE
Goal Outcome Evaluation:      Plan of Care Reviewed With: patient    Outcome Evaluation: Pt A&O. Drowsy from pain medication.  Increased pain with movement. Transfers with SBA. diet ordered per urology. IVF infusing.    Problem: Adult Inpatient Plan of Care  Goal: Plan of Care Review  Description: The Plan of Care Review/Shift note should be completed every shift.  The Outcome Evaluation is a brief statement about your assessment that the patient is improving, declining, or no change.  This information will be displayed automatically on your shift  note.  Outcome: Progressing  Flowsheets (Taken 6/28/2024 1727)  Outcome Evaluation: Pt A&O. Drowsy from pain medication.  Increased pain with movement. Transfers with SBA. diet ordered per urology. IVF infusing.  Plan of Care Reviewed With: patient  Goal: Absence of Hospital-Acquired Illness or Injury  Outcome: Progressing  Intervention: Identify and Manage Fall Risk  Recent Flowsheet Documentation  Taken 6/28/2024 1315 by Dawna Stein RN  Safety Promotion/Fall Prevention:   activity supervised   clutter free environment maintained   increased rounding and observation   increase visualization of patient   lighting adjusted   mobility aid in reach   nonskid shoes/slippers when out of bed   patient and family education   safety round/check completed  Intervention: Prevent Skin Injury  Recent Flowsheet Documentation  Taken 6/28/2024 1315 by Dawna Stein, RN  Body Position: position changed independently  Intervention: Prevent Infection  Recent Flowsheet Documentation  Taken 6/28/2024 1315 by Dawna Stein, RN  Infection Prevention:   rest/sleep promoted   single patient room provided  Goal: Optimal Comfort and Wellbeing  Outcome: Progressing  Intervention: Monitor Pain and Promote Comfort  Recent Flowsheet Documentation  Taken 6/28/2024 1315 by Dawna Stein RN  Pain Management Interventions:   pain management plan reviewed with patient/caregiver    rest  Goal: Readiness for Transition of Care  Outcome: Progressing

## 2024-06-28 NOTE — PROGRESS NOTES
"Pt called RN into room during lab draw.  Pt stated, \"My blood is solidifying.  I have an addiction to amphetamines.  I need this room as hot as it can go to get my blood flowing.\"   "

## 2024-06-29 ENCOUNTER — APPOINTMENT (OUTPATIENT)
Dept: ULTRASOUND IMAGING | Facility: CLINIC | Age: 34
End: 2024-06-29
Attending: PHYSICIAN ASSISTANT
Payer: COMMERCIAL

## 2024-06-29 LAB
AMPHETAMINES UR QL SCN: ABNORMAL
ANION GAP SERPL CALCULATED.3IONS-SCNC: 15 MMOL/L (ref 7–15)
BARBITURATES UR QL SCN: ABNORMAL
BASOPHILS # BLD MANUAL: 0 10E3/UL (ref 0–0.2)
BASOPHILS NFR BLD MANUAL: 0 %
BENZODIAZ UR QL SCN: ABNORMAL
BUN SERPL-MCNC: 8.3 MG/DL (ref 6–20)
BZE UR QL SCN: ABNORMAL
CALCIUM SERPL-MCNC: 8.8 MG/DL (ref 8.6–10)
CANNABINOIDS UR QL SCN: ABNORMAL
CHLORIDE SERPL-SCNC: 100 MMOL/L (ref 98–107)
CREAT SERPL-MCNC: 0.89 MG/DL (ref 0.67–1.17)
DEPRECATED HCO3 PLAS-SCNC: 22 MMOL/L (ref 22–29)
EGFRCR SERPLBLD CKD-EPI 2021: >90 ML/MIN/1.73M2
EOSINOPHIL # BLD MANUAL: 0 10E3/UL (ref 0–0.7)
EOSINOPHIL NFR BLD MANUAL: 0 %
ERYTHROCYTE [DISTWIDTH] IN BLOOD BY AUTOMATED COUNT: 13.3 % (ref 10–15)
FENTANYL UR QL: ABNORMAL
GLUCOSE SERPL-MCNC: 114 MG/DL (ref 70–99)
HCT VFR BLD AUTO: 44.3 % (ref 40–53)
HGB BLD-MCNC: 14.7 G/DL (ref 13.3–17.7)
LYMPHOCYTES # BLD MANUAL: 2.8 10E3/UL (ref 0.8–5.3)
LYMPHOCYTES NFR BLD MANUAL: 9 %
MCH RBC QN AUTO: 29.8 PG (ref 26.5–33)
MCHC RBC AUTO-ENTMCNC: 33.2 G/DL (ref 31.5–36.5)
MCV RBC AUTO: 90 FL (ref 78–100)
MONOCYTES # BLD MANUAL: 1.2 10E3/UL (ref 0–1.3)
MONOCYTES NFR BLD MANUAL: 4 %
NEUTROPHILS # BLD MANUAL: 27.1 10E3/UL (ref 1.6–8.3)
NEUTROPHILS NFR BLD MANUAL: 87 %
NRBC # BLD AUTO: 0 10E3/UL
NRBC BLD AUTO-RTO: 0 /100
OPIATES UR QL SCN: ABNORMAL
PCP QUAL URINE (ROCHE): ABNORMAL
PLAT MORPH BLD: ABNORMAL
PLATELET # BLD AUTO: 276 10E3/UL (ref 150–450)
POTASSIUM SERPL-SCNC: 3.5 MMOL/L (ref 3.4–5.3)
RBC # BLD AUTO: 4.93 10E6/UL (ref 4.4–5.9)
RBC MORPH BLD: ABNORMAL
SODIUM SERPL-SCNC: 137 MMOL/L (ref 135–145)
WBC # BLD AUTO: 31.1 10E3/UL (ref 4–11)

## 2024-06-29 PROCEDURE — 99232 SBSQ HOSP IP/OBS MODERATE 35: CPT | Performed by: INTERNAL MEDICINE

## 2024-06-29 PROCEDURE — 250N000011 HC RX IP 250 OP 636: Performed by: INTERNAL MEDICINE

## 2024-06-29 PROCEDURE — 250N000013 HC RX MED GY IP 250 OP 250 PS 637: Performed by: PHYSICIAN ASSISTANT

## 2024-06-29 PROCEDURE — 76870 US EXAM SCROTUM: CPT

## 2024-06-29 PROCEDURE — 36415 COLL VENOUS BLD VENIPUNCTURE: CPT | Performed by: PHYSICIAN ASSISTANT

## 2024-06-29 PROCEDURE — 250N000011 HC RX IP 250 OP 636: Performed by: PHYSICIAN ASSISTANT

## 2024-06-29 PROCEDURE — 85007 BL SMEAR W/DIFF WBC COUNT: CPT | Performed by: PHYSICIAN ASSISTANT

## 2024-06-29 PROCEDURE — 120N000001 HC R&B MED SURG/OB

## 2024-06-29 PROCEDURE — 258N000003 HC RX IP 258 OP 636: Performed by: PHYSICIAN ASSISTANT

## 2024-06-29 PROCEDURE — 85025 COMPLETE CBC W/AUTO DIFF WBC: CPT | Performed by: PHYSICIAN ASSISTANT

## 2024-06-29 PROCEDURE — 258N000003 HC RX IP 258 OP 636: Performed by: INTERNAL MEDICINE

## 2024-06-29 PROCEDURE — 80048 BASIC METABOLIC PNL TOTAL CA: CPT | Performed by: PHYSICIAN ASSISTANT

## 2024-06-29 PROCEDURE — 99232 SBSQ HOSP IP/OBS MODERATE 35: CPT | Mod: GC | Performed by: UROLOGY

## 2024-06-29 PROCEDURE — 80307 DRUG TEST PRSMV CHEM ANLYZR: CPT | Performed by: INTERNAL MEDICINE

## 2024-06-29 RX ORDER — HYDROMORPHONE HYDROCHLORIDE 1 MG/ML
.5-1 INJECTION, SOLUTION INTRAMUSCULAR; INTRAVENOUS; SUBCUTANEOUS EVERY 4 HOURS PRN
Status: DISCONTINUED | OUTPATIENT
Start: 2024-06-29 | End: 2024-06-29

## 2024-06-29 RX ORDER — HYDROMORPHONE HYDROCHLORIDE 2 MG/1
4 TABLET ORAL EVERY 4 HOURS PRN
Status: DISCONTINUED | OUTPATIENT
Start: 2024-06-29 | End: 2024-07-05

## 2024-06-29 RX ORDER — HYDROMORPHONE HYDROCHLORIDE 1 MG/ML
.5-1 INJECTION, SOLUTION INTRAMUSCULAR; INTRAVENOUS; SUBCUTANEOUS EVERY 4 HOURS PRN
Status: DISCONTINUED | OUTPATIENT
Start: 2024-06-29 | End: 2024-07-05

## 2024-06-29 RX ORDER — PIPERACILLIN SODIUM, TAZOBACTAM SODIUM 4; .5 G/20ML; G/20ML
4.5 INJECTION, POWDER, LYOPHILIZED, FOR SOLUTION INTRAVENOUS EVERY 6 HOURS
Status: DISCONTINUED | OUTPATIENT
Start: 2024-06-29 | End: 2024-07-01

## 2024-06-29 RX ADMIN — PANTOPRAZOLE SODIUM 40 MG: 40 INJECTION, POWDER, FOR SOLUTION INTRAVENOUS at 09:22

## 2024-06-29 RX ADMIN — IBUPROFEN 600 MG: 600 TABLET, FILM COATED ORAL at 15:37

## 2024-06-29 RX ADMIN — PIPERACILLIN AND TAZOBACTAM 4.5 G: 4; .5 INJECTION, POWDER, FOR SOLUTION INTRAVENOUS at 17:43

## 2024-06-29 RX ADMIN — POTASSIUM CHLORIDE, DEXTROSE MONOHYDRATE AND SODIUM CHLORIDE: 150; 5; 450 INJECTION, SOLUTION INTRAVENOUS at 15:50

## 2024-06-29 RX ADMIN — POTASSIUM CHLORIDE, DEXTROSE MONOHYDRATE AND SODIUM CHLORIDE: 150; 5; 450 INJECTION, SOLUTION INTRAVENOUS at 07:02

## 2024-06-29 RX ADMIN — VANCOMYCIN HYDROCHLORIDE 1500 MG: 10 INJECTION, POWDER, LYOPHILIZED, FOR SOLUTION INTRAVENOUS at 13:54

## 2024-06-29 RX ADMIN — KETOROLAC TROMETHAMINE 15 MG: 15 INJECTION, SOLUTION INTRAMUSCULAR; INTRAVENOUS at 22:09

## 2024-06-29 RX ADMIN — OXYCODONE HYDROCHLORIDE 5 MG: 5 TABLET ORAL at 05:38

## 2024-06-29 RX ADMIN — ACETAMINOPHEN 650 MG: 325 TABLET, FILM COATED ORAL at 03:50

## 2024-06-29 RX ADMIN — PIPERACILLIN AND TAZOBACTAM 4.5 G: 4; .5 INJECTION, POWDER, FOR SOLUTION INTRAVENOUS at 12:29

## 2024-06-29 RX ADMIN — CEFTRIAXONE 2 G: 2 INJECTION, POWDER, FOR SOLUTION INTRAMUSCULAR; INTRAVENOUS at 09:22

## 2024-06-29 RX ADMIN — KETOROLAC TROMETHAMINE 15 MG: 15 INJECTION, SOLUTION INTRAMUSCULAR; INTRAVENOUS at 09:22

## 2024-06-29 RX ADMIN — ACETAMINOPHEN 650 MG: 325 TABLET, FILM COATED ORAL at 17:43

## 2024-06-29 RX ADMIN — SENNOSIDES AND DOCUSATE SODIUM 1 TABLET: 8.6; 5 TABLET ORAL at 22:08

## 2024-06-29 ASSESSMENT — ACTIVITIES OF DAILY LIVING (ADL)
ADLS_ACUITY_SCORE: 38
ADLS_ACUITY_SCORE: 37
ADLS_ACUITY_SCORE: 38
ADLS_ACUITY_SCORE: 37
ADLS_ACUITY_SCORE: 38
ADLS_ACUITY_SCORE: 37
ADLS_ACUITY_SCORE: 38
ADLS_ACUITY_SCORE: 37
ADLS_ACUITY_SCORE: 38
ADLS_ACUITY_SCORE: 37
ADLS_ACUITY_SCORE: 37

## 2024-06-29 NOTE — PLAN OF CARE
Goal Outcome Evaluation:      Plan of Care Reviewed With: patient    Overall Patient Progress: improvingOverall Patient Progress: improving     Patient drowsy this shift. Arouses to voices. Per patient Bed and chair are uncomfortable due to scrotal edema and will only sleep on couch. Pain treated with Toradol. Patient wants to stay away from all opioids. Appetite fair. Family at bedside throughout shift. Ultrasound completed today. Pending discharge plans.       Problem: Adult Inpatient Plan of Care  Goal: Plan of Care Review  6/29/2024 1450 by Kristine Welsh RN  Outcome: Progressing  Flowsheets (Taken 6/29/2024 1450)  Plan of Care Reviewed With: patient  Overall Patient Progress: improving  6/29/2024 1449 by Kristine Welsh RN  Outcome: Progressing  Flowsheets (Taken 6/29/2024 1449)  Plan of Care Reviewed With: patient  Overall Patient Progress: improving  Goal: Patient-Specific Goal (Individualized)  6/29/2024 1450 by Kristine Welsh RN  Outcome: Progressing  6/29/2024 1449 by Kristine Welsh RN  Outcome: Progressing  Goal: Absence of Hospital-Acquired Illness or Injury  6/29/2024 1450 by Kristine Welsh RN  Outcome: Progressing  6/29/2024 1449 by Kristine Welsh RN  Outcome: Progressing  Intervention: Identify and Manage Fall Risk  Recent Flowsheet Documentation  Taken 6/29/2024 1400 by Kristine Welsh RN  Safety Promotion/Fall Prevention:   assistive device/personal items within reach   safety round/check completed   room near nurse's station   room door open  Intervention: Prevent Skin Injury  Recent Flowsheet Documentation  Taken 6/29/2024 1400 by Kristine Welsh RN  Body Position: position changed independently  Intervention: Prevent and Manage VTE (Venous Thromboembolism) Risk  Recent Flowsheet Documentation  Taken 6/29/2024 1400 by Kristine Welsh RN  VTE Prevention/Management: SCDs off (sequential compression devices)  Intervention: Prevent Infection  Recent Flowsheet Documentation  Taken  6/29/2024 1400 by Kristine Welsh RN  Infection Prevention:   hand hygiene promoted   rest/sleep promoted  Goal: Optimal Comfort and Wellbeing  6/29/2024 1450 by Kristine Welsh RN  Outcome: Progressing  6/29/2024 1449 by Kristine Welsh RN  Outcome: Progressing  Intervention: Monitor Pain and Promote Comfort  Recent Flowsheet Documentation  Taken 6/29/2024 0948 by Kristine Welsh RN  Pain Management Interventions: declines  Taken 6/29/2024 0922 by Kristine Welsh RN  Pain Management Interventions: medication (see MAR)  Goal: Readiness for Transition of Care  6/29/2024 1450 by Kristine Welsh RN  Outcome: Progressing  6/29/2024 1449 by Kristine Welsh RN  Outcome: Progressing     Problem: Pain Acute  Goal: Optimal Pain Control and Function  6/29/2024 1450 by Kristine Welsh RN  Outcome: Progressing  6/29/2024 1449 by Kristine Welsh RN  Outcome: Progressing  Intervention: Develop Pain Management Plan  Recent Flowsheet Documentation  Taken 6/29/2024 0948 by Kristine Welsh RN  Pain Management Interventions: declines  Taken 6/29/2024 0922 by Kristine Welsh RN  Pain Management Interventions: medication (see MAR)  Intervention: Prevent or Manage Pain  Recent Flowsheet Documentation  Taken 6/29/2024 1400 by Kristine Welsh RN  Medication Review/Management: medications reviewed     Problem: Substance Withdrawal  Goal: Substance Withdrawal  Description: Signs and symptoms of listed problems will be absent or manageable.  6/29/2024 1450 by Kristine Welsh RN  Outcome: Progressing  6/29/2024 1449 by Kristine Welsh RN  Outcome: Progressing  Goal: Social and Therapeutic (Substance Withdrawal)  Description: Signs and symptoms of listed problems will be absent or manageable.  6/29/2024 1450 by Kristine Welsh RN  Outcome: Progressing  6/29/2024 1449 by Kristine Welsh RN  Outcome: Progressing     Problem: Infection  Goal: Absence of Infection Signs and Symptoms  6/29/2024 1450 by Kristine Welsh RN  Outcome:  Progressing  6/29/2024 1449 by Kristine Welsh, RN  Outcome: Progressing

## 2024-06-29 NOTE — PHARMACY-VANCOMYCIN DOSING SERVICE
"Pharmacy Vancomycin Initial Note  Date of Service 2024  Patient's  1990  34 year old, male    Indication: Pyelonephritis    Current estimated CrCl = Estimated Creatinine Clearance: 130.1 mL/min (based on SCr of 0.89 mg/dL).    Creatinine for last 3 days  2024:  5:48 AM Creatinine 0.90 mg/dL  2024:  6:57 AM Creatinine 0.89 mg/dL    Recent Vancomycin Level(s) for last 3 days  No results found for requested labs within last 3 days.      Vancomycin IV Administrations (past 72 hours)        No vancomycin orders with administrations in past 72 hours.                    Nephrotoxins and other renal medications (From now, onward)      Start     Dose/Rate Route Frequency Ordered Stop    24 1230  vancomycin (VANCOCIN) 1,500 mg in 0.9% NaCl 250 mL intermittent infusion         1,500 mg  over 90 Minutes Intravenous ONCE 24 1216      24 1130  piperacillin-tazobactam (ZOSYN) 4.5 g vial to attach to  mL bag        Note to Pharmacy: For [facility], [facility] and [facility]: For Zosyn-naive patients, use the \"Zosyn initial dose + extended infusion\" order panel.    4.5 g  over 30 Minutes Intravenous EVERY 6 HOURS 24 1110      24  ibuprofen (ADVIL/MOTRIN) tablet 600 mg        Placed in \"Or\" Linked Group    600 mg Oral 4 TIMES DAILY 24  ketorolac (TORADOL) injection 15 mg        Placed in \"Or\" Linked Group    15 mg Intravenous 4 TIMES DAILY 24            Contrast Orders - past 72 hours (72h ago, onward)      None            [employer] Prediction of Planned Initial Vancomycin Regimen  Loading dose: 1500 mg at 12:30 2024.  Regimen: 1250 mg IV every 12 hours.  Start time: 00:30 on 2024  Exposure target: AUC24 (range)400-600 mg/L.hr   AUC24,ss: 517 mg/L.hr  Probability of AUC24 > 400: 75 %  Ctrough,ss: 15.4 mg/L  Probability of Ctrough,ss > 20: 30 %  Probability of nephrotoxicity (Lodise MITCH ): 11 %     "      Plan:  Start vancomycin  1250 mg IV q12h after vanco load.  Vancomycin monitoring method: AUC  Vancomycin therapeutic monitoring goal: 400-600 mg*h/L  Pharmacy will check vancomycin levels as appropriate in 1-3 Days.    Serum creatinine levels will be ordered daily for the first week of therapy and at least twice weekly for subsequent weeks.      Jasmin Fonseca RPH

## 2024-06-29 NOTE — PROGRESS NOTES
[facility]     Hospitalist Progress Note     Assessment & Plan     ASSESSMENT    34M with hx of UTI, gonorrhea, ADHD, and amphetamine abuse presents with severe testicular pain and found to be septic 2/2 acute epididymitis complicated by suspected testicular torsion.  Patient refused urgent surgery for removal of testicle on admission.    Now agreeable to surgery if offered. Discussed with urology who will see patient today.    PLAN    Sepsis 2/2 Acute Epididymitis with Testicular Torsion  -Presents with severe testicular pain and found to have evidence of epididymitis with potential testicular torsion  -Chlamydia and Gonorrhea testing negative  -Urology consulted who recommended urgent surgery for removal of testicle but patient refused  -Patient now agreeable to surgery, discussed with urology and will see patient today  PLAN  -Continue ceftriaxone 2 g daily + Levaquin 750mg daily  -Repeat US pending for this morning  -Continue IVF  -Will make NPO in case surgery offered today although patient ate breakfast  -Urology consulted, appreciate recommendations  -Pain regimen in place    Acute Metabolic Encephalopathy  -Suspect secondary to opioid use for pain control  -Will obtain UDS  -Will have patient on continuous pulse ox monitoring    Other Issues  -Polysubstance abuse: UDS pending  -ADHD: Not on prescription medications for this it appears    DVT Prophy  -SCDs    Disposition  -Medically ready for discharge: 1 to 2 days to home      Nickolas Zafar MD    Subjective     Seen at bedside. Still endorsing a lot of pain. Now agreeable to surgery if offered. Discussed with urology who will see patient today.        Objective   Blood pressure 131/75, pulse 107, temperature 98  F (36.7  C), temperature source Oral, resp. rate 16, SpO2 98%.    PHYSICAL EXAM  General: In no acute distress  CV: RRR.  Lungs: CTAB. Nl WOB.  Abd: Non-tender.  : Large inflamed testicle  Ext: No edema.    LABS AND  IMAGING  Reviewed and pertinent results discussed in assessment and plan.

## 2024-06-29 NOTE — PLAN OF CARE
RN from [id number]:    Pt A&O x4-lethargic, arouses to voice, sometimes after repeated tries. PO ibuprofen and tylenol given for pain. Up independently. Voiding. Notified MD of urine screen results-pt continues to be lethargic & sweaty. Tolerating regular diet. Plan is to be NPO @ midnight incase of surgical procedure tomorrow.     Problem: Adult Inpatient Plan of Care  Goal: Plan of Care Review  Description: The Plan of Care Review/Shift note should be completed every shift.  The Outcome Evaluation is a brief statement about your assessment that the patient is improving, declining, or no change.  This information will be displayed automatically on your shift  note.  Outcome: Not Progressing  Flowsheets (Taken 6/29/2024 1842)  Plan of Care Reviewed With: patient  Overall Patient Progress: no change     Problem: Pain Acute  Goal: Optimal Pain Control and Function  Outcome: Not Progressing  Intervention: Develop Pain Management Plan  Recent Flowsheet Documentation  Taken 6/29/2024 1743 by Lissa Weiner RN  Pain Management Interventions: medication (see MAR)  Taken 6/29/2024 1537 by Lissa Weiner RN  Pain Management Interventions: medication (see MAR)  Intervention: Prevent or Manage Pain  Recent Flowsheet Documentation  Taken 6/29/2024 1554 by Lissa Weiner RN  Medication Review/Management: medications reviewed     Problem: Substance Withdrawal  Goal: Substance Withdrawal  Description: Signs and symptoms of listed problems will be absent or manageable.  Outcome: Not Progressing  Goal: Social and Therapeutic (Substance Withdrawal)  Description: Signs and symptoms of listed problems will be absent or manageable.  Outcome: Not Progressing     Problem: Infection  Goal: Absence of Infection Signs and Symptoms  Outcome: Not Progressing     Problem: Adult Inpatient Plan of Care  Goal: Patient-Specific Goal (Individualized)  Description: You can add care plan individualizations to a care plan. Examples of  "Individualization might be:  \"Parent requests to be called daily at 9am for status\", \"I have a hard time hearing out of my right ear\", or \"Do not touch me to wake me up as it startles  me\".  Outcome: Progressing  Goal: Absence of Hospital-Acquired Illness or Injury  Outcome: Progressing  Intervention: Identify and Manage Fall Risk  Recent Flowsheet Documentation  Taken 6/29/2024 1554 by Lissa Weiner RN  Safety Promotion/Fall Prevention:   nonskid shoes/slippers when out of bed   safety round/check completed  Intervention: Prevent Skin Injury  Recent Flowsheet Documentation  Taken 6/29/2024 1554 by Lissa Weiner RN  Body Position: position changed independently  Intervention: Prevent Infection  Recent Flowsheet Documentation  Taken 6/29/2024 1554 by Lissa Weiner RN  Infection Prevention: rest/sleep promoted  Goal: Optimal Comfort and Wellbeing  Outcome: Progressing  Intervention: Monitor Pain and Promote Comfort  Recent Flowsheet Documentation  Taken 6/29/2024 1743 by Lissa Weiner RN  Pain Management Interventions: medication (see MAR)  Taken 6/29/2024 1537 by Lissa Weiner RN  Pain Management Interventions: medication (see MAR)  Goal: Readiness for Transition of Care  Outcome: Progressing   Goal Outcome Evaluation:      Plan of Care Reviewed With: patient    Overall Patient Progress: no changeOverall Patient Progress: no change           "

## 2024-06-29 NOTE — PLAN OF CARE
"  Problem: Adult Inpatient Plan of Care  Goal: Plan of Care Review  Description: The Plan of Care Review/Shift note should be completed every shift.  The Outcome Evaluation is a brief statement about your assessment that the patient is improving, declining, or no change.  This information will be displayed automatically on your shift  note.  Outcome: Progressing  Flowsheets (Taken 6/29/2024 0525)  Outcome Evaluation:   Pt AOX4, drowsy, had a shower, tylenol for headache with relief. Refused VSS check at midnight and continous pulse oxymeter. girl friend at bedside. repeat ultrasound scheduled this morning   low grade temp IV fluid infusing  Plan of Care Reviewed With: patient  Overall Patient Progress: improving  Goal: Patient-Specific Goal (Individualized)  Description: You can add care plan individualizations to a care plan. Examples of Individualization might be:  \"Parent requests to be called daily at 9am for status\", \"I have a hard time hearing out of my right ear\", or \"Do not touch me to wake me up as it startles  me\".  Outcome: Progressing  Goal: Absence of Hospital-Acquired Illness or Injury  Outcome: Progressing  Intervention: Identify and Manage Fall Risk  Recent Flowsheet Documentation  Taken 6/29/2024 0000 by Liberty Tinoco RN  Safety Promotion/Fall Prevention:   safety round/check completed   room near nurse's station  Intervention: Prevent Skin Injury  Recent Flowsheet Documentation  Taken 6/29/2024 0000 by Liberty Tinoco RN  Body Position: position changed independently  Intervention: Prevent and Manage VTE (Venous Thromboembolism) Risk  Recent Flowsheet Documentation  Taken 6/29/2024 0000 by Liberty Tinoco RN  VTE Prevention/Management: SCDs off (sequential compression devices)  Intervention: Prevent Infection  Recent Flowsheet Documentation  Taken 6/29/2024 0000 by Liberty Tinoco RN  Infection Prevention: rest/sleep promoted  Goal: Optimal Comfort and Wellbeing  Outcome: Progressing  Goal: Readiness " for Transition of Care  Outcome: Progressing     Problem: Pain Acute  Goal: Optimal Pain Control and Function  Outcome: Progressing  Intervention: Prevent or Manage Pain  Recent Flowsheet Documentation  Taken 6/29/2024 0000 by Liberty Tinoco RN  Medication Review/Management: medications reviewed  Intervention: Optimize Psychosocial Wellbeing  Recent Flowsheet Documentation  Taken 6/29/2024 0000 by Liberty Tinoco RN  Supportive Measures:   active listening utilized   verbalization of feelings encouraged     Problem: Substance Withdrawal  Goal: Substance Withdrawal  Description: Signs and symptoms of listed problems will be absent or manageable.  Outcome: Progressing  Goal: Social and Therapeutic (Substance Withdrawal)  Description: Signs and symptoms of listed problems will be absent or manageable.  Outcome: Progressing   Goal Outcome Evaluation:      Plan of Care Reviewed With: patient    Overall Patient Progress: improvingOutcome Evaluation: Pt AOX4, drowsy, had a shower, tylenol for headache with relief. Refused VSS check at midnight and continous pulse oxymeter. girl friend at bedside. repeat ultrasound scheduled this morning; low grade temp IV fluid infusing. PO oxycodone for pain.

## 2024-06-29 NOTE — PLAN OF CARE
"Goal Outcome Evaluation:    A&O x4, very drowsy, falls asleep during conversation, provider called for evaluation on transfer to our unit from pediatrics, no concerns/no new orders   IV infusing   Voiding ok via urinal   Regular diet   Continuous pulse ox in place, on RA, tachycardic  Scheduled IV ketorolac given for pain   Pt sleeping most of evening, girlfriend at bedside, attentive to pt, requesting to stay over night   Pt repeatedly asking to take a hot shower but falls asleep mid conversation, upset stating \"we neglected his needs to bathe\", informed pt towels & toiletries are in bathroom & he has not showered dt excessive sleepiness     Problem: Adult Inpatient Plan of Care  Goal: Plan of Care Review  Description: The Plan of Care Review/Shift note should be completed every shift.  The Outcome Evaluation is a brief statement about your assessment that the patient is improving, declining, or no change.  This information will be displayed automatically on your shift  note.  Outcome: Not Progressing  Flowsheets (Taken 6/28/2024 2250)  Outcome Evaluation: A&O x4, very drowsy, regular diet, continuous pulse ox in place on RA, Iv ketorolac given for pain, IV infusing  Plan of Care Reviewed With:   patient   significant other  Overall Patient Progress: no change  Goal: Patient-Specific Goal (Individualized)  Description: You can add care plan individualizations to a care plan. Examples of Individualization might be:  \"Parent requests to be called daily at 9am for status\", \"I have a hard time hearing out of my right ear\", or \"Do not touch me to wake me up as it startles  me\".  Outcome: Not Progressing  Goal: Absence of Hospital-Acquired Illness or Injury  Outcome: Not Progressing  Intervention: Identify and Manage Fall Risk  Recent Flowsheet Documentation  Taken 6/28/2024 1952 by Emilee Devine RN  Safety Promotion/Fall Prevention:   safety round/check completed   room near nurse's station  Intervention: Prevent " Skin Injury  Recent Flowsheet Documentation  Taken 6/28/2024 1952 by Emilee Devine RN  Body Position: position changed independently  Skin Protection: adhesive use limited  Device Skin Pressure Protection: adhesive use limited  Intervention: Prevent and Manage VTE (Venous Thromboembolism) Risk  Recent Flowsheet Documentation  Taken 6/28/2024 1952 by Emilee Devine RN  VTE Prevention/Management: SCDs off (sequential compression devices)  Intervention: Prevent Infection  Recent Flowsheet Documentation  Taken 6/28/2024 1952 by Emilee Devine RN  Infection Prevention: rest/sleep promoted  Goal: Optimal Comfort and Wellbeing  Outcome: Not Progressing  Intervention: Monitor Pain and Promote Comfort  Recent Flowsheet Documentation  Taken 6/28/2024 2038 by Emilee Devine RN  Pain Management Interventions: medication (see MAR)  Taken 6/28/2024 1952 by Emilee Devine RN  Pain Management Interventions:   care clustered   medication (see MAR)  Goal: Readiness for Transition of Care  Outcome: Not Progressing     Problem: Pain Acute  Goal: Optimal Pain Control and Function  Outcome: Not Progressing  Intervention: Develop Pain Management Plan  Recent Flowsheet Documentation  Taken 6/28/2024 2038 by Emilee Devine RN  Pain Management Interventions: medication (see MAR)  Taken 6/28/2024 1952 by Emilee Devine RN  Pain Management Interventions:   care clustered   medication (see MAR)  Intervention: Prevent or Manage Pain  Recent Flowsheet Documentation  Taken 6/28/2024 1952 by Emilee Devine RN  Medication Review/Management: medications reviewed     Problem: Substance Withdrawal  Goal: Substance Withdrawal  Description: Signs and symptoms of listed problems will be absent or manageable.  Outcome: Not Progressing  Goal: Social and Therapeutic (Substance Withdrawal)  Description: Signs and symptoms of listed problems will be absent or manageable.  Outcome: Not Progressing         Plan of Care  Reviewed With: patient, significant other    Overall Patient Progress: no changeOverall Patient Progress: no change    Outcome Evaluation: A&O x4, very drowsy, regular diet, continuous pulse ox in place on RA, Iv ketorolac given for pain, IV infusing

## 2024-06-29 NOTE — PROGRESS NOTES
Urology  Progress Note    NAEO  Patient tired this morning, pain stable     Exam  /75 (BP Location: Left arm, Patient Position: Sitting, Cuff Size: Adult Regular)   Pulse 107   Temp 98  F (36.7  C) (Oral)   Resp 16   SpO2 98%   No acute distress  : circumcised, orthotopic meatus, enlarged scrotum R>L with reactive hydrocele on palpation w/o fluctuance, necrosis, or crepitus, moderately tender, erythematous, indurated     /NR    Labs  WBC 31.1  Hgb 14.7  Cr 0.89    Assessment/Plan  34 year old y/o male w/ PMH of UTI/STIs, amphetamine abuse, who is currently admitted for concerns of severe testicular pain found to have likely left epididymitis/orchitis and possible sepsis.     We discussed US results with potential concern for decreased blood flow to the testicle. Repeat US this AM is stable. It is not uncommon for mass effect from the tumor to decrease blood flow to the testicle. This is not the same as a testicular torsion, and an operative orchiopexy will not improve his symptoms. If we were to take Mr. Rowland to the operating room, we would do so to remove his testicle. At this time given he is clinically stable, would recommend continued observation. It is concerning his WBC elevated this AM, although he remains afebrile. His somnolence per medicine is likely withdrawal from amphetamine and pain medication related. Would recommend broadening abx with NPO at midnight tomorrow for possible scrotal exploration if he does not clinically improve/WBC is not down trending. Of note, it will take weeks or longer before his scrotum would return to normal after infection treatment.     -ok for diet  -broaden abx to vanc/zosyn, discussed with medicine  -NPO at midnight for possible scrotal exploration tomorrow  -if patient becomes vitally unstable page urology in case of need for emergent orchiectomy (very unlikely)  -scrotal support, scrotal elevation, pain medications for pain  -urology will see in the  AM    Seen with with Dr. Rocha.    Kobe Lao MD, PGY-5  Urology Resident     Contacting the Urology Team     Please use the following job codes to reach the Urology Team. Note that you must use an in house phone and that job codes cannot receive text pages.   On weekdays, dial [phone] (or star-star-star [phone] on the new "[employer] telephones) then [phone] to reach the Adult Urology resident or PA on call  On weekdays, dial [phone] (or star-star-star [phone] on the new [employer] telephones) then [phone] to reach the Pediatric Urology resident  On weeknights and weekends, dial [phone] (or star-star-star [phone] on the new [employer] telephones) then [phone] to reach the Urology resident on call (for both Adult and Pediatrics)

## 2024-06-29 NOTE — PHARMACY-ADMISSION MEDICATION HISTORY
Pharmacist Admission Medication History    Admission medication history is complete. The information provided in this note is only as accurate as the sources available at the time of the update.    Information Source(s): Patient , careverywhere, surescrpits via in-person    Pertinent Information: none    Changes made to PTA medication list:  Added: None  Deleted: keflex  Changed: None    Allergies reviewed with patient and updates made in EHR: yes    Medication History Completed By: Ap Bryant RPH 6/28/2024 8:16 PM    PTA Med List   Medication Sig Last Dose    ALBUTEROL INHALER 17GM 2 Puffs [id number] prn     ibuprofen (ADVIL/MOTRIN) 600 MG tablet Take 1 tablet (600 mg) by mouth every 8 hours as needed for moderate pain

## 2024-06-29 NOTE — PROGRESS NOTES
"Report called to [facility] pt to transfer to 644.      1800: Upon transfer to 644, pt needed reorientation to time frame of events.  Pt kept stating \"yesterday\" when events he was referring to had just happened within the last few hours.  Pt tearful, stating \"I am a relatively smart individual, I just made bad choices, and they are controlling me.\"  Pt also spoke in detail about how amphetamine solidifies and crystalizes his blood and heat is needed to thin it out.  Pt was sweating, unsteady and slightly shaky upon transfer.  He was alert and actively communicating with staff during transfer.   "

## 2024-06-30 LAB
ANION GAP SERPL CALCULATED.3IONS-SCNC: 12 MMOL/L (ref 7–15)
BACTERIA UR CULT: ABNORMAL
BUN SERPL-MCNC: 8.2 MG/DL (ref 6–20)
CALCIUM SERPL-MCNC: 8.3 MG/DL (ref 8.6–10)
CHLORIDE SERPL-SCNC: 106 MMOL/L (ref 98–107)
CREAT SERPL-MCNC: 0.86 MG/DL (ref 0.67–1.17)
DEPRECATED HCO3 PLAS-SCNC: 22 MMOL/L (ref 22–29)
EGFRCR SERPLBLD CKD-EPI 2021: >90 ML/MIN/1.73M2
ERYTHROCYTE [DISTWIDTH] IN BLOOD BY AUTOMATED COUNT: 13.2 % (ref 10–15)
GLUCOSE SERPL-MCNC: 109 MG/DL (ref 70–99)
HCT VFR BLD AUTO: 40.4 % (ref 40–53)
HGB BLD-MCNC: 13.4 G/DL (ref 13.3–17.7)
MCH RBC QN AUTO: 29.9 PG (ref 26.5–33)
MCHC RBC AUTO-ENTMCNC: 33.2 G/DL (ref 31.5–36.5)
MCV RBC AUTO: 90 FL (ref 78–100)
PLATELET # BLD AUTO: 261 10E3/UL (ref 150–450)
POTASSIUM SERPL-SCNC: 3.9 MMOL/L (ref 3.4–5.3)
RBC # BLD AUTO: 4.48 10E6/UL (ref 4.4–5.9)
SODIUM SERPL-SCNC: 140 MMOL/L (ref 135–145)
WBC # BLD AUTO: 25.8 10E3/UL (ref 4–11)

## 2024-06-30 PROCEDURE — 99232 SBSQ HOSP IP/OBS MODERATE 35: CPT | Performed by: INTERNAL MEDICINE

## 2024-06-30 PROCEDURE — 80048 BASIC METABOLIC PNL TOTAL CA: CPT | Performed by: INTERNAL MEDICINE

## 2024-06-30 PROCEDURE — 258N000003 HC RX IP 258 OP 636: Performed by: INTERNAL MEDICINE

## 2024-06-30 PROCEDURE — 120N000001 HC R&B MED SURG/OB

## 2024-06-30 PROCEDURE — 36415 COLL VENOUS BLD VENIPUNCTURE: CPT | Performed by: INTERNAL MEDICINE

## 2024-06-30 PROCEDURE — 250N000011 HC RX IP 250 OP 636: Performed by: PHYSICIAN ASSISTANT

## 2024-06-30 PROCEDURE — 250N000013 HC RX MED GY IP 250 OP 250 PS 637: Performed by: INTERNAL MEDICINE

## 2024-06-30 PROCEDURE — 250N000013 HC RX MED GY IP 250 OP 250 PS 637: Performed by: PHYSICIAN ASSISTANT

## 2024-06-30 PROCEDURE — 250N000011 HC RX IP 250 OP 636: Performed by: INTERNAL MEDICINE

## 2024-06-30 PROCEDURE — 258N000003 HC RX IP 258 OP 636: Performed by: PHYSICIAN ASSISTANT

## 2024-06-30 PROCEDURE — 85027 COMPLETE CBC AUTOMATED: CPT | Performed by: INTERNAL MEDICINE

## 2024-06-30 RX ORDER — NICOTINE 21 MG/24HR
1 PATCH, TRANSDERMAL 24 HOURS TRANSDERMAL DAILY
Status: DISCONTINUED | OUTPATIENT
Start: 2024-06-30 | End: 2024-07-02

## 2024-06-30 RX ADMIN — KETOROLAC TROMETHAMINE 15 MG: 15 INJECTION, SOLUTION INTRAMUSCULAR; INTRAVENOUS at 22:55

## 2024-06-30 RX ADMIN — PIPERACILLIN AND TAZOBACTAM 4.5 G: 4; .5 INJECTION, POWDER, FOR SOLUTION INTRAVENOUS at 05:50

## 2024-06-30 RX ADMIN — NICOTINE 1 PATCH: 21 PATCH, EXTENDED RELEASE TRANSDERMAL at 16:31

## 2024-06-30 RX ADMIN — IBUPROFEN 600 MG: 600 TABLET, FILM COATED ORAL at 16:32

## 2024-06-30 RX ADMIN — ACETAMINOPHEN 650 MG: 325 TABLET, FILM COATED ORAL at 05:03

## 2024-06-30 RX ADMIN — SENNOSIDES AND DOCUSATE SODIUM 1 TABLET: 8.6; 5 TABLET ORAL at 09:06

## 2024-06-30 RX ADMIN — PIPERACILLIN AND TAZOBACTAM 4.5 G: 4; .5 INJECTION, POWDER, FOR SOLUTION INTRAVENOUS at 11:48

## 2024-06-30 RX ADMIN — POTASSIUM CHLORIDE, DEXTROSE MONOHYDRATE AND SODIUM CHLORIDE: 150; 5; 450 INJECTION, SOLUTION INTRAVENOUS at 05:50

## 2024-06-30 RX ADMIN — ONDANSETRON 4 MG: 2 INJECTION INTRAMUSCULAR; INTRAVENOUS at 03:47

## 2024-06-30 RX ADMIN — VANCOMYCIN HYDROCHLORIDE 1250 MG: 10 INJECTION, POWDER, LYOPHILIZED, FOR SOLUTION INTRAVENOUS at 15:44

## 2024-06-30 RX ADMIN — PIPERACILLIN AND TAZOBACTAM 4.5 G: 4; .5 INJECTION, POWDER, FOR SOLUTION INTRAVENOUS at 00:43

## 2024-06-30 RX ADMIN — OXYCODONE HYDROCHLORIDE 10 MG: 5 TABLET ORAL at 17:13

## 2024-06-30 RX ADMIN — IBUPROFEN 600 MG: 600 TABLET, FILM COATED ORAL at 10:45

## 2024-06-30 RX ADMIN — VANCOMYCIN HYDROCHLORIDE 1250 MG: 10 INJECTION, POWDER, LYOPHILIZED, FOR SOLUTION INTRAVENOUS at 02:39

## 2024-06-30 RX ADMIN — OXYCODONE HYDROCHLORIDE 5 MG: 5 TABLET ORAL at 05:03

## 2024-06-30 RX ADMIN — PIPERACILLIN AND TAZOBACTAM 4.5 G: 4; .5 INJECTION, POWDER, FOR SOLUTION INTRAVENOUS at 20:05

## 2024-06-30 RX ADMIN — KETOROLAC TROMETHAMINE 15 MG: 15 INJECTION, SOLUTION INTRAMUSCULAR; INTRAVENOUS at 03:42

## 2024-06-30 ASSESSMENT — ACTIVITIES OF DAILY LIVING (ADL)
ADLS_ACUITY_SCORE: 37

## 2024-06-30 NOTE — PROGRESS NOTES
"    [facility]     Hospitalist Progress Note     Assessment & Plan     ASSESSMENT    34M with hx of UTI, gonorrhea, ADHD, and amphetamine abuse presents with severe testicular pain and found to be septic 2/2 acute epididymitis complicated by suspected testicular torsion.  Patient refused urgent surgery for removal of testicle on admission.    Improving.  WBC trending down and pain under better control.  Discussed with urology and holding off on surgical intervention for now.    PLAN    Sepsis 2/2 Acute Epididymitis  -Presents with severe testicular pain and found to have evidence of epididymitis and orchitis  -Initial concern for testicular torsion on imaging although urology feels this is less likely  -Chlamydia and Gonorrhea testing negative  -Urology consulted, holding off on surgery for now  PLAN  -Vancomycin + Zosyn, can likely de-escalate antibiotics tomorrow if improved  -Diet for today, NPO@MN in case surgery needed tomorrow  -Urology consulted, appreciate recommendations  -Pain regimen in place    Acute Metabolic Encephalopathy 2/2 Amphetamine Withdrawal  -Somnolent on admission due to amphetamine withdrawal, now improved    Other Issues  -Polysubstance abuse: UDS with cannabis and amphetamines  -ADHD: Not on prescription medications for this it appears    DVT Prophy  -SCDs    Disposition  -Medically ready for discharge: 1 to 2 days to home      Nickolas Zafar MD    Subjective     Patient clinically improving.  Pain in testicles is less.  Leukocytosis trending down.        Objective   Blood pressure 112/58, pulse 102, temperature 98.1  F (36.7  C), temperature source Temporal, resp. rate 20, height 1.803 m (5' 11\"), weight 77.9 kg (171 lb 12.8 oz), SpO2 96%.    PHYSICAL EXAM  General: In no acute distress  CV: RRR.  Lungs: CTAB. Nl WOB.  Abd: Non-tender.  : Large inflamed testicle  Ext: No edema.    LABS AND IMAGING  Reviewed and pertinent results discussed in assessment and plan.   "

## 2024-06-30 NOTE — PLAN OF CARE
"Pt is lethargic and had his eyes closed when nurse went to his room. On room air. Pain that is managed by oxy and tylenol. Toradol also adminstered. D5W in kcl also running at 100ml/hr. NPO at midnight. Independent in room. VSS. Urology following.     Problem: Adult Inpatient Plan of Care  Goal: Plan of Care Review  Description: The Plan of Care Review/Shift note should be completed every shift.  The Outcome Evaluation is a brief statement about your assessment that the patient is improving, declining, or no change.  This information will be displayed automatically on your shift  note.  Outcome: Progressing  Flowsheets (Taken 6/30/2024 0648)  Outcome Evaluation: pain managed by oxy and tylenol. Getting vanco and zosyn. PIV infusing d5w. Scrotum edema 3+. NPO Independent in room.  Plan of Care Reviewed With: patient  Overall Patient Progress: improving  Goal: Patient-Specific Goal (Individualized)  Description: You can add care plan individualizations to a care plan. Examples of Individualization might be:  \"Parent requests to be called daily at 9am for status\", \"I have a hard time hearing out of my right ear\", or \"Do not touch me to wake me up as it startles  me\".  Outcome: Progressing  Goal: Absence of Hospital-Acquired Illness or Injury  Outcome: Progressing  Intervention: Identify and Manage Fall Risk  Recent Flowsheet Documentation  Taken 6/30/2024 0342 by Jo-Ann Fuentes, RN  Safety Promotion/Fall Prevention:   safety round/check completed   room organization consistent   room near nurse's station   lighting adjusted   increase visualization of patient   increased rounding and observation  Intervention: Prevent Skin Injury  Recent Flowsheet Documentation  Taken 6/30/2024 0342 by Jo-Ann Fuentes, RN  Body Position: position changed independently  Intervention: Prevent and Manage VTE (Venous Thromboembolism) Risk  Recent Flowsheet Documentation  Taken 6/30/2024 0342 by Jo-Ann Fuentes, RN  VTE Prevention/Management: " SCDs off (sequential compression devices)  Intervention: Prevent Infection  Recent Flowsheet Documentation  Taken 6/30/2024 0342 by Jo-Ann Fuentes RN  Infection Prevention:   single patient room provided   rest/sleep promoted   hand hygiene promoted  Goal: Optimal Comfort and Wellbeing  Outcome: Progressing  Goal: Readiness for Transition of Care  Outcome: Progressing     Problem: Pain Acute  Goal: Optimal Pain Control and Function  Outcome: Progressing  Intervention: Prevent or Manage Pain  Recent Flowsheet Documentation  Taken 6/30/2024 0342 by Jo-Ann Fuentes RN  Medication Review/Management: medications reviewed  Intervention: Optimize Psychosocial Wellbeing  Recent Flowsheet Documentation  Taken 6/30/2024 0342 by Jo-Ann Fuentes RN  Supportive Measures:   active listening utilized   decision-making supported     Problem: Substance Withdrawal  Goal: Substance Withdrawal  Description: Signs and symptoms of listed problems will be absent or manageable.  Outcome: Progressing  Goal: Social and Therapeutic (Substance Withdrawal)  Description: Signs and symptoms of listed problems will be absent or manageable.  Outcome: Progressing     Problem: Infection  Goal: Absence of Infection Signs and Symptoms  Outcome: Progressing   Goal Outcome Evaluation:      Plan of Care Reviewed With: patient    Overall Patient Progress: improvingOverall Patient Progress: improving    Outcome Evaluation: pain managed by oxy and tylenol. Getting vanco and zosyn. PIV infusing d5w. Scrotum edema 3+. NPO Independent in room.

## 2024-06-30 NOTE — PROGRESS NOTES
"Urology  Progress Note    NAEO  Patient remains tired this morning  Pain is stable, denies fevers/chills/n/v    Exam  /58 (BP Location: Right arm)   Pulse 102   Temp 98.1  F (36.7  C) (Temporal)   Resp 20   Ht 1.803 m (5' 11\")   Wt 77.9 kg (171 lb 12.8 oz)   SpO2 96%   BMI 23.96 kg/m    No acute distress  : circumcised, orthotopic meatus, enlarged scrotum L>R with reactive hydrocele on palpation w/o fluctuance, necrosis, or crepitus, moderately tender, erythematous, indurated, exam slightly improved with less erythema and less tenderness    UOP 1575/NR    Labs  WBC 25.8 (31.1)  Hgb 13.4  Cr 0.86    Assessment/Plan  34 year old y/o male w/ PMH of UTI/STIs, amphetamine abuse, who is currently admitted for concerns of severe testicular pain found to have likely left epididymitis/orchitis and possible sepsis.     We discussed again his options this morning which include left orchiectomy vs continued treatment with antibiotics. His exam is slightly improved today and WBC is down trending which I find reassuring. He is otherwise clinically stable. We discussed that a left orchiectomy would likely involve him packing his wound as we would not be able to perform a tight closure in the setting of an active infection. He was very worried about having to pack the wound, and I also am concerned about his toleration of this. Given his clinical improvement, would recommend another day of observation. If he continues to improve we can stay the course with antibiotics. If he worsens, will consider left orchiectomy.     -ok for diet today  -continue vanc/zosyn  -NPO at midnight for possible scrotal exploration tomorrow  -if patient becomes vitally unstable page urology in case of need for emergent orchiectomy (very unlikely)  -scrotal support, scrotal elevation, pain medications for pain  -urology will see in the AM    Seen with with Dr. Rocha.    Kobe Lao MD, PGY-5  Urology Resident     Contacting the Urology " Team     Please use the following job codes to reach the Urology Team. Note that you must use an in house phone and that job codes cannot receive text pages.   On weekdays, dial [phone] (or star-star-star [phone] on the new [employer] telephones) then [phone] to reach the Adult Urology resident or PA on call  On weekdays, dial [phone] (or star-star-star [phone] on the new [employer] telephones) then [phone] to reach the Pediatric Urology resident  On weeknights and weekends, dial [phone] (or star-star-star [phone] on the new [employer] telephones) then [phone] to reach the Urology resident on call (for both Adult and Pediatrics)

## 2024-06-30 NOTE — PLAN OF CARE
"Goal Outcome Evaluation:      Plan of Care Reviewed With: patient    Overall Patient Progress: improvingOverall Patient Progress: improving     Patient alert and oriented this shift. Pain treated with ibuprofen. Patient states \"more discomfort than pain.\" Pt does not want any narcotics for pain. Nicotine patch ordered per pt request.  Independent in room. Friends at bedside throughout shift. Regular diet. Surgery following for possible exploratory if patient does not improve. IV abx given. Patient open about amphetamine usage- asked patient if he would like resources to get clean- pt denied. Pending discharge plans.     Problem: Adult Inpatient Plan of Care  Goal: Plan of Care Review  Outcome: Progressing  Flowsheets (Taken 6/30/2024 1553)  Plan of Care Reviewed With: patient  Overall Patient Progress: improving  Goal: Patient-Specific Goal (Individualized)  Outcome: Progressing  Goal: Absence of Hospital-Acquired Illness or Injury  Outcome: Progressing  Intervention: Identify and Manage Fall Risk  Recent Flowsheet Documentation  Taken 6/30/2024 1400 by Kristine Welsh RN  Safety Promotion/Fall Prevention:   safety round/check completed   room organization consistent   room near nurse's station  Intervention: Prevent Skin Injury  Recent Flowsheet Documentation  Taken 6/30/2024 1400 by Kristine Welsh RN  Body Position: position changed independently  Intervention: Prevent and Manage VTE (Venous Thromboembolism) Risk  Recent Flowsheet Documentation  Taken 6/30/2024 1400 by Kristine Welsh RN  VTE Prevention/Management: SCDs off (sequential compression devices)  Intervention: Prevent Infection  Recent Flowsheet Documentation  Taken 6/30/2024 1400 by Kristine Welsh RN  Infection Prevention:   hand hygiene promoted   rest/sleep promoted  Goal: Optimal Comfort and Wellbeing  Outcome: Progressing  Intervention: Monitor Pain and Promote Comfort  Recent Flowsheet Documentation  Taken 6/30/2024 1045 by Kristine Welsh, " RN  Pain Management Interventions: medication (see MAR)  Goal: Readiness for Transition of Care  Outcome: Progressing     Problem: Pain Acute  Goal: Optimal Pain Control and Function  Outcome: Progressing  Intervention: Develop Pain Management Plan  Recent Flowsheet Documentation  Taken 6/30/2024 1045 by Kristine Welsh RN  Pain Management Interventions: medication (see MAR)  Intervention: Prevent or Manage Pain  Recent Flowsheet Documentation  Taken 6/30/2024 1400 by Kristine Welsh RN  Medication Review/Management: medications reviewed     Problem: Substance Withdrawal  Goal: Substance Withdrawal  Description: Signs and symptoms of listed problems will be absent or manageable.  Outcome: Progressing  Goal: Social and Therapeutic (Substance Withdrawal)  Description: Signs and symptoms of listed problems will be absent or manageable.  Outcome: Progressing     Problem: Infection  Goal: Absence of Infection Signs and Symptoms  Outcome: Progressing

## 2024-07-01 LAB
ANION GAP SERPL CALCULATED.3IONS-SCNC: 13 MMOL/L (ref 7–15)
BUN SERPL-MCNC: 8.9 MG/DL (ref 6–20)
CALCIUM SERPL-MCNC: 8.2 MG/DL (ref 8.6–10)
CHLORIDE SERPL-SCNC: 105 MMOL/L (ref 98–107)
CREAT SERPL-MCNC: 0.78 MG/DL (ref 0.67–1.17)
DEPRECATED HCO3 PLAS-SCNC: 24 MMOL/L (ref 22–29)
EGFRCR SERPLBLD CKD-EPI 2021: >90 ML/MIN/1.73M2
ERYTHROCYTE [DISTWIDTH] IN BLOOD BY AUTOMATED COUNT: 13.3 % (ref 10–15)
GLUCOSE SERPL-MCNC: 95 MG/DL (ref 70–99)
HCT VFR BLD AUTO: 39 % (ref 40–53)
HGB BLD-MCNC: 13 G/DL (ref 13.3–17.7)
LACTATE SERPL-SCNC: 1.3 MMOL/L (ref 0.7–2)
MCH RBC QN AUTO: 29.7 PG (ref 26.5–33)
MCHC RBC AUTO-ENTMCNC: 33.3 G/DL (ref 31.5–36.5)
MCV RBC AUTO: 89 FL (ref 78–100)
PLATELET # BLD AUTO: 288 10E3/UL (ref 150–450)
POTASSIUM SERPL-SCNC: 3.8 MMOL/L (ref 3.4–5.3)
RBC # BLD AUTO: 4.38 10E6/UL (ref 4.4–5.9)
SODIUM SERPL-SCNC: 142 MMOL/L (ref 135–145)
WBC # BLD AUTO: 18.7 10E3/UL (ref 4–11)

## 2024-07-01 PROCEDURE — 250N000011 HC RX IP 250 OP 636: Performed by: PHYSICIAN ASSISTANT

## 2024-07-01 PROCEDURE — 250N000011 HC RX IP 250 OP 636: Performed by: INTERNAL MEDICINE

## 2024-07-01 PROCEDURE — 250N000013 HC RX MED GY IP 250 OP 250 PS 637: Performed by: INTERNAL MEDICINE

## 2024-07-01 PROCEDURE — 120N000001 HC R&B MED SURG/OB

## 2024-07-01 PROCEDURE — 250N000013 HC RX MED GY IP 250 OP 250 PS 637: Performed by: PHYSICIAN ASSISTANT

## 2024-07-01 PROCEDURE — 99232 SBSQ HOSP IP/OBS MODERATE 35: CPT | Performed by: INTERNAL MEDICINE

## 2024-07-01 PROCEDURE — 85027 COMPLETE CBC AUTOMATED: CPT | Performed by: INTERNAL MEDICINE

## 2024-07-01 PROCEDURE — 36415 COLL VENOUS BLD VENIPUNCTURE: CPT | Performed by: INTERNAL MEDICINE

## 2024-07-01 PROCEDURE — 83605 ASSAY OF LACTIC ACID: CPT | Performed by: INTERNAL MEDICINE

## 2024-07-01 PROCEDURE — 258N000003 HC RX IP 258 OP 636: Performed by: INTERNAL MEDICINE

## 2024-07-01 PROCEDURE — 80048 BASIC METABOLIC PNL TOTAL CA: CPT | Performed by: INTERNAL MEDICINE

## 2024-07-01 PROCEDURE — 99231 SBSQ HOSP IP/OBS SF/LOW 25: CPT | Performed by: PHYSICIAN ASSISTANT

## 2024-07-01 RX ORDER — CEFTRIAXONE 2 G/1
2 INJECTION, POWDER, FOR SOLUTION INTRAMUSCULAR; INTRAVENOUS EVERY 24 HOURS
Status: DISCONTINUED | OUTPATIENT
Start: 2024-07-01 | End: 2024-07-06 | Stop reason: HOSPADM

## 2024-07-01 RX ADMIN — OXYCODONE HYDROCHLORIDE 5 MG: 5 TABLET ORAL at 12:22

## 2024-07-01 RX ADMIN — NICOTINE 1 PATCH: 21 PATCH, EXTENDED RELEASE TRANSDERMAL at 09:01

## 2024-07-01 RX ADMIN — PIPERACILLIN AND TAZOBACTAM 4.5 G: 4; .5 INJECTION, POWDER, FOR SOLUTION INTRAVENOUS at 02:58

## 2024-07-01 RX ADMIN — ACETAMINOPHEN 650 MG: 325 TABLET, FILM COATED ORAL at 12:26

## 2024-07-01 RX ADMIN — OXYCODONE HYDROCHLORIDE 5 MG: 5 TABLET ORAL at 15:29

## 2024-07-01 RX ADMIN — KETOROLAC TROMETHAMINE 15 MG: 15 INJECTION, SOLUTION INTRAMUSCULAR; INTRAVENOUS at 06:29

## 2024-07-01 RX ADMIN — CEFTRIAXONE 2 G: 2 INJECTION, POWDER, FOR SOLUTION INTRAMUSCULAR; INTRAVENOUS at 13:31

## 2024-07-01 RX ADMIN — IBUPROFEN 600 MG: 600 TABLET, FILM COATED ORAL at 15:32

## 2024-07-01 RX ADMIN — ACETAMINOPHEN 650 MG: 325 TABLET, FILM COATED ORAL at 06:43

## 2024-07-01 RX ADMIN — OXYCODONE HYDROCHLORIDE 5 MG: 5 TABLET ORAL at 02:41

## 2024-07-01 RX ADMIN — PIPERACILLIN AND TAZOBACTAM 4.5 G: 4; .5 INJECTION, POWDER, FOR SOLUTION INTRAVENOUS at 08:49

## 2024-07-01 RX ADMIN — ACETAMINOPHEN 650 MG: 325 TABLET, FILM COATED ORAL at 02:41

## 2024-07-01 RX ADMIN — VANCOMYCIN HYDROCHLORIDE 1250 MG: 10 INJECTION, POWDER, LYOPHILIZED, FOR SOLUTION INTRAVENOUS at 03:48

## 2024-07-01 RX ADMIN — IBUPROFEN 600 MG: 600 TABLET, FILM COATED ORAL at 19:47

## 2024-07-01 ASSESSMENT — ACTIVITIES OF DAILY LIVING (ADL)
ADLS_ACUITY_SCORE: 37

## 2024-07-01 NOTE — PLAN OF CARE
Goal Outcome Evaluation:      Plan of Care Reviewed With: patient    Overall Patient Progress: improvingOverall Patient Progress: improving     Patient alert and oriented. Rating pain in scrotum 8/10- treated with oxy. Temperature 100.3- complains of chills- MD aware- tylenol given.See MAR. Denies nausea and vomiting. Open about amphetamine use- denies the want for resources.  Patient independent in room. Significant other at bedside this AM. Scrotum +3 edema and red. Appetite excellent. Makes needs known. Abx given- see MAR. WBC 18.7. urology following- possible discharge home tomorrow.       Problem: Adult Inpatient Plan of Care  Goal: Plan of Care Review  Outcome: Progressing  Flowsheets (Taken 7/1/2024 1252)  Plan of Care Reviewed With: patient  Overall Patient Progress: improving  Goal: Patient-Specific Goal (Individualized)  Outcome: Progressing  Goal: Absence of Hospital-Acquired Illness or Injury  Outcome: Progressing  Intervention: Identify and Manage Fall Risk  Recent Flowsheet Documentation  Taken 7/1/2024 1231 by Kristine Welsh RN  Safety Promotion/Fall Prevention:   safety round/check completed   room near nurse's station  Intervention: Prevent Skin Injury  Recent Flowsheet Documentation  Taken 7/1/2024 1231 by Kristine Welsh, RN  Body Position: position changed independently  Intervention: Prevent Infection  Recent Flowsheet Documentation  Taken 7/1/2024 1231 by Kristine Welsh RN  Infection Prevention:   hand hygiene promoted   rest/sleep promoted  Goal: Optimal Comfort and Wellbeing  Outcome: Progressing  Intervention: Monitor Pain and Promote Comfort  Recent Flowsheet Documentation  Taken 7/1/2024 1222 by Kristine Welsh RN  Pain Management Interventions: medication (see MAR)  Taken 7/1/2024 0746 by Kristine Welsh, RN  Pain Management Interventions: declines  Goal: Readiness for Transition of Care  Outcome: Progressing     Problem: Pain Acute  Goal: Optimal Pain Control and Function  Outcome:  Progressing  Intervention: Develop Pain Management Plan  Recent Flowsheet Documentation  Taken 7/1/2024 1222 by Kristine Welsh, RN  Pain Management Interventions: medication (see MAR)  Taken 7/1/2024 0746 by Kristine Welsh, RN  Pain Management Interventions: declines  Intervention: Prevent or Manage Pain  Recent Flowsheet Documentation  Taken 7/1/2024 1231 by Kristine Welsh, RN  Medication Review/Management: medications reviewed     Problem: Substance Withdrawal  Goal: Substance Withdrawal  Description: Signs and symptoms of listed problems will be absent or manageable.  Outcome: Progressing  Goal: Social and Therapeutic (Substance Withdrawal)  Description: Signs and symptoms of listed problems will be absent or manageable.  Outcome: Progressing     Problem: Infection  Goal: Absence of Infection Signs and Symptoms  Outcome: Progressing

## 2024-07-01 NOTE — PLAN OF CARE
"Goal Outcome Evaluation:      Plan of Care Reviewed With: patient    Overall Patient Progress: improvingOverall Patient Progress: improving    Outcome Evaluation: srotum red, swollen & painful. pain controlled with scheduled nsaid & oxy. voiding without difficulty. IV abx vanco & zosyn.      Problem: Adult Inpatient Plan of Care  Goal: Plan of Care Review  Description: The Plan of Care Review/Shift note should be completed every shift.  The Outcome Evaluation is a brief statement about your assessment that the patient is improving, declining, or no change.  This information will be displayed automatically on your shift  note.  Outcome: Progressing  Flowsheets (Taken 6/30/2024 2223)  Outcome Evaluation: srotum red, swollen & painful. pain controlled with scheduled nsaid & oxy. voiding without difficulty. IV abx vanco & zosyn.  Plan of Care Reviewed With: patient  Overall Patient Progress: improving  Goal: Patient-Specific Goal (Individualized)  Description: You can add care plan individualizations to a care plan. Examples of Individualization might be:  \"Parent requests to be called daily at 9am for status\", \"I have a hard time hearing out of my right ear\", or \"Do not touch me to wake me up as it startles  me\".  Outcome: Progressing  Goal: Absence of Hospital-Acquired Illness or Injury  Outcome: Progressing  Intervention: Identify and Manage Fall Risk  Recent Flowsheet Documentation  Taken 6/30/2024 1632 by Margi Rodríguez, RN  Safety Promotion/Fall Prevention: safety round/check completed  Intervention: Prevent Skin Injury  Recent Flowsheet Documentation  Taken 6/30/2024 1632 by Margi Rodríguez RN  Body Position:   position changed independently   right   side-lying  Goal: Optimal Comfort and Wellbeing  Outcome: Progressing  Intervention: Monitor Pain and Promote Comfort  Recent Flowsheet Documentation  Taken 6/30/2024 1713 by Margi Rodríguez RN  Pain Management Interventions: medication (see MAR)  Taken " 6/30/2024 1632 by Margi Rodríguez RN  Pain Management Interventions: (scrotul elevation)   medication (see MAR)   cold applied   other (see comments)  Goal: Readiness for Transition of Care  Outcome: Progressing     Problem: Pain Acute  Goal: Optimal Pain Control and Function  Outcome: Progressing  Intervention: Develop Pain Management Plan  Recent Flowsheet Documentation  Taken 6/30/2024 1713 by Margi Rodríguez, RN  Pain Management Interventions: medication (see MAR)  Taken 6/30/2024 1632 by Margi Rodríguez RN  Pain Management Interventions: (scrotul elevation)   medication (see MAR)   cold applied   other (see comments)     Problem: Substance Withdrawal  Goal: Substance Withdrawal  Description: Signs and symptoms of listed problems will be absent or manageable.  Outcome: Progressing  Goal: Social and Therapeutic (Substance Withdrawal)  Description: Signs and symptoms of listed problems will be absent or manageable.  Outcome: Progressing     Problem: Infection  Goal: Absence of Infection Signs and Symptoms  Outcome: Progressing

## 2024-07-01 NOTE — PROGRESS NOTES
[facility] Progress Note          Assessment and Plan:     Assessment:    Epididymoorchitis    PMH of UTI/STIs    Amphetamine abuse       Plan:   -Okay for a diet today.  -NPO at midnight to reassess if need for scrotal exploration tomorrow   -Continue with IV antibiotics while here.  Would recommend prolonged course given severity of infection, likely 21 days.  -If patient becomes vitally unstable page urology in case of need for emergent orchiectomy (very unlikely)   -Scrotal support, scrotal elevation, pain medications for pain.  -Continue to monitor leukocytosis.  -Recommend continued observation, possible discharge tomorrow or Tuesday.    Sophie Romero PA-C   [facility]  [phone]               Interval History:     Doing better.  Some chills overnight, but denies fever.  Denies N/V.  Urinating is normal now and going well.  WBC slowly improving 18.7(25.8(31.1(20.3))).  Urine culture showed >100,000 CFU/mL E. Coli.   Creatinine 0.78 eGFR >90.  Blood cultures: no growth to date. Afebrile with mild tachycardia. Circumcised phallus.  Scrotal swelling L>R.  Reactive hydrocele on left.  , but significant improvement from Friday.  No fluctuance, necrosis, or crepitus, mildly tender, erythematous-mild, indurated.                Review of Systems:     The 5 point Review of Systems is negative other than noted in the HPI             Medications:     Current Facility-Administered Medications   Medication Dose Route Frequency Provider Last Rate Last Admin    acetaminophen (TYLENOL) tablet 650 mg  650 mg Oral Q4H PRN Lashonda Mandujano PA-C   650 mg at 07/01/24 0643    Or    acetaminophen (TYLENOL) Suppository 650 mg  650 mg Rectal Q4H PRN Lashonda Mandujano PA-C        artificial saliva (BIOTENE MT) solution 2 spray  2 spray Swish & Spit 4x Daily PRN Lashonda Mandujano PA-C        bisacodyl (DULCOLAX) suppository 10 mg  10 mg Rectal Daily PRN Lashonda Mandujano  AYSE        HYDROmorphone (DILAUDID) tablet 4 mg  4 mg Oral Q4H PRN Abdoul Montero MD        HYDROmorphone (PF) (DILAUDID) injection 0.5-1 mg  0.5-1 mg Intravenous Q4H PRN Abdoul Montero MD        ibuprofen (ADVIL/MOTRIN) tablet 600 mg  600 mg Oral 4x Daily Lashonda Mandujano PA-C   600 mg at 06/30/24 1632    Or    ketorolac (TORADOL) injection 15 mg  15 mg Intravenous 4x Daily Lashonda Mandujano PA-C   15 mg at 07/01/24 0629    lidocaine (LMX4) cream   Topical Q1H PRN Lashonda Mandujano PA-C        lidocaine 1 % 0.1-1 mL  0.1-1 mL Other Q1H PRN Lashonda Mandujano PA-C        LORazepam (ATIVAN) injection 0.5-1 mg  0.5-1 mg Intravenous Q4H PRN Lashonda Mandujano PA-C        magnesium hydroxide (MILK OF MAGNESIA) suspension 15-30 mL  15-30 mL Oral Daily PRN Lashonda Mandujano PA-C        naloxone (NARCAN) injection 0.2 mg  0.2 mg Intravenous Q2 Min PRN Lashonda Mandujano PA-C        Or    naloxone (NARCAN) injection 0.4 mg  0.4 mg Intravenous Q2 Min PRN Lashonda Mandujano PA-C        Or    naloxone (NARCAN) injection 0.2 mg  0.2 mg Intramuscular Q2 Min PRN Lashonda Mandujano PA-C        Or    naloxone (NARCAN) injection 0.4 mg  0.4 mg Intramuscular Q2 Min PRN Lashonda Mandujano PA-C        nicotine (NICODERM CQ) 21 MG/24HR 24 hr patch 1 patch  1 patch Transdermal Daily Abdoul Montero MD   1 patch at 07/01/24 0901    ondansetron (ZOFRAN) injection 4 mg  4 mg Intravenous Q6H PRN Lashonda Mandujano PA-C   4 mg at 06/30/24 0347    Or    ondansetron (ZOFRAN ODT) ODT tab 4 mg  4 mg Oral Q6H PRN Lashonda Mandujano PA-C        oxyCODONE (ROXICODONE) tablet 5-10 mg  5-10 mg Oral Q4H PRN Lashonda Mandujano PA-C   5 mg at 07/01/24 0241    piperacillin-tazobactam (ZOSYN) 4.5 g vial to attach to  mL bag  4.5 g Intravenous Q6H Abdoul Montero  mL/hr at 07/01/24 0849 4.5 g at 07/01/24 0849    prochlorperazine (COMPAZINE) injection 10 mg  10  "mg Intravenous Q6H PRN Lashonda Mandujano PA-C        Or    prochlorperazine (COMPAZINE) tablet 10 mg  10 mg Oral Q6H PRN Lashonda Mandujano PA-C        Or    prochlorperazine (COMPAZINE) suppository 25 mg  25 mg Rectal Q12H PRN Lashonda Mandujano PA-C        senna-docusate (SENOKOT-S/PERICOLACE) 8.6-50 MG per tablet 1 tablet  1 tablet Oral BID Lashonda Mandujano PA-C   1 tablet at 06/30/24 0906    sodium chloride (PF) 0.9% PF flush 3 mL  3 mL Intracatheter q1 min prn Lashonda Mandujano PA-C   3 mL at 06/30/24 1154    sodium chloride (PF) 0.9% PF flush 3 mL  3 mL Intracatheter Q8H Lashonda Mandujano PA-C   3 mL at 07/01/24 0255    vancomycin (VANCOCIN) 1,250 mg in 0.9% NaCl 250 mL intermittent infusion  1,250 mg Intravenous Q12H Abdoul Montero MD   1,250 mg at 07/01/24 0348                  Physical Exam:   Vitals were reviewed  Patient Vitals for the past 8 hrs:   BP Temp Temp src Pulse Resp SpO2   07/01/24 0854 105/61 98.2  F (36.8  C) Temporal 108 16 96 %   07/01/24 0401 116/64 97.8  F (36.6  C) Temporal 93 16 99 %     GEN: NAD, lying in bed  EYES: EOMI  MOUTH: MMM  NECK: Supple  RESP: Unlabored breathing  SKIN: Warm  ABD: soft  NEURO: AAO  URO: Circumcised phallus.  Scrotal swelling L>R.  Reactive hydrocele on left.  , but significant improvement from Friday.  No fluctuance, necrosis, or crepitus, mildly tender, erythematous-mild, indurated            Data:   No results found for: \"NTBNPI\", \"NTBNP\"  Lab Results   Component Value Date    WBC 18.7 (H) 07/01/2024    WBC 25.8 (H) 06/30/2024    WBC 31.1 (H) 06/29/2024    HGB 13.0 (L) 07/01/2024    HGB 13.4 06/30/2024    HGB 14.7 06/29/2024    HCT 39.0 (L) 07/01/2024    HCT 40.4 06/30/2024    HCT 44.3 06/29/2024    MCV 89 07/01/2024    MCV 90 06/30/2024    MCV 90 06/29/2024     07/01/2024     06/30/2024     06/29/2024     Lab Results   Component Value Date    INR 1.12 01/12/2007      All cultures:  Recent Labs "   Lab 06/28/24  1843 06/28/24  0931   CULTURE No growth after 2 days  No growth after 2 days >100,000 CFU/mL Escherichia coli*

## 2024-07-01 NOTE — PLAN OF CARE
"Pt is A&O x4. VSS. On RA. Rates his pain from  mild to severe. Received PRN Tylenol, PRN 5 mg of Oxycodone, and Toradol IV. Scrotum is swollen 3+, hard, warm to touch. Pt is independent in the room. Took a shower at night x1. Bed alarm off. Fiancee at the bedside at night. IV SL.           Goal Outcome Evaluation:      Plan of Care Reviewed With: patient    Overall Patient Progress: improvingOverall Patient Progress: improving    Outcome Evaluation: Pt progressing      Problem: Adult Inpatient Plan of Care  Goal: Plan of Care Review  Description: The Plan of Care Review/Shift note should be completed every shift.  The Outcome Evaluation is a brief statement about your assessment that the patient is improving, declining, or no change.  This information will be displayed automatically on your shift  note.  Outcome: Progressing  Flowsheets (Taken 7/1/2024 0602)  Outcome Evaluation: Pt progressing  Plan of Care Reviewed With: patient  Overall Patient Progress: improving  Goal: Patient-Specific Goal (Individualized)  Description: You can add care plan individualizations to a care plan. Examples of Individualization might be:  \"Parent requests to be called daily at 9am for status\", \"I have a hard time hearing out of my right ear\", or \"Do not touch me to wake me up as it startles  me\".  Outcome: Progressing  Goal: Absence of Hospital-Acquired Illness or Injury  Outcome: Progressing  Intervention: Identify and Manage Fall Risk  Recent Flowsheet Documentation  Taken 7/1/2024 0304 by Esther Cyr RN  Safety Promotion/Fall Prevention:   safety round/check completed   activity supervised   assistive device/personal items within reach   clutter free environment maintained   increased rounding and observation   increase visualization of patient   lighting adjusted   mobility aid in reach   patient and family education   room near nurse's station   room organization consistent   supervised activity   treat reversible " contributory factors  Intervention: Prevent Skin Injury  Recent Flowsheet Documentation  Taken 7/1/2024 0302 by Esther Cyr RN  Body Position:   position changed independently   right   side-lying  Intervention: Prevent and Manage VTE (Venous Thromboembolism) Risk  Recent Flowsheet Documentation  Taken 7/1/2024 0304 by Esther Cyr RN  VTE Prevention/Management: SCDs off (sequential compression devices)  Intervention: Prevent Infection  Recent Flowsheet Documentation  Taken 7/1/2024 0304 by Esther Cyr RN  Infection Prevention:   hand hygiene promoted   rest/sleep promoted   cohorting utilized   environmental surveillance performed   single patient room provided  Goal: Optimal Comfort and Wellbeing  Outcome: Progressing  Intervention: Monitor Pain and Promote Comfort  Recent Flowsheet Documentation  Taken 7/1/2024 0302 by Esther Cyr RN  Pain Management Interventions:   medication (see MAR)   distraction   quiet environment facilitated   rest  Goal: Readiness for Transition of Care  Outcome: Progressing     Problem: Pain Acute  Goal: Optimal Pain Control and Function  Outcome: Progressing  Intervention: Develop Pain Management Plan  Recent Flowsheet Documentation  Taken 7/1/2024 0302 by Esther Cyr RN  Pain Management Interventions:   medication (see MAR)   distraction   quiet environment facilitated   rest  Intervention: Prevent or Manage Pain  Recent Flowsheet Documentation  Taken 7/1/2024 0304 by Esther Cyr RN  Sensory Stimulation Regulation: lighting decreased  Medication Review/Management: medications reviewed  Intervention: Optimize Psychosocial Wellbeing  Recent Flowsheet Documentation  Taken 7/1/2024 0304 by Esther Cyr RN  Supportive Measures:   active listening utilized   decision-making supported   positive reinforcement provided   relaxation techniques promoted   verbalization of feelings encouraged     Problem: Substance Withdrawal  Goal: Substance  Withdrawal  Description: Signs and symptoms of listed problems will be absent or manageable.  Outcome: Progressing  Goal: Social and Therapeutic (Substance Withdrawal)  Description: Signs and symptoms of listed problems will be absent or manageable.  Outcome: Progressing     Problem: Infection  Goal: Absence of Infection Signs and Symptoms  Outcome: Progressing  Intervention: Prevent or Manage Infection  Recent Flowsheet Documentation  Taken 7/1/2024 0304 by Esther Cyr, RN  Infection Management: aseptic technique maintained

## 2024-07-01 NOTE — PROGRESS NOTES
"    [facility]     Hospitalist Progress Note     Assessment & Plan     ASSESSMENT    34M with hx of UTI, gonorrhea, ADHD, and amphetamine abuse presents with severe testicular pain and found to be septic 2/2 acute epididymitis complicated by suspected testicular torsion.  Patient refused urgent surgery for removal of testicle on admission.    Improving.  WBC trending down and pain under better control.  Urology recommending an additional day of IV antibiotics.    PLAN    Sepsis 2/2 Acute Epididymitis  -Presents with severe testicular pain and found to have evidence of epididymitis and orchitis  -Initial concern for testicular torsion on imaging although urology feels this is less likely  -Chlamydia and Gonorrhea testing negative  -Urology consulted, holding off on surgery for now  PLAN  -Vancomycin + Zosyn  -Diet for today, NPO@MN in case surgery needed tomorrow  -Urology consulted, appreciate recommendations  -Pain regimen in place    Acute Metabolic Encephalopathy 2/2 Amphetamine Withdrawal  -Somnolent on admission due to amphetamine withdrawal, now improved    Other Issues  -Polysubstance abuse: UDS with cannabis and amphetamines  -ADHD: Not on prescription medications for this it appears    DVT Prophy  -SCDs    Disposition  -Medically ready for discharge: Tomorrow      Nickolas Zafar MD    Subjective     Seen at bedside.  Wanting to go home.  Pain in testicles improving.        Objective   Blood pressure 105/61, pulse 108, temperature 98.2  F (36.8  C), temperature source Temporal, resp. rate 16, height 1.803 m (5' 11\"), weight 77.9 kg (171 lb 12.8 oz), SpO2 96%.    PHYSICAL EXAM  General: In no acute distress  CV: RRR.  Lungs: CTAB. Nl WOB.  Abd: Non-tender.  : Large inflamed testicle  Ext: No edema.    LABS AND IMAGING  Reviewed and pertinent results discussed in assessment and plan.   "

## 2024-07-01 NOTE — PROGRESS NOTES
Antimicrobial Stewardship Team Note    Antimicrobial Stewardship Program - A joint venture between [facility] and [facility] [employer] to optimize antibiotic management.     Patient: Ricky Rowland  MRN: [id number]  Allergies: Cats    Brief Summary: Ricky Rowland is a 34 year old male admitted on 6/28/24 with severe testicular pain found to have acute epididymitis complicated by suspected testicular torsion. PMH significant for UTI, gonorrhea, ADHD and amphetamine abuse. Patient refused urgent surgery for removal of testicle on admission. Initially started on ceftriaxone and doxycycline on 6/28, doxycycline broadened to levofloxacin 6/28, antibiotics broadened to Zosyn and vancomycin on 6/29 for pyelonephritis.    Initial concern for testicular torsion on imaging, urology feels less likely, holding on surgery and plan for antibiotic management. Currently recommend prolonged course of 21 day sof antibiotics. WBC count 20.3 on admission, up to 31.1 on 6/29, trending down to 18.7 today. Afebrile, stable on room air. US L testicle on 6/29 with severe left epididymoorchitis, no abscess noted. Blood cultures negative, chlamydia and gonorrhea negative. Urine culture from 6/28 with >100k E coli (R to ampicillin, sensitive to all other antibiotics tested).         Active Anti-infective Medications   (From admission, onward)                 Start     Stop    06/30/24 0200  vancomycin  1,250 mg,   Intravenous,   EVERY 12 HOURS        epididymitis       --    06/29/24 1130  piperacillin-tazobactam  4.5 g,   Intravenous,   EVERY 6 HOURS        Pyelonephritis       --                  Assessment: L epididymitis/orchitis  Patient presented with severe testicular pain, found to have epididymitis, currently on day 4 of broad spectrum antibiotics with Zosyn and vancomycin. No plans at this time for surgical management given improvement with IV antibiotics. Urine culture growing E coli, recommend narrowing  antibiotics to target this organism with ceftriaxone. Agree with prolonged course given severity of symptoms, would transition to levofloxacin at discharge.    Recommendations:  Stop Zosyn and vancomycin.  Start ceftriaxone 2g q24h while inpatient, transition to levofloxacin 500mg daily at discharge to complete course.    Discussed with ID Staff MD Praveena Easley, PharmD, BCIDP    Vital Signs/Clinical Features:  Vitals         06/29 0700  06/30 0659 06/30 0700  07/01 0659 07/01 0700  07/01 1221   Most Recent      Temp ( F) 97.7 -  99.1    97.8 -  98.9      98.2     98.2 (36.8) 07/01 0854    Pulse 98 -  105    93 -  102      108     108 07/01 0854    Resp 16 -  18    16 -  22      16     16 07/01 0854    /66 -  131/75    110/68 -  130/80      105/61     105/61 07/01 0854    SpO2 (%) 96 -  97    96 -  99      96     96 07/01 0854            Labs  Estimated Creatinine Clearance: 148.4 mL/min (based on SCr of 0.78 mg/dL).  Recent Labs   Lab Test 06/28/24  0548 06/29/24  0657 06/30/24  0644 07/01/24  0713   CR 0.90 0.89 0.86 0.78       Recent Labs   Lab Test 06/28/24  0548 06/29/24  0657 06/30/24  0644 07/01/24  0713   WBC 20.3* 31.1* 25.8* 18.7*   ANEU 16.0* 27.1*  --   --    ALYM 3.0 2.8  --   --    YANI 1.2 1.2  --   --    AEOS 0.0 0.0  --   --    HGB 16.4 14.7 13.4 13.0*   HCT 49.6 44.3 40.4 39.0*   MCV 89 90 90 89    276 261 288                         Culture Results:  7-Day Micro Results       Procedure Component Value Units Date/Time    Blood Culture Arm, Right [[id number]]  (Normal) Collected: 06/28/24 1843    Order Status: Completed Lab Status: Preliminary result Updated: 07/01/24 0932    Specimen: Blood from Arm, Right      Culture No growth after 2 days    Blood Culture Arm, Left [[id number]]  (Normal) Collected: 06/28/24 1843    Order Status: Completed Lab Status: Preliminary result Updated: 07/01/24 0967    Specimen: Blood from Arm, Left      Culture No growth after 2 days     Chlamydia trachomatis/Neisseria gonorrhoeae by PCR [[id number]]  (Normal) Collected: 06/28/24 0931    Order Status: Completed Lab Status: Final result Updated: 06/28/24 1725    Specimen: Urine, Voided      Chlamydia Trachomatis Negative     Comment: Negative for C. trachomatis rRNA by transcription mediated amplification.   A negative result by transcription mediated amplification does not preclude the presence of infection because results are dependent on proper and adequate collection, absence of inhibitors and sufficient rRNA to be detected.        Neisseria gonorrhoeae Negative     Comment: Negative for N. gonorrhoeae rRNA by transcription mediated amplification. A negative result by transcription mediated amplification does not preclude the presence of C. trachomatis infection because results are dependent on proper and adequate collection, absence of inhibitors and sufficient rRNA to be detected.       Urine Culture [[id number]]  (Abnormal)  (Susceptibility) Collected: 06/28/24 0931    Order Status: Completed Lab Status: Final result Updated: 06/30/24 0150    Specimen: Urine, NOS      Culture >100,000 CFU/mL Escherichia coli    Susceptibility       Escherichia coli (1)       Antibiotic Interpretation Sensitivity   Method Status    Ampicillin Resistant >=32 ug/mL MACY Final    Ampicillin/ Sulbactam Intermediate 16 ug/mL MACY Final    Piperacillin/Tazobactam Susceptible <=4 ug/mL MACY Final    Cefazolin Susceptible <=4 ug/mL MACY Final     Cefazolin MACY breakpoints are for the treatment of uncomplicated urinary tract infections. For the treatment of systemic infections, please contact the laboratory for additional testing.       Cefoxitin Susceptible <=4 ug/mL MACY Final    Ceftazidime Susceptible <=1 ug/mL MACY Final    Ceftriaxone Susceptible <=1 ug/mL MACY Final    Cefepime Susceptible <=1 ug/mL MACY Final    Extended Spectrum Beta-Lactamase  [*]  ESBL Negative Negative ug/mL MACY Final    Meropenem  [*]   Susceptible <=0.25 ug/mL MACY Final    Amikacin  [*]  Susceptible <=2 ug/mL MACY Final    Gentamicin Susceptible <=1 ug/mL MACY Final    Tobramycin Susceptible <=1 ug/mL MACY Final    Ciprofloxacin Susceptible <=0.25 ug/mL MACY Final    Levofloxacin Susceptible <=0.12 ug/mL MACY Final    Nitrofurantoin Susceptible <=16 ug/mL MACY Final    Trimethoprim/Sulfamethoxazole Susceptible <=1/19 ug/mL MACY Final               [*]  Suppressed Antibiotic                           Recent Labs   Lab Test 01/08/24  1448 02/07/24  1219 06/28/24  0931   URINEPH 5.5 7.0 6.0  6.0   NITRITE Positive* Positive* Negative  Negative   LEUKEST Small* Small* Small*  Small*   WBCU 10-25* 10-25* 22*  22*                         Imaging: US Testicular & Scrotum w Doppler Ltd    Result Date: 6/29/2024  EXAM: US TESTICULAR AND SCROTUM WITH DOPPLER LIMITED LOCATION: [facility] DATE: 6/29/2024 INDICATION: Left testicular pain and swelling. COMPARISON: Testicular ultrasound 6/28/2024. TECHNIQUE: Ultrasound of scrotum with color flow and spectral Doppler with waveform analysis performed. FINDINGS: RIGHT: Right testicle measures 5.2 x 2.3 x 3.3 cm. Homogenous testicular parenchyma. Simple 0.5 x 0.4 x 0.6 cm intratesticular cyst at the superior pole. Normal arterial duplex and normal color flow.  Simple 0.3 cm epididymal cyst. Epididymis is otherwise normal. No hydrocele. No varicocele. LEFT: Left testicle measures 5.4 x 3.4 x 3.8 cm.  Testicle parenchyma is mildly heterogeneous. Arterial and venous flow is present within the testicle, but is diminished in comparison to the right. No intratesticular abscess. Epididymis is thickened and diffusely hypervascular. There is marked hypervascularity surrounding the left testicle which extends along the thickened and heterogeneous spermatic cord; there is some swirling of the spermatic cord on cine images, similar to prior. Small complex hydrocele. No varicocele. Diffusely thickened and  hyperemic scrotal wall.     IMPRESSION: 1.  Findings likely reflecting severe left epididymoorchitis. Diminished vascular flow throughout the left testicle is most likely due to inflammation and edema along the spermatic cord, although intermittent torsion remains in the differential. No intratesticular abscess. 2.  Small complex left hydrocele. 3.  Thickened and hyperemic scrotal wall.     US Testicular & Scrotum w Doppler Ltd    Result Date: 6/28/2024  TESTICULAR ULTRASOUND 6/28/2024 3:35 PM HISTORY: Question left-sided torsion. COMPARISON: June 28, 2024 TECHNIQUE: Ultrasound gray scale, color Doppler flow, and Doppler spectral waveform analysis performed. FINDINGS: There is homogeneous normal echotexture throughout the bilateral testes. The left testicle measures 4.3 x 3.4 x 3.3 cm.  The right testicle measures 5.0 x 2.3 x 2.5 cm. The right epididymis is unremarkable.  Echogenic lesion superior to left testicle may be a markedly inflamed epididymis. Doppler evaluation shows high resistance waveforms to the left testicle. Unremarkable arterial waveforms to the right testicle. There is no hydrocele. There is no varicocele. There is no mass or abnormal calcifications.     IMPRESSION: High resistance waveforms to the left testicle with decreased flow, this may be related to severe edema to the testicle, vascular compromise in the artery to the testicle, vs. partial or intermittent torsion. SUNNY PITTMAN MD   SYSTEM ID:  [id number]    US Testicular & Scrotum w Doppler Ltd    Result Date: 6/28/2024  EXAM: US TESTICULAR AND SCROTUM WITH DOPPLER LIMITED LOCATION: [facility] DATE: 6/28/2024 INDICATION: L scrotal pain COMPARISON: None. TECHNIQUE: Ultrasound of scrotum with color flow and spectral Doppler with waveform analysis performed. FINDINGS: RIGHT: Right testicle measures 5.6 x 3.1 x 2.0 cm. Homogeneous testicular echotexture. No testicular mass. Arterial and venous blood flow of the right  testicle is demonstrated on Doppler evaluation. Normal epididymis. No hydrocele. No varicocele. LEFT: Left testicle measures 4.9 x 3.3 x 3.4 cm. Homogeneous testicular echotexture. No testicular mass. Hyperemia of the left testicle and epididymis on color Doppler evaluation. Arterial and venous blood flow of the left testicle is demonstrated on Doppler evaluation. Small hydrocele. No varicocele. The sonographer performed additional imaging of the left spermatic cord. On cine ultrasound images, the left spermatic cord has a somewhat swirled appearance which suggests the possibility of intermittent torsion.     IMPRESSION: Hyperemia of the left testicle and epididymis may be due to epididymoorchitis. However, the left spermatic cord has a somewhat swirled appearance which raises the possibility of intermittent torsion. Urologic consultation recommended. I discussed the findings with Dr. Quijano of the ED at 7:00 AM on 6/20/2024.

## 2024-07-02 ENCOUNTER — APPOINTMENT (OUTPATIENT)
Dept: ULTRASOUND IMAGING | Facility: CLINIC | Age: 34
End: 2024-07-02
Attending: PHYSICIAN ASSISTANT
Payer: COMMERCIAL

## 2024-07-02 LAB
ANION GAP SERPL CALCULATED.3IONS-SCNC: 13 MMOL/L (ref 7–15)
BUN SERPL-MCNC: 8.9 MG/DL (ref 6–20)
CALCIUM SERPL-MCNC: 9 MG/DL (ref 8.6–10)
CHLORIDE SERPL-SCNC: 103 MMOL/L (ref 98–107)
CREAT SERPL-MCNC: 0.8 MG/DL (ref 0.67–1.17)
DEPRECATED HCO3 PLAS-SCNC: 24 MMOL/L (ref 22–29)
EGFRCR SERPLBLD CKD-EPI 2021: >90 ML/MIN/1.73M2
ERYTHROCYTE [DISTWIDTH] IN BLOOD BY AUTOMATED COUNT: 13.4 % (ref 10–15)
GLUCOSE SERPL-MCNC: 112 MG/DL (ref 70–99)
HCT VFR BLD AUTO: 40.4 % (ref 40–53)
HGB BLD-MCNC: 13.4 G/DL (ref 13.3–17.7)
LACTATE SERPL-SCNC: 0.6 MMOL/L (ref 0.7–2)
MCH RBC QN AUTO: 29.5 PG (ref 26.5–33)
MCHC RBC AUTO-ENTMCNC: 33.2 G/DL (ref 31.5–36.5)
MCV RBC AUTO: 89 FL (ref 78–100)
PLATELET # BLD AUTO: 372 10E3/UL (ref 150–450)
POTASSIUM SERPL-SCNC: 3.7 MMOL/L (ref 3.4–5.3)
RBC # BLD AUTO: 4.55 10E6/UL (ref 4.4–5.9)
SODIUM SERPL-SCNC: 140 MMOL/L (ref 135–145)
WBC # BLD AUTO: 20.1 10E3/UL (ref 4–11)

## 2024-07-02 PROCEDURE — 82374 ASSAY BLOOD CARBON DIOXIDE: CPT | Performed by: INTERNAL MEDICINE

## 2024-07-02 PROCEDURE — 76870 US EXAM SCROTUM: CPT

## 2024-07-02 PROCEDURE — 250N000013 HC RX MED GY IP 250 OP 250 PS 637: Performed by: PHYSICIAN ASSISTANT

## 2024-07-02 PROCEDURE — 93976 VASCULAR STUDY: CPT

## 2024-07-02 PROCEDURE — 99232 SBSQ HOSP IP/OBS MODERATE 35: CPT | Performed by: INTERNAL MEDICINE

## 2024-07-02 PROCEDURE — 250N000011 HC RX IP 250 OP 636: Performed by: INTERNAL MEDICINE

## 2024-07-02 PROCEDURE — 99231 SBSQ HOSP IP/OBS SF/LOW 25: CPT | Performed by: STUDENT IN AN ORGANIZED HEALTH CARE EDUCATION/TRAINING PROGRAM

## 2024-07-02 PROCEDURE — 82565 ASSAY OF CREATININE: CPT | Performed by: INTERNAL MEDICINE

## 2024-07-02 PROCEDURE — 85027 COMPLETE CBC AUTOMATED: CPT | Performed by: INTERNAL MEDICINE

## 2024-07-02 PROCEDURE — 250N000011 HC RX IP 250 OP 636: Performed by: PHYSICIAN ASSISTANT

## 2024-07-02 PROCEDURE — 36415 COLL VENOUS BLD VENIPUNCTURE: CPT | Performed by: INTERNAL MEDICINE

## 2024-07-02 PROCEDURE — 83605 ASSAY OF LACTIC ACID: CPT | Performed by: INTERNAL MEDICINE

## 2024-07-02 PROCEDURE — 250N000013 HC RX MED GY IP 250 OP 250 PS 637: Performed by: INTERNAL MEDICINE

## 2024-07-02 PROCEDURE — 120N000001 HC R&B MED SURG/OB

## 2024-07-02 RX ORDER — NICOTINE 21 MG/24HR
1 PATCH, TRANSDERMAL 24 HOURS TRANSDERMAL DAILY
Status: DISCONTINUED | OUTPATIENT
Start: 2024-07-02 | End: 2024-07-06 | Stop reason: HOSPADM

## 2024-07-02 RX ADMIN — NICOTINE 1 PATCH: 21 PATCH, EXTENDED RELEASE TRANSDERMAL at 08:29

## 2024-07-02 RX ADMIN — CEFTRIAXONE 2 G: 2 INJECTION, POWDER, FOR SOLUTION INTRAMUSCULAR; INTRAVENOUS at 14:29

## 2024-07-02 RX ADMIN — HYDROMORPHONE HYDROCHLORIDE 0.5 MG: 1 INJECTION, SOLUTION INTRAMUSCULAR; INTRAVENOUS; SUBCUTANEOUS at 08:36

## 2024-07-02 RX ADMIN — IBUPROFEN 600 MG: 600 TABLET, FILM COATED ORAL at 19:39

## 2024-07-02 RX ADMIN — ACETAMINOPHEN 650 MG: 325 TABLET, FILM COATED ORAL at 14:39

## 2024-07-02 RX ADMIN — ACETAMINOPHEN 650 MG: 325 TABLET, FILM COATED ORAL at 04:59

## 2024-07-02 RX ADMIN — ACETAMINOPHEN 650 MG: 325 TABLET, FILM COATED ORAL at 23:51

## 2024-07-02 RX ADMIN — SENNOSIDES AND DOCUSATE SODIUM 1 TABLET: 8.6; 5 TABLET ORAL at 08:26

## 2024-07-02 RX ADMIN — OXYCODONE HYDROCHLORIDE 5 MG: 5 TABLET ORAL at 08:29

## 2024-07-02 RX ADMIN — MAGNESIUM HYDROXIDE 30 ML: 400 SUSPENSION ORAL at 05:00

## 2024-07-02 RX ADMIN — IBUPROFEN 600 MG: 600 TABLET, FILM COATED ORAL at 11:57

## 2024-07-02 RX ADMIN — KETOROLAC TROMETHAMINE 15 MG: 15 INJECTION, SOLUTION INTRAMUSCULAR; INTRAVENOUS at 00:47

## 2024-07-02 RX ADMIN — ACETAMINOPHEN 650 MG: 325 TABLET, FILM COATED ORAL at 10:47

## 2024-07-02 ASSESSMENT — ACTIVITIES OF DAILY LIVING (ADL)
ADLS_ACUITY_SCORE: 20
ADLS_ACUITY_SCORE: 37
ADLS_ACUITY_SCORE: 20
ADLS_ACUITY_SCORE: 37
ADLS_ACUITY_SCORE: 20
ADLS_ACUITY_SCORE: 37
ADLS_ACUITY_SCORE: 20
ADLS_ACUITY_SCORE: 37
ADLS_ACUITY_SCORE: 20
ADLS_ACUITY_SCORE: 37
ADLS_ACUITY_SCORE: 20
ADLS_ACUITY_SCORE: 37
ADLS_ACUITY_SCORE: 20

## 2024-07-02 NOTE — PROGRESS NOTES
[facility] Progress Note          Assessment and Plan:     Assessment:    Epididymoorchitis    PMH of UTI/STIs    Amphetamine abuse       Plan:   -Scrotal and testicular ultrasound reviewed.  No evidence of abscess and decreased blood flow continuously with left testicle.  Okay for diet today.  -Continue with IV antibiotics while here.  Would recommend prolonged course given severity of infection, likely 21 days.  -If patient becomes vitally unstable page urology in case of need for emergent orchiectomy (very unlikely)   -Scrotal support, scrotal elevation, pain medications for pain.  -Continue to monitor leukocytosis.  -Pain and nausea management per primary service.  -Recommend continued observation given low-grade temperature as well as increasing leukocytosis.    Sophie Romero PA-C   [facility]  [phone]               Interval History:     Had fever and chills.  Tmax of 100.8 yesterday.  Patient notes that he thought this was due to his Emil hugger.  Tachycardia and fever have resolved.  Creatinine 0.8 with a EGFR greater than 90.  WBC worsened 20.1(18.7(25.8(31.1(20.3)))).  Urine culture showed >100,000 CFU/mL E. Coli.  Transition to IV Rocephin.  Some constipation.  Denies nausea and vomiting.  Blood cultures: no growth to date.     Circumcised phallus.  Scrotal swelling L>R.  Reactive hydrocele on left.  .  No fluctuance, necrosis, or crepitus, mildly tender, erythematous-mild, indurated.                Review of Systems:     The 5 point Review of Systems is negative other than noted in the HPI             Medications:     Current Facility-Administered Medications   Medication Dose Route Frequency Provider Last Rate Last Admin    acetaminophen (TYLENOL) tablet 650 mg  650 mg Oral Q4H PRN Lashonda Mandujano PA-C   650 mg at 07/02/24 1047    Or    acetaminophen (TYLENOL) Suppository 650 mg  650 mg Rectal Q4H PRN Lashonda Mandujano PA-C        artificial  saliva (BIOTENE MT) solution 2 spray  2 spray Swish & Spit 4x Daily PRN Lashonda Mandujano PA-C        bisacodyl (DULCOLAX) suppository 10 mg  10 mg Rectal Daily PRN Lashonda Mandujano PA-C        cefTRIAXone (ROCEPHIN) 2 g vial to attach to  ml bag for ADULTS or NS 50 ml bag for PEDS  2 g Intravenous Q24H Abdoul Montero MD   2 g at 07/01/24 1331    HYDROmorphone (DILAUDID) tablet 4 mg  4 mg Oral Q4H PRN Abdoul Montero MD        HYDROmorphone (PF) (DILAUDID) injection 0.5-1 mg  0.5-1 mg Intravenous Q4H PRN Abdoul Montero MD   0.5 mg at 07/02/24 0836    ibuprofen (ADVIL/MOTRIN) tablet 600 mg  600 mg Oral 4x Daily Lashonda Mandujano PA-C   600 mg at 07/01/24 1947    Or    ketorolac (TORADOL) injection 15 mg  15 mg Intravenous 4x Daily Lashonda Mandujano PA-C   15 mg at 07/02/24 0047    lidocaine (LMX4) cream   Topical Q1H PRN Lashonda Mandujano PA-C        lidocaine 1 % 0.1-1 mL  0.1-1 mL Other Q1H PRN Lashonda Mandujano PA-C        LORazepam (ATIVAN) injection 0.5-1 mg  0.5-1 mg Intravenous Q4H PRN Lashonda Mandujano PA-C        magnesium hydroxide (MILK OF MAGNESIA) suspension 15-30 mL  15-30 mL Oral Daily PRN Lashonda Mandujano PA-C   30 mL at 07/02/24 0500    naloxone (NARCAN) injection 0.2 mg  0.2 mg Intravenous Q2 Min PRN Lashonda Mandujano PA-C        Or    naloxone (NARCAN) injection 0.4 mg  0.4 mg Intravenous Q2 Min PRN Lashonda Mandujano PA-C        Or    naloxone (NARCAN) injection 0.2 mg  0.2 mg Intramuscular Q2 Min PRN Lashonda Mandujano PA-C        Or    naloxone (NARCAN) injection 0.4 mg  0.4 mg Intramuscular Q2 Min PRN Lashonda Mandujano PA-C        nicotine (NICODERM CQ) 21 MG/24HR 24 hr patch 1 patch  1 patch Transdermal Daily Abdoul Montero MD   1 patch at 07/02/24 0829    ondansetron (ZOFRAN) injection 4 mg  4 mg Intravenous Q6H PRN Lashonda Mandujano PA-C   4 mg at 06/30/24 0347    Or    ondansetron (ZOFRAN  "ODT) ODT tab 4 mg  4 mg Oral Q6H PRN Lashonda Mandujano PA-C        oxyCODONE (ROXICODONE) tablet 5-10 mg  5-10 mg Oral Q4H PRN Lashonda Mandujano PA-C   5 mg at 07/02/24 0829    prochlorperazine (COMPAZINE) injection 10 mg  10 mg Intravenous Q6H PRN Lashonda Mandujano PA-C        Or    prochlorperazine (COMPAZINE) tablet 10 mg  10 mg Oral Q6H PRN Lashonda Mandujano PA-C        Or    prochlorperazine (COMPAZINE) suppository 25 mg  25 mg Rectal Q12H PRN Lashonda Mandujano PA-C        senna-docusate (SENOKOT-S/PERICOLACE) 8.6-50 MG per tablet 1 tablet  1 tablet Oral BID Lashonda Mandujano PA-C   1 tablet at 07/02/24 0826    sodium chloride (PF) 0.9% PF flush 3 mL  3 mL Intracatheter q1 min prn Lashonda Mandujano PA-C   3 mL at 07/01/24 1337    sodium chloride (PF) 0.9% PF flush 3 mL  3 mL Intracatheter Q8H Lashonda Mandujano PA-C   3 mL at 07/02/24 0835                  Physical Exam:   Vitals were reviewed  Patient Vitals for the past 8 hrs:   BP Temp Temp src Pulse Resp SpO2   07/02/24 1047 -- -- -- -- 20 --   07/02/24 1045 (!) 156/88 98.6  F (37  C) Temporal 105 20 98 %   07/02/24 0940 135/73 98.2  F (36.8  C) Temporal 92 18 97 %   07/02/24 0836 -- -- -- -- 20 --   07/02/24 0829 -- -- -- -- 20 --   07/02/24 0805 127/79 98.6  F (37  C) Temporal 89 20 97 %   07/02/24 0340 126/84 98.2  F (36.8  C) Temporal 88 16 97 %     GEN: NAD, lying in bed, sleepy  EYES: EOMI  MOUTH: MMM  NECK: Supple  RESP: Unlabored breathing  SKIN: Warm  ABD: soft  NEURO: AAO  URO: Circumcised phallus.  Scrotal swelling L>R.  Reactive hydrocele on left.  .  No fluctuance, necrosis, or crepitus, mildly tender, erythematous-mild, indurated.           Data:   No results found for: \"NTBNPI\", \"NTBNP\"  Lab Results   Component Value Date    WBC 20.1 (H) 07/02/2024    WBC 18.7 (H) 07/01/2024    WBC 25.8 (H) 06/30/2024    HGB 13.4 07/02/2024    HGB 13.0 (L) 07/01/2024    HGB 13.4 06/30/2024    HCT 40.4 07/02/2024    HCT 39.0 " (L) 07/01/2024    HCT 40.4 06/30/2024    MCV 89 07/02/2024    MCV 89 07/01/2024    MCV 90 06/30/2024     07/02/2024     07/01/2024     06/30/2024     Lab Results   Component Value Date    INR 1.12 01/12/2007      All cultures:  Recent Labs   Lab 06/28/24  1843 06/28/24  0931   CULTURE No growth after 3 days  No growth after 3 days >100,000 CFU/mL Escherichia coli*      US Testicular & Scrotum w Doppler Ltd    Result Date: 7/2/2024  US TESTICULAR AND SCROTUM WITH DOPPLER LIMITED 7/2/2024 9:50 AM CLINICAL HISTORY: left testicle--blood flow?  new abscess; worsening WBC, fever and chills TECHNIQUE: Ultrasound of scrotum with color flow and spectral Doppler with waveform analysis performed. COMPARISON: 6/29/2024 FINDINGS: RIGHT: Right testicle measures 5.6 x 3.1 x 2.5 cm. Normal testicle with no masses. Normal arterial duplex and normal color flow. 0.5 x 0.5 x 0.4 cm cyst is seen within the right epididymis. No hydrocele. No varicocele. LEFT: Left testicle measures 5.2 x 2.9 x 2.2 cm. Heterogenous appearance of the left testicle with minimal flow, similar to the prior exam. Epididymis appears thickened and diffusely hypervascular. There is also marked hypervascularity surrounding the left testicle extending into the sclerotic cord which has a swirled appearance. No hydrocele. No varicocele.     IMPRESSION: 1.  Findings are suggestive of persistent, severe left epididymo-orchitis. Persistent, diminished vascular flow along the left testicle which may be due to underlying inflammation and edema. However, differential diagnosis also includes intermittent or partial torsion. Recommend clinical correlation. No intratesticular abscess is seen. 2.  Thickened, hyperemic left scrotal wall. MORENA BYERS MD   SYSTEM ID:  [id number]

## 2024-07-02 NOTE — PLAN OF CARE
"  Problem: Adult Inpatient Plan of Care  Goal: Plan of Care Review  Description: The Plan of Care Review/Shift note should be completed every shift.  The Outcome Evaluation is a brief statement about your assessment that the patient is improving, declining, or no change.  This information will be displayed automatically on your shift  note.  Outcome: Progressing  Goal: Patient-Specific Goal (Individualized)  Description: You can add care plan individualizations to a care plan. Examples of Individualization might be:  \"Parent requests to be called daily at 9am for status\", \"I have a hard time hearing out of my right ear\", or \"Do not touch me to wake me up as it startles  me\".  Outcome: Progressing  Goal: Absence of Hospital-Acquired Illness or Injury  Outcome: Progressing  Goal: Optimal Comfort and Wellbeing  Outcome: Progressing  Goal: Readiness for Transition of Care  Outcome: Progressing     Problem: Pain Acute  Goal: Optimal Pain Control and Function  Outcome: Progressing     Problem: Substance Withdrawal  Goal: Substance Withdrawal  Description: Signs and symptoms of listed problems will be absent or manageable.  Outcome: Progressing  Goal: Social and Therapeutic (Substance Withdrawal)  Description: Signs and symptoms of listed problems will be absent or manageable.  Outcome: Progressing     Problem: Infection  Goal: Absence of Infection Signs and Symptoms  Outcome: Progressing   Goal Outcome Evaluation:                        "

## 2024-07-02 NOTE — PROGRESS NOTES
"    [facility]     Hospitalist Progress Note     Assessment & Plan     ASSESSMENT    34M with hx of UTI, gonorrhea, ADHD, and amphetamine abuse presents with severe testicular pain and found to be septic 2/2 acute epididymitis complicated by suspected testicular torsion.  Patient refused urgent surgery for removal of testicle on admission.    Still having low-grade fevers, WBC and pain worse today.  Patient is NPO in case urology needs to intervene surgically.    PLAN    Sepsis 2/2 Acute Epididymitis  -Presents with severe testicular pain and found to have evidence of epididymitis and orchitis  -Initial concern for testicular torsion on imaging although urology feels this is less likely  -Chlamydia and Gonorrhea testing negative  -Urology consulted, holding off on surgery for now  PLAN  -Ceftriaxone 2 g daily  -NPO in case surgery needed  -Urology consulted, appreciate recommendations  -Pain regimen in place    Acute Metabolic Encephalopathy 2/2 Amphetamine Withdrawal  -Somnolent on admission due to amphetamine withdrawal, now improved    Other Issues  -Polysubstance abuse: UDS with cannabis and amphetamines  -ADHD: Not on prescription medications for this it appears    DVT Prophy  -SCDs    Disposition  -Medically ready for discharge: Tomorrow      Nickolas Zafar MD    Subjective     Patient having worsening testicular pain this morning and somewhat noncooperative with staff.        Objective   Blood pressure 127/79, pulse 89, temperature 98.6  F (37  C), temperature source Temporal, resp. rate 20, height 1.803 m (5' 11\"), weight 77.9 kg (171 lb 12.8 oz), SpO2 97%.    PHYSICAL EXAM  General: In no acute distress  CV: RRR.  Lungs: CTAB. Nl WOB.  Abd: Non-tender.  : Large inflamed testicle  Ext: No edema.    LABS AND IMAGING  Reviewed and pertinent results discussed in assessment and plan.   "

## 2024-07-02 NOTE — PROGRESS NOTES
Patient alert and oriented x4. Independent in the room. Reported intermittent pain rated at 3. Gave prn Tylenol. Gave prn milk of magnesia for constipation.  VSS. Patient took a shower in the morning. NPO from midnight for urology consult.

## 2024-07-02 NOTE — PLAN OF CARE
Goal Outcome Evaluation:      Plan of Care Reviewed With: patient    Overall Patient Progress: improvingOverall Patient Progress: improving    Outcome Evaluation: intermittent pain, PO and IV pain meds managing pain, had US of scrotum, NPO until 1130, jeimy PO well once diet ordered, up independently, on IV Rocephin, possible dc to hme tomorrow      Problem: Adult Inpatient Plan of Care  Goal: Plan of Care Review    Outcome: Progressing  Flowsheets (Taken 7/2/2024 1338)  Outcome Evaluation: intermittent pain, PO and IV pain meds managing pain, had US of scrotum, NPO until 1130, jeimy PO well once diet ordered, up independently, on IV Rocephin, possible dc to hme tomorrow  Plan of Care Reviewed With: patient  Overall Patient Progress: improving  Goal: Patient-Specific Goal (Individualized)    Outcome: Progressing  Goal: Absence of Hospital-Acquired Illness or Injury  Outcome: Progressing  Intervention: Identify and Manage Fall Risk  Recent Flowsheet Documentation  Taken 7/2/2024 0840 by Alyssa Ariza RN  Safety Promotion/Fall Prevention:   clutter free environment maintained   nonskid shoes/slippers when out of bed   patient and family education   safety round/check completed   lighting adjusted   room near nurse's station   room organization consistent  Intervention: Prevent Skin Injury  Recent Flowsheet Documentation  Taken 7/2/2024 0840 by Alyssa Ariza RN  Body Position: position changed independently  Skin Protection: adhesive use limited  Device Skin Pressure Protection: adhesive use limited  Intervention: Prevent Infection  Recent Flowsheet Documentation  Taken 7/2/2024 0840 by Alyssa Ariza RN  Infection Prevention:   hand hygiene promoted   rest/sleep promoted  Goal: Optimal Comfort and Wellbeing  Outcome: Progressing  Intervention: Monitor Pain and Promote Comfort  Recent Flowsheet Documentation  Taken 7/2/2024 1157 by Alyssa Ariza RN  Pain Management Interventions: medication (see MAR)  Taken  7/2/2024 1047 by Alyssa Ariza RN  Pain Management Interventions: medication (see MAR)  Taken 7/2/2024 0836 by Alyssa Ariza RN  Pain Management Interventions: medication (see MAR)  Taken 7/2/2024 0829 by Alyssa Ariza RN  Pain Management Interventions: medication (see MAR)  Goal: Readiness for Transition of Care  Outcome: Progressing  Intervention: Mutually Develop Transition Plan  Recent Flowsheet Documentation  Taken 7/2/2024 0900 by Alyssa Ariza RN  Patient/Family Anticipates Transition to: home  Equipment Currently Used at Home: none     Problem: Pain Acute  Goal: Optimal Pain Control and Function  Outcome: Progressing  Intervention: Develop Pain Management Plan  Recent Flowsheet Documentation  Taken 7/2/2024 1157 by Alyssa Ariza RN  Pain Management Interventions: medication (see MAR)  Taken 7/2/2024 1047 by Alyssa Ariza RN  Pain Management Interventions: medication (see MAR)  Taken 7/2/2024 0836 by Alyssa Ariza RN  Pain Management Interventions: medication (see MAR)  Taken 7/2/2024 0829 by Alyssa Ariza RN  Pain Management Interventions: medication (see MAR)  Intervention: Prevent or Manage Pain  Recent Flowsheet Documentation  Taken 7/2/2024 0840 by Alyssa Ariza RN  Sensory Stimulation Regulation: lighting decreased  Medication Review/Management: medications reviewed  Intervention: Optimize Psychosocial Wellbeing  Recent Flowsheet Documentation  Taken 7/2/2024 0840 by Alyssa Ariza RN  Supportive Measures: active listening utilized     Problem: Substance Withdrawal  Goal: Substance Withdrawal  Description: Signs and symptoms of listed problems will be absent or manageable.  Outcome: Progressing  Goal: Social and Therapeutic (Substance Withdrawal)  Description: Signs and symptoms of listed problems will be absent or manageable.  Outcome: Progressing     Problem: Infection  Goal: Absence of Infection Signs and Symptoms  Outcome: Progressing  Intervention: Prevent or Manage  Infection  Recent Flowsheet Documentation  Taken 7/2/2024 0840 by Alyssa Ariza, RN  Infection Management: aseptic technique maintained

## 2024-07-02 NOTE — PLAN OF CARE
"Goal Outcome Evaluation:      Plan of Care Reviewed With: patient    Overall Patient Progress: improvingOverall Patient Progress: improving    Outcome Evaluation: decreased pain today. scrotal swelling slightly improved- less tender. pt walked in gaspar. jeimy reg diet. voiding without difficulty. temp 100.6 lactic 1.3      Problem: Adult Inpatient Plan of Care  Goal: Plan of Care Review  Description: The Plan of Care Review/Shift note should be completed every shift.  The Outcome Evaluation is a brief statement about your assessment that the patient is improving, declining, or no change.  This information will be displayed automatically on your shift  note.  Outcome: Progressing  Flowsheets (Taken 7/1/2024 2042)  Outcome Evaluation: decreased pain today. scrotal swelling slightly improved- less tender. pt walked in gaspar. jeimy reg diet. voiding without difficulty. temp 100.6 lactic 1.3  Plan of Care Reviewed With: patient  Goal: Patient-Specific Goal (Individualized)  Description: You can add care plan individualizations to a care plan. Examples of Individualization might be:  \"Parent requests to be called daily at 9am for status\", \"I have a hard time hearing out of my right ear\", or \"Do not touch me to wake me up as it startles  me\".  Outcome: Progressing  Goal: Absence of Hospital-Acquired Illness or Injury  Outcome: Progressing  Intervention: Identify and Manage Fall Risk  Recent Flowsheet Documentation  Taken 7/1/2024 1632 by Margi Rodríguez, RN  Safety Promotion/Fall Prevention:   clutter free environment maintained   nonskid shoes/slippers when out of bed   patient and family education   safety round/check completed  Intervention: Prevent Skin Injury  Recent Flowsheet Documentation  Taken 7/1/2024 1529 by Margi Rodríguez RN  Body Position: position changed independently  Goal: Optimal Comfort and Wellbeing  Outcome: Progressing  Intervention: Monitor Pain and Promote Comfort  Recent Flowsheet " Documentation  Taken 7/1/2024 1947 by Margi Rodríguez RN  Pain Management Interventions: medication (see MAR)  Taken 7/1/2024 1529 by Margi Rodríguez RN  Pain Management Interventions: medication (see MAR)  Goal: Readiness for Transition of Care  Outcome: Progressing     Problem: Pain Acute  Goal: Optimal Pain Control and Function  Outcome: Progressing  Intervention: Develop Pain Management Plan  Recent Flowsheet Documentation  Taken 7/1/2024 1947 by Margi Rodríguez RN  Pain Management Interventions: medication (see MAR)  Taken 7/1/2024 1529 by Margi Rodríguez RN  Pain Management Interventions: medication (see MAR)     Problem: Substance Withdrawal  Goal: Substance Withdrawal  Description: Signs and symptoms of listed problems will be absent or manageable.  Outcome: Progressing  Goal: Social and Therapeutic (Substance Withdrawal)  Description: Signs and symptoms of listed problems will be absent or manageable.  Outcome: Progressing     Problem: Infection  Goal: Absence of Infection Signs and Symptoms  Outcome: Progressing

## 2024-07-03 LAB
ANION GAP SERPL CALCULATED.3IONS-SCNC: 13 MMOL/L (ref 7–15)
BACTERIA BLD CULT: NO GROWTH
BACTERIA BLD CULT: NO GROWTH
BUN SERPL-MCNC: 8.5 MG/DL (ref 6–20)
CALCIUM SERPL-MCNC: 9 MG/DL (ref 8.6–10)
CHLORIDE SERPL-SCNC: 101 MMOL/L (ref 98–107)
CREAT SERPL-MCNC: 0.77 MG/DL (ref 0.67–1.17)
DEPRECATED HCO3 PLAS-SCNC: 23 MMOL/L (ref 22–29)
EGFRCR SERPLBLD CKD-EPI 2021: >90 ML/MIN/1.73M2
ERYTHROCYTE [DISTWIDTH] IN BLOOD BY AUTOMATED COUNT: 13.6 % (ref 10–15)
GLUCOSE SERPL-MCNC: 113 MG/DL (ref 70–99)
HCT VFR BLD AUTO: 42.9 % (ref 40–53)
HGB BLD-MCNC: 14.2 G/DL (ref 13.3–17.7)
MCH RBC QN AUTO: 29.3 PG (ref 26.5–33)
MCHC RBC AUTO-ENTMCNC: 33.1 G/DL (ref 31.5–36.5)
MCV RBC AUTO: 89 FL (ref 78–100)
PLATELET # BLD AUTO: 419 10E3/UL (ref 150–450)
POTASSIUM SERPL-SCNC: 4.5 MMOL/L (ref 3.4–5.3)
RBC # BLD AUTO: 4.84 10E6/UL (ref 4.4–5.9)
SODIUM SERPL-SCNC: 137 MMOL/L (ref 135–145)
WBC # BLD AUTO: 20.5 10E3/UL (ref 4–11)

## 2024-07-03 PROCEDURE — 250N000011 HC RX IP 250 OP 636: Performed by: INTERNAL MEDICINE

## 2024-07-03 PROCEDURE — 36415 COLL VENOUS BLD VENIPUNCTURE: CPT | Performed by: INTERNAL MEDICINE

## 2024-07-03 PROCEDURE — 80048 BASIC METABOLIC PNL TOTAL CA: CPT | Performed by: INTERNAL MEDICINE

## 2024-07-03 PROCEDURE — 85027 COMPLETE CBC AUTOMATED: CPT | Performed by: INTERNAL MEDICINE

## 2024-07-03 PROCEDURE — 999N000127 HC STATISTIC PERIPHERAL IV START W US GUIDANCE

## 2024-07-03 PROCEDURE — 250N000009 HC RX 250: Performed by: PHYSICIAN ASSISTANT

## 2024-07-03 PROCEDURE — 99233 SBSQ HOSP IP/OBS HIGH 50: CPT | Performed by: INTERNAL MEDICINE

## 2024-07-03 PROCEDURE — 250N000013 HC RX MED GY IP 250 OP 250 PS 637: Performed by: INTERNAL MEDICINE

## 2024-07-03 PROCEDURE — 99231 SBSQ HOSP IP/OBS SF/LOW 25: CPT | Performed by: STUDENT IN AN ORGANIZED HEALTH CARE EDUCATION/TRAINING PROGRAM

## 2024-07-03 PROCEDURE — 250N000013 HC RX MED GY IP 250 OP 250 PS 637: Performed by: PHYSICIAN ASSISTANT

## 2024-07-03 PROCEDURE — 120N000001 HC R&B MED SURG/OB

## 2024-07-03 RX ADMIN — CEFTRIAXONE 2 G: 2 INJECTION, POWDER, FOR SOLUTION INTRAMUSCULAR; INTRAVENOUS at 14:50

## 2024-07-03 RX ADMIN — LIDOCAINE: 40 CREAM TOPICAL at 18:10

## 2024-07-03 RX ADMIN — OXYCODONE HYDROCHLORIDE 5 MG: 5 TABLET ORAL at 22:11

## 2024-07-03 RX ADMIN — OXYCODONE HYDROCHLORIDE 5 MG: 5 TABLET ORAL at 06:33

## 2024-07-03 RX ADMIN — OXYCODONE HYDROCHLORIDE 5 MG: 5 TABLET ORAL at 05:06

## 2024-07-03 RX ADMIN — ACETAMINOPHEN 650 MG: 325 TABLET, FILM COATED ORAL at 16:41

## 2024-07-03 RX ADMIN — HYDROMORPHONE HYDROCHLORIDE 4 MG: 2 TABLET ORAL at 16:46

## 2024-07-03 RX ADMIN — OXYCODONE HYDROCHLORIDE 5 MG: 5 TABLET ORAL at 12:19

## 2024-07-03 RX ADMIN — OXYCODONE HYDROCHLORIDE 10 MG: 5 TABLET ORAL at 00:11

## 2024-07-03 RX ADMIN — HYDROMORPHONE HYDROCHLORIDE 1 MG: 1 INJECTION, SOLUTION INTRAMUSCULAR; INTRAVENOUS; SUBCUTANEOUS at 20:43

## 2024-07-03 RX ADMIN — ACETAMINOPHEN 650 MG: 325 TABLET, FILM COATED ORAL at 12:19

## 2024-07-03 ASSESSMENT — ACTIVITIES OF DAILY LIVING (ADL)
ADLS_ACUITY_SCORE: 20

## 2024-07-03 NOTE — PROGRESS NOTES
"At approximately 0630 writer rounded on patient to assess his pain level, patient reports his pain was still \"really bad\". Writer went to [facility] and grabbed a additional oxycodone 5 mg per available on MAR. When writer re-entered patient's room, pt was walking around room with a blanket wrapped around himself.     Pt demanded writer place a new IV in him \"NOW!\", stating he wanted his IV antibiotics \"right now!\". Writer explained to patient he was not due yet for any antibiotics; writer attempted to show patient his MAR and explained to him when he would receive his next antibiotic dose. Pt screamed at writer \"I don't fucking care!\" And proceeded to throw of the blanket over his body, showing he was completed naked. Pt proceed to use both his hands and pointed directly over his groin and screamed at writer \"look at these fucking nuts!\"     Writer informed him what he was doing was inappropriate, and asked if he still wanted to take oxycodone or not to help relieve his pain. Pt agreed, covered back up with a blanket and sat on the bed. While patient took his pain medication, he stated he wanted someone \"who knows what the fuck they are doing\" to take care of him.     Writer updated charge nurse of patient interaction and statement to writer.   "

## 2024-07-03 NOTE — PROVIDER NOTIFICATION
"Paged Dr Fernandez with the following message \"    his girlfriend is here and she is taking Keflex  mg BID for the \"same infection\" and he would like to take that as she is getting better and he thinks it will help him too. I explained that the IV Rocephin is stronger and will work better but he wants me to check with you.     Dr Fernandez replied with \"I agree .. its same bug but his infection is in a tough spot needs iv and thats equal to oral keflex \"      "

## 2024-07-03 NOTE — PROVIDER NOTIFICATION
"Text paged Dr Fernandez with \"    pt states he is very frustrated and \"sick of the bad care he is receiving here and wants to go [facility] for better [facility]\"     \"He wants to talk to you\"  \"Refused am vitals\"  \"He is belligerent this am\"    She replied with \"Ok but is q new pt to me today. Give me time to review and round before i can assist in sound guidance \"    0835- went into pt room with morning meds and he he was belligerent, stating he should rajinder us for malpractice as no one know what the hell they are doing around here\"  Paged Dr Fernandez with \"    he has packed up his room and he said he is leaving to go to [facility] where they Know what they are doing, that we should be sued for malpractice. I have the AMA form which I will try to have him sign.     And she replied with \"Ok ill be there in 10 min if he can wait \"  "

## 2024-07-03 NOTE — PLAN OF CARE
Goal Outcome Evaluation:      Plan of Care Reviewed With: patient    Overall Patient Progress: no change    Outcome Evaluation: verbally agressive toward staff, stated was leaving this [facility], MD here and spoke with pt popeye, pt agreed to stay and calmed down, PRN PO meds given for pain per pt request, voiding, up Independently in room, showered, increased swelling noted, pt in bed now with towel sling/supporting scrotum, ice applied per pt request, IV being paced by VATs nurse then will give IV Rocephin per orders      Problem: Adult Inpatient Plan of Care  Goal: Plan of Care Review    Outcome: Not Progressing  Flowsheets (Taken 7/3/2024 1419)  Outcome Evaluation: verbally agressive toward staff, stated was leaving [facility], MD here and spoke with pt popeye, pt agreed to stay and calmed down, PRN PO meds given for pain per pt request, voiding, up Independently in room, showered, increased swelling noted, pt in bed now with towel sling/supporting scrotum, ice applied per pt request, IV being paced by VATs nurse then will give IV Rocephin per orders  Plan of Care Reviewed With: patient  Overall Patient Progress: no change  Goal: Patient-Specific Goal (Individualized)    Outcome: Not Progressing  Goal: Absence of Hospital-Acquired Illness or Injury  Outcome: Not Progressing  Intervention: Identify and Manage Fall Risk  Recent Flowsheet Documentation  Taken 7/3/2024 1000 by Alyssa Ariza RN  Safety Promotion/Fall Prevention:   lighting adjusted   nonskid shoes/slippers when out of bed   room near nurse's station   safety round/check completed  Intervention: Prevent Skin Injury  Recent Flowsheet Documentation  Taken 7/3/2024 1000 by Alyssa Ariza RN  Body Position: position changed independently  Skin Protection: adhesive use limited  Device Skin Pressure Protection: (pt using towel to sling scrotum) positioning supports utilized  Intervention: Prevent and Manage VTE (Venous Thromboembolism)  Risk  Recent Flowsheet Documentation  Taken 7/3/2024 1000 by Alyssa Ariza RN  VTE Prevention/Management:   SCDs off (sequential compression devices)   patient refused intervention  Intervention: Prevent Infection  Recent Flowsheet Documentation  Taken 7/3/2024 1000 by Alyssa Ariza RN  Infection Prevention:   hand hygiene promoted   rest/sleep promoted   single patient room provided  Goal: Optimal Comfort and Wellbeing  Outcome: Not Progressing  Intervention: Monitor Pain and Promote Comfort  Recent Flowsheet Documentation  Taken 7/3/2024 1305 by Alyssa Ariza RN  Pain Management Interventions:   quiet environment facilitated   rest  Taken 7/3/2024 1219 by Alyssa Ariza RN  Pain Management Interventions: medication (see MAR)  Taken 7/3/2024 0957 by Alyssa Ariza RN  Pain Management Interventions: declines  Taken 7/3/2024 0730 by Alyssa Ariza RN  Pain Management Interventions: (offered Tylenol) declines  Goal: Readiness for Transition of Care  Outcome: Not Progressing     Problem: Pain Acute  Goal: Optimal Pain Control and Function  Outcome: Not Progressing  Intervention: Develop Pain Management Plan  Recent Flowsheet Documentation  Taken 7/3/2024 1305 by Alyssa Ariza RN  Pain Management Interventions:   quiet environment facilitated   rest  Taken 7/3/2024 1219 by Alyssa Ariza RN  Pain Management Interventions: medication (see MAR)  Taken 7/3/2024 0957 by Alyssa Ariza RN  Pain Management Interventions: declines  Taken 7/3/2024 0730 by Alyssa Ariza RN  Pain Management Interventions: (offered Tylenol) declines  Intervention: Prevent or Manage Pain  Recent Flowsheet Documentation  Taken 7/3/2024 1000 by Alyssa Ariza RN  Sensory Stimulation Regulation:   quiet environment promoted   lighting decreased  Sleep/Rest Enhancement:   awakenings minimized   consistent schedule promoted   regular sleep/rest pattern promoted  Bowel Elimination Promotion: ambulation promoted  Medication  Review/Management: medications reviewed  Intervention: Optimize Psychosocial Wellbeing  Recent Flowsheet Documentation  Taken 7/3/2024 1000 by Alyssa Ariza RN  Supportive Measures: active listening utilized     Problem: Substance Withdrawal  Goal: Substance Withdrawal  Description: Signs and symptoms of listed problems will be absent or manageable.  Outcome: Not Progressing  Goal: Social and Therapeutic (Substance Withdrawal)  Description: Signs and symptoms of listed problems will be absent or manageable.  Outcome: Not Progressing     Problem: Infection  Goal: Absence of Infection Signs and Symptoms  Outcome: Not Progressing  Intervention: Prevent or Manage Infection  Recent Flowsheet Documentation  Taken 7/3/2024 1000 by Alyssa Ariza RN  Infection Management: aseptic technique maintained

## 2024-07-03 NOTE — PLAN OF CARE
"Goal Outcome Evaluation:    Time: 2300 -0730  Reason for Admission: Epididymoorchitis  Activity: Independent  Neuro: A&O x4, able to make his needs be know.   Cardiac: Denies any chest pain or palpations.   Respiratory: Denies any SOB or cough.   GI/: Voiding without difficulty, passing flatus, bowel sounds active x4. Denies any N/V/D  Skin: +2 edema of scrotum, tender to touch. See flowsheet  Diet: Regular diet  IV Access: No IV access -- pt refused to let staff place new IV access.   Vitals: /77 (BP Location: Right arm)   Pulse 85   Temp 97.4  F (36.3  C) (Temporal)   Resp 18   Ht 1.803 m (5' 11\")   Wt 77.9 kg (171 lb 12.8 oz)   SpO2 99%   BMI 23.96 kg/m    Pain: Rating his pain \"5\" - \"7\"/10, given PRN Oxycodone x2 this shift.   Plan: Switch to PO antibotics today, possible discharge home  Behavior: Yellow -- Pt was verbally aggressive towards nursing staff tonight, he called nursing staff \"fucking bitch\" and repeatedly using the word \"fuck\" every other word -- writer informed patient he needed to stop swearing and to speak respectful to staff. Patient stated he understood request to stop swearing and did so for approximately 5 minutes before swearing again. Pt's IV was noted to be leaking  around IV site when writer went to flush it -- pt became verbally aggressive towards writer when I informed the IV was no longer working properly & would need to be removed. Patient demanded writer keep using the IV and insisted writer inject IV Toradol through the site. Writer re-educated patient multiple times why this could not be done and the safety issues of using his current IV -- pt refused to listen to writer, stating \"you don't know what the hell you're talking about. I know what the fuck I'm doing.\" Pt referred himself as an \"expert\" when discussing how IV's work and the staff here \"don't know what the fuck they are doing\". Writer informed charge nurse of situation -- charge nurse went and spoke with " "patient, also re-educating him on why we could not use his IV and what his options were for having it replaced. Pt allowed charge nurse to remove his IV, but refused to have another IV placed.       Problem: Adult Inpatient Plan of Care  Goal: Plan of Care Review  Description: The Plan of Care Review/Shift note should be completed every shift.  The Outcome Evaluation is a brief statement about your assessment that the patient is improving, declining, or no change.  This information will be displayed automatically on your shift  note.  Outcome: Progressing  Flowsheets (Taken 7/3/2024 5585)  Outcome Evaluation: Pt verbally agressive towards staff, tolering PO intake, indepedent in room.  Plan of Care Reviewed With: patient  Overall Patient Progress: no change  Goal: Patient-Specific Goal (Individualized)  Description: You can add care plan individualizations to a care plan. Examples of Individualization might be:  \"Parent requests to be called daily at 9am for status\", \"I have a hard time hearing out of my right ear\", or \"Do not touch me to wake me up as it startles  me\".  Outcome: Progressing  Goal: Absence of Hospital-Acquired Illness or Injury  Outcome: Progressing  Intervention: Identify and Manage Fall Risk  Recent Flowsheet Documentation  Taken 7/2/2024 2350 by Karina King  Safety Promotion/Fall Prevention: assistive device/personal items within reach  Intervention: Prevent Skin Injury  Recent Flowsheet Documentation  Taken 7/2/2024 2350 by Karina King  Body Position: position changed independently  Intervention: Prevent and Manage VTE (Venous Thromboembolism) Risk  Recent Flowsheet Documentation  Taken 7/2/2024 2350 by Karina King  VTE Prevention/Management: SCDs off (sequential compression devices)  Intervention: Prevent Infection  Recent Flowsheet Documentation  Taken 7/2/2024 2350 by Karina King  Infection Prevention:   hand hygiene promoted   rest/sleep promoted   single patient " room provided  Goal: Optimal Comfort and Wellbeing  Outcome: Progressing  Intervention: Monitor Pain and Promote Comfort  Recent Flowsheet Documentation  Taken 7/2/2024 2351 by Karina King  Pain Management Interventions: medication (see MAR)  Goal: Readiness for Transition of Care  Outcome: Progressing     Problem: Pain Acute  Goal: Optimal Pain Control and Function  Outcome: Progressing  Intervention: Develop Pain Management Plan  Recent Flowsheet Documentation  Taken 7/2/2024 2351 by Karina King  Pain Management Interventions: medication (see MAR)  Intervention: Prevent or Manage Pain  Recent Flowsheet Documentation  Taken 7/2/2024 2350 by Karina King  Sleep/Rest Enhancement:   awakenings minimized   consistent schedule promoted   regular sleep/rest pattern promoted  Bowel Elimination Promotion: ambulation promoted  Medication Review/Management: medications reviewed  Intervention: Optimize Psychosocial Wellbeing  Recent Flowsheet Documentation  Taken 7/2/2024 2350 by Karina King  Supportive Measures: active listening utilized     Problem: Substance Withdrawal  Goal: Substance Withdrawal  Description: Signs and symptoms of listed problems will be absent or manageable.  Outcome: Progressing  Goal: Social and Therapeutic (Substance Withdrawal)  Description: Signs and symptoms of listed problems will be absent or manageable.  Outcome: Progressing     Problem: Infection  Goal: Absence of Infection Signs and Symptoms  Outcome: Progressing         Plan of Care Reviewed With: patient    Overall Patient Progress: no changeOverall Patient Progress: no change    Outcome Evaluation: Pt verbally agressive towards staff, tolering PO intake, indepedent in room.

## 2024-07-03 NOTE — PLAN OF CARE
"Goal Outcome Evaluation:      Plan of Care Reviewed With: patient    Overall Patient Progress: no changeOverall Patient Progress: no change    Outcome Evaluation: pt slept majority of evening, tolerated regular diet well, independent in room    Pt IV is leaking, pt refused dressing change, states \"this happens sometimes\", informed pt that this will have to be addressed/fixed before any further IV medications can be administered   Pain managed with scheduled ibuprofen   States he never received his nicotine patch/was unaware it was placed & fell off in shower. Requested new patch, cross cover placed order.     Problem: Adult Inpatient Plan of Care  Goal: Plan of Care Review  Description: The Plan of Care Review/Shift note should be completed every shift.  The Outcome Evaluation is a brief statement about your assessment that the patient is improving, declining, or no change.  This information will be displayed automatically on your shift  note.  Outcome: Progressing  Flowsheets (Taken 7/2/2024 2117)  Outcome Evaluation: pt slept majority of evening, tolerated regular diet well, independent in room  Plan of Care Reviewed With: patient  Overall Patient Progress: no change  Goal: Patient-Specific Goal (Individualized)  Description: You can add care plan individualizations to a care plan. Examples of Individualization might be:  \"Parent requests to be called daily at 9am for status\", \"I have a hard time hearing out of my right ear\", or \"Do not touch me to wake me up as it startles  me\".  Outcome: Progressing  Goal: Absence of Hospital-Acquired Illness or Injury  Outcome: Progressing  Intervention: Identify and Manage Fall Risk  Recent Flowsheet Documentation  Taken 7/2/2024 1700 by Emilee Devine RN  Safety Promotion/Fall Prevention:   room near nurse's station   safety round/check completed  Intervention: Prevent Skin Injury  Recent Flowsheet Documentation  Taken 7/2/2024 1700 by Emilee Devine, JENN  Body " Position: position changed independently  Skin Protection: adhesive use limited  Device Skin Pressure Protection: absorbent pad utilized/changed  Intervention: Prevent and Manage VTE (Venous Thromboembolism) Risk  Recent Flowsheet Documentation  Taken 7/2/2024 1700 by Emilee Devine RN  VTE Prevention/Management: SCDs off (sequential compression devices)  Intervention: Prevent Infection  Recent Flowsheet Documentation  Taken 7/2/2024 1700 by Emilee Devine RN  Infection Prevention: hand hygiene promoted  Goal: Optimal Comfort and Wellbeing  Outcome: Progressing  Intervention: Monitor Pain and Promote Comfort  Recent Flowsheet Documentation  Taken 7/2/2024 1939 by Emilee Devine RN  Pain Management Interventions: medication (see MAR)  Taken 7/2/2024 1645 by Emilee Devine RN  Pain Management Interventions: medication (see MAR)  Goal: Readiness for Transition of Care  Outcome: Progressing     Problem: Pain Acute  Goal: Optimal Pain Control and Function  Outcome: Progressing  Intervention: Develop Pain Management Plan  Recent Flowsheet Documentation  Taken 7/2/2024 1939 by Emilee Devine RN  Pain Management Interventions: medication (see MAR)  Taken 7/2/2024 1645 by Emilee Devine RN  Pain Management Interventions: medication (see MAR)  Intervention: Prevent or Manage Pain  Recent Flowsheet Documentation  Taken 7/2/2024 1700 by Emilee Devine RN  Sensory Stimulation Regulation:   lighting decreased   care clustered  Sleep/Rest Enhancement: awakenings minimized  Bowel Elimination Promotion: ambulation promoted  Medication Review/Management: medications reviewed  Intervention: Optimize Psychosocial Wellbeing  Recent Flowsheet Documentation  Taken 7/2/2024 1700 by Emilee Devine RN  Supportive Measures: active listening utilized     Problem: Substance Withdrawal  Goal: Substance Withdrawal  Description: Signs and symptoms of listed problems will be absent or manageable.  Outcome:  Progressing  Goal: Social and Therapeutic (Substance Withdrawal)  Description: Signs and symptoms of listed problems will be absent or manageable.  Outcome: Progressing     Problem: Infection  Goal: Absence of Infection Signs and Symptoms  Outcome: Progressing  Intervention: Prevent or Manage Infection  Recent Flowsheet Documentation  Taken 7/2/2024 1700 by Emilee Devine, RN  Infection Management: aseptic technique maintained

## 2024-07-03 NOTE — PLAN OF CARE
Goal Outcome Evaluation:      Plan of Care Reviewed With: patient    Overall Patient Progress: no changeOverall Patient Progress: no change    Outcome Evaluation: cont to have episodes where scrotal pain is very intense. pt requested to switch to PO dilaudid which did offer adequate relief. pt appears frustrated & anxious but able to get some rest after pain meds. up indep in room. jeimy regular diet. voiding without difficulty. no change is scrotal redness & swelling.      Problem: Adult Inpatient Plan of Care  Goal: Optimal Comfort and Wellbeing  Outcome: Not Progressing  Intervention: Monitor Pain and Promote Comfort  Recent Flowsheet Documentation  Taken 7/3/2024 1646 by Margi Rodríguez RN  Pain Management Interventions: medication (see MAR)     Problem: Pain Acute  Goal: Optimal Pain Control and Function  Outcome: Not Progressing  Intervention: Develop Pain Management Plan  Recent Flowsheet Documentation  Taken 7/3/2024 1646 by Margi Rodríguez RN  Pain Management Interventions: medication (see MAR)     Problem: Adult Inpatient Plan of Care  Goal: Plan of Care Review  Description: The Plan of Care Review/Shift note should be completed every shift.  The Outcome Evaluation is a brief statement about your assessment that the patient is improving, declining, or no change.  This information will be displayed automatically on your shift  note.  Outcome: Progressing  Flowsheets (Taken 7/3/2024 1830)  Outcome Evaluation: cont to have episodes where scrotal pain is very intense. pt requested to switch to PO dilaudid which did offer adequate relief. pt appears frustrated & anxious but able to get some rest after pain meds. up indep in room. jeimy regular diet. voiding without difficulty. no change is scrotal redness & swelling.  Plan of Care Reviewed With: patient  Overall Patient Progress: no change  Goal: Patient-Specific Goal (Individualized)  Description: You can add care plan individualizations to a care plan.  "Examples of Individualization might be:  \"Parent requests to be called daily at 9am for status\", \"I have a hard time hearing out of my right ear\", or \"Do not touch me to wake me up as it startles  me\".  Outcome: Progressing  Goal: Absence of Hospital-Acquired Illness or Injury  Outcome: Progressing  Intervention: Identify and Manage Fall Risk  Recent Flowsheet Documentation  Taken 7/3/2024 1538 by Margi Rodríguez, RN  Safety Promotion/Fall Prevention:   clutter free environment maintained   nonskid shoes/slippers when out of bed   patient and family education   safety round/check completed  Intervention: Prevent Skin Injury  Recent Flowsheet Documentation  Taken 7/3/2024 1538 by Margi Rodríguez, RN  Body Position: position changed independently  Goal: Readiness for Transition of Care  Outcome: Progressing     Problem: Substance Withdrawal  Goal: Substance Withdrawal  Description: Signs and symptoms of listed problems will be absent or manageable.  Outcome: Progressing  Goal: Social and Therapeutic (Substance Withdrawal)  Description: Signs and symptoms of listed problems will be absent or manageable.  Outcome: Progressing     Problem: Infection  Goal: Absence of Infection Signs and Symptoms  Outcome: Progressing     "

## 2024-07-03 NOTE — PROGRESS NOTES
[facility]rology Progress Note          Assessment and Plan:     Assessment:    Epididymoorchitis    PMH of UTI/STIs    Amphetamine abuse       Plan:   -NPO at midnight.  -Ultrasound imaging yesterday did not show any drainable abscess.  If patient clinically does not continue to improve, may need to consider orchiectomy.  -Continue with IV antibiotics while here.  Would recommend prolonged course given severity of infection, likely 28 days.  -Discussed with medicine about possible broadening of antibiotics back to Zosyn.  Recommendation was to continue with Rocephin.  Concern that is persistent infection and issues are likely due to poor blood flow to the area which makes it difficult for the antibiotics to penetrate tissue.  -If patient becomes vitally unstable page urology in case of need for emergent orchiectomy (very unlikely)   -Scrotal support, scrotal elevation, pain medications for pain.  -Continue to monitor leukocytosis.  -Pain and nausea management per primary service.  -Recommend continued inpatient stay due to worsening pain, persistent leukocytosis, and severity of infection.    Sophie Romero PA-C   [facility]  [phone]               Interval History:     Patient endorses that his pain is much worse today.  He has been afebrile, but having mild tachycardia.  Creatinine 0.77 EGFR greater than 90.  Patient has noted aggression towards staff and wanting to leave.  Appears that he lost his IV overnight.  IV antibiotics due to be instilled later today.  Patient had noted to medicine and nursing about possibly leaving AMA.  Leukocytosis persisting, WBC 20.5(20.1(18.7(25.8(31.1(20.3))))).  Urine culture showed >100,000 CFU/mL E. Coli.  On IV Rocephin.   Blood cultures: no growth to date.     Circumcised phallus.  Scrotal swelling L>R.  Reactive hydrocele on left.  Worsening tenderness and pain. No fluctuance, necrosis, or crepitus, mildly tender, erythematous worsening and  warm to the touch, indurated.                Review of Systems:     The 5 point Review of Systems is negative other than noted in the HPI             Medications:     Current Facility-Administered Medications   Medication Dose Route Frequency Provider Last Rate Last Admin    acetaminophen (TYLENOL) tablet 650 mg  650 mg Oral Q4H PRN Lashonda Mandujano PA-C   650 mg at 07/02/24 2351    Or    acetaminophen (TYLENOL) Suppository 650 mg  650 mg Rectal Q4H PRN Lashonda Mandujano PA-C        artificial saliva (BIOTENE MT) solution 2 spray  2 spray Swish & Spit 4x Daily PRN Lashonda Mandujano PA-C        bisacodyl (DULCOLAX) suppository 10 mg  10 mg Rectal Daily PRN Lashonda Mandujano PA-C        cefTRIAXone (ROCEPHIN) 2 g vial to attach to  ml bag for ADULTS or NS 50 ml bag for PEDS  2 g Intravenous Q24H Abdoul Montero MD   2 g at 07/02/24 1429    HYDROmorphone (DILAUDID) tablet 4 mg  4 mg Oral Q4H PRN Abdoul Montero MD        HYDROmorphone (PF) (DILAUDID) injection 0.5-1 mg  0.5-1 mg Intravenous Q4H PRN Abdoul Montero MD   0.5 mg at 07/02/24 0836    ibuprofen (ADVIL/MOTRIN) tablet 600 mg  600 mg Oral 4x Daily Lashonda Mandujano PA-C   600 mg at 07/02/24 1939    Or    ketorolac (TORADOL) injection 15 mg  15 mg Intravenous 4x Daily Lashonda Mandujano PA-C   15 mg at 07/02/24 0047    lidocaine (LMX4) cream   Topical Q1H PRN Lashonda Mandujano PA-C        lidocaine 1 % 0.1-1 mL  0.1-1 mL Other Q1H PRN Lashonda Mandujano PA-C        LORazepam (ATIVAN) injection 0.5-1 mg  0.5-1 mg Intravenous Q4H PRN Lashonda Mandujano PA-C        magnesium hydroxide (MILK OF MAGNESIA) suspension 15-30 mL  15-30 mL Oral Daily PRN Lashonda Mandujano PA-C   30 mL at 07/02/24 0500    naloxone (NARCAN) injection 0.2 mg  0.2 mg Intravenous Q2 Min PRN Lashonda Mandujano PA-C        Or    naloxone (NARCAN) injection 0.4 mg  0.4 mg Intravenous Q2 Min PRN Lashonda Mandujano PA-C         Or    naloxone (NARCAN) injection 0.2 mg  0.2 mg Intramuscular Q2 Min PRN Lashonda Mandujano PA-C        Or    naloxone (NARCAN) injection 0.4 mg  0.4 mg Intramuscular Q2 Min PRN Lashonda Mandujano PA-C        nicotine (NICODERM CQ) 21 MG/24HR 24 hr patch 1 patch  1 patch Transdermal Daily Abdoul Montero MD        ondansetron (ZOFRAN) injection 4 mg  4 mg Intravenous Q6H PRN Lashonda Mandujano PA-C   4 mg at 06/30/24 0347    Or    ondansetron (ZOFRAN ODT) ODT tab 4 mg  4 mg Oral Q6H PRN Lashonda Mandujano PA-C        oxyCODONE (ROXICODONE) tablet 5-10 mg  5-10 mg Oral Q4H PRN Lashonda Mandujano PA-C   5 mg at 07/03/24 0633    prochlorperazine (COMPAZINE) injection 10 mg  10 mg Intravenous Q6H PRN Lashonda Mandujano PA-C        Or    prochlorperazine (COMPAZINE) tablet 10 mg  10 mg Oral Q6H PRN Lashonda Mandujano PA-C        Or    prochlorperazine (COMPAZINE) suppository 25 mg  25 mg Rectal Q12H PRN Lashonda Mandujano PA-C        senna-docusate (SENOKOT-S/PERICOLACE) 8.6-50 MG per tablet 1 tablet  1 tablet Oral BID Lashonda Mandujano PA-C   1 tablet at 07/02/24 0826    sodium chloride (PF) 0.9% PF flush 3 mL  3 mL Intracatheter q1 min prn Lashonda Mandujano PA-C   3 mL at 07/01/24 1337    sodium chloride (PF) 0.9% PF flush 3 mL  3 mL Intracatheter Q8H Lashonda Mandujano PA-C   3 mL at 07/02/24 0835                  Physical Exam:   Vitals were reviewed  Patient Vitals for the past 8 hrs:   BP Temp Temp src Pulse Resp SpO2   07/03/24 0957 126/82 97.3  F (36.3  C) Temporal 111 18 99 %   07/03/24 0551 -- -- -- -- 18 --     GEN: NAD, lying in bed, voicing that he wants to leave   EYES: EOMI  MOUTH: MMM  NECK: Supple  RESP: Unlabored breathing  SKIN: Warm  ABD: soft  NEURO: AAO  URO: Circumcised phallus.  Scrotal swelling L>R.  Reactive hydrocele on left.  Worsening tenderness and pain. No fluctuance, necrosis, or crepitus, mildly tender, erythematous worsening and warm to the  "touch, indurated.           Data:   No results found for: \"NTBNPI\", \"NTBNP\"  Lab Results   Component Value Date    WBC 20.5 (H) 07/03/2024    WBC 20.1 (H) 07/02/2024    WBC 18.7 (H) 07/01/2024    HGB 14.2 07/03/2024    HGB 13.4 07/02/2024    HGB 13.0 (L) 07/01/2024    HCT 42.9 07/03/2024    HCT 40.4 07/02/2024    HCT 39.0 (L) 07/01/2024    MCV 89 07/03/2024    MCV 89 07/02/2024    MCV 89 07/01/2024     07/03/2024     07/02/2024     07/01/2024     Lab Results   Component Value Date    INR 1.12 01/12/2007      All cultures:  Recent Labs   Lab 06/28/24  1843 06/28/24  0931   CULTURE No growth after 4 days  No growth after 4 days >100,000 CFU/mL Escherichia coli*      US Testicular & Scrotum w Doppler Ltd    Result Date: 7/2/2024  US TESTICULAR AND SCROTUM WITH DOPPLER LIMITED 7/2/2024 9:50 AM CLINICAL HISTORY: left testicle--blood flow?  new abscess; worsening WBC, fever and chills TECHNIQUE: Ultrasound of scrotum with color flow and spectral Doppler with waveform analysis performed. COMPARISON: 6/29/2024 FINDINGS: RIGHT: Right testicle measures 5.6 x 3.1 x 2.5 cm. Normal testicle with no masses. Normal arterial duplex and normal color flow. 0.5 x 0.5 x 0.4 cm cyst is seen within the right epididymis. No hydrocele. No varicocele. LEFT: Left testicle measures 5.2 x 2.9 x 2.2 cm. Heterogenous appearance of the left testicle with minimal flow, similar to the prior exam. Epididymis appears thickened and diffusely hypervascular. There is also marked hypervascularity surrounding the left testicle extending into the sclerotic cord which has a swirled appearance. No hydrocele. No varicocele.     IMPRESSION: 1.  Findings are suggestive of persistent, severe left epididymo-orchitis. Persistent, diminished vascular flow along the left testicle which may be due to underlying inflammation and edema. However, differential diagnosis also includes intermittent or partial torsion. Recommend clinical correlation. " No intratesticular abscess is seen. 2.  Thickened, hyperemic left scrotal wall. MORENA BYERS MD   SYSTEM ID:  [id number]

## 2024-07-03 NOTE — PROGRESS NOTES
[facility]    Hospitalist Progress Note  Name: Ricky Rowland    MRN: [id number]  Provider: Susanne Fernandez MD  Date of Service: 07/03/2024    Assessment & Plan   Summary of Stay: Ricky Rowland is a 34 year old male who was admitted on 6/28/2024. He is a 34-year-old male with a history of UTIs, gonorrhea, ADHD, and amphetamine abuse presented with severe testicular pain and sepsis due to acute epididymoorchitis, with suspected intermittent testicular torsion. his clinical status has worsened, with increased pain, persistent leukocytosis, and low-grade fevers. He is being treated with Ceftriaxone 2 g daily and has a pain management regimen. His acute metabolic encephalopathy from amphetamine withdrawal has improved. The patient has a history of polysubstance abuse, with recent use of cannabis and amphetamines, and is not on ADHD medications. Management options include continued observation and antibiotics or potential orchiectomy no improvement.  Patient to be n.p.o. after midnight for possible surgical intervention will likely repeat ultrasound in a.m.    Sepsis 2/2 Acute Epididymorchitis  -Presents with severe testicular pain and found to have evidence of epididymitis and orchitis  -Initial concern for testicular torsion on imaging although urology feels this is less likely  -Chlamydia and Gonorrhea testing negative  -Urology consulted, holding off on surgery for now  -Ceftriaxone 2 g daily (E. coli sensitive to ceftriaxone)  -NPO in case surgery needed  -Urology consulted, appreciate recommendations  -Pain regimen in place     Acute Metabolic Encephalopathy 2/2 Amphetamine Withdrawal  -Somnolent on admission due to amphetamine withdrawal, now improved     Other Issues  -Polysubstance abuse: UDS with cannabis and amphetamines  -ADHD: Not on prescription medications for this it appears    DVT Prophylaxis: Pneumatic Compression Devices  Code Status: Full Code    Disposition: Expected discharge at least 2  to 3 days pending clinical course      Interval History   Assumed care reviewed chart this morning patient was wanting to leave AMA to go to another hospital.  He was concerned that he missed his antibiotic last night I explained to him at length the reason for his worsening symptoms and possibly failure of IV antibiotic treatment.  Also explained that he did receive his antibiotics as scheduled.  I encouraged him to wait for urology to evaluate and to get further antibiotics before leaving AMA if he so chooses to.  He is really frustrated and sad that his swelling pain and redness is worse.  Complains of generalized malaise weakness.  10 point review of system was carried out but was limited.  Patient very frustrated.  I spent more than 60 minutes counseling and reassuring patient of treatment and plan and discussing disease processes.  Also discussed with urology.    -Data reviewed today: I reviewed all new labs and imaging reports over the last 24 hours. I personally reviewed no images or EKG's today.    Physical Exam   Temp: 97.3  F (36.3  C) Temp src: Temporal BP: 126/82 Pulse: 111   Resp: 18 SpO2: 99 % O2 Device: None (Room air)    Vitals:    06/29/24 1203   Weight: 77.9 kg (171 lb 12.8 oz)     Vital Signs with Ranges  Temp:  [97.3  F (36.3  C)-97.4  F (36.3  C)] 97.3  F (36.3  C)  Pulse:  [] 111  Resp:  [18] 18  BP: (123-126)/(77-82) 126/82  SpO2:  [99 %-100 %] 99 %  I/O last 3 completed shifts:  In: 180 [P.O.:180]  Out: -       GEN:  Alert, oriented x 3, appears comfortable, NAD.  Patient declined exam    Medications   Current Facility-Administered Medications   Medication Dose Route Frequency Provider Last Rate Last Admin     Current Facility-Administered Medications   Medication Dose Route Frequency Provider Last Rate Last Admin    cefTRIAXone (ROCEPHIN) 2 g vial to attach to  ml bag for ADULTS or NS 50 ml bag for PEDS  2 g Intravenous Q24H Abdoul Montero MD   2 g at 07/02/24 2659     ibuprofen (ADVIL/MOTRIN) tablet 600 mg  600 mg Oral 4x Daily Lashonda Mandujano PA-C   600 mg at 07/02/24 1939    Or    ketorolac (TORADOL) injection 15 mg  15 mg Intravenous 4x Daily Lashonda Mandujano PA-C   15 mg at 07/02/24 0047    nicotine (NICODERM CQ) 21 MG/24HR 24 hr patch 1 patch  1 patch Transdermal Daily Abdoul Montero MD        senna-docusate (SENOKOT-S/PERICOLACE) 8.6-50 MG per tablet 1 tablet  1 tablet Oral BID Lashonda Mandujano PA-C   1 tablet at 07/02/24 0826    sodium chloride (PF) 0.9% PF flush 3 mL  3 mL Intracatheter Q8H Lashonda Mandujano PA-C   3 mL at 07/02/24 0835     Data     Recent Labs   Lab 07/03/24  0732 07/02/24  0633 07/01/24  0713   WBC 20.5* 20.1* 18.7*   HGB 14.2 13.4 13.0*   HCT 42.9 40.4 39.0*   MCV 89 89 89    372 288     Recent Labs   Lab 07/03/24  0732 07/02/24  0633 07/01/24  0713    140 142   POTASSIUM 4.5 3.7 3.8   CHLORIDE 101 103 105   CO2 23 24 24   ANIONGAP 13 13 13   * 112* 95   BUN 8.5 8.9 8.9   CR 0.77 0.80 0.78   GFRESTIMATED >90 >90 >90   CHARLENE 9.0 9.0 8.2*     7-Day Micro Results       Collected Updated Procedure Result Status      06/28/2024 1843 07/02/2024 2047 Blood Culture Arm, Right [[id number]]   Blood from Arm, Right    Preliminary result Component Value   Culture No growth after 4 days  [P]                06/28/2024 1843 07/02/2024 2047 Blood Culture Arm, Left [[id number]]   Blood from Arm, Left    Preliminary result Component Value   Culture No growth after 4 days  [P]                06/28/2024 0931 06/28/2024 1725 Chlamydia trachomatis/Neisseria gonorrhoeae by PCR [[id number]]   Urine, Voided    Final result Component Value   Chlamydia Trachomatis Negative   Negative for C. trachomatis rRNA by transcription mediated amplification.   A negative result by transcription mediated amplification does not preclude the presence of infection because results are dependent on proper and adequate collection, absence  "of inhibitors and sufficient rRNA to be detected.   Neisseria gonorrhoeae Negative   Negative for N. gonorrhoeae rRNA by transcription mediated amplification. A negative result by transcription mediated amplification does not preclude the presence of C. trachomatis infection because results are dependent on proper and adequate collection, absence of inhibitors and sufficient rRNA to be detected.            06/28/2024 0931 06/30/2024 0150 Urine Culture [[id number]]    (Abnormal)   Urine, NOS    Final result Component Value   Culture >100,000 CFU/mL Escherichia coli        Susceptibility        Escherichia coli      MACY      Ampicillin >=32 ug/mL Resistant      Ampicillin/ Sulbactam 16 ug/mL Intermediate      Cefazolin <=4 ug/mL Susceptible  [1]       Cefepime <=1 ug/mL Susceptible      Cefoxitin <=4 ug/mL Susceptible      Ceftazidime <=1 ug/mL Susceptible      Ceftriaxone <=1 ug/mL Susceptible      Ciprofloxacin <=0.25 ug/mL Susceptible      Gentamicin <=1 ug/mL Susceptible      Levofloxacin <=0.12 ug/mL Susceptible      Nitrofurantoin <=16 ug/mL Susceptible      Piperacillin/Tazobactam <=4 ug/mL Susceptible      Tobramycin <=1 ug/mL Susceptible      Trimethoprim/Sulfamethoxazole <=1/19 ug/mL Susceptible                   [1]  Cefazolin MACY breakpoints are for the treatment of uncomplicated urinary tract infections. For the treatment of systemic infections, please contact the laboratory for additional testing.                          Recent Labs   Lab 07/03/24  0732 07/02/24  0633 07/01/24  0713 06/30/24  0644 06/29/24  0657   * 112* 95 109* 114*     Recent Labs   Lab 07/03/24  0732 07/02/24  0633 07/01/24  0713   HGB 14.2 13.4 13.0*     No results for input(s): \"AST\", \"ALT\", \"GGT\", \"ALKPHOS\", \"BILITOTAL\", \"BILICONJ\", \"BILIDIRECT\", \"GERMAINE\" in the last 168 hours.    Invalid input(s): \"BILIRUBININDIRECT\"  No results for input(s): \"INR\" in the last 168 hours.  Recent Labs   Lab 07/02/24  1150 07/01/24  1659 " "  LACT 0.6* 1.3     No results for input(s): \"LIPASE\" in the last 168 hours.  No results for input(s): \"CHOL\", \"HDL\", \"LDL\", \"TRIG\", \"CHOLHDLRATIO\" in the last 168 hours.  No results for input(s): \"TSH\" in the last 168 hours.  No results for input(s): \"TROPONIN\", \"TROPI\", \"TROPR\", \"TROPONINIS\" in the last 168 hours.    Invalid input(s): \"TROPT\", \"TROP\", \"TROPONINIES\", \"TNIH\"  Recent Labs   Lab 06/28/24  0931   COLOR Yellow  Yellow   APPEARANCE Slightly Cloudy*  Slightly Cloudy*   URINEGLC Negative  Negative   URINEBILI Negative  Negative   URINEKETONE 20*  20*   SG 1.024  1.024   UBLD Negative  Negative   URINEPH 6.0  6.0   PROTEIN Negative  Negative   NITRITE Negative  Negative   LEUKEST Small*  Small*   RBCU 1  1   WBCU 22*  22*       No results found for this or any previous visit (from the past 24 hour(s)).       "

## 2024-07-04 ENCOUNTER — APPOINTMENT (OUTPATIENT)
Dept: ULTRASOUND IMAGING | Facility: CLINIC | Age: 34
End: 2024-07-04
Attending: STUDENT IN AN ORGANIZED HEALTH CARE EDUCATION/TRAINING PROGRAM
Payer: COMMERCIAL

## 2024-07-04 LAB
CREAT SERPL-MCNC: 0.83 MG/DL (ref 0.67–1.17)
EGFRCR SERPLBLD CKD-EPI 2021: >90 ML/MIN/1.73M2
ERYTHROCYTE [DISTWIDTH] IN BLOOD BY AUTOMATED COUNT: 13.8 % (ref 10–15)
HCT VFR BLD AUTO: 44.2 % (ref 40–53)
HGB BLD-MCNC: 14.3 G/DL (ref 13.3–17.7)
HOLD SPECIMEN: NORMAL
MCH RBC QN AUTO: 29.7 PG (ref 26.5–33)
MCHC RBC AUTO-ENTMCNC: 32.4 G/DL (ref 31.5–36.5)
MCV RBC AUTO: 92 FL (ref 78–100)
PLATELET # BLD AUTO: 358 10E3/UL (ref 150–450)
RBC # BLD AUTO: 4.81 10E6/UL (ref 4.4–5.9)
WBC # BLD AUTO: 23.3 10E3/UL (ref 4–11)

## 2024-07-04 PROCEDURE — 250N000013 HC RX MED GY IP 250 OP 250 PS 637: Performed by: INTERNAL MEDICINE

## 2024-07-04 PROCEDURE — 85027 COMPLETE CBC AUTOMATED: CPT | Performed by: UROLOGY

## 2024-07-04 PROCEDURE — 99233 SBSQ HOSP IP/OBS HIGH 50: CPT | Performed by: INTERNAL MEDICINE

## 2024-07-04 PROCEDURE — 82565 ASSAY OF CREATININE: CPT | Performed by: INTERNAL MEDICINE

## 2024-07-04 PROCEDURE — 76870 US EXAM SCROTUM: CPT

## 2024-07-04 PROCEDURE — 250N000013 HC RX MED GY IP 250 OP 250 PS 637: Performed by: PHYSICIAN ASSISTANT

## 2024-07-04 PROCEDURE — 93976 VASCULAR STUDY: CPT

## 2024-07-04 PROCEDURE — 120N000001 HC R&B MED SURG/OB

## 2024-07-04 PROCEDURE — 99232 SBSQ HOSP IP/OBS MODERATE 35: CPT | Performed by: UROLOGY

## 2024-07-04 PROCEDURE — 250N000011 HC RX IP 250 OP 636: Performed by: UROLOGY

## 2024-07-04 PROCEDURE — 36415 COLL VENOUS BLD VENIPUNCTURE: CPT | Performed by: INTERNAL MEDICINE

## 2024-07-04 PROCEDURE — 250N000011 HC RX IP 250 OP 636: Performed by: INTERNAL MEDICINE

## 2024-07-04 RX ORDER — LEVOFLOXACIN 5 MG/ML
750 INJECTION, SOLUTION INTRAVENOUS EVERY 24 HOURS
Status: DISCONTINUED | OUTPATIENT
Start: 2024-07-04 | End: 2024-07-06 | Stop reason: HOSPADM

## 2024-07-04 RX ADMIN — ACETAMINOPHEN 650 MG: 325 TABLET, FILM COATED ORAL at 19:02

## 2024-07-04 RX ADMIN — OXYCODONE HYDROCHLORIDE 10 MG: 5 TABLET ORAL at 03:50

## 2024-07-04 RX ADMIN — OXYCODONE HYDROCHLORIDE 10 MG: 5 TABLET ORAL at 09:28

## 2024-07-04 RX ADMIN — HYDROMORPHONE HYDROCHLORIDE 1 MG: 1 INJECTION, SOLUTION INTRAMUSCULAR; INTRAVENOUS; SUBCUTANEOUS at 23:14

## 2024-07-04 RX ADMIN — NICOTINE 1 PATCH: 21 PATCH, EXTENDED RELEASE TRANSDERMAL at 09:02

## 2024-07-04 RX ADMIN — HYDROMORPHONE HYDROCHLORIDE 0.5 MG: 1 INJECTION, SOLUTION INTRAMUSCULAR; INTRAVENOUS; SUBCUTANEOUS at 15:53

## 2024-07-04 RX ADMIN — OXYCODONE HYDROCHLORIDE 10 MG: 5 TABLET ORAL at 18:58

## 2024-07-04 RX ADMIN — SENNOSIDES AND DOCUSATE SODIUM 1 TABLET: 8.6; 5 TABLET ORAL at 09:03

## 2024-07-04 RX ADMIN — CEFTRIAXONE 2 G: 2 INJECTION, POWDER, FOR SOLUTION INTRAMUSCULAR; INTRAVENOUS at 13:52

## 2024-07-04 RX ADMIN — HYDROMORPHONE HYDROCHLORIDE 1 MG: 1 INJECTION, SOLUTION INTRAMUSCULAR; INTRAVENOUS; SUBCUTANEOUS at 06:17

## 2024-07-04 RX ADMIN — OXYCODONE HYDROCHLORIDE 10 MG: 5 TABLET ORAL at 13:49

## 2024-07-04 RX ADMIN — LEVOFLOXACIN 750 MG: 5 INJECTION, SOLUTION INTRAVENOUS at 11:35

## 2024-07-04 RX ADMIN — HYDROMORPHONE HYDROCHLORIDE 1 MG: 1 INJECTION, SOLUTION INTRAMUSCULAR; INTRAVENOUS; SUBCUTANEOUS at 01:12

## 2024-07-04 ASSESSMENT — ACTIVITIES OF DAILY LIVING (ADL)
ADLS_ACUITY_SCORE: 21
ADLS_ACUITY_SCORE: 18
ADLS_ACUITY_SCORE: 21
ADLS_ACUITY_SCORE: 18
ADLS_ACUITY_SCORE: 21
ADLS_ACUITY_SCORE: 20
ADLS_ACUITY_SCORE: 21
ADLS_ACUITY_SCORE: 18
ADLS_ACUITY_SCORE: 21
ADLS_ACUITY_SCORE: 21
ADLS_ACUITY_SCORE: 18
ADLS_ACUITY_SCORE: 18
ADLS_ACUITY_SCORE: 21
ADLS_ACUITY_SCORE: 18
ADLS_ACUITY_SCORE: 18
ADLS_ACUITY_SCORE: 21
ADLS_ACUITY_SCORE: 18
ADLS_ACUITY_SCORE: 21

## 2024-07-04 NOTE — PROGRESS NOTES
[facility] Urology Consult Progress Note          Assessment and Plan:     Active Problems:    Epididymoorchitis    Assessment: Significant epididymo-orchitis    Plan:     -Reviewed his repeat ultrasound with no significant changes and continued diminished blood flow to the left testicle  - Labs drawn this morning did not include a CBC, I placed an order for this and it is pending  - We discussed that I would recommend the addition of fluoroquinolone, and order for levofloxacin 750 mg IV daily ordered  - He is not interested in surgery today, and I think it reasonable to give the fluoroquinolone sometime  - Regular diet resumed  - N.p.o. after midnight for potential OR tomorrow  - Recommend continued scrotal support and intermittent ice      Favian Ayers MD   [facility]  Office: [phone]               Interval History:     Seen and examined, frustrated with the lack of any progress.  Remains very fixated on his prior round of Keflex in February for UTI.  Notes pain is slightly improved at 4 out of 10 today             Review of Systems:     The 5 point Review of Systems is negative other than noted in the HPI             Medications:     Current Facility-Administered Medications   Medication Dose Route Frequency Provider Last Rate Last Admin    acetaminophen (TYLENOL) tablet 650 mg  650 mg Oral Q4H PRN Lashonda Mandujano PA-C   650 mg at 07/03/24 1641    Or    acetaminophen (TYLENOL) Suppository 650 mg  650 mg Rectal Q4H PRN Lashonda Mandujano PA-C        artificial saliva (BIOTENE MT) solution 2 spray  2 spray Swish & Spit 4x Daily PRN Lashonda Mandujano PA-C        bisacodyl (DULCOLAX) suppository 10 mg  10 mg Rectal Daily PRN Lashonda Mandujano PA-C        cefTRIAXone (ROCEPHIN) 2 g vial to attach to  ml bag for ADULTS or NS 50 ml bag for PEDS  2 g Intravenous Q24H Abdoul Montero MD   2 g at 07/03/24 1450    HYDROmorphone (DILAUDID) tablet 4 mg  4 mg Oral  Q4H PRN Abdoul Montero MD   4 mg at 07/03/24 1646    HYDROmorphone (PF) (DILAUDID) injection 0.5-1 mg  0.5-1 mg Intravenous Q4H PRN Abdoul Montero MD   1 mg at 07/04/24 0617    levofloxacin (LEVAQUIN) infusion 750 mg  750 mg Intravenous Q24H Favian Ayers MD        lidocaine (LMX4) cream   Topical Q1H PRN Lashonda Mandujano PA-C   Given at 07/03/24 1810    lidocaine 1 % 0.1-1 mL  0.1-1 mL Other Q1H PRN Lashonda Mandujano PA-C        LORazepam (ATIVAN) injection 0.5-1 mg  0.5-1 mg Intravenous Q4H PRN Lashonda Mandujano PA-C        magnesium hydroxide (MILK OF MAGNESIA) suspension 15-30 mL  15-30 mL Oral Daily PRN Lashonda Mandujano PA-C   30 mL at 07/02/24 0500    naloxone (NARCAN) injection 0.2 mg  0.2 mg Intravenous Q2 Min PRN Lashonda Mandujano PA-C        Or    naloxone (NARCAN) injection 0.4 mg  0.4 mg Intravenous Q2 Min PRN Lashonda Mandujano PA-C        Or    naloxone (NARCAN) injection 0.2 mg  0.2 mg Intramuscular Q2 Min PRN Lashonda Mandujano PA-C        Or    naloxone (NARCAN) injection 0.4 mg  0.4 mg Intramuscular Q2 Min PRN Lashonda Mandujano PA-C        nicotine (NICODERM CQ) 21 MG/24HR 24 hr patch 1 patch  1 patch Transdermal Daily Abdoul Montero MD   1 patch at 07/04/24 0902    ondansetron (ZOFRAN) injection 4 mg  4 mg Intravenous Q6H PRN Lashonda Mandujano PA-C   4 mg at 06/30/24 0347    Or    ondansetron (ZOFRAN ODT) ODT tab 4 mg  4 mg Oral Q6H PRN Lashonda Mandujano PA-C        oxyCODONE (ROXICODONE) tablet 5-10 mg  5-10 mg Oral Q4H PRN Lashonda Mandujano PA-C   10 mg at 07/04/24 0928    prochlorperazine (COMPAZINE) injection 10 mg  10 mg Intravenous Q6H PRN Lashonda Mandujano PA-C        Or    prochlorperazine (COMPAZINE) tablet 10 mg  10 mg Oral Q6H PRN Lashonda Mandujano PA-C        Or    prochlorperazine (COMPAZINE) suppository 25 mg  25 mg Rectal Q12H PRN Lashonda Mandujano PA-C        senna-docusate (SENOKOT-S/PERICOLACE)  8.6-50 MG per tablet 1 tablet  1 tablet Oral BID Lashonda Mandujano PA-C   1 tablet at 07/04/24 0903    sodium chloride (PF) 0.9% PF flush 3 mL  3 mL Intracatheter q1 min prn Lashonda Mandujano PA-C   3 mL at 07/01/24 1337    sodium chloride (PF) 0.9% PF flush 3 mL  3 mL Intracatheter Q8H Lashonda Mandujano PA-C   3 mL at 07/04/24 0903                  Physical Exam:   Vitals were reviewed  Patient Vitals for the past 8 hrs:   BP Temp Temp src Pulse Resp SpO2   07/04/24 0730 120/73 98.2  F (36.8  C) Temporal 70 18 98 %     GEN: NAD, lying in bed  HEENT: EOMI  NECK: Supple  ABD: soft  EXT: No LE edema  : Normal phallus, normal right testis, significantly enlarged left hemiscrotum with a enlarged testicle and epididymis extending into the cord which is tender to palpation, slightly firm and erythematous with no induration or crepitus.           Data:     Lab Results   Component Value Date    CR 0.83 07/04/2024    CR 0.77 07/03/2024    CR 0.80 07/02/2024    CR 0.78 07/01/2024    CR 0.86 06/30/2024     Lab Results   Component Value Date    WBC 20.5 (H) 07/03/2024    WBC 20.1 (H) 07/02/2024    WBC 18.7 (H) 07/01/2024    HGB 14.2 07/03/2024    HGB 13.4 07/02/2024    HGB 13.0 (L) 07/01/2024    HCT 42.9 07/03/2024    HCT 40.4 07/02/2024    HCT 39.0 (L) 07/01/2024    MCV 89 07/03/2024    MCV 89 07/02/2024    MCV 89 07/01/2024     07/03/2024     07/02/2024     07/01/2024     Lab Results   Component Value Date    INR 1.12 01/12/2007     U/S TESTICULAR 7/4/2024:  FINDINGS:     RIGHT: Right testicle measures Measures 5.4 x 3.2 x 2.5 cm. Normal testicle with no masses. Normal arterial duplex and normal color flow. There is 5 mm epididymal head cyst. No hydrocele. No varicocele.     LEFT: Left testicle measures 5.1 x 3.8 x 3.2 cm. Diffuse heterogenous appearance of the left testicle with significant decreased internal flow, not significantly changed as compared to 7/2/2024. Mild diffuse epididymal  thickening with diffuse hyperemia,   not significantly changed as compared to 7/2/2024. No hydrocele. No varicocele.     Again noted complex hyperemic area along the superior aspect of the left testicle, not significantly changed as compared to 7/2/2024.                                                                      IMPRESSION:  1.  Diffuse heterogenous appearance of the left testicle with significant decreased internal flow as compared to the right, not significantly changed as compared to 7/2/2024 exam. No significant fluid collection visualized.  2.  Mild diffuse left epididymal thickening with diffuse epididymal hyperemia, not significantly changed as compared to 7/2/2024.  3.  Again noted complex hyperemic area along the superior aspect of the left testicle, not significantly changed as compared to 7/2/2024.

## 2024-07-04 NOTE — PLAN OF CARE
"Pt is alert and oriented x 4. PRN dilaudid x 2 and oxycodone administered to manage pain. Pt denies any shortness of breath and on room air.    Edema and redness persists to left scrotal area. Ultrasound completed on this shift.    NPO maintained. Ongoing monitoring.    Plan: Urology following.    /83 (BP Location: Right arm, Patient Position: Semi-Mcconnell's, Cuff Size: Adult Regular)   Pulse 100   Temp 98.8  F (37.1  C) (Temporal)   Resp 18   Ht 1.803 m (5' 11\")   Wt 77.9 kg (171 lb 12.8 oz)   SpO2 98%   BMI 23.96 kg/m       Problem: Adult Inpatient Plan of Care  Goal: Plan of Care Review  Description: The Plan of Care Review/Shift note should be completed every shift.  The Outcome Evaluation is a brief statement about your assessment that the patient is improving, declining, or no change.  This information will be displayed automatically on your shift  note.  Outcome: Progressing  Flowsheets (Taken 7/4/2024 0225)  Outcome Evaluation: PRN Dilaudid adminstered to manage scrotal/testicular pain.  Plan of Care Reviewed With: patient  Overall Patient Progress: no change  Goal: Patient-Specific Goal (Individualized)  Description: You can add care plan individualizations to a care plan. Examples of Individualization might be:  \"Parent requests to be called daily at 9am for status\", \"I have a hard time hearing out of my right ear\", or \"Do not touch me to wake me up as it startles  me\".  Outcome: Progressing  Goal: Absence of Hospital-Acquired Illness or Injury  Outcome: Progressing  Intervention: Identify and Manage Fall Risk  Recent Flowsheet Documentation  Taken 7/4/2024 0200 by Jimbo Mcdonough, JENN  Safety Promotion/Fall Prevention: safety round/check completed  Taken 7/4/2024 0100 by Jimbo Mcdonough RN  Safety Promotion/Fall Prevention: safety round/check completed  Taken 7/3/2024 2353 by Jimbo Mcdonough, RN  Safety Promotion/Fall Prevention:   safety round/check completed   nonskid shoes/slippers when " out of bed   lighting adjusted   clutter free environment maintained   assistive device/personal items within reach   patient and family education  Taken 7/3/2024 2300 by Jimbo Mcdonough RN  Safety Promotion/Fall Prevention: safety round/check completed  Intervention: Prevent Skin Injury  Recent Flowsheet Documentation  Taken 7/3/2024 2353 by Jimbo Mcdonough RN  Body Position: position changed independently  Intervention: Prevent and Manage VTE (Venous Thromboembolism) Risk  Recent Flowsheet Documentation  Taken 7/3/2024 2353 by Jimbo Mcdonough RN  VTE Prevention/Management: SCDs off (sequential compression devices)  Intervention: Prevent Infection  Recent Flowsheet Documentation  Taken 7/3/2024 2353 by Jimbo Mcdonough RN  Infection Prevention:   single patient room provided   rest/sleep promoted   hand hygiene promoted  Goal: Optimal Comfort and Wellbeing  Outcome: Progressing  Intervention: Monitor Pain and Promote Comfort  Recent Flowsheet Documentation  Taken 7/4/2024 0112 by Jimbo Mcdonough RN  Pain Management Interventions: medication (see MAR)  Goal: Readiness for Transition of Care  Outcome: Progressing     Problem: Pain Acute  Goal: Optimal Pain Control and Function  Outcome: Progressing  Intervention: Develop Pain Management Plan  Recent Flowsheet Documentation  Taken 7/4/2024 0112 by Jimbo Mcdonough RN  Pain Management Interventions: medication (see MAR)  Intervention: Prevent or Manage Pain  Recent Flowsheet Documentation  Taken 7/3/2024 2353 by Jimbo Mcdonough RN  Medication Review/Management: medications reviewed     Problem: Substance Withdrawal  Goal: Substance Withdrawal  Description: Signs and symptoms of listed problems will be absent or manageable.  Outcome: Progressing  Goal: Social and Therapeutic (Substance Withdrawal)  Description: Signs and symptoms of listed problems will be absent or manageable.  Outcome: Progressing     Problem: Infection  Goal: Absence of Infection Signs  and Symptoms  Outcome: Progressing   Goal Outcome Evaluation:      Plan of Care Reviewed With: patient    Overall Patient Progress: no changeOverall Patient Progress: no change    Outcome Evaluation: PRN Dilaudid adminstered to manage scrotal/testicular pain.

## 2024-07-04 NOTE — PROGRESS NOTES
[facility]    Hospitalist Progress Note  Name: Ricky Rowland    MRN: [id number]  Provider: Susanne Fernandez MD  Date of Service: 07/04/2024    Assessment & Plan   Summary of Stay: Ricky Rowland is a 34 year old male who was admitted on 6/28/2024. He is a 34-year-old male with a history of UTIs, gonorrhea, ADHD, and amphetamine abuse presented with severe testicular pain and sepsis due to acute epididymoorchitis, with suspected intermittent testicular torsion. his clinical status has worsened, with increased pain, persistent leukocytosis, and low-grade fevers. He is being treated with Ceftriaxone 2 g daily and has a pain management regimen. His acute metabolic encephalopathy from amphetamine withdrawal has improved. The patient has a history of polysubstance abuse, with recent use of cannabis and amphetamines, and is not on ADHD medications.  Repeat scrotal ultrasound shows no significant change or worsening flow.  Urology evaluated patient today and changed antibiotic to levofloxacin after discussion with patient.  Plan is to treat conservatively try to protect and avoid orchiectomy if possible.  Plan is to keep patient n.p.o. at midnight for possible or tomorrow.    Miscellaneous note: Patient expressed that he is uncomfortable with a female provider as somehow may not be more aware of male urogenital system.  Patient appears to have a trust issue with a female provider.  He noted that he did not believe or trust that I had spoken to the urology PA.  He was upset that I was not at his bedside when urologist was there.  Patient expressed changing of providers and did not want to continue his cares with a female provider.  I offered to transfer his care to my partner tomorrow.  I tried to explain his disease process and antibiotic choices.  Patient seems quite frustrated, has threatened multiple times in the last several days for leaving AMA, he has been disrespectful many times.    Sepsis 2/2 Acute  Epididymorchitis  -Presents with severe testicular pain and found to have evidence of epididymitis and orchitis  -Initial concern for testicular torsion on imaging although urology feels this is less likely  -Chlamydia and Gonorrhea testing negative  -Urology consulted, holding off on surgery for now  -Ceftriaxone 2 g daily (E. coli sensitive to ceftriaxone)  -NPO in case surgery needed  -Urology consulted, appreciate recommendations  -Pain regimen in place     Acute Metabolic Encephalopathy 2/2 Amphetamine Withdrawal  -Resolved    Other Issues  -Polysubstance abuse: UDS with cannabis and amphetamines  -ADHD: Not on prescription medications for this it appears    DVT Prophylaxis: Pneumatic Compression Devices  Code Status: Full Code    Disposition: Expected discharge at least 2 to 3 days pending clinical course      Interval History   Reviewed chart.  I was asked to please see patient stat as he was unhappy with the choice of antibiotics.  Yesterday he had asked me to change his medication to oral Keflex.  Today patient was mad that his symptoms and white count is not getting any better.  He seems frustrated and he seems angry.  However it seems like he has underlying fear.  I tried to alleviate his concerns and explained the disease process and treatment plan however patient was not trusting frustrated and asked that his provider be change.  He did not want a female provider as in his opinion would not understand testicular and scrotal pathology.  I tried my best to explain.  Total time spent in direct patient care is mostly counseling and communicating and explaining more than 60 minutes.  -Data reviewed today: I reviewed all new labs and imaging reports over the last 24 hours. I personally reviewed no images or EKG's today.    Physical Exam   Temp: 96.9  F (36.1  C) Temp src: Temporal BP: (!) 144/79 Pulse: 117   Resp: 18 SpO2: 95 % O2 Device: None (Room air)    Vitals:    06/29/24 1203   Weight: 77.9 kg (171 lb  12.8 oz)     Vital Signs with Ranges  Temp:  [96.9  F (36.1  C)-98.8  F (37.1  C)] 96.9  F (36.1  C)  Pulse:  [] 117  Resp:  [16-20] 18  BP: (120-144)/(73-89) 144/79  SpO2:  [95 %-100 %] 95 %  No intake/output data recorded.      GEN:  Alert, oriented x 3, appears comfortable, NAD.  Patient declined exam    Medications   Current Facility-Administered Medications   Medication Dose Route Frequency Provider Last Rate Last Admin     Current Facility-Administered Medications   Medication Dose Route Frequency Provider Last Rate Last Admin    cefTRIAXone (ROCEPHIN) 2 g vial to attach to  ml bag for ADULTS or NS 50 ml bag for PEDS  2 g Intravenous Q24H Abdoul Montero MD   2 g at 07/04/24 1352    levofloxacin (LEVAQUIN) infusion 750 mg  750 mg Intravenous Q24H Favian Ayers  mL/hr at 07/04/24 1135 750 mg at 07/04/24 1135    nicotine (NICODERM CQ) 21 MG/24HR 24 hr patch 1 patch  1 patch Transdermal Daily Abdoul Montero MD   1 patch at 07/04/24 0902    senna-docusate (SENOKOT-S/PERICOLACE) 8.6-50 MG per tablet 1 tablet  1 tablet Oral BID Lashonda Mandujano PA-C   1 tablet at 07/04/24 0903    sodium chloride (PF) 0.9% PF flush 3 mL  3 mL Intracatheter Q8H Lashonda Mandujano PA-C   3 mL at 07/04/24 0903     Data     Recent Labs   Lab 07/04/24  0552 07/03/24  0732 07/02/24  0633   WBC 23.3* 20.5* 20.1*   HGB 14.3 14.2 13.4   HCT 44.2 42.9 40.4   MCV 92 89 89    419 372     Recent Labs   Lab 07/04/24  0552 07/03/24  0732 07/02/24  0633 07/01/24  0713   NA  --  137 140 142   POTASSIUM  --  4.5 3.7 3.8   CHLORIDE  --  101 103 105   CO2  --  23 24 24   ANIONGAP  --  13 13 13   GLC  --  113* 112* 95   BUN  --  8.5 8.9 8.9   CR 0.83 0.77 0.80 0.78   GFRESTIMATED >90 >90 >90 >90   CHARLENE  --  9.0 9.0 8.2*     7-Day Micro Results       Collected Updated Procedure Result Status      06/28/2024 1843 07/03/2024 2047 Blood Culture Arm, Right [[id number]]   Blood from Arm, Right     Final result Component Value   Culture No Growth               06/28/2024 1843 07/03/2024 2047 Blood Culture Arm, Left [[id number]]   Blood from Arm, Left    Final result Component Value   Culture No Growth               06/28/2024 0931 06/28/2024 1725 Chlamydia trachomatis/Neisseria gonorrhoeae by PCR [[id number]]   Urine, Voided    Final result Component Value   Chlamydia Trachomatis Negative   Negative for C. trachomatis rRNA by transcription mediated amplification.   A negative result by transcription mediated amplification does not preclude the presence of infection because results are dependent on proper and adequate collection, absence of inhibitors and sufficient rRNA to be detected.   Neisseria gonorrhoeae Negative   Negative for N. gonorrhoeae rRNA by transcription mediated amplification. A negative result by transcription mediated amplification does not preclude the presence of C. trachomatis infection because results are dependent on proper and adequate collection, absence of inhibitors and sufficient rRNA to be detected.            06/28/2024 0931 06/30/2024 0150 Urine Culture [[id number]]    (Abnormal)   Urine, NOS    Final result Component Value   Culture >100,000 CFU/mL Escherichia coli        Susceptibility        Escherichia coli      MACY      Ampicillin >=32 ug/mL Resistant      Ampicillin/ Sulbactam 16 ug/mL Intermediate      Cefazolin <=4 ug/mL Susceptible  [1]       Cefepime <=1 ug/mL Susceptible      Cefoxitin <=4 ug/mL Susceptible      Ceftazidime <=1 ug/mL Susceptible      Ceftriaxone <=1 ug/mL Susceptible      Ciprofloxacin <=0.25 ug/mL Susceptible      Gentamicin <=1 ug/mL Susceptible      Levofloxacin <=0.12 ug/mL Susceptible      Nitrofurantoin <=16 ug/mL Susceptible      Piperacillin/Tazobactam <=4 ug/mL Susceptible      Tobramycin <=1 ug/mL Susceptible      Trimethoprim/Sulfamethoxazole <=1/19 ug/mL Susceptible                   [1]  Cefazolin MACY breakpoints are for the  "treatment of uncomplicated urinary tract infections. For the treatment of systemic infections, please contact the laboratory for additional testing.                          Recent Labs   Lab 07/03/24  0732 07/02/24  0633 07/01/24  0713 06/30/24  0644 06/29/24  0657   * 112* 95 109* 114*     Recent Labs   Lab 07/04/24  0552 07/03/24  0732 07/02/24  0633   HGB 14.3 14.2 13.4     No results for input(s): \"AST\", \"ALT\", \"GGT\", \"ALKPHOS\", \"BILITOTAL\", \"BILICONJ\", \"BILIDIRECT\", \"GERMAINE\" in the last 168 hours.    Invalid input(s): \"BILIRUBININDIRECT\"  No results for input(s): \"INR\" in the last 168 hours.  Recent Labs   Lab 07/02/24  1150 07/01/24  1659   LACT 0.6* 1.3     No results for input(s): \"LIPASE\" in the last 168 hours.  No results for input(s): \"CHOL\", \"HDL\", \"LDL\", \"TRIG\", \"CHOLHDLRATIO\" in the last 168 hours.  No results for input(s): \"TSH\" in the last 168 hours.  No results for input(s): \"TROPONIN\", \"TROPI\", \"TROPR\", \"TROPONINIS\" in the last 168 hours.    Invalid input(s): \"TROPT\", \"TROP\", \"TROPONINIES\", \"TNIH\"  Recent Labs   Lab 06/28/24  0931   COLOR Yellow  Yellow   APPEARANCE Slightly Cloudy*  Slightly Cloudy*   URINEGLC Negative  Negative   URINEBILI Negative  Negative   URINEKETONE 20*  20*   SG 1.024  1.024   UBLD Negative  Negative   URINEPH 6.0  6.0   PROTEIN Negative  Negative   NITRITE Negative  Negative   LEUKEST Small*  Small*   RBCU 1  1   WBCU 22*  22*       Recent Results (from the past 24 hour(s))   US Testicular & Scrotum w Doppler Ltd    Narrative    EXAM: US TESTICULAR AND SCROTUM WITH DOPPLER LIMITED  LOCATION: [facility]  DATE: 7/4/2024    INDICATION: Follow severe epididymoorchitis, assess for developing abscess, assess blood flow to left testicle.  COMPARISON: Scrotal ultrasound on 7/2/2024.  TECHNIQUE: Ultrasound of scrotum with color flow and spectral Doppler with waveform analysis performed.    FINDINGS:    RIGHT: Right testicle measures " Measures 5.4 x 3.2 x 2.5 cm. Normal testicle with no masses. Normal arterial duplex and normal color flow. There is 5 mm epididymal head cyst. No hydrocele. No varicocele.    LEFT: Left testicle measures 5.1 x 3.8 x 3.2 cm. Diffuse heterogenous appearance of the left testicle with significant decreased internal flow, not significantly changed as compared to 7/2/2024. Mild diffuse epididymal thickening with diffuse hyperemia,   not significantly changed as compared to 7/2/2024. No hydrocele. No varicocele.    Again noted complex hyperemic area along the superior aspect of the left testicle, not significantly changed as compared to 7/2/2024.      Impression    IMPRESSION:  1.  Diffuse heterogenous appearance of the left testicle with significant decreased internal flow as compared to the right, not significantly changed as compared to 7/2/2024 exam. No significant fluid collection visualized.  2.  Mild diffuse left epididymal thickening with diffuse epididymal hyperemia, not significantly changed as compared to 7/2/2024.  3.  Again noted complex hyperemic area along the superior aspect of the left testicle, not significantly changed as compared to 7/2/2024.

## 2024-07-04 NOTE — PLAN OF CARE
"Goal Outcome Evaluation:      Plan of Care Reviewed With: patient, significant other    Overall Patient Progress: no changeOverall Patient Progress: no change    Outcome Evaluation: Patient verbally frustrated with stagnated progress with infection and increasing WBC values. Blames change in antibiotics to ceftriaxone on the 1st. From conversation, does not appear to trust hospitalist nor urology. Stated that he felt surgery was being pushed so urology could make more money. More calm and relaxed after antbx changed to levofloxacin. Did verbalize that he may be getting ready to accept surgery.      Problem: Adult Inpatient Plan of Care  Goal: Plan of Care Review  Description: The Plan of Care Review/Shift note should be completed every shift.  The Outcome Evaluation is a brief statement about your assessment that the patient is improving, declining, or no change.  This information will be displayed automatically on your shift  note.  Outcome: Progressing  Flowsheets (Taken 7/4/2024 0561)  Outcome Evaluation: Patient verbally frustrated with stagnated progress with infection and increasing WBC values. Blames change in antibiotics to ceftriaxone on the 1st. From conversation, does not appear to trust hospitalist nor urology. Stated that he felt surgery was being pushed so urology could make more money. More calm and relaxed after antbx changed to levofloxacin. Did verbalize that he may be getting ready to accept surgery.  Plan of Care Reviewed With:   patient   significant other  Overall Patient Progress: no change  Goal: Patient-Specific Goal (Individualized)  Description: You can add care plan individualizations to a care plan. Examples of Individualization might be:  \"Parent requests to be called daily at 9am for status\", \"I have a hard time hearing out of my right ear\", or \"Do not touch me to wake me up as it startles  me\".  Outcome: Progressing  Goal: Absence of Hospital-Acquired Illness or Injury  Outcome: " Progressing  Intervention: Identify and Manage Fall Risk  Recent Flowsheet Documentation  Taken 7/4/2024 0900 by Blane Harkins RN  Safety Promotion/Fall Prevention:   safety round/check completed   room near nurse's station  Intervention: Prevent Skin Injury  Recent Flowsheet Documentation  Taken 7/4/2024 0900 by Blane Harkins RN  Body Position: position changed independently  Intervention: Prevent and Manage VTE (Venous Thromboembolism) Risk  Recent Flowsheet Documentation  Taken 7/4/2024 0900 by Blane Harkins RN  VTE Prevention/Management: SCDs off (sequential compression devices)  Goal: Optimal Comfort and Wellbeing  Outcome: Progressing  Intervention: Monitor Pain and Promote Comfort  Recent Flowsheet Documentation  Taken 7/4/2024 0928 by Blane Harkins RN  Pain Management Interventions: medication (see MAR)  Goal: Readiness for Transition of Care  Outcome: Progressing     Problem: Pain Acute  Goal: Optimal Pain Control and Function  Outcome: Progressing  Intervention: Develop Pain Management Plan  Recent Flowsheet Documentation  Taken 7/4/2024 0928 by Blane Harkins RN  Pain Management Interventions: medication (see MAR)  Intervention: Prevent or Manage Pain  Recent Flowsheet Documentation  Taken 7/4/2024 0900 by Blane Harkins RN  Medication Review/Management: medications reviewed     Problem: Substance Withdrawal  Goal: Substance Withdrawal  Description: Signs and symptoms of listed problems will be absent or manageable.  Outcome: Progressing  Goal: Social and Therapeutic (Substance Withdrawal)  Description: Signs and symptoms of listed problems will be absent or manageable.  Outcome: Progressing     Problem: Infection  Goal: Absence of Infection Signs and Symptoms  Outcome: Progressing

## 2024-07-05 LAB
CREAT SERPL-MCNC: 0.95 MG/DL (ref 0.67–1.17)
CRP SERPL-MCNC: 221.85 MG/L
EGFRCR SERPLBLD CKD-EPI 2021: >90 ML/MIN/1.73M2
ERYTHROCYTE [DISTWIDTH] IN BLOOD BY AUTOMATED COUNT: 13.4 % (ref 10–15)
HCT VFR BLD AUTO: 44.7 % (ref 40–53)
HGB BLD-MCNC: 14.9 G/DL (ref 13.3–17.7)
MCH RBC QN AUTO: 29.6 PG (ref 26.5–33)
MCHC RBC AUTO-ENTMCNC: 33.3 G/DL (ref 31.5–36.5)
MCV RBC AUTO: 89 FL (ref 78–100)
PLATELET # BLD AUTO: 427 10E3/UL (ref 150–450)
RBC # BLD AUTO: 5.04 10E6/UL (ref 4.4–5.9)
WBC # BLD AUTO: 19.5 10E3/UL (ref 4–11)

## 2024-07-05 PROCEDURE — 250N000011 HC RX IP 250 OP 636: Performed by: UROLOGY

## 2024-07-05 PROCEDURE — 82565 ASSAY OF CREATININE: CPT | Performed by: INTERNAL MEDICINE

## 2024-07-05 PROCEDURE — 250N000013 HC RX MED GY IP 250 OP 250 PS 637: Performed by: INTERNAL MEDICINE

## 2024-07-05 PROCEDURE — 250N000013 HC RX MED GY IP 250 OP 250 PS 637: Performed by: PHYSICIAN ASSISTANT

## 2024-07-05 PROCEDURE — 86140 C-REACTIVE PROTEIN: CPT | Performed by: INTERNAL MEDICINE

## 2024-07-05 PROCEDURE — 85027 COMPLETE CBC AUTOMATED: CPT | Performed by: UROLOGY

## 2024-07-05 PROCEDURE — 250N000011 HC RX IP 250 OP 636: Performed by: INTERNAL MEDICINE

## 2024-07-05 PROCEDURE — 36415 COLL VENOUS BLD VENIPUNCTURE: CPT | Performed by: UROLOGY

## 2024-07-05 PROCEDURE — 120N000001 HC R&B MED SURG/OB

## 2024-07-05 PROCEDURE — 99233 SBSQ HOSP IP/OBS HIGH 50: CPT | Performed by: INTERNAL MEDICINE

## 2024-07-05 RX ORDER — KETOROLAC TROMETHAMINE 30 MG/ML
30 INJECTION, SOLUTION INTRAMUSCULAR; INTRAVENOUS EVERY 6 HOURS PRN
Status: DISCONTINUED | OUTPATIENT
Start: 2024-07-05 | End: 2024-07-06 | Stop reason: HOSPADM

## 2024-07-05 RX ORDER — HYDROMORPHONE HYDROCHLORIDE 1 MG/ML
.5-1 INJECTION, SOLUTION INTRAMUSCULAR; INTRAVENOUS; SUBCUTANEOUS
Status: DISCONTINUED | OUTPATIENT
Start: 2024-07-05 | End: 2024-07-06 | Stop reason: HOSPADM

## 2024-07-05 RX ORDER — HYDROMORPHONE HYDROCHLORIDE 4 MG/1
4 TABLET ORAL
Status: DISCONTINUED | OUTPATIENT
Start: 2024-07-05 | End: 2024-07-06 | Stop reason: HOSPADM

## 2024-07-05 RX ADMIN — CEFTRIAXONE 2 G: 2 INJECTION, POWDER, FOR SOLUTION INTRAMUSCULAR; INTRAVENOUS at 14:44

## 2024-07-05 RX ADMIN — ACETAMINOPHEN 650 MG: 325 TABLET, FILM COATED ORAL at 00:45

## 2024-07-05 RX ADMIN — OXYCODONE HYDROCHLORIDE 10 MG: 5 TABLET ORAL at 00:45

## 2024-07-05 RX ADMIN — ACETAMINOPHEN 650 MG: 325 TABLET, FILM COATED ORAL at 06:48

## 2024-07-05 RX ADMIN — KETOROLAC TROMETHAMINE 30 MG: 30 INJECTION, SOLUTION INTRAMUSCULAR at 18:38

## 2024-07-05 RX ADMIN — HYDROMORPHONE HYDROCHLORIDE 4 MG: 4 TABLET ORAL at 09:21

## 2024-07-05 RX ADMIN — HYDROMORPHONE HYDROCHLORIDE 0.5 MG: 1 INJECTION, SOLUTION INTRAMUSCULAR; INTRAVENOUS; SUBCUTANEOUS at 16:28

## 2024-07-05 RX ADMIN — ACETAMINOPHEN 650 MG: 325 TABLET, FILM COATED ORAL at 18:11

## 2024-07-05 RX ADMIN — HYDROMORPHONE HYDROCHLORIDE 0.5 MG: 1 INJECTION, SOLUTION INTRAMUSCULAR; INTRAVENOUS; SUBCUTANEOUS at 13:10

## 2024-07-05 RX ADMIN — LEVOFLOXACIN 750 MG: 5 INJECTION, SOLUTION INTRAVENOUS at 13:09

## 2024-07-05 RX ADMIN — HYDROMORPHONE HYDROCHLORIDE 4 MG: 2 TABLET ORAL at 03:21

## 2024-07-05 RX ADMIN — OXYCODONE HYDROCHLORIDE 10 MG: 5 TABLET ORAL at 06:48

## 2024-07-05 ASSESSMENT — ACTIVITIES OF DAILY LIVING (ADL)
ADLS_ACUITY_SCORE: 18

## 2024-07-05 NOTE — PLAN OF CARE
"Goal Outcome Evaluation:    Pt is angry and anxious. Said his pain always 9/10. Pain medications make his pain tolerable, but pain level  is sever. LS CTA. No SOB. Denies chills. No  N/V. Appetite good .frined  at bedside.NPO from midnight.       Plan of Care Reviewed With: patient, friend    Overall Patient Progress: no changeOverall Patient Progress: no change    Outcome Evaluation: pt is anxious, frustrated.coopertive, but not polite. on RA. Ind. voiding. rates his pain 9/10. swelling and redness present to testicles.tolarating regular diet.      Problem: Adult Inpatient Plan of Care  Goal: Plan of Care Review  Description: The Plan of Care Review/Shift note should be completed every shift.  The Outcome Evaluation is a brief statement about your assessment that the patient is improving, declining, or no change.  This information will be displayed automatically on your shift  note.  Outcome: Progressing  Flowsheets (Taken 7/4/2024 2047)  Outcome Evaluation: pt is anxious, frustrated.coopertive, but not polite. on RA. Ind. voiding. rates his pain 9/10. swelling and redness present to testicles.tolarating regular diet.  Plan of Care Reviewed With:   patient   friend  Overall Patient Progress: no change  Goal: Patient-Specific Goal (Individualized)  Description: You can add care plan individualizations to a care plan. Examples of Individualization might be:  \"Parent requests to be called daily at 9am for status\", \"I have a hard time hearing out of my right ear\", or \"Do not touch me to wake me up as it startles  me\".  Outcome: Progressing  Goal: Absence of Hospital-Acquired Illness or Injury  Outcome: Progressing  Intervention: Identify and Manage Fall Risk  Recent Flowsheet Documentation  Taken 7/4/2024 1600 by Cortney Ochoa RN  Safety Promotion/Fall Prevention:   safety round/check completed   room near nurse's station  Intervention: Prevent Skin Injury  Recent Flowsheet Documentation  Taken 7/4/2024 1600 by " Cortney Ochoa RN  Body Position: position changed independently  Device Skin Pressure Protection: other (see comments)  Intervention: Prevent and Manage VTE (Venous Thromboembolism) Risk  Recent Flowsheet Documentation  Taken 7/4/2024 1600 by Cortney Ochoa RN  VTE Prevention/Management: SCDs off (sequential compression devices)  Intervention: Prevent Infection  Recent Flowsheet Documentation  Taken 7/4/2024 1600 by Cortney Ochoa RN  Infection Prevention:   single patient room provided   rest/sleep promoted   hand hygiene promoted  Goal: Optimal Comfort and Wellbeing  Outcome: Progressing  Intervention: Monitor Pain and Promote Comfort  Recent Flowsheet Documentation  Taken 7/4/2024 2003 by Cortney Ochoa RN  Pain Management Interventions: medication (see MAR)  Taken 7/4/2024 1943 by Cortney Ochoa RN  Pain Management Interventions: medication (see MAR)  Taken 7/4/2024 1858 by Cortney Ochoa RN  Pain Management Interventions: medication (see MAR)  Taken 7/4/2024 1608 by Cortney Ochoa RN  Pain Management Interventions: medication (see MAR)  Goal: Readiness for Transition of Care  Outcome: Progressing     Problem: Pain Acute  Goal: Optimal Pain Control and Function  Outcome: Progressing  Intervention: Develop Pain Management Plan  Recent Flowsheet Documentation  Taken 7/4/2024 2003 by Cortney Ochoa RN  Pain Management Interventions: medication (see MAR)  Taken 7/4/2024 1943 by Cortney Ochoa RN  Pain Management Interventions: medication (see MAR)  Taken 7/4/2024 1858 by Cortney Ochoa RN  Pain Management Interventions: medication (see MAR)  Taken 7/4/2024 1608 by Cortney Ochoa RN  Pain Management Interventions: medication (see MAR)  Intervention: Prevent or Manage Pain  Recent Flowsheet Documentation  Taken 7/4/2024 1600 by Cortney Ochoa RN  Medication Review/Management: medications reviewed  Intervention: Optimize Psychosocial Wellbeing  Recent Flowsheet Documentation  Taken 7/4/2024 1600 by  Cortney Ochoa, RN  Supportive Measures: active listening utilized     Problem: Substance Withdrawal  Goal: Substance Withdrawal  Description: Signs and symptoms of listed problems will be absent or manageable.  Outcome: Progressing  Goal: Social and Therapeutic (Substance Withdrawal)  Description: Signs and symptoms of listed problems will be absent or manageable.  Outcome: Progressing     Problem: Infection  Goal: Absence of Infection Signs and Symptoms  Outcome: Progressing

## 2024-07-05 NOTE — PLAN OF CARE
RN shift [id number]:  NPO [place], Urology following.  Frustrated easily, tearful at times, cooperative.  Afebrile.  Moderate-high pain still, PRN IV/PO dilaudid, encouraged ice & repo.  Voiding.  Up ad fredis.     Goal Outcome Evaluation:    Plan of Care Reviewed With: patient    Overall Patient Progress: no changeOverall Patient Progress: no change    Outcome Evaluation: Frustrated easily, tearful at times, cooperative.  Moderate-high pain still, PRN IV/PO dilaudid.  Up ad fredis.      Problem: Adult Inpatient Plan of Care  Goal: Plan of Care Review  Description: The Plan of Care Review/Shift note should be completed every shift.  The Outcome Evaluation is a brief statement about your assessment that the patient is improving, declining, or no change.  This information will be displayed automatically on your shift  note.  Flowsheets (Taken 7/5/2024 4767)  Outcome Evaluation: Frustrated easily, tearful at times, cooperative.  Moderate-high pain still, PRN IV/PO dilaudid.  Up ad fredis.  Plan of Care Reviewed With: patient  Overall Patient Progress: no change  Goal: Absence of Hospital-Acquired Illness or Injury  Intervention: Identify and Manage Fall Risk  Recent Flowsheet Documentation  Taken 7/5/2024 0920 by Pool Perez RN  Safety Promotion/Fall Prevention:   safety round/check completed   assistive device/personal items within reach   lighting adjusted   nonskid shoes/slippers when out of bed   patient and family education   room organization consistent  Intervention: Prevent Skin Injury  Recent Flowsheet Documentation  Taken 7/5/2024 1430 by Pool Perez RN  Body Position:   position changed independently   supine  Taken 7/5/2024 1310 by Pool Perez RN  Body Position:   position changed independently   sitting up in bed  Taken 7/5/2024 1155 by Pool Perez RN  Body Position:   position changed independently   supine  Taken 7/5/2024 0921 by Pool Perez RN  Body Position: position changed  independently  Intervention: Prevent and Manage VTE (Venous Thromboembolism) Risk  Recent Flowsheet Documentation  Taken 7/5/2024 0920 by Pool Perez RN  VTE Prevention/Management:   patient refused intervention   SCDs off (sequential compression devices)  Intervention: Prevent Infection  Recent Flowsheet Documentation  Taken 7/5/2024 0920 by Pool Perez RN  Infection Prevention:   hand hygiene promoted   equipment surfaces disinfected  Goal: Optimal Comfort and Wellbeing  Intervention: Monitor Pain and Promote Comfort  Recent Flowsheet Documentation  Taken 7/5/2024 1430 by Pool Perez RN  Pain Management Interventions:   rest   care clustered  Taken 7/5/2024 1320 by Pool Perez RN  Pain Management Interventions:   rest   care clustered   emotional support   pillow support provided   quiet environment facilitated  Taken 7/5/2024 1310 by Pool Perez RN  Pain Management Interventions:   medication (see MAR)   rest   care clustered   emotional support   pillow support provided   relaxation techniques promoted  Taken 7/5/2024 1300 by Pool Perez RN  Pain Management Interventions:   pain management plan reviewed with patient/caregiver   rest   relaxation techniques promoted   pillow support provided   music therapy   emotional support   diversional activity provided   care clustered  Taken 7/5/2024 1155 by Pool Perez RN  Pain Management Interventions:   care clustered   quiet environment facilitated   pillow support provided   rest   relaxation techniques promoted   emotional support   repositioned   music therapy  Taken 7/5/2024 1030 by Pool Perez RN  Pain Management Interventions:   care clustered   quiet environment facilitated   pillow support provided   rest   relaxation techniques promoted   emotional support   breathing exercises   music therapy  Taken 7/5/2024 0921 by Pool Perez RN  Pain Management Interventions:   pain management plan reviewed with patient/caregiver   medication  (see MAR)   care clustered   quiet environment facilitated   pillow support provided   rest   relaxation techniques promoted     Problem: Pain Acute  Goal: Optimal Pain Control and Function  Intervention: Develop Pain Management Plan  Recent Flowsheet Documentation  Taken 7/5/2024 1430 by Pool Perez RN  Pain Management Interventions:   rest   care clustered  Taken 7/5/2024 1320 by Pool Perez RN  Pain Management Interventions:   rest   care clustered   emotional support   pillow support provided   quiet environment facilitated  Taken 7/5/2024 1310 by Pool Perez RN  Pain Management Interventions:   medication (see MAR)   rest   care clustered   emotional support   pillow support provided   relaxation techniques promoted  Taken 7/5/2024 1300 by Pool Perez RN  Pain Management Interventions:   pain management plan reviewed with patient/caregiver   rest   relaxation techniques promoted   pillow support provided   music therapy   emotional support   diversional activity provided   care clustered  Taken 7/5/2024 1155 by Pool Perez RN  Pain Management Interventions:   care clustered   quiet environment facilitated   pillow support provided   rest   relaxation techniques promoted   emotional support   repositioned   music therapy  Taken 7/5/2024 1030 by Pool Perez RN  Pain Management Interventions:   care clustered   quiet environment facilitated   pillow support provided   rest   relaxation techniques promoted   emotional support   breathing exercises   music therapy  Taken 7/5/2024 0921 by Pool Perez RN  Pain Management Interventions:   pain management plan reviewed with patient/caregiver   medication (see MAR)   care clustered   quiet environment facilitated   pillow support provided   rest   relaxation techniques promoted  Intervention: Prevent or Manage Pain  Recent Flowsheet Documentation  Taken 7/5/2024 0920 by Pool Perez RN  Sensory Stimulation Regulation:   care clustered   quiet  environment promoted  Bowel Elimination Promotion:   privacy promoted   ambulation promoted   adequate fluid intake promoted  Medication Review/Management: medications reviewed  Intervention: Optimize Psychosocial Wellbeing  Recent Flowsheet Documentation  Taken 7/5/2024 0920 by Pool Perez RN  Supportive Measures:   active listening utilized   problem-solving facilitated   verbalization of feelings encouraged   decision-making supported   goal-setting facilitated   relaxation techniques promoted   self-care encouraged

## 2024-07-05 NOTE — PROGRESS NOTES
[facility] Urology Consult Progress Note          Assessment and Plan:     Active Problems:    Epididymoorchitis    Assessment: Significant epididymo-orchitis    Plan:     Discussed with patient that there is no abscess to drain. At this time based on chart review his WBC started to uptrend after switching from vanc/zosyn to ceftriaxone. Would recommend a course of levo as this has excellent tissue penetration and is recommended in non-STI related epididymitis.     Kobe Lao MD   [facility]  Office: [phone]      Addendum:  Saw patient again with staff. He was in significantly more pain this afternoon. His WBC downtrended this AM which is reassuring for response to levo; however, he is more open to undergoing surgery for removal of his testicle. Would recommend NPO at midnight, we discussed the importance of this given he had food this morning although he was NPO. If his WBC downtrends and exam improves, will consider further observation. However, if there is no improvement we may consider surgical intervention given the duration of his hospital stay.     -continue levofloxacin abx  -NPO at midnight  - Recommend continued scrotal support and intermittent ice               Interval History:     Seen and examined, frustrated with the lack of any progress. Emotional in tears this morning.              Review of Systems:     The 5 point Review of Systems is negative other than noted in the HPI             Medications:     Current Facility-Administered Medications   Medication Dose Route Frequency Provider Last Rate Last Admin    acetaminophen (TYLENOL) tablet 650 mg  650 mg Oral Q4H PRN Lashonda Mandujano PA-C   650 mg at 07/05/24 0648    Or    acetaminophen (TYLENOL) Suppository 650 mg  650 mg Rectal Q4H PRN Lashonda Mandujano PA-C        artificial saliva (BIOTENE MT) solution 2 spray  2 spray Swish & Spit 4x Daily PRN Lashonda Mandujano PA-C        bisacodyl (DULCOLAX) suppository  10 mg  10 mg Rectal Daily PRN Lashonda Mandujano PA-C        cefTRIAXone (ROCEPHIN) 2 g vial to attach to  ml bag for ADULTS or NS 50 ml bag for PEDS  2 g Intravenous Q24H Adboul Montero MD   2 g at 07/04/24 1352    HYDROmorphone (DILAUDID) tablet 4 mg  4 mg Oral Q4H PRN Abdoul Montero MD   4 mg at 07/05/24 0321    HYDROmorphone (PF) (DILAUDID) injection 0.5-1 mg  0.5-1 mg Intravenous Q3H PRN Neal Wilson,         ketorolac (TORADOL) injection 30 mg  30 mg Intravenous Q6H PRN Neal Wilson,         levofloxacin (LEVAQUIN) infusion 750 mg  750 mg Intravenous Q24H Favian Ayers  mL/hr at 07/04/24 1135 750 mg at 07/04/24 1135    lidocaine (LMX4) cream   Topical Q1H PRN Lashonda Mandujano PA-C   Given at 07/03/24 1810    lidocaine 1 % 0.1-1 mL  0.1-1 mL Other Q1H PRN Lashonda Mandujano PA-C        LORazepam (ATIVAN) injection 0.5-1 mg  0.5-1 mg Intravenous Q4H PRN Lashonda Mandujano PA-C        magnesium hydroxide (MILK OF MAGNESIA) suspension 15-30 mL  15-30 mL Oral Daily PRN Lashonda Mandujano PA-C   30 mL at 07/02/24 0500    naloxone (NARCAN) injection 0.2 mg  0.2 mg Intravenous Q2 Min PRN Lashonda Mandujano PA-C        Or    naloxone (NARCAN) injection 0.4 mg  0.4 mg Intravenous Q2 Min PRN Lashonda Mandujano PA-C        Or    naloxone (NARCAN) injection 0.2 mg  0.2 mg Intramuscular Q2 Min PRN Lashonda Mandujano PA-C        Or    naloxone (NARCAN) injection 0.4 mg  0.4 mg Intramuscular Q2 Min PRN Lashonda Mandujano PA-C        nicotine (NICODERM CQ) 21 MG/24HR 24 hr patch 1 patch  1 patch Transdermal Daily Abdoul Montero MD   1 patch at 07/04/24 0902    ondansetron (ZOFRAN) injection 4 mg  4 mg Intravenous Q6H PRN Lashonda Mandujano PA-C   4 mg at 06/30/24 0347    Or    ondansetron (ZOFRAN ODT) ODT tab 4 mg  4 mg Oral Q6H PRN Lashonda Mandujano PA-C        oxyCODONE (ROXICODONE) tablet 5-10 mg  5-10 mg Oral Q4H PRN  Lashonda Mandujano PA-C   10 mg at 07/05/24 0648    prochlorperazine (COMPAZINE) injection 10 mg  10 mg Intravenous Q6H PRN Lashonda Mandujano PA-C        Or    prochlorperazine (COMPAZINE) tablet 10 mg  10 mg Oral Q6H PRN Lashonda Mandujano PA-C        Or    prochlorperazine (COMPAZINE) suppository 25 mg  25 mg Rectal Q12H PRN Lashonda Mandujano PA-C        senna-docusate (SENOKOT-S/PERICOLACE) 8.6-50 MG per tablet 1 tablet  1 tablet Oral BID Lashonda Mandujano PA-C   1 tablet at 07/04/24 0903    sodium chloride (PF) 0.9% PF flush 3 mL  3 mL Intracatheter q1 min prn Lashonda Mandujano PA-C   3 mL at 07/01/24 1337    sodium chloride (PF) 0.9% PF flush 3 mL  3 mL Intracatheter Q8H Lashonda Mandujano PA-C   3 mL at 07/04/24 2314                  Physical Exam:   Vitals were reviewed  Patient Vitals for the past 8 hrs:   BP Temp Temp src Pulse Resp SpO2   07/05/24 0410 116/64 97  F (36.1  C) Temporal 102 16 96 %     GEN: NAD, lying in bed  HEENT: EOMI  NECK: Supple  ABD: soft  EXT: No LE edema  : Normal phallus, normal right testis, significantly enlarged left hemiscrotum with a enlarged testicle and epididymis extending into the cord which is tender to palpation, slightly firm and erythematous with no induration or crepitus.           Data:     Lab Results   Component Value Date    CR 0.83 07/04/2024    CR 0.77 07/03/2024    CR 0.80 07/02/2024    CR 0.78 07/01/2024    CR 0.86 06/30/2024     Lab Results   Component Value Date    WBC 23.3 (H) 07/04/2024    WBC 20.5 (H) 07/03/2024    WBC 20.1 (H) 07/02/2024    HGB 14.3 07/04/2024    HGB 14.2 07/03/2024    HGB 13.4 07/02/2024    HCT 44.2 07/04/2024    HCT 42.9 07/03/2024    HCT 40.4 07/02/2024    MCV 92 07/04/2024    MCV 89 07/03/2024    MCV 89 07/02/2024     07/04/2024     07/03/2024     07/02/2024     Lab Results   Component Value Date    INR 1.12 01/12/2007     U/S TESTICULAR 7/4/2024:  FINDINGS:     RIGHT: Right testicle measures  Measures 5.4 x 3.2 x 2.5 cm. Normal testicle with no masses. Normal arterial duplex and normal color flow. There is 5 mm epididymal head cyst. No hydrocele. No varicocele.     LEFT: Left testicle measures 5.1 x 3.8 x 3.2 cm. Diffuse heterogenous appearance of the left testicle with significant decreased internal flow, not significantly changed as compared to 7/2/2024. Mild diffuse epididymal thickening with diffuse hyperemia,   not significantly changed as compared to 7/2/2024. No hydrocele. No varicocele.     Again noted complex hyperemic area along the superior aspect of the left testicle, not significantly changed as compared to 7/2/2024.                                                                      IMPRESSION:  1.  Diffuse heterogenous appearance of the left testicle with significant decreased internal flow as compared to the right, not significantly changed as compared to 7/2/2024 exam. No significant fluid collection visualized.  2.  Mild diffuse left epididymal thickening with diffuse epididymal hyperemia, not significantly changed as compared to 7/2/2024.  3.  Again noted complex hyperemic area along the superior aspect of the left testicle, not significantly changed as compared to 7/2/2024.

## 2024-07-05 NOTE — PROGRESS NOTES
[facility]    Hospitalist Progress Note  Name: Ricky Rowland    MRN: [id number]  Provider:  Neal Wilson DO  Date of Service: 07/05/2024    Summary of Stay: Ricky Rowland is a 34 year old male with a history of polysubstance abuse with methamphetamine and cannabis, ADHD admitted on 6/28/2024 with severe testicular pain.  In the emergency department, patient is found to be temperature of 98.7  F, heart rate 129, blood pressure 146/99, respiratory rate 26, SpO2 96% on room air.  Initial lab work showed BMP within normal limits, WBC 20.3.  Urinalysis showed small leukocyte esterase, 22 WBC, few bacteria.  Gonorrhea and Chlamydia PCR were negative.  Urine cultures returned positive for pansensitive E. coli.  Urine drug screen was positive for amphetamines and cannabinoids.  Testicular ultrasound with Doppler showed hyperemia of the left testicle and epididymis possibly representing epididymoorchitis, left spermatic cord that is somewhat swirled in appearance raising suspicion for intermittent torsion.  The patient was initially started on vancomycin and IV Zosyn.  Urology was consulted to see the patient.  After urine cultures returned, antibiotics were adjusted to IV ceftriaxone.  Ultimately repeat ultrasound showed persistent edema of the left testicle and compromised blood flow to the testicle.  Surgery was ultimately recommended, however the patient declined.  IV levofloxacin was added on 7/4.    TODAY'S PLAN:  Appreciate Urology recommendations.  Levofloxacin 750 mg IV daily started yesterday but the second dose around noon today.  Continue IV ceftriaxone.  Antibiotic coverage should be adequate.  Patient reports he felt like the IV Zosyn was working better.  We discussed the issue is not the antibiotics, but rather the compromised blood flow to his testicle that does not allow appropriate antibiotic penetration.  We discussed the inflammation seen on ultrasound being the driving factor for  his compromised blood flow.  There is also some concern for torsion.  Surgery has been recommended to the patient, however the patient has declined.  At this point he wants to see if the additional antibiotic will be beneficial.  Added IV Toradol to see if that will help his pain at all given it is being driven by inflammation.  Will add CRP to morning blood work.  Await a.m. WBC.  Urine culture was growing pansensitive E. coli and ceftriaxone alone however Levaquin added for excellent tissue penetration.  Patient did express his frustration regarding his perceived lack of antibiotic frequency.  We discussed antibiotic pharmacokinetics briefly and that different antibiotics are metabolized differently and some work over a longer period of time.  Patient also expressed his frustration regarding his IV as there was an issue with this overnight.  Appreciate nursing assistance with IV management and seems to be working well.  Girlfriend is at bedside.  All questions answered.  If no improvement overnight and WBC continuing to rise, could transition back to zosyn, however, issue regarding compromised blood flow would remain.    Problem List:   Acute Epididymoorchitis with Sepsis  - Appreciate Urology recommendations  - Initially on vanco/zosyn.  Adjusted to ceftriaxone on 7/1.  Added Levofloxacin on 7/4 due to persistent leukocytosis  - Pt has declined surgery at this time  - Urine culture positive for pan-sensitive e coli  - GC/NG negative  - Several testicular US with doppler have shown persistent diffuse heterogenous appearance of the L testicale with significant decreased internal blood flow as compared to the right testicle, mild diffuse L epidiymal thickening with diffuse epididymal hyperemia (not significantly changed), complex hyperemic area along the superior aspect of the L testicle  - Pain control prn    Polysubstance Abuse (Methamphetamine, Cannabis)  Amphetamine Withdrawal  ADHD  - Improved  - Recommend  substance cessation    I spent 53 minutes in reviewing this patient's labs, imaging, medications, medical history.  In addition time was spent interviewing the patient, communicating with family, and medical decision making.  Time was also spent reviewing notes of urology, nursing.    DVT Prophylaxis: Pneumatic Compression Devices  Code Status: Full Code  Diet: Regular Diet Adult    Kim Catheter: Not present    Disposition: Medically Ready for Discharge: Anticipated in 2-4 Days  Goals to discharge include: abx plan established, urology work up complete  Family updated today: Yes      Interval History   Pt seen and examined.  Patient reports ongoing frustration with his perceived care at the hospital.  He expressed frustration regarding perceived improvement in his swelling after initially coming to the hospital and then worsening after that.  Patient also reports frustration regarding his IV overnight.    -Data reviewed today: I personally reviewed all new labs and imaging results over the last 24 hours.     Physical Exam   Temp: 97  F (36.1  C) Temp src: Temporal BP: 116/64 Pulse: 102   Resp: 16 SpO2: 96 % O2 Device: None (Room air)    Vitals:    06/29/24 1203   Weight: 77.9 kg (171 lb 12.8 oz)     Vital Signs with Ranges  Temp:  [96.9  F (36.1  C)-97  F (36.1  C)] 97  F (36.1  C)  Pulse:  [102-117] 102  Resp:  [16-18] 16  BP: (111-144)/(62-79) 116/64  SpO2:  [95 %-97 %] 96 %  No intake/output data recorded.    GENERAL: No apparent distress. Awake, alert, and fully oriented.  HEENT: Normocephalic, atraumatic. Extraocular movements intact.  CARDIOVASCULAR: Regular rate and rhythm without murmurs or rubs. No S3.  PULMONARY: Clear bilaterally.  GASTROINTESTINAL: Soft, non-tender, non-distended. Bowel sounds normoactive.   EXTREMITIES: No cyanosis or clubbing. No edema.  NEUROLOGICAL: CN 2-12 grossly intact, no focal neurological deficits.  DERMATOLOGICAL: No rash, ulcer, bruising, nor jaundice.  OTHER: swelling and  "erythema of L testicle    Medications   Current Facility-Administered Medications   Medication Dose Route Frequency Provider Last Rate Last Admin     Current Facility-Administered Medications   Medication Dose Route Frequency Provider Last Rate Last Admin    cefTRIAXone (ROCEPHIN) 2 g vial to attach to  ml bag for ADULTS or NS 50 ml bag for PEDS  2 g Intravenous Q24H Abdoul Montero MD   2 g at 07/04/24 1352    levofloxacin (LEVAQUIN) infusion 750 mg  750 mg Intravenous Q24H Favian Ayers  mL/hr at 07/04/24 1135 750 mg at 07/04/24 1135    nicotine (NICODERM CQ) 21 MG/24HR 24 hr patch 1 patch  1 patch Transdermal Daily Abdoul Montero MD   1 patch at 07/04/24 0902    senna-docusate (SENOKOT-S/PERICOLACE) 8.6-50 MG per tablet 1 tablet  1 tablet Oral BID Lashonda Mandujano PA-C   1 tablet at 07/04/24 0903    sodium chloride (PF) 0.9% PF flush 3 mL  3 mL Intracatheter Q8H Lashonda Mandujano PA-C   3 mL at 07/04/24 2314     Data     Laboratory:  Recent Labs   Lab 07/04/24  0552 07/03/24  0732 07/02/24  0633   WBC 23.3* 20.5* 20.1*   HGB 14.3 14.2 13.4   HCT 44.2 42.9 40.4   MCV 92 89 89    419 372     Recent Labs   Lab 07/04/24  0552 07/03/24  0732 07/02/24  0633 07/01/24  0713   NA  --  137 140 142   POTASSIUM  --  4.5 3.7 3.8   CHLORIDE  --  101 103 105   CO2  --  23 24 24   ANIONGAP  --  13 13 13   GLC  --  113* 112* 95   BUN  --  8.5 8.9 8.9   CR 0.83 0.77 0.80 0.78   GFRESTIMATED >90 >90 >90 >90   CHARLENE  --  9.0 9.0 8.2*     No results for input(s): \"SED\", \"CRP\" in the last 168 hours.  No results for input(s): \"CULT\" in the last 168 hours.  No results found for: \"TROPI\"    Imaging:  No results found for this or any previous visit (from the past 24 hour(s)).      Neal Wilson DO  Formerly [facility] Hospitalist  [address] [place] [address]  Securely message with MyStream (more info)  Text page via [employer] Paging/Directory   07/05/2024   "

## 2024-07-05 NOTE — CONSULTS
NUTRITION BRIEF NOTE      REASON FOR NUTRITION BRIEF NOTE:  Chart check at length of stay (LOS) per policy.    CHART CHECK:  - Per review of flowsheets, patient is consuming % of meals and ordering meals consistently per meal system review.  - There is no current documentation of edema, other than scrotal.  No wt loss based on review of available wt trends outlined below, wt gain when compared to 1/2024 trending.  Wt Readings from Last 10 Encounters:   06/29/24 77.9 kg (171 lb 12.8 oz)   01/08/24 71.4 kg (157 lb 4.8 oz)   11/04/22 70.3 kg (155 lb)   07/24/07 59.8 kg (131 lb 12.8 oz) (40%, Z= -0.25)*   01/12/07 57.6 kg (127 lb) (40%, Z= -0.25)*   06/15/06 53.3 kg (117 lb 8 oz) (33%, Z= -0.43)*   02/08/06 50.3 kg (111 lb) (28%, Z= -0.57)*   09/15/05 47.6 kg (105 lb) (25%, Z= -0.66)*   06/10/05 45.8 kg (101 lb) (24%, Z= -0.72)*     * Growth percentiles are based on CDC (Boys, 2-20 Years) data.     FOLLOW UP:   Will re-check chart in 7-10 days per policy, unless formally consulted in the interim.  Please formally consult if needs arise.    Margi Larios RDN, LD  Clinical Dietitian  3rd floor/ICU: [phone]  All other floors: [phone]  Weekend/holiday: [phone]  Office: [phone]

## 2024-07-05 NOTE — PLAN OF CARE
"Goal Outcome Evaluation:      Plan of Care Reviewed With: patient, significant other    Overall Patient Progress: no changeOverall Patient Progress: no change    Outcome Evaluation: Pt AOx4. Independent. Urology following. Scrotum remains swollen and red. NPO due to possible surgery today, although patient states that he does not want surgery. States \" if anyone comes near me with a scapel, I'll run out chirag## naked\". He also might have eaten something while on NPO, says he forgot he was NPO. Pain managed with dilaudid, oxycodone, tylenol and ice. Girlfriend in room. States his pain is never less than 6/10. Discharge TBD.      Problem: Adult Inpatient Plan of Care  Goal: Plan of Care Review  Description: The Plan of Care Review/Shift note should be completed every shift.  The Outcome Evaluation is a brief statement about your assessment that the patient is improving, declining, or no change.  This information will be displayed automatically on your shift  note.  Outcome: Progressing  Flowsheets (Taken 7/5/2024 0521)  Outcome Evaluation: Pt AOx4. Independent. Urology following. Scrotum remains swollen and red. NPO due to possible surgery today, although patient states that he does not want surgery. States \" if anyone comes near me with a scapel, I'll run out chirag## naked\". He also might have eaten something while on NPO, says he forgot he was NPO. Pain managed with dilaudid, oxycodone, tylenol and ice. Girlfriend in room. States his pain is never less than 6/10. Discharge TBD.  Plan of Care Reviewed With:   patient   significant other  Overall Patient Progress: no change  Goal: Patient-Specific Goal (Individualized)  Description: You can add care plan individualizations to a care plan. Examples of Individualization might be:  \"Parent requests to be called daily at 9am for status\", \"I have a hard time hearing out of my right ear\", or \"Do not touch me to wake me up as it startles  me\".  Outcome: Progressing  Goal: " Absence of Hospital-Acquired Illness or Injury  Outcome: Progressing  Intervention: Identify and Manage Fall Risk  Recent Flowsheet Documentation  Taken 7/5/2024 0045 by Tayler Sharif RN  Safety Promotion/Fall Prevention:   room near nurse's station   safety round/check completed  Intervention: Prevent Skin Injury  Recent Flowsheet Documentation  Taken 7/5/2024 0045 by Tayler Sharif RN  Body Position: position changed independently  Device Skin Pressure Protection: absorbent pad utilized/changed  Intervention: Prevent and Manage VTE (Venous Thromboembolism) Risk  Recent Flowsheet Documentation  Taken 7/5/2024 0045 by Tayler Sharif RN  VTE Prevention/Management: SCDs off (sequential compression devices)  Intervention: Prevent Infection  Recent Flowsheet Documentation  Taken 7/5/2024 0045 by Tayler Sharif RN  Infection Prevention:   single patient room provided   rest/sleep promoted  Goal: Optimal Comfort and Wellbeing  Outcome: Progressing  Intervention: Monitor Pain and Promote Comfort  Recent Flowsheet Documentation  Taken 7/5/2024 0045 by Tayler Sharif RN  Pain Management Interventions: medication (see MAR)  Goal: Readiness for Transition of Care  Outcome: Progressing     Problem: Pain Acute  Goal: Optimal Pain Control and Function  Outcome: Progressing  Intervention: Develop Pain Management Plan  Recent Flowsheet Documentation  Taken 7/5/2024 0045 by Tayler Sharif RN  Pain Management Interventions: medication (see MAR)  Intervention: Prevent or Manage Pain  Recent Flowsheet Documentation  Taken 7/5/2024 0045 by Tayler Sharif RN  Medication Review/Management: medications reviewed  Intervention: Optimize Psychosocial Wellbeing  Recent Flowsheet Documentation  Taken 7/5/2024 0045 by Tayler Sharif RN  Supportive Measures:   active listening utilized   problem-solving facilitated   verbalization of feelings encouraged     Problem:  Substance Withdrawal  Goal: Substance Withdrawal  Description: Signs and symptoms of listed problems will be absent or manageable.  Outcome: Progressing  Goal: Social and Therapeutic (Substance Withdrawal)  Description: Signs and symptoms of listed problems will be absent or manageable.  Outcome: Progressing     Problem: Infection  Goal: Absence of Infection Signs and Symptoms  Outcome: Progressing  Intervention: Prevent or Manage Infection  Recent Flowsheet Documentation  Taken 7/5/2024 0045 by Tayler Sharif RN  Fever Reduction/Comfort Measures: ice pack(s) applied

## 2024-07-06 VITALS
HEIGHT: 71 IN | WEIGHT: 171.8 LBS | SYSTOLIC BLOOD PRESSURE: 100 MMHG | OXYGEN SATURATION: 98 % | RESPIRATION RATE: 16 BRPM | TEMPERATURE: 97.6 F | DIASTOLIC BLOOD PRESSURE: 56 MMHG | BODY MASS INDEX: 24.05 KG/M2 | HEART RATE: 90 BPM

## 2024-07-06 LAB
ANION GAP SERPL CALCULATED.3IONS-SCNC: 13 MMOL/L (ref 7–15)
BUN SERPL-MCNC: 20.6 MG/DL (ref 6–20)
CALCIUM SERPL-MCNC: 9.6 MG/DL (ref 8.6–10)
CHLORIDE SERPL-SCNC: 98 MMOL/L (ref 98–107)
CREAT SERPL-MCNC: 1.17 MG/DL (ref 0.67–1.17)
CRP SERPL-MCNC: 189.34 MG/L
DEPRECATED HCO3 PLAS-SCNC: 25 MMOL/L (ref 22–29)
EGFRCR SERPLBLD CKD-EPI 2021: 84 ML/MIN/1.73M2
ERYTHROCYTE [DISTWIDTH] IN BLOOD BY AUTOMATED COUNT: 13.5 % (ref 10–15)
GLUCOSE SERPL-MCNC: 101 MG/DL (ref 70–99)
HCT VFR BLD AUTO: 44.5 % (ref 40–53)
HGB BLD-MCNC: 14.4 G/DL (ref 13.3–17.7)
MCH RBC QN AUTO: 29.5 PG (ref 26.5–33)
MCHC RBC AUTO-ENTMCNC: 32.4 G/DL (ref 31.5–36.5)
MCV RBC AUTO: 91 FL (ref 78–100)
PLATELET # BLD AUTO: 582 10E3/UL (ref 150–450)
POTASSIUM SERPL-SCNC: 4.8 MMOL/L (ref 3.4–5.3)
RBC # BLD AUTO: 4.88 10E6/UL (ref 4.4–5.9)
SODIUM SERPL-SCNC: 136 MMOL/L (ref 135–145)
WBC # BLD AUTO: 18.8 10E3/UL (ref 4–11)

## 2024-07-06 PROCEDURE — 99239 HOSP IP/OBS DSCHRG MGMT >30: CPT | Performed by: INTERNAL MEDICINE

## 2024-07-06 PROCEDURE — 36415 COLL VENOUS BLD VENIPUNCTURE: CPT | Performed by: UROLOGY

## 2024-07-06 PROCEDURE — 85027 COMPLETE CBC AUTOMATED: CPT | Performed by: UROLOGY

## 2024-07-06 PROCEDURE — 250N000011 HC RX IP 250 OP 636: Performed by: INTERNAL MEDICINE

## 2024-07-06 PROCEDURE — 86140 C-REACTIVE PROTEIN: CPT | Performed by: INTERNAL MEDICINE

## 2024-07-06 PROCEDURE — 80048 BASIC METABOLIC PNL TOTAL CA: CPT | Performed by: INTERNAL MEDICINE

## 2024-07-06 PROCEDURE — 99232 SBSQ HOSP IP/OBS MODERATE 35: CPT | Performed by: UROLOGY

## 2024-07-06 RX ORDER — LEVOFLOXACIN 750 MG/1
750 TABLET, FILM COATED ORAL DAILY
Qty: 28 TABLET | Refills: 0 | Status: ON HOLD | OUTPATIENT
Start: 2024-07-06 | End: 2024-07-21

## 2024-07-06 RX ORDER — LEVOFLOXACIN 750 MG/1
750 TABLET, FILM COATED ORAL DAILY
Qty: 10 TABLET | Refills: 0 | Status: SHIPPED | OUTPATIENT
Start: 2024-07-06 | End: 2024-07-06

## 2024-07-06 RX ORDER — HYDROMORPHONE HYDROCHLORIDE 2 MG/1
4 TABLET ORAL EVERY 6 HOURS PRN
Qty: 10 TABLET | Refills: 0 | Status: SHIPPED | OUTPATIENT
Start: 2024-07-06 | End: 2024-07-09

## 2024-07-06 RX ORDER — CEPHALEXIN 500 MG/1
500 CAPSULE ORAL 4 TIMES DAILY
Qty: 28 CAPSULE | Refills: 0 | Status: SHIPPED | OUTPATIENT
Start: 2024-07-06 | End: 2024-07-06

## 2024-07-06 RX ORDER — CEPHALEXIN 500 MG/1
500 CAPSULE ORAL 4 TIMES DAILY
Qty: 28 CAPSULE | Refills: 0 | Status: SHIPPED | OUTPATIENT
Start: 2024-07-06 | End: 2024-07-13

## 2024-07-06 RX ADMIN — KETOROLAC TROMETHAMINE 30 MG: 30 INJECTION, SOLUTION INTRAMUSCULAR at 04:06

## 2024-07-06 ASSESSMENT — ACTIVITIES OF DAILY LIVING (ADL)
ADLS_ACUITY_SCORE: 18

## 2024-07-06 NOTE — PLAN OF CARE
"AO4. RA. VSS. Toradol given for testicular pain and effective per pt. PIV SL. Continent of bladder and bowel. NPO. Anticipates surgery today. Family at bedside.      Goal Outcome Evaluation:      Plan of Care Reviewed With: patient    Overall Patient Progress: improvingOverall Patient Progress: improving    Outcome Evaluation: IV Toradol given for testicular pain. Pt Is NPO and anticipates surgery this morning.      Problem: Adult Inpatient Plan of Care  Goal: Plan of Care Review  Description: The Plan of Care Review/Shift note should be completed every shift.  The Outcome Evaluation is a brief statement about your assessment that the patient is improving, declining, or no change.  This information will be displayed automatically on your shift  note.  Outcome: Progressing  Flowsheets (Taken 7/6/2024 0558)  Outcome Evaluation: IV Toradol given for testicular pain. Pt Is NPO and anticipates surgery this morning.  Plan of Care Reviewed With: patient  Overall Patient Progress: improving  Goal: Patient-Specific Goal (Individualized)  Description: You can add care plan individualizations to a care plan. Examples of Individualization might be:  \"Parent requests to be called daily at 9am for status\", \"I have a hard time hearing out of my right ear\", or \"Do not touch me to wake me up as it startles  me\".  Outcome: Progressing  Goal: Absence of Hospital-Acquired Illness or Injury  Outcome: Progressing  Intervention: Identify and Manage Fall Risk  Recent Flowsheet Documentation  Taken 7/6/2024 0001 by Alecia Ac, RN  Safety Promotion/Fall Prevention: safety round/check completed  Intervention: Prevent Skin Injury  Recent Flowsheet Documentation  Taken 7/6/2024 0001 by Alecia Ac, RN  Body Position: position changed independently  Goal: Optimal Comfort and Wellbeing  Outcome: Progressing  Goal: Readiness for Transition of Care  Outcome: Progressing     Problem: Pain Acute  Goal: Optimal Pain Control and Function  Outcome: " Progressing  Intervention: Prevent or Manage Pain  Recent Flowsheet Documentation  Taken 7/6/2024 0001 by Alecia Ac, RN  Medication Review/Management: medications reviewed     Problem: Substance Withdrawal  Goal: Substance Withdrawal  Description: Signs and symptoms of listed problems will be absent or manageable.  Outcome: Progressing  Goal: Social and Therapeutic (Substance Withdrawal)  Description: Signs and symptoms of listed problems will be absent or manageable.  Outcome: Progressing     Problem: Infection  Goal: Absence of Infection Signs and Symptoms  Outcome: Progressing

## 2024-07-06 NOTE — PROGRESS NOTES
[facility] Urology Consult Progress Note          Assessment and Plan:     Active Problems:    Epididymoorchitis    Assessment: Significant epididymo-orchitis    Plan:     -Reviewed his labs with a downtrending leukocytosis  - Subjectively he is feeling better and more ambulatory and pain is easily controlled with Toradol  - No indication for surgery today and I think it reasonable for him to be discharged to home  - I recommend an extended course of levofloxacin, 4 weeks  - I recommend a p.o. course of Toradol for pain control  - May follow-up in our urology clinic and I will send a message to our schedulers    Favian Ayers MD   [facility] Urology  Office: [phone]               Interval History:     Seen and examined.  Ambulating in his room and feeling better after recent dose of Toradol.  Overall feels improved over the last few days.  In good spirits             Review of Systems:     The 5 point Review of Systems is negative other than noted in the HPI             Medications:     Current Facility-Administered Medications   Medication Dose Route Frequency Provider Last Rate Last Admin    acetaminophen (TYLENOL) tablet 650 mg  650 mg Oral Q4H PRN Lashonda Mandujano PA-C   650 mg at 07/05/24 1811    Or    acetaminophen (TYLENOL) Suppository 650 mg  650 mg Rectal Q4H PRN Lashonda Mandujano PA-C        artificial saliva (BIOTENE MT) solution 2 spray  2 spray Swish & Spit 4x Daily PRN Lashonda Mandujano PA-C        bisacodyl (DULCOLAX) suppository 10 mg  10 mg Rectal Daily PRN Lashonda Mandujano PA-C        cefTRIAXone (ROCEPHIN) 2 g vial to attach to  ml bag for ADULTS or NS 50 ml bag for PEDS  2 g Intravenous Q24H Abdoul Montero  mL/hr at 07/05/24 1444 2 g at 07/05/24 1444    HYDROmorphone (DILAUDID) tablet 4 mg  4 mg Oral Q3H PRN Neal Wilson DO   4 mg at 07/05/24 0921    HYDROmorphone (PF) (DILAUDID) injection 0.5-1 mg  0.5-1 mg Intravenous Q3H  PRN Neal Wilson DO   0.5 mg at 07/05/24 1628    [Held by provider] ketorolac (TORADOL) injection 30 mg  30 mg Intravenous Q6H PRN Neal Wilson DO   30 mg at 07/06/24 0406    levofloxacin (LEVAQUIN) infusion 750 mg  750 mg Intravenous Q24H Favian Ayers  mL/hr at 07/05/24 1309 750 mg at 07/05/24 1309    lidocaine (LMX4) cream   Topical Q1H PRN Lashonda Mandujano PA-C   Given at 07/03/24 1810    lidocaine 1 % 0.1-1 mL  0.1-1 mL Other Q1H PRN Lashonda Mandujano PA-C        LORazepam (ATIVAN) injection 0.5-1 mg  0.5-1 mg Intravenous Q4H PRN Lashonda Mandujano PA-C        magnesium hydroxide (MILK OF MAGNESIA) suspension 15-30 mL  15-30 mL Oral Daily PRN Lashonda Mandujano PA-C   30 mL at 07/02/24 0500    naloxone (NARCAN) injection 0.2 mg  0.2 mg Intravenous Q2 Min PRN Lashonda Mandujano PA-C        Or    naloxone (NARCAN) injection 0.4 mg  0.4 mg Intravenous Q2 Min PRN Lashonda Mandujano PA-C        Or    naloxone (NARCAN) injection 0.2 mg  0.2 mg Intramuscular Q2 Min PRN Lashonda Mandujano PA-C        Or    naloxone (NARCAN) injection 0.4 mg  0.4 mg Intramuscular Q2 Min PRN Lashonda Mandujano PA-C        nicotine (NICODERM CQ) 21 MG/24HR 24 hr patch 1 patch  1 patch Transdermal Daily Abdoul Montero MD   1 patch at 07/04/24 0902    ondansetron (ZOFRAN) injection 4 mg  4 mg Intravenous Q6H PRN Lashonda Mandujano PA-C   4 mg at 06/30/24 0347    Or    ondansetron (ZOFRAN ODT) ODT tab 4 mg  4 mg Oral Q6H PRN Lashonda Mandujano PA-C        prochlorperazine (COMPAZINE) injection 10 mg  10 mg Intravenous Q6H PRN Lashonda Mandujano PA-C        Or    prochlorperazine (COMPAZINE) tablet 10 mg  10 mg Oral Q6H PRN Lashonda Mandujano PA-C        Or    prochlorperazine (COMPAZINE) suppository 25 mg  25 mg Rectal Q12H PRN Lashonda Mandujano PA-C        senna-docusate (SENOKOT-S/PERICOLACE) 8.6-50 MG per tablet 1 tablet  1 tablet Oral BID Lashonda Mandujano PA-C   1  tablet at 07/04/24 0903    sodium chloride (PF) 0.9% PF flush 3 mL  3 mL Intracatheter q1 min prn Lashonda Mandujano PA-C   3 mL at 07/01/24 1337    sodium chloride (PF) 0.9% PF flush 3 mL  3 mL Intracatheter Q8H Lashonda Mandujano PA-C   3 mL at 07/05/24 1629                  Physical Exam:   Vitals were reviewed  Patient Vitals for the past 8 hrs:   BP Temp Temp src Pulse Resp SpO2   07/06/24 1040 -- -- -- -- 16 --   07/06/24 0955 -- -- -- -- 16 --   07/06/24 0808 100/56 97.6  F (36.4  C) Oral 90 20 98 %   07/06/24 0409 133/67 -- -- 105 20 --     GEN: NAD, lying in bed  HEENT: EOMI  NECK: Supple  ABD: soft  EXT: No LE edema  : Normal phallus, normal right testis, significantly enlarged left hemiscrotum with a enlarged testicle and epididymis extending into the cord which is only slightly tender to palpation, slightly firm and erythematous with no induration or crepitus - overall slightly decreased in size from 7/4           Data:     Lab Results   Component Value Date    CR 1.17 07/06/2024    CR 0.95 07/05/2024    CR 0.83 07/04/2024    CR 0.77 07/03/2024    CR 0.80 07/02/2024     Lab Results   Component Value Date    WBC 18.8 (H) 07/06/2024    WBC 19.5 (H) 07/05/2024    WBC 23.3 (H) 07/04/2024    HGB 14.4 07/06/2024    HGB 14.9 07/05/2024    HGB 14.3 07/04/2024    HCT 44.5 07/06/2024    HCT 44.7 07/05/2024    HCT 44.2 07/04/2024    MCV 91 07/06/2024    MCV 89 07/05/2024    MCV 92 07/04/2024     (H) 07/06/2024     07/05/2024     07/04/2024     Lab Results   Component Value Date    INR 1.12 01/12/2007     U/S TESTICULAR 7/4/2024:  FINDINGS:     RIGHT: Right testicle measures Measures 5.4 x 3.2 x 2.5 cm. Normal testicle with no masses. Normal arterial duplex and normal color flow. There is 5 mm epididymal head cyst. No hydrocele. No varicocele.     LEFT: Left testicle measures 5.1 x 3.8 x 3.2 cm. Diffuse heterogenous appearance of the left testicle with significant decreased internal flow,  not significantly changed as compared to 7/2/2024. Mild diffuse epididymal thickening with diffuse hyperemia,   not significantly changed as compared to 7/2/2024. No hydrocele. No varicocele.     Again noted complex hyperemic area along the superior aspect of the left testicle, not significantly changed as compared to 7/2/2024.                                                                      IMPRESSION:  1.  Diffuse heterogenous appearance of the left testicle with significant decreased internal flow as compared to the right, not significantly changed as compared to 7/2/2024 exam. No significant fluid collection visualized.  2.  Mild diffuse left epididymal thickening with diffuse epididymal hyperemia, not significantly changed as compared to 7/2/2024.  3.  Again noted complex hyperemic area along the superior aspect of the left testicle, not significantly changed as compared to 7/2/2024.

## 2024-07-06 NOTE — PROGRESS NOTES
[facility]    Hospitalist Progress Note  Name: Ricky Rowland    MRN: [id number]  Provider:  Neal Wilson DO  Date of Service: 07/06/2024    Summary of Stay: Ricky Rowland is a 34 year old male with a history of polysubstance abuse with methamphetamine and cannabis, ADHD admitted on 6/28/2024 with severe testicular pain.  In the emergency department, patient is found to be temperature of 98.7  F, heart rate 129, blood pressure 146/99, respiratory rate 26, SpO2 96% on room air.  Initial lab work showed BMP within normal limits, WBC 20.3.  Urinalysis showed small leukocyte esterase, 22 WBC, few bacteria.  Gonorrhea and Chlamydia PCR were negative.  Urine cultures returned positive for pansensitive E. coli.  Urine drug screen was positive for amphetamines and cannabinoids.  Testicular ultrasound with Doppler showed hyperemia of the left testicle and epididymis possibly representing epididymoorchitis, left spermatic cord that is somewhat swirled in appearance raising suspicion for intermittent torsion.  The patient was initially started on vancomycin and IV Zosyn.  Urology was consulted to see the patient.  After urine cultures returned, antibiotics were adjusted to IV ceftriaxone.  Ultimately repeat ultrasound showed persistent edema of the left testicle and compromised blood flow to the testicle.  Surgery was ultimately recommended, however the patient declined.  IV levofloxacin was added on 7/4.     TODAY'S PLAN:  Appreciate Urology recommendations.  Pt more open to surgery today.  Continue ceftriaxone and levofloxacin.  Hold toradol due to slight increase in cr.  Pt states that if no plans for surgery, then he wants to be discharged.  Discussed that inflammatory markers and WBC improving a bit, but testicular blood flow issue remains.  Pt expressed understanding.  ADDENDUM:  No plans for surgery today.  Pt wants to be discharged.  Given some improvement in his WBC and CRP and no plan for  surgery would be ok to switch to oral abx.  The issue of blood flow compromise remains.  Pt encouraged to return to the emergency department if his symptoms acutely worsen or do not improve.  Discharge home today with oral levaquin and keflex for 10 days.  ADDENDUM #2:  Urology recommending 4 weeks of oral levaquin.  Will also  RX keflex for 7 days.    Problem List:   Acute Epididymoorchitis with Sepsis  - Appreciate Urology recommendations  - Initially on vanco/zosyn.  Adjusted to ceftriaxone on 7/1.  Added Levofloxacin on 7/4 due to persistent leukocytosis  - Pt has declined surgery at this time  - Urine culture positive for pan-sensitive e coli  - GC/NG negative  - Several testicular US with doppler have shown persistent diffuse heterogenous appearance of the L testicale with significant decreased internal blood flow as compared to the right testicle, mild diffuse L epidiymal thickening with diffuse epididymal hyperemia (not significantly changed), complex hyperemic area along the superior aspect of the L testicle  - Pain control prn     Polysubstance Abuse (Methamphetamine, Cannabis)  Amphetamine Withdrawal  ADHD  - Improved  - Recommend substance cessation    I spent 46 minutes in reviewing this patient's labs, imaging, medications, medical history.  In addition time was spent interviewing the patient, communicating with family, and medical decision making.      DVT Prophylaxis: Pneumatic Compression Devices  Code Status: Full Code  Diet: NPO per Anesthesia Guidelines for Procedure/Surgery Except for: Meds    Kim Catheter: Not present    Disposition: Medically Ready for Discharge: Anticipated Tomorrow (1-2 days)  Goals to discharge include: urology work up complete, abx plan established  Family updated today: No     Interval History   Pt seen and examined.  Pt reports improvement in his pain    -Data reviewed today: I personally reviewed all new labs and imaging results over the last 24 hours.     Physical  Exam   Temp: 97.6  F (36.4  C) Temp src: Oral BP: 100/56 Pulse: 90   Resp: 16 SpO2: 98 % O2 Device: None (Room air)    Vitals:    06/29/24 1203   Weight: 77.9 kg (171 lb 12.8 oz)     Vital Signs with Ranges  Temp:  [97.6  F (36.4  C)-98.8  F (37.1  C)] 97.6  F (36.4  C)  Pulse:  [] 90  Resp:  [16-20] 16  BP: (100-133)/(49-79) 100/56  SpO2:  [98 %-99 %] 98 %  I/O last 3 completed shifts:  In: 633 [P.O.:630; I.V.:3]  Out: -     GENERAL: No apparent distress. Awake, alert, and fully oriented.  HEENT: Normocephalic, atraumatic. Extraocular movements intact.  CARDIOVASCULAR: Regular rate and rhythm without murmurs or rubs. No S3.  PULMONARY: Clear bilaterally.  GASTROINTESTINAL: Soft, non-tender, non-distended. Bowel sounds normoactive.   EXTREMITIES: No cyanosis or clubbing. No edema.  NEUROLOGICAL: CN 2-12 grossly intact, no focal neurological deficits.  DERMATOLOGICAL: No rash, ulcer, bruising, nor jaundice.  OTHER:  Erythematous and enlarged left testicle and scrotum    Medications   Current Facility-Administered Medications   Medication Dose Route Frequency Provider Last Rate Last Admin     Current Facility-Administered Medications   Medication Dose Route Frequency Provider Last Rate Last Admin    cefTRIAXone (ROCEPHIN) 2 g vial to attach to  ml bag for ADULTS or NS 50 ml bag for PEDS  2 g Intravenous Q24H Abdoul Montero  mL/hr at 07/05/24 1444 2 g at 07/05/24 1444    levofloxacin (LEVAQUIN) infusion 750 mg  750 mg Intravenous Q24H Favian Ayers  mL/hr at 07/05/24 1309 750 mg at 07/05/24 1309    nicotine (NICODERM CQ) 21 MG/24HR 24 hr patch 1 patch  1 patch Transdermal Daily Abdoul Montero MD   1 patch at 07/04/24 0902    senna-docusate (SENOKOT-S/PERICOLACE) 8.6-50 MG per tablet 1 tablet  1 tablet Oral BID Lashonda Mandujano PA-C   1 tablet at 07/04/24 0903    sodium chloride (PF) 0.9% PF flush 3 mL  3 mL Intracatheter Q8H Lashonda Mandujano PA-C   3 mL at  "07/05/24 1629     Data     Laboratory:  Recent Labs   Lab 07/06/24  0706 07/05/24  0958 07/04/24  0552   WBC 18.8* 19.5* 23.3*   HGB 14.4 14.9 14.3   HCT 44.5 44.7 44.2   MCV 91 89 92   * 427 358     Recent Labs   Lab 07/06/24  0706 07/05/24  0958 07/04/24  0552 07/03/24  0732 07/02/24  0633     --   --  137 140   POTASSIUM 4.8  --   --  4.5 3.7   CHLORIDE 98  --   --  101 103   CO2 25  --   --  23 24   ANIONGAP 13  --   --  13 13   *  --   --  113* 112*   BUN 20.6*  --   --  8.5 8.9   CR 1.17 0.95 0.83 0.77 0.80   GFRESTIMATED 84 >90 >90 >90 >90   CHARLENE 9.6  --   --  9.0 9.0     No results for input(s): \"SED\", \"CRP\" in the last 168 hours.  No results for input(s): \"CULT\" in the last 168 hours.  No results found for: \"TROPI\"    Imaging:  No results found for this or any previous visit (from the past 24 hour(s)).      Neal Wilson DO  [facility] Hospitalist  [address] [place] [address]  Securely message with [employer] (more info)  Text page via [employer] Paging/Directory   07/06/2024   "

## 2024-07-06 NOTE — DISCHARGE SUMMARY
Hospitalist Discharge Summary  [facility]    Ricky Rowland MRN# [id number]   YOB: 1990 Age: 34 year old     Date of Admission:  6/28/2024  Date of Discharge:  7/6/2024 11:47 AM  Admitting Physician:  Abdoul Montero MD  Discharge Physician:  Neal Wilson DO  Discharging Service:  Hospitalist     Primary Provider: No Ref-Primary, Physician          Discharge Diagnosis:     Acute Epididymoorchitis with Sepsis  - Appreciate Urology recommendations  - Initially on vanco/zosyn.  Adjusted to ceftriaxone on 7/1.  Added Levofloxacin on 7/4 due to persistent leukocytosis  - Pt has declined surgery multiple times  - Urine culture positive for pan-sensitive e coli  - GC/NG negative  - Several testicular US with doppler have shown persistent diffuse heterogenous appearance of the L testicale with significant decreased internal blood flow as compared to the right testicle, mild diffuse L epidiymal thickening with diffuse epididymal hyperemia (not significantly changed), complex hyperemic area along the superior aspect of the L testicle  - Pain control prn     Polysubstance Abuse (Methamphetamine, Cannabis)  Amphetamine Withdrawal  ADHD  - Improved  - Recommend substance cessation             Discharge Disposition:     Discharged to home           Hospital Course:     Ricky Rowland is a 34 year old male with a history of polysubstance abuse with methamphetamine and cannabis, ADHD admitted on 6/28/2024 with severe testicular pain.  In the emergency department, patient is found to be temperature of 98.7  F, heart rate 129, blood pressure 146/99, respiratory rate 26, SpO2 96% on room air.  Initial lab work showed BMP within normal limits, WBC 20.3.  Urinalysis showed small leukocyte esterase, 22 WBC, few bacteria.  Gonorrhea and Chlamydia PCR were negative.  Urine cultures returned positive for pansensitive E. coli.  Urine drug screen was positive for amphetamines and  cannabinoids.  Testicular ultrasound with Doppler showed hyperemia of the left testicle and epididymis possibly representing epididymoorchitis, left spermatic cord that is somewhat swirled in appearance raising suspicion for intermittent torsion.  The patient was initially started on vancomycin and IV Zosyn.  Urology was consulted to see the patient.  After urine cultures returned, antibiotics were adjusted to IV ceftriaxone.  Ultimately repeat ultrasound showed persistent edema of the left testicle and compromised blood flow to the testicle.  Surgery was ultimately recommended, however the patient declined.  IV levofloxacin was added on 7/4.  Unfortunately, the patient declined surgery multiple times.  On 7/6, the patient's white blood cell count was improving as was his inflammatory markers and the patient was requesting discharge.  Urology recommended 4 weeks of oral Levaquin.  On 7/6, the patient was discharged home with 7 days of Keflex added for refill of oral Levaquin.    The patient was seen, examined, and counseled on this day. The hospitalization and plan of care was reviewed with the patient extensively. All questions were addressed and the patient agreed to follow-up as noted above.           Allergies:     Allergies   Allergen Reactions    Cats               Discharge Medications:     Discharge Medication List as of 7/6/2024 11:37 AM        START taking these medications    Details   HYDROmorphone (DILAUDID) 2 MG tablet Take 2 tablets (4 mg) by mouth every 6 hours as needed for pain, Disp-10 tablet, R-0, Local Print           CONTINUE these medications which have CHANGED    Details   cephALEXin (KEFLEX) 500 MG capsule Take 1 capsule (500 mg) by mouth 4 times daily for 7 days, Disp-28 capsule, R-0, E-Prescribe      levofloxacin (LEVAQUIN) 750 MG tablet Take 1 tablet (750 mg) by mouth daily for 28 days, Disp-28 tablet, R-0, E-Prescribe           CONTINUE these medications which have NOT CHANGED    Details  "  ALBUTEROL INHALER 17GM 2 Puffs [id number] prn, Disp-2, R-3, Fax      ibuprofen (ADVIL/MOTRIN) 600 MG tablet Take 1 tablet (600 mg) by mouth every 8 hours as needed for moderate pain, Disp-30 tablet, R-0, Local Print           STOP taking these medications       ADDERALL XR# 20 MG OR CP24 Comments:   Reason for Stopping:         ALBUTEROL SULFATE (2.5 MG/3ML) 0.083% IN NEBU Comments:   Reason for Stopping:         oxyCODONE-acetaminophen (PERCOCET) 5-325 MG per tablet Comments:   Reason for Stopping:                      Condition on Discharge:     Discharge condition: Fair   Discharge vitals: Blood pressure 100/56, pulse 90, temperature 97.6  F (36.4  C), temperature source Oral, resp. rate 16, height 1.803 m (5' 11\"), weight 77.9 kg (171 lb 12.8 oz), SpO2 98%.   Code status on discharge: Full Code      BASIC PHYSICAL EXAMINATION:  GENERAL: No apparent distress.  CARDIOVASCULAR: Regular rate and rhythm without murmurs.  PULMONARY: Clear to auscultation bilaterally.   GASTROINTESTINAL: Abdomen soft, non-tender.  EXTREMITIES: No edema, pulses intact.  NEUROLOGIC: No focal deficits.            History of Illness:   See detailed admission note for full details.               Procedures excluding imaging which is summarized below:     Please see details in the electronic medical record.           Consultations:     UROLOGY IP CONSULT  PHARMACY TO DOSE VANCO          Significant Results:     Results for orders placed or performed during the hospital encounter of 06/28/24   US Testicular & Scrotum w Doppler Ltd    Narrative    EXAM: US TESTICULAR AND SCROTUM WITH DOPPLER LIMITED  LOCATION: [facility]  DATE: 6/28/2024    INDICATION: L scrotal pain  COMPARISON: None.  TECHNIQUE: Ultrasound of scrotum with color flow and spectral Doppler with waveform analysis performed.    FINDINGS:    RIGHT: Right testicle measures 5.6 x 3.1 x 2.0 cm. Homogeneous testicular echotexture. No testicular mass. Arterial and " venous blood flow of the right testicle is demonstrated on Doppler evaluation. Normal epididymis. No hydrocele. No varicocele.    LEFT: Left testicle measures 4.9 x 3.3 x 3.4 cm. Homogeneous testicular echotexture. No testicular mass. Hyperemia of the left testicle and epididymis on color Doppler evaluation. Arterial and venous blood flow of the left testicle is demonstrated on   Doppler evaluation. Small hydrocele. No varicocele.    The sonographer performed additional imaging of the left spermatic cord. On cine ultrasound images, the left spermatic cord has a somewhat swirled appearance which suggests the possibility of intermittent torsion.      Impression    IMPRESSION:  Hyperemia of the left testicle and epididymis may be due to epididymoorchitis. However, the left spermatic cord has a somewhat swirled appearance which raises the possibility of intermittent torsion. Urologic consultation recommended.    I discussed the findings with Dr. Quijano of the ED at 7:00 AM on 6/20/2024.   US Testicular & Scrotum w Doppler Ltd    Narrative    TESTICULAR ULTRASOUND 6/28/2024 3:35 PM     HISTORY: Question left-sided torsion.     COMPARISON: June 28, 2024    TECHNIQUE: Ultrasound gray scale, color Doppler flow, and Doppler  spectral waveform analysis performed.    FINDINGS: There is homogeneous normal echotexture throughout the  bilateral testes. The left testicle measures 4.3 x 3.4 x 3.3 cm.  The  right testicle measures 5.0 x 2.3 x 2.5 cm. The right epididymis is  unremarkable.  Echogenic lesion superior to left testicle may be a  markedly inflamed epididymis. Doppler evaluation shows high resistance  waveforms to the left testicle. Unremarkable arterial waveforms to the  right testicle. There is no hydrocele. There is no varicocele. There  is no mass or abnormal calcifications.      Impression    IMPRESSION: High resistance waveforms to the left testicle with  decreased flow, this may be related to severe edema to the  testicle,  vascular compromise in the artery to the testicle, vs. partial or  intermittent torsion.     SUNNY PITTMAN MD         SYSTEM ID:  [id number]   US Testicular & Scrotum w Doppler Ltd    Narrative    EXAM: US TESTICULAR AND SCROTUM WITH DOPPLER LIMITED  LOCATION: [facility]  DATE: 6/29/2024    INDICATION: Left testicular pain and swelling.   COMPARISON: Testicular ultrasound 6/28/2024.   TECHNIQUE: Ultrasound of scrotum with color flow and spectral Doppler with waveform analysis performed.    FINDINGS:    RIGHT: Right testicle measures 5.2 x 2.3 x 3.3 cm. Homogenous testicular parenchyma. Simple 0.5 x 0.4 x 0.6 cm intratesticular cyst at the superior pole. Normal arterial duplex and normal color flow.  Simple 0.3 cm epididymal cyst. Epididymis is   otherwise normal. No hydrocele. No varicocele.    LEFT: Left testicle measures 5.4 x 3.4 x 3.8 cm.  Testicle parenchyma is mildly heterogeneous. Arterial and venous flow is present within the testicle, but is diminished in comparison to the right. No intratesticular abscess. Epididymis is thickened and   diffusely hypervascular. There is marked hypervascularity surrounding the left testicle which extends along the thickened and heterogeneous spermatic cord; there is some swirling of the spermatic cord on cine images, similar to prior. Small complex   hydrocele. No varicocele.    Diffusely thickened and hyperemic scrotal wall.      Impression    IMPRESSION:    1.  Findings likely reflecting severe left epididymoorchitis. Diminished vascular flow throughout the left testicle is most likely due to inflammation and edema along the spermatic cord, although intermittent torsion remains in the differential. No   intratesticular abscess.    2.  Small complex left hydrocele.     3.  Thickened and hyperemic scrotal wall.    US Testicular & Scrotum w Doppler Ltd    Narrative    US TESTICULAR AND SCROTUM WITH DOPPLER LIMITED 7/2/2024 9:50 AM    CLINICAL  HISTORY: left testicle--blood flow?  new abscess; worsening  WBC, fever and chills  TECHNIQUE: Ultrasound of scrotum with color flow and spectral Doppler  with waveform analysis performed.  COMPARISON: 6/29/2024    FINDINGS:    RIGHT: Right testicle measures 5.6 x 3.1 x 2.5 cm. Normal testicle  with no masses. Normal arterial duplex and normal color flow. 0.5 x  0.5 x 0.4 cm cyst is seen within the right epididymis. No hydrocele.  No varicocele.    LEFT: Left testicle measures 5.2 x 2.9 x 2.2 cm. Heterogenous  appearance of the left testicle with minimal flow, similar to the  prior exam. Epididymis appears thickened and diffusely hypervascular.  There is also marked hypervascularity surrounding the left testicle  extending into the sclerotic cord which has a swirled appearance. No  hydrocele. No varicocele.      Impression    IMPRESSION:  1.  Findings are suggestive of persistent, severe left  epididymo-orchitis. Persistent, diminished vascular flow along the  left testicle which may be due to underlying inflammation and edema.  However, differential diagnosis also includes intermittent or partial  torsion. Recommend clinical correlation. No intratesticular abscess is  seen.  2.  Thickened, hyperemic left scrotal wall.    MORENA BYERS MD         SYSTEM ID:  [id number]   US Testicular & Scrotum w Doppler Ltd    Narrative    EXAM: US TESTICULAR AND SCROTUM WITH DOPPLER LIMITED  LOCATION: [facility]  DATE: 7/4/2024    INDICATION: Follow severe epididymoorchitis, assess for developing abscess, assess blood flow to left testicle.  COMPARISON: Scrotal ultrasound on 7/2/2024.  TECHNIQUE: Ultrasound of scrotum with color flow and spectral Doppler with waveform analysis performed.    FINDINGS:    RIGHT: Right testicle measures Measures 5.4 x 3.2 x 2.5 cm. Normal testicle with no masses. Normal arterial duplex and normal color flow. There is 5 mm epididymal head cyst. No hydrocele. No  varicocele.    LEFT: Left testicle measures 5.1 x 3.8 x 3.2 cm. Diffuse heterogenous appearance of the left testicle with significant decreased internal flow, not significantly changed as compared to 7/2/2024. Mild diffuse epididymal thickening with diffuse hyperemia,   not significantly changed as compared to 7/2/2024. No hydrocele. No varicocele.    Again noted complex hyperemic area along the superior aspect of the left testicle, not significantly changed as compared to 7/2/2024.      Impression    IMPRESSION:  1.  Diffuse heterogenous appearance of the left testicle with significant decreased internal flow as compared to the right, not significantly changed as compared to 7/2/2024 exam. No significant fluid collection visualized.  2.  Mild diffuse left epididymal thickening with diffuse epididymal hyperemia, not significantly changed as compared to 7/2/2024.  3.  Again noted complex hyperemic area along the superior aspect of the left testicle, not significantly changed as compared to 7/2/2024.       Transthoracic Echocardiogram Results:  No results found for this or any previous visit (from the past 4320 hour(s)).             Pending Results:     Unresulted Labs Ordered in the Past 30 Days of this Admission       No orders found from 5/29/2024 to 6/29/2024.                        Discharge Instructions and Follow-Up:     Discharge instructions and follow-up:   Discharge Procedure Orders   Reason for your hospital stay   Order Comments: Acute Epididymoorchitis with urine culture positive for e coli bacteria and evidence of limited blood flow to left testicle due to inflammation     Activity   Order Comments: Your activity upon discharge: activity as tolerated     Order Specific Question Answer Comments   Is discharge order? Yes      Follow-up and recommended labs and tests    Order Comments: Follow up with primary care provider within 2-3 days for hospital follow- up.  The following labs/tests are recommended:  CBC, BMP.    Recommend you monitor your symptoms and swelling.  Continue icing and elevating your scrotum.  Use Tylenol as needed for pain control and Dilaudid when very severe.  If your pain acutely worsens, does not improve, swelling does not improve or worsens, then recommend you return to the emergency department for evaluation as you would likely benefit from surgery.    Your antibiotic levaquin has been associated with tendon rupture (including achilles tendon in your ankles), QT prolongation of heart rhythms, and aortic rupture.  Monitor for symptoms and immediately stop them medication and come to the emergency department if these develop.     Diet   Order Comments: Follow this diet upon discharge: Orders Placed This Encounter      Regular Diet Adult     Order Specific Question Answer Comments   Is discharge order? Yes           Total time spent in face to face contact with the patient and coordinating discharge was:  35 Minutes    Neal Wilson DO  [facility] Hospitalist  [address] [place] [address]  07/06/2024

## 2024-07-06 NOTE — PLAN OF CARE
"No surgery per Urology.  Afebrile.  Denies any bothersome pain, stated \"so much better\".  No nausea.  Voiding.  Up ad fredis., ambulating hallways.  Reviewed DC instructions with patient and S.O.  Patient discharged to home with discharge instructions & personal belongings.  Pt verbalized importance of picking up DC meds from DC pharmacy today 7/6.     Goal Outcome Evaluation:    Plan of Care Reviewed With: patient, significant other    Overall Patient Progress: improvingOverall Patient Progress: improving    Outcome Evaluation: No surgery.  DC home.      Problem: Adult Inpatient Plan of Care  Goal: Plan of Care Review  Description: The Plan of Care Review/Shift note should be completed every shift.  The Outcome Evaluation is a brief statement about your assessment that the patient is improving, declining, or no change.  This information will be displayed automatically on your shift  note.  Outcome: Met  Flowsheets (Taken 7/6/2024 1141)  Outcome Evaluation: No surgery.  DC home.  Plan of Care Reviewed With:   patient   significant other  Overall Patient Progress: improving  Goal: Patient-Specific Goal (Individualized)  Description: You can add care plan individualizations to a care plan. Examples of Individualization might be:  \"Parent requests to be called daily at 9am for status\", \"I have a hard time hearing out of my right ear\", or \"Do not touch me to wake me up as it startles  me\".  Outcome: Met  Goal: Absence of Hospital-Acquired Illness or Injury  Outcome: Met  Goal: Optimal Comfort and Wellbeing  Outcome: Met  Goal: Readiness for Transition of Care  Outcome: Met     Problem: Pain Acute  Goal: Optimal Pain Control and Function  Outcome: Met     Problem: Substance Withdrawal  Goal: Substance Withdrawal  Description: Signs and symptoms of listed problems will be absent or manageable.  Outcome: Met  Goal: Social and Therapeutic (Substance Withdrawal)  Description: Signs and symptoms of listed problems will be absent " or manageable.  Outcome: Met     Problem: Infection  Goal: Absence of Infection Signs and Symptoms  Outcome: Met

## 2024-07-06 NOTE — PLAN OF CARE
"Goal Outcome Evaluation:      Plan of Care Reviewed With: patient    Overall Patient Progress: no changeOverall Patient Progress: no change    Outcome Evaluation: pt is A&OX4, very anxious, frustrated,on RA. LS CTA. IV CDI to R/ wrist, SL.pt tolrating regular diet. NPO from midnight. Ind. voids. c/o pain 8-9/10. takes dilaudid IV, Toradol IV. cont. IV antibiotic.      Problem: Adult Inpatient Plan of Care  Goal: Plan of Care Review  Description: The Plan of Care Review/Shift note should be completed every shift.  The Outcome Evaluation is a brief statement about your assessment that the patient is improving, declining, or no change.  This information will be displayed automatically on your shift  note.  Outcome: Progressing  Flowsheets (Taken 7/5/2024 1953)  Outcome Evaluation: pt is A&OX4, very anxious, frustrated,on RA. LS CTA. IV CDI to R/ wrist, SL.pt tolrating regular diet. NPO from midnight. Ind. voids. c/o pain 8-9/10. takes dilaudid IV, Toradol IV. cont. IV antibiotic.  Plan of Care Reviewed With: patient  Overall Patient Progress: no change  Goal: Patient-Specific Goal (Individualized)  Description: You can add care plan individualizations to a care plan. Examples of Individualization might be:  \"Parent requests to be called daily at 9am for status\", \"I have a hard time hearing out of my right ear\", or \"Do not touch me to wake me up as it startles  me\".  Outcome: Progressing  Goal: Absence of Hospital-Acquired Illness or Injury  Outcome: Progressing  Intervention: Identify and Manage Fall Risk  Recent Flowsheet Documentation  Taken 7/5/2024 1800 by Cortney Ochoa, RN  Safety Promotion/Fall Prevention:   safety round/check completed   assistive device/personal items within reach   lighting adjusted   nonskid shoes/slippers when out of bed   patient and family education   room organization consistent  Intervention: Prevent Skin Injury  Recent Flowsheet Documentation  Taken 7/5/2024 1800 by Cortney Ochoa, " RN  Device Skin Pressure Protection: absorbent pad utilized/changed  Intervention: Prevent and Manage VTE (Venous Thromboembolism) Risk  Recent Flowsheet Documentation  Taken 7/5/2024 1800 by Cortney Ochoa RN  VTE Prevention/Management:   SCDs off (sequential compression devices)   compression stockings off  Intervention: Prevent Infection  Recent Flowsheet Documentation  Taken 7/5/2024 1800 by Cortney Ochoa RN  Infection Prevention: single patient room provided  Goal: Optimal Comfort and Wellbeing  Outcome: Progressing  Intervention: Monitor Pain and Promote Comfort  Recent Flowsheet Documentation  Taken 7/5/2024 1912 by Cortney Ochoa RN  Pain Management Interventions:   rest   care clustered  Taken 7/5/2024 1643 by Cortney Ochoa RN  Pain Management Interventions:   rest   care clustered  Goal: Readiness for Transition of Care  Outcome: Progressing     Problem: Pain Acute  Goal: Optimal Pain Control and Function  Outcome: Progressing  Intervention: Develop Pain Management Plan  Recent Flowsheet Documentation  Taken 7/5/2024 1912 by Cortney Ochoa RN  Pain Management Interventions:   rest   care clustered  Taken 7/5/2024 1643 by Cortney Ohcoa RN  Pain Management Interventions:   rest   care clustered  Intervention: Prevent or Manage Pain  Recent Flowsheet Documentation  Taken 7/5/2024 1800 by Cortney Ochoa RN  Sensory Stimulation Regulation:   care clustered   quiet environment promoted  Medication Review/Management: medications reviewed  Intervention: Optimize Psychosocial Wellbeing  Recent Flowsheet Documentation  Taken 7/5/2024 1800 by Cortney Ochoa RN  Supportive Measures:   active listening utilized   problem-solving facilitated   verbalization of feelings encouraged   decision-making supported   goal-setting facilitated   relaxation techniques promoted   self-care encouraged     Problem: Substance Withdrawal  Goal: Substance Withdrawal  Description: Signs and symptoms of listed problems will be  absent or manageable.  Outcome: Progressing  Goal: Social and Therapeutic (Substance Withdrawal)  Description: Signs and symptoms of listed problems will be absent or manageable.  Outcome: Progressing     Problem: Infection  Goal: Absence of Infection Signs and Symptoms  Outcome: Progressing

## 2024-07-16 ENCOUNTER — HOSPITAL ENCOUNTER (INPATIENT)
Facility: CLINIC | Age: 34
LOS: 5 days | Discharge: HOME | End: 2024-07-21
Attending: STUDENT IN AN ORGANIZED HEALTH CARE EDUCATION/TRAINING PROGRAM | Admitting: INTERNAL MEDICINE
Payer: COMMERCIAL

## 2024-07-16 ENCOUNTER — APPOINTMENT (OUTPATIENT)
Dept: ULTRASOUND IMAGING | Facility: CLINIC | Age: 34
End: 2024-07-16
Attending: STUDENT IN AN ORGANIZED HEALTH CARE EDUCATION/TRAINING PROGRAM
Payer: COMMERCIAL

## 2024-07-16 ENCOUNTER — ANESTHESIA EVENT (OUTPATIENT)
Dept: SURGERY | Facility: CLINIC | Age: 34
End: 2024-07-16
Payer: COMMERCIAL

## 2024-07-16 ENCOUNTER — ANESTHESIA (OUTPATIENT)
Dept: SURGERY | Facility: CLINIC | Age: 34
End: 2024-07-16
Payer: COMMERCIAL

## 2024-07-16 DIAGNOSIS — N45.3 EPIDIDYMOORCHITIS: ICD-10-CM

## 2024-07-16 DIAGNOSIS — S31.30XA: ICD-10-CM

## 2024-07-16 DIAGNOSIS — N44.00 TORSION OF LEFT TESTICLE: Primary | ICD-10-CM

## 2024-07-16 DIAGNOSIS — N44.00 TESTICULAR TORSION: ICD-10-CM

## 2024-07-16 LAB
ABO/RH(D): NORMAL
ALBUMIN UR-MCNC: NEGATIVE MG/DL
AMORPH CRY #/AREA URNS HPF: ABNORMAL /HPF
ANION GAP SERPL CALCULATED.3IONS-SCNC: 13 MMOL/L (ref 7–15)
ANTIBODY SCREEN: NEGATIVE
APPEARANCE UR: ABNORMAL
BACTERIA SPEC CULT: ABNORMAL
BACTERIA SPEC CULT: NORMAL
BASOPHILS # BLD AUTO: 0.1 10E3/UL (ref 0–0.2)
BASOPHILS NFR BLD AUTO: 0 %
BILIRUB UR QL STRIP: NEGATIVE
BUN SERPL-MCNC: 12.9 MG/DL (ref 6–20)
CALCIUM SERPL-MCNC: 8.8 MG/DL (ref 8.8–10.4)
CHLORIDE SERPL-SCNC: 105 MMOL/L (ref 98–107)
COLOR UR AUTO: YELLOW
CREAT SERPL-MCNC: 0.95 MG/DL (ref 0.67–1.17)
EGFRCR SERPLBLD CKD-EPI 2021: >90 ML/MIN/1.73M2
EOSINOPHIL # BLD AUTO: 0.1 10E3/UL (ref 0–0.7)
EOSINOPHIL NFR BLD AUTO: 1 %
ERYTHROCYTE [DISTWIDTH] IN BLOOD BY AUTOMATED COUNT: 13.7 % (ref 10–15)
GLUCOSE SERPL-MCNC: 87 MG/DL (ref 70–99)
GLUCOSE UR STRIP-MCNC: 100 MG/DL
GRAM STAIN RESULT: ABNORMAL
GRAM STAIN RESULT: ABNORMAL
GRAM STAIN RESULT: NORMAL
GRAM STAIN RESULT: NORMAL
HCO3 SERPL-SCNC: 25 MMOL/L (ref 22–29)
HCT VFR BLD AUTO: 39.7 % (ref 40–53)
HGB BLD-MCNC: 13 G/DL (ref 13.3–17.7)
HGB UR QL STRIP: NEGATIVE
HOLD SPECIMEN: NORMAL
HYALINE CASTS: 2 /LPF
IMM GRANULOCYTES # BLD: 0.1 10E3/UL
IMM GRANULOCYTES NFR BLD: 0 %
KETONES UR STRIP-MCNC: NEGATIVE MG/DL
LACTATE SERPL-SCNC: 3 MMOL/L (ref 0.7–2)
LACTATE SERPL-SCNC: 3.5 MMOL/L (ref 0.7–2)
LACTATE SERPL-SCNC: 3.5 MMOL/L (ref 0.7–2)
LEUKOCYTE ESTERASE UR QL STRIP: NEGATIVE
LYMPHOCYTES # BLD AUTO: 2.8 10E3/UL (ref 0.8–5.3)
LYMPHOCYTES NFR BLD AUTO: 17 %
MCH RBC QN AUTO: 29.5 PG (ref 26.5–33)
MCHC RBC AUTO-ENTMCNC: 32.7 G/DL (ref 31.5–36.5)
MCV RBC AUTO: 90 FL (ref 78–100)
MONOCYTES # BLD AUTO: 0.6 10E3/UL (ref 0–1.3)
MONOCYTES NFR BLD AUTO: 4 %
MUCOUS THREADS #/AREA URNS LPF: PRESENT /LPF
NEUTROPHILS # BLD AUTO: 12.5 10E3/UL (ref 1.6–8.3)
NEUTROPHILS NFR BLD AUTO: 78 %
NITRATE UR QL: NEGATIVE
NRBC # BLD AUTO: 0 10E3/UL
NRBC BLD AUTO-RTO: 0 /100
PH UR STRIP: 7.5 [PH] (ref 5–7)
PLATELET # BLD AUTO: 667 10E3/UL (ref 150–450)
POTASSIUM SERPL-SCNC: 4.1 MMOL/L (ref 3.4–5.3)
RADIOLOGIST FLAGS: ABNORMAL
RBC # BLD AUTO: 4.4 10E6/UL (ref 4.4–5.9)
RBC URINE: 2 /HPF
SODIUM SERPL-SCNC: 143 MMOL/L (ref 135–145)
SP GR UR STRIP: 1.02 (ref 1–1.03)
SPECIMEN EXPIRATION DATE: NORMAL
UROBILINOGEN UR STRIP-MCNC: NORMAL MG/DL
WBC # BLD AUTO: 16.2 10E3/UL (ref 4–11)
WBC URINE: 3 /HPF

## 2024-07-16 PROCEDURE — 999N000141 HC STATISTIC PRE-PROCEDURE NURSING ASSESSMENT: Performed by: STUDENT IN AN ORGANIZED HEALTH CARE EDUCATION/TRAINING PROGRAM

## 2024-07-16 PROCEDURE — 250N000011 HC RX IP 250 OP 636: Performed by: STUDENT IN AN ORGANIZED HEALTH CARE EDUCATION/TRAINING PROGRAM

## 2024-07-16 PROCEDURE — 85004 AUTOMATED DIFF WBC COUNT: CPT | Performed by: STUDENT IN AN ORGANIZED HEALTH CARE EDUCATION/TRAINING PROGRAM

## 2024-07-16 PROCEDURE — 87205 SMEAR GRAM STAIN: CPT | Performed by: STUDENT IN AN ORGANIZED HEALTH CARE EDUCATION/TRAINING PROGRAM

## 2024-07-16 PROCEDURE — 0V950ZZ DRAINAGE OF SCROTUM, OPEN APPROACH: ICD-10-PCS | Performed by: STUDENT IN AN ORGANIZED HEALTH CARE EDUCATION/TRAINING PROGRAM

## 2024-07-16 PROCEDURE — 99222 1ST HOSP IP/OBS MODERATE 55: CPT | Performed by: INTERNAL MEDICINE

## 2024-07-16 PROCEDURE — 99285 EMERGENCY DEPT VISIT HI MDM: CPT | Mod: 25

## 2024-07-16 PROCEDURE — 87070 CULTURE OTHR SPECIMN AEROBIC: CPT | Performed by: STUDENT IN AN ORGANIZED HEALTH CARE EDUCATION/TRAINING PROGRAM

## 2024-07-16 PROCEDURE — 370N000017 HC ANESTHESIA TECHNICAL FEE, PER MIN: Performed by: STUDENT IN AN ORGANIZED HEALTH CARE EDUCATION/TRAINING PROGRAM

## 2024-07-16 PROCEDURE — 81001 URINALYSIS AUTO W/SCOPE: CPT | Performed by: STUDENT IN AN ORGANIZED HEALTH CARE EDUCATION/TRAINING PROGRAM

## 2024-07-16 PROCEDURE — 250N000009 HC RX 250: Performed by: STUDENT IN AN ORGANIZED HEALTH CARE EDUCATION/TRAINING PROGRAM

## 2024-07-16 PROCEDURE — 36415 COLL VENOUS BLD VENIPUNCTURE: CPT | Performed by: STUDENT IN AN ORGANIZED HEALTH CARE EDUCATION/TRAINING PROGRAM

## 2024-07-16 PROCEDURE — 54700 I&D EPDIDYMS TSTIS&/SCROT SP: CPT | Performed by: STUDENT IN AN ORGANIZED HEALTH CARE EDUCATION/TRAINING PROGRAM

## 2024-07-16 PROCEDURE — 87075 CULTR BACTERIA EXCEPT BLOOD: CPT | Performed by: STUDENT IN AN ORGANIZED HEALTH CARE EDUCATION/TRAINING PROGRAM

## 2024-07-16 PROCEDURE — 83605 ASSAY OF LACTIC ACID: CPT | Performed by: INTERNAL MEDICINE

## 2024-07-16 PROCEDURE — 93976 VASCULAR STUDY: CPT

## 2024-07-16 PROCEDURE — 250N000011 HC RX IP 250 OP 636: Performed by: ANESTHESIOLOGY

## 2024-07-16 PROCEDURE — 36415 COLL VENOUS BLD VENIPUNCTURE: CPT | Performed by: INTERNAL MEDICINE

## 2024-07-16 PROCEDURE — 120N000001 HC R&B MED SURG/OB

## 2024-07-16 PROCEDURE — 258N000003 HC RX IP 258 OP 636: Performed by: ANESTHESIOLOGY

## 2024-07-16 PROCEDURE — 54520 REMOVAL OF TESTIS: CPT | Mod: LT | Performed by: STUDENT IN AN ORGANIZED HEALTH CARE EDUCATION/TRAINING PROGRAM

## 2024-07-16 PROCEDURE — 258N000003 HC RX IP 258 OP 636: Performed by: NURSE ANESTHETIST, CERTIFIED REGISTERED

## 2024-07-16 PROCEDURE — 250N000011 HC RX IP 250 OP 636: Performed by: INTERNAL MEDICINE

## 2024-07-16 PROCEDURE — 86900 BLOOD TYPING SEROLOGIC ABO: CPT | Performed by: STUDENT IN AN ORGANIZED HEALTH CARE EDUCATION/TRAINING PROGRAM

## 2024-07-16 PROCEDURE — 258N000003 HC RX IP 258 OP 636: Performed by: INTERNAL MEDICINE

## 2024-07-16 PROCEDURE — 250N000025 HC SEVOFLURANE, PER MIN: Performed by: STUDENT IN AN ORGANIZED HEALTH CARE EDUCATION/TRAINING PROGRAM

## 2024-07-16 PROCEDURE — 250N000013 HC RX MED GY IP 250 OP 250 PS 637: Performed by: INTERNAL MEDICINE

## 2024-07-16 PROCEDURE — 272N000001 HC OR GENERAL SUPPLY STERILE: Performed by: STUDENT IN AN ORGANIZED HEALTH CARE EDUCATION/TRAINING PROGRAM

## 2024-07-16 PROCEDURE — 88305 TISSUE EXAM BY PATHOLOGIST: CPT | Mod: TC | Performed by: STUDENT IN AN ORGANIZED HEALTH CARE EDUCATION/TRAINING PROGRAM

## 2024-07-16 PROCEDURE — 250N000011 HC RX IP 250 OP 636: Performed by: NURSE ANESTHETIST, CERTIFIED REGISTERED

## 2024-07-16 PROCEDURE — 88305 TISSUE EXAM BY PATHOLOGIST: CPT | Mod: 26 | Performed by: PATHOLOGY

## 2024-07-16 PROCEDURE — 99222 1ST HOSP IP/OBS MODERATE 55: CPT | Mod: 57 | Performed by: STUDENT IN AN ORGANIZED HEALTH CARE EDUCATION/TRAINING PROGRAM

## 2024-07-16 PROCEDURE — 710N000010 HC RECOVERY PHASE 1, LEVEL 2, PER MIN: Performed by: STUDENT IN AN ORGANIZED HEALTH CARE EDUCATION/TRAINING PROGRAM

## 2024-07-16 PROCEDURE — 360N000075 HC SURGERY LEVEL 2, PER MIN: Performed by: STUDENT IN AN ORGANIZED HEALTH CARE EDUCATION/TRAINING PROGRAM

## 2024-07-16 PROCEDURE — 76870 US EXAM SCROTUM: CPT

## 2024-07-16 PROCEDURE — 250N000009 HC RX 250: Performed by: NURSE ANESTHETIST, CERTIFIED REGISTERED

## 2024-07-16 PROCEDURE — 0VBB0ZZ EXCISION OF LEFT TESTIS, OPEN APPROACH: ICD-10-PCS | Performed by: STUDENT IN AN ORGANIZED HEALTH CARE EDUCATION/TRAINING PROGRAM

## 2024-07-16 PROCEDURE — 87040 BLOOD CULTURE FOR BACTERIA: CPT | Performed by: STUDENT IN AN ORGANIZED HEALTH CARE EDUCATION/TRAINING PROGRAM

## 2024-07-16 PROCEDURE — 80048 BASIC METABOLIC PNL TOTAL CA: CPT | Performed by: STUDENT IN AN ORGANIZED HEALTH CARE EDUCATION/TRAINING PROGRAM

## 2024-07-16 PROCEDURE — 36416 COLLJ CAPILLARY BLOOD SPEC: CPT | Performed by: INTERNAL MEDICINE

## 2024-07-16 RX ORDER — LIDOCAINE 40 MG/G
CREAM TOPICAL
Status: DISCONTINUED | OUTPATIENT
Start: 2024-07-16 | End: 2024-07-21 | Stop reason: HOSPADM

## 2024-07-16 RX ORDER — HYDROMORPHONE HCL IN WATER/PF 6 MG/30 ML
0.4 PATIENT CONTROLLED ANALGESIA SYRINGE INTRAVENOUS EVERY 5 MIN PRN
Status: DISCONTINUED | OUTPATIENT
Start: 2024-07-16 | End: 2024-07-16 | Stop reason: HOSPADM

## 2024-07-16 RX ORDER — VANCOMYCIN HYDROCHLORIDE
1250 EVERY 12 HOURS
Status: DISCONTINUED | OUTPATIENT
Start: 2024-07-16 | End: 2024-07-18 | Stop reason: DRUGHIGH

## 2024-07-16 RX ORDER — NALOXONE HYDROCHLORIDE 0.4 MG/ML
0.1 INJECTION, SOLUTION INTRAMUSCULAR; INTRAVENOUS; SUBCUTANEOUS
Status: DISCONTINUED | OUTPATIENT
Start: 2024-07-16 | End: 2024-07-16 | Stop reason: HOSPADM

## 2024-07-16 RX ORDER — DEXAMETHASONE SODIUM PHOSPHATE 4 MG/ML
4 INJECTION, SOLUTION INTRA-ARTICULAR; INTRALESIONAL; INTRAMUSCULAR; INTRAVENOUS; SOFT TISSUE
Status: DISCONTINUED | OUTPATIENT
Start: 2024-07-16 | End: 2024-07-16 | Stop reason: HOSPADM

## 2024-07-16 RX ORDER — OXYCODONE HYDROCHLORIDE 5 MG/1
10 TABLET ORAL
Status: DISCONTINUED | OUTPATIENT
Start: 2024-07-16 | End: 2024-07-16 | Stop reason: HOSPADM

## 2024-07-16 RX ORDER — LIDOCAINE 40 MG/G
CREAM TOPICAL
Status: DISCONTINUED | OUTPATIENT
Start: 2024-07-16 | End: 2024-07-16 | Stop reason: HOSPADM

## 2024-07-16 RX ORDER — NEOSTIGMINE METHYLSULFATE 1 MG/ML
VIAL (ML) INJECTION PRN
Status: DISCONTINUED | OUTPATIENT
Start: 2024-07-16 | End: 2024-07-16

## 2024-07-16 RX ORDER — ACETAMINOPHEN 325 MG/1
650 TABLET ORAL EVERY 4 HOURS PRN
Status: DISCONTINUED | OUTPATIENT
Start: 2024-07-16 | End: 2024-07-21 | Stop reason: HOSPADM

## 2024-07-16 RX ORDER — LABETALOL HYDROCHLORIDE 5 MG/ML
10 INJECTION, SOLUTION INTRAVENOUS EVERY 5 MIN PRN
Status: DISCONTINUED | OUTPATIENT
Start: 2024-07-16 | End: 2024-07-16 | Stop reason: HOSPADM

## 2024-07-16 RX ORDER — GLYCOPYRROLATE 0.2 MG/ML
INJECTION, SOLUTION INTRAMUSCULAR; INTRAVENOUS PRN
Status: DISCONTINUED | OUTPATIENT
Start: 2024-07-16 | End: 2024-07-16

## 2024-07-16 RX ORDER — ACETAMINOPHEN 325 MG/1
975 TABLET ORAL ONCE
Status: DISCONTINUED | OUTPATIENT
Start: 2024-07-16 | End: 2024-07-16 | Stop reason: HOSPADM

## 2024-07-16 RX ORDER — SODIUM CHLORIDE, SODIUM LACTATE, POTASSIUM CHLORIDE, CALCIUM CHLORIDE 600; 310; 30; 20 MG/100ML; MG/100ML; MG/100ML; MG/100ML
INJECTION, SOLUTION INTRAVENOUS CONTINUOUS
Status: DISCONTINUED | OUTPATIENT
Start: 2024-07-16 | End: 2024-07-16 | Stop reason: HOSPADM

## 2024-07-16 RX ORDER — ONDANSETRON 2 MG/ML
4 INJECTION INTRAMUSCULAR; INTRAVENOUS EVERY 30 MIN PRN
Status: DISCONTINUED | OUTPATIENT
Start: 2024-07-16 | End: 2024-07-16 | Stop reason: HOSPADM

## 2024-07-16 RX ORDER — SODIUM CHLORIDE 9 MG/ML
INJECTION, SOLUTION INTRAVENOUS CONTINUOUS
Status: ACTIVE | OUTPATIENT
Start: 2024-07-16 | End: 2024-07-17

## 2024-07-16 RX ORDER — ONDANSETRON 2 MG/ML
INJECTION INTRAMUSCULAR; INTRAVENOUS PRN
Status: DISCONTINUED | OUTPATIENT
Start: 2024-07-16 | End: 2024-07-16

## 2024-07-16 RX ORDER — ACETAMINOPHEN 650 MG/1
650 SUPPOSITORY RECTAL EVERY 4 HOURS PRN
Status: DISCONTINUED | OUTPATIENT
Start: 2024-07-16 | End: 2024-07-21 | Stop reason: HOSPADM

## 2024-07-16 RX ORDER — FENTANYL CITRATE 50 UG/ML
50 INJECTION, SOLUTION INTRAMUSCULAR; INTRAVENOUS EVERY 5 MIN PRN
Status: DISCONTINUED | OUTPATIENT
Start: 2024-07-16 | End: 2024-07-16

## 2024-07-16 RX ORDER — HYDROMORPHONE HYDROCHLORIDE 1 MG/ML
0.3 INJECTION, SOLUTION INTRAMUSCULAR; INTRAVENOUS; SUBCUTANEOUS
Status: DISCONTINUED | OUTPATIENT
Start: 2024-07-16 | End: 2024-07-21 | Stop reason: HOSPADM

## 2024-07-16 RX ORDER — OXYCODONE HYDROCHLORIDE 5 MG/1
5 TABLET ORAL EVERY 4 HOURS PRN
Status: DISCONTINUED | OUTPATIENT
Start: 2024-07-16 | End: 2024-07-21 | Stop reason: HOSPADM

## 2024-07-16 RX ORDER — LIDOCAINE HYDROCHLORIDE 20 MG/ML
INJECTION, SOLUTION INFILTRATION; PERINEURAL PRN
Status: DISCONTINUED | OUTPATIENT
Start: 2024-07-16 | End: 2024-07-16

## 2024-07-16 RX ORDER — KETOROLAC TROMETHAMINE 15 MG/ML
15 INJECTION, SOLUTION INTRAMUSCULAR; INTRAVENOUS
Status: COMPLETED | OUTPATIENT
Start: 2024-07-16 | End: 2024-07-16

## 2024-07-16 RX ORDER — CEFAZOLIN SODIUM/WATER 2 G/20 ML
2 SYRINGE (ML) INTRAVENOUS
Status: COMPLETED | OUTPATIENT
Start: 2024-07-16 | End: 2024-07-16

## 2024-07-16 RX ORDER — FENTANYL CITRATE 50 UG/ML
INJECTION, SOLUTION INTRAMUSCULAR; INTRAVENOUS PRN
Status: DISCONTINUED | OUTPATIENT
Start: 2024-07-16 | End: 2024-07-16

## 2024-07-16 RX ORDER — AMOXICILLIN 250 MG
2 CAPSULE ORAL 2 TIMES DAILY
Status: DISCONTINUED | OUTPATIENT
Start: 2024-07-16 | End: 2024-07-21 | Stop reason: HOSPADM

## 2024-07-16 RX ORDER — PROPOFOL 10 MG/ML
INJECTION, EMULSION INTRAVENOUS PRN
Status: DISCONTINUED | OUTPATIENT
Start: 2024-07-16 | End: 2024-07-16

## 2024-07-16 RX ORDER — CEFAZOLIN SODIUM/WATER 2 G/20 ML
2 SYRINGE (ML) INTRAVENOUS SEE ADMIN INSTRUCTIONS
Status: DISCONTINUED | OUTPATIENT
Start: 2024-07-16 | End: 2024-07-16 | Stop reason: HOSPADM

## 2024-07-16 RX ORDER — DEXAMETHASONE SODIUM PHOSPHATE 4 MG/ML
INJECTION, SOLUTION INTRA-ARTICULAR; INTRALESIONAL; INTRAMUSCULAR; INTRAVENOUS; SOFT TISSUE PRN
Status: DISCONTINUED | OUTPATIENT
Start: 2024-07-16 | End: 2024-07-16

## 2024-07-16 RX ORDER — AMOXICILLIN 250 MG
2 CAPSULE ORAL 2 TIMES DAILY PRN
Status: DISCONTINUED | OUTPATIENT
Start: 2024-07-16 | End: 2024-07-21 | Stop reason: HOSPADM

## 2024-07-16 RX ORDER — HYDROMORPHONE HYDROCHLORIDE 1 MG/ML
0.5 INJECTION, SOLUTION INTRAMUSCULAR; INTRAVENOUS; SUBCUTANEOUS
Status: DISCONTINUED | OUTPATIENT
Start: 2024-07-16 | End: 2024-07-21 | Stop reason: HOSPADM

## 2024-07-16 RX ORDER — MAGNESIUM HYDROXIDE 1200 MG/15ML
LIQUID ORAL PRN
Status: DISCONTINUED | OUTPATIENT
Start: 2024-07-16 | End: 2024-07-16 | Stop reason: HOSPADM

## 2024-07-16 RX ORDER — BUPIVACAINE HYDROCHLORIDE 5 MG/ML
INJECTION, SOLUTION EPIDURAL; INTRACAUDAL PRN
Status: DISCONTINUED | OUTPATIENT
Start: 2024-07-16 | End: 2024-07-16 | Stop reason: HOSPADM

## 2024-07-16 RX ORDER — PIPERACILLIN SODIUM, TAZOBACTAM SODIUM 3; .375 G/15ML; G/15ML
3.38 INJECTION, POWDER, LYOPHILIZED, FOR SOLUTION INTRAVENOUS EVERY 6 HOURS
Status: DISCONTINUED | OUTPATIENT
Start: 2024-07-16 | End: 2024-07-20

## 2024-07-16 RX ORDER — CALCIUM CARBONATE 500 MG/1
1000 TABLET, CHEWABLE ORAL 4 TIMES DAILY PRN
Status: DISCONTINUED | OUTPATIENT
Start: 2024-07-16 | End: 2024-07-20

## 2024-07-16 RX ORDER — FENTANYL CITRATE 50 UG/ML
25 INJECTION, SOLUTION INTRAMUSCULAR; INTRAVENOUS EVERY 5 MIN PRN
Status: DISCONTINUED | OUTPATIENT
Start: 2024-07-16 | End: 2024-07-16

## 2024-07-16 RX ORDER — ONDANSETRON 4 MG/1
4 TABLET, ORALLY DISINTEGRATING ORAL EVERY 6 HOURS PRN
Status: DISCONTINUED | OUTPATIENT
Start: 2024-07-16 | End: 2024-07-21 | Stop reason: HOSPADM

## 2024-07-16 RX ORDER — SODIUM CHLORIDE, SODIUM LACTATE, POTASSIUM CHLORIDE, CALCIUM CHLORIDE 600; 310; 30; 20 MG/100ML; MG/100ML; MG/100ML; MG/100ML
INJECTION, SOLUTION INTRAVENOUS CONTINUOUS PRN
Status: DISCONTINUED | OUTPATIENT
Start: 2024-07-16 | End: 2024-07-16

## 2024-07-16 RX ORDER — CEFTRIAXONE 2 G/1
2 INJECTION, POWDER, FOR SOLUTION INTRAMUSCULAR; INTRAVENOUS ONCE
Status: COMPLETED | OUTPATIENT
Start: 2024-07-16 | End: 2024-07-16

## 2024-07-16 RX ORDER — HYDROMORPHONE HCL IN WATER/PF 6 MG/30 ML
0.2 PATIENT CONTROLLED ANALGESIA SYRINGE INTRAVENOUS EVERY 5 MIN PRN
Status: DISCONTINUED | OUTPATIENT
Start: 2024-07-16 | End: 2024-07-16 | Stop reason: HOSPADM

## 2024-07-16 RX ORDER — AMOXICILLIN 250 MG
1 CAPSULE ORAL 2 TIMES DAILY
Status: DISCONTINUED | OUTPATIENT
Start: 2024-07-16 | End: 2024-07-21 | Stop reason: HOSPADM

## 2024-07-16 RX ORDER — ONDANSETRON 4 MG/1
4 TABLET, ORALLY DISINTEGRATING ORAL EVERY 30 MIN PRN
Status: DISCONTINUED | OUTPATIENT
Start: 2024-07-16 | End: 2024-07-16 | Stop reason: HOSPADM

## 2024-07-16 RX ORDER — ONDANSETRON 2 MG/ML
4 INJECTION INTRAMUSCULAR; INTRAVENOUS EVERY 6 HOURS PRN
Status: DISCONTINUED | OUTPATIENT
Start: 2024-07-16 | End: 2024-07-21 | Stop reason: HOSPADM

## 2024-07-16 RX ORDER — ALBUTEROL SULFATE 90 UG/1
2 AEROSOL, METERED RESPIRATORY (INHALATION) 4 TIMES DAILY PRN
Status: DISCONTINUED | OUTPATIENT
Start: 2024-07-16 | End: 2024-07-21 | Stop reason: HOSPADM

## 2024-07-16 RX ORDER — AMOXICILLIN 250 MG
1 CAPSULE ORAL 2 TIMES DAILY PRN
Status: DISCONTINUED | OUTPATIENT
Start: 2024-07-16 | End: 2024-07-21 | Stop reason: HOSPADM

## 2024-07-16 RX ORDER — OXYCODONE HYDROCHLORIDE 10 MG/1
10 TABLET ORAL EVERY 4 HOURS PRN
Status: DISCONTINUED | OUTPATIENT
Start: 2024-07-16 | End: 2024-07-21 | Stop reason: HOSPADM

## 2024-07-16 RX ORDER — OXYCODONE HYDROCHLORIDE 5 MG/1
5 TABLET ORAL
Status: DISCONTINUED | OUTPATIENT
Start: 2024-07-16 | End: 2024-07-16 | Stop reason: HOSPADM

## 2024-07-16 RX ADMIN — DEXAMETHASONE SODIUM PHOSPHATE 8 MG: 4 INJECTION, SOLUTION INTRA-ARTICULAR; INTRALESIONAL; INTRAMUSCULAR; INTRAVENOUS; SOFT TISSUE at 13:04

## 2024-07-16 RX ADMIN — NEOSTIGMINE METHYLSULFATE 3 MG: 1 INJECTION, SOLUTION INTRAVENOUS at 14:24

## 2024-07-16 RX ADMIN — PROPOFOL 100 MG: 10 INJECTION, EMULSION INTRAVENOUS at 13:26

## 2024-07-16 RX ADMIN — Medication 2 G: at 13:00

## 2024-07-16 RX ADMIN — SENNOSIDES AND DOCUSATE SODIUM 1 TABLET: 50; 8.6 TABLET ORAL at 19:32

## 2024-07-16 RX ADMIN — SODIUM CHLORIDE: 9 INJECTION, SOLUTION INTRAVENOUS at 19:21

## 2024-07-16 RX ADMIN — SODIUM CHLORIDE, POTASSIUM CHLORIDE, SODIUM LACTATE AND CALCIUM CHLORIDE: 600; 310; 30; 20 INJECTION, SOLUTION INTRAVENOUS at 13:00

## 2024-07-16 RX ADMIN — FENTANYL CITRATE 100 MCG: 50 INJECTION INTRAMUSCULAR; INTRAVENOUS at 13:27

## 2024-07-16 RX ADMIN — ROCURONIUM BROMIDE 20 MG: 50 INJECTION, SOLUTION INTRAVENOUS at 13:26

## 2024-07-16 RX ADMIN — CEFTRIAXONE 2 G: 2 INJECTION, POWDER, FOR SOLUTION INTRAMUSCULAR; INTRAVENOUS at 12:28

## 2024-07-16 RX ADMIN — ONDANSETRON 4 MG: 2 INJECTION INTRAMUSCULAR; INTRAVENOUS at 14:11

## 2024-07-16 RX ADMIN — SODIUM CHLORIDE 1000 ML: 9 INJECTION, SOLUTION INTRAVENOUS at 23:51

## 2024-07-16 RX ADMIN — FENTANYL CITRATE 100 MCG: 50 INJECTION INTRAMUSCULAR; INTRAVENOUS at 13:04

## 2024-07-16 RX ADMIN — Medication 100 MG: at 13:04

## 2024-07-16 RX ADMIN — OXYCODONE HYDROCHLORIDE 10 MG: 10 TABLET ORAL at 19:06

## 2024-07-16 RX ADMIN — OXYCODONE HYDROCHLORIDE 10 MG: 10 TABLET ORAL at 23:43

## 2024-07-16 RX ADMIN — LIDOCAINE HYDROCHLORIDE 50 MG: 20 INJECTION, SOLUTION INFILTRATION; PERINEURAL at 13:04

## 2024-07-16 RX ADMIN — MIDAZOLAM 2 MG: 1 INJECTION INTRAMUSCULAR; INTRAVENOUS at 13:00

## 2024-07-16 RX ADMIN — PROPOFOL 200 MG: 10 INJECTION, EMULSION INTRAVENOUS at 13:04

## 2024-07-16 RX ADMIN — SENNOSIDES AND DOCUSATE SODIUM 1 TABLET: 8.6; 5 TABLET ORAL at 19:37

## 2024-07-16 RX ADMIN — VANCOMYCIN HYDROCHLORIDE 1250 MG: 10 INJECTION, POWDER, LYOPHILIZED, FOR SOLUTION INTRAVENOUS at 20:22

## 2024-07-16 RX ADMIN — FENTANYL CITRATE 25 MCG: 50 INJECTION, SOLUTION INTRAMUSCULAR; INTRAVENOUS at 15:19

## 2024-07-16 RX ADMIN — GLYCOPYRROLATE 0.4 MG: 0.2 INJECTION, SOLUTION INTRAMUSCULAR; INTRAVENOUS at 14:24

## 2024-07-16 RX ADMIN — PIPERACILLIN AND TAZOBACTAM 3.38 G: 3; .375 INJECTION, POWDER, FOR SOLUTION INTRAVENOUS at 19:32

## 2024-07-16 RX ADMIN — KETOROLAC TROMETHAMINE 15 MG: 15 INJECTION, SOLUTION INTRAMUSCULAR; INTRAVENOUS at 15:37

## 2024-07-16 RX ADMIN — SODIUM CHLORIDE, POTASSIUM CHLORIDE, SODIUM LACTATE AND CALCIUM CHLORIDE: 600; 310; 30; 20 INJECTION, SOLUTION INTRAVENOUS at 13:45

## 2024-07-16 RX ADMIN — DEXMEDETOMIDINE HYDROCHLORIDE 12 MCG: 100 INJECTION, SOLUTION INTRAVENOUS at 13:10

## 2024-07-16 RX ADMIN — SODIUM CHLORIDE, POTASSIUM CHLORIDE, SODIUM LACTATE AND CALCIUM CHLORIDE: 600; 310; 30; 20 INJECTION, SOLUTION INTRAVENOUS at 13:01

## 2024-07-16 ASSESSMENT — ACTIVITIES OF DAILY LIVING (ADL)
ADLS_ACUITY_SCORE: 35

## 2024-07-16 ASSESSMENT — COLUMBIA-SUICIDE SEVERITY RATING SCALE - C-SSRS
1. IN THE PAST MONTH, HAVE YOU WISHED YOU WERE DEAD OR WISHED YOU COULD GO TO SLEEP AND NOT WAKE UP?: NO
2. HAVE YOU ACTUALLY HAD ANY THOUGHTS OF KILLING YOURSELF IN THE PAST MONTH?: NO
6. HAVE YOU EVER DONE ANYTHING, STARTED TO DO ANYTHING, OR PREPARED TO DO ANYTHING TO END YOUR LIFE?: NO

## 2024-07-16 NOTE — H&P
Hospitalist Admission   History and Physical    CC: Left scrotal abscess.  Patient is being admitted to the hospital after being taken for urgent incision and drainage and orchiectomy this morning by urology    HPI:  34 year old male with a history of polysubstance abuse, ADHD, and recent admission for epididymoorchitis who presented to the ED for testicular/scrotal pain on 7/16/2024. The patient was recently admitted to the hospital 6/28 through 7/6 for acute epididymoorchitis with sepsis and ischemia.  The patient returned to the hospital for continued testicular pain.    Per recent discharge summary on 7/6/24, the patient was initially started on vancomycin and IV Zosyn. Urology was consulted to see the patient. After urine cultures returned showing E. coli sensitive to multiple antibiotics (resistance to ampicillin noted), antibiotics were adjusted to IV ceftriaxone.  Chlamydia and gonorrhea PCR were negative.  Repeat ultrasound showed persistent edema of the left testicle and compromised blood flow to the testicle.  It sounds like surgery was recommended to the patient several times, however the patient repeatedly declined this recommendation. IV levofloxacin was added on 7/4.  On 7/6, the patient's white blood cell count was improving as was his inflammatory markers and the patient was requesting discharge. Urology recommended 4 weeks of oral Levaquin on discharge.    On presentation to the ER today, vital signs notable for systolic blood pressure near 135, heart rate near 100 bpm, no fever, no hypoxia    Pertinent lab workup in the ER included white cell count of 16.  BMP normal.  Blood cultures obtained and is pending    Imaging included testicular and scrotal ultrasound showing severely decreased/absent perfusion left testicle worrisome for testicular torsion.  Hyperemia of the right testicle and right epididymis suggesting right-sided epididymoorchitis    Urology was contacted by the emergency department.   Patient was taken urgently to the operating room today for concerns about torsion of the left testicle.  Ultimately the patient was found to have a scrotal and testicular abscess.  He underwent scrotal exploration with incision and drainage and debridement of left scrotal/testicular abscess along with left orchiectomy    I saw and examined the patient in the PACU after his procedure was performed    Urology is requesting that the patient be admitted post procedure.  Per urology report he has a large wound that will require packing and WOC consult    PMH:  Past Medical History:   Diagnosis Date    Attention deficit disorder with hyperactivity(314.01)     ADHD  ( also had learning disability)    Mild intermittent asthma     Asthma        Medications:  Current Facility-Administered Medications   Medication Dose Route Frequency Provider Last Rate Last Admin    acetaminophen (TYLENOL) tablet 975 mg  975 mg Oral Once Christen Swift MD        dexAMETHasone (DECADRON) injection 4 mg  4 mg Intravenous Once PRN Christen Swift MD        dexAMETHasone (DECADRON) injection 4 mg  4 mg Intravenous Once PRN Christen Swift MD        fentaNYL (PF) (SUBLIMAZE) injection 25 mcg  25 mcg Intravenous Q5 Min PRN Christen Swift MD        fentaNYL (PF) (SUBLIMAZE) injection 50 mcg  50 mcg Intravenous Q5 Min PRN Christen Swift MD        HYDROmorphone (DILAUDID) injection 0.2 mg  0.2 mg Intravenous Q5 Min PRN Christen Swift MD        HYDROmorphone (DILAUDID) injection 0.4 mg  0.4 mg Intravenous Q5 Min PRN Christen Swift MD        ketorolac (TORADOL) injection 15 mg  15 mg Intravenous Once PRN Christen Swift MD        labetalol (NORMODYNE/TRANDATE) injection 10 mg  10 mg Intravenous Q5 Min PRN Christen Swift MD        lactated ringers infusion   Intravenous Continuous Christen Swift MD        naloxone (NARCAN) injection 0.1 mg  0.1 mg Intravenous Q2 Min PRN Christen Swift MD        naloxone (NARCAN) injection 0.1 mg  0.1  "mg Intravenous Q2 Min PRN Christen Swift MD        ondansetron (ZOFRAN ODT) ODT tab 4 mg  4 mg Oral Q30 Min PRN Christen Swift MD        Or    ondansetron (ZOFRAN) injection 4 mg  4 mg Intravenous Q30 Min PRN Christen Swift MD        ondansetron (ZOFRAN ODT) ODT tab 4 mg  4 mg Oral Q30 Min PRN Christen Swift MD        Or    ondansetron (ZOFRAN) injection 4 mg  4 mg Intravenous Q30 Min PRN Christen Swift MD        oxyCODONE (ROXICODONE) tablet 10 mg  10 mg Oral Once PRN Christen Swift MD        oxyCODONE (ROXICODONE) tablet 5 mg  5 mg Oral Once PRN Christen Swift MD        prochlorperazine (COMPAZINE) injection 5 mg  5 mg Intravenous Q6H PRN Christen Swift MD        prochlorperazine (COMPAZINE) injection 5 mg  5 mg Intravenous Q6H PRN Christen Swift MD            Allergies:     Allergies   Allergen Reactions    Cats         Family History:  noncontributory    Social History:      Review of Systems: Comprehensive greater than 10 point review systems otherwise negative besides that detailed above    Physical exam:  /89   Pulse 63   Temp 97  F (36.1  C) (Temporal)   Resp 14   Ht 1.803 m (5' 11\")   Wt 76.5 kg (168 lb 10.4 oz)   SpO2 100%   BMI 23.52 kg/m     PSYCH: pleasant, oriented, No acute distress.  HEART:  Normal S1, S2 with no edema.  LUNGS:  Clear to auscultation, normal Respiratory effort.  ABDOMEN:  Soft, no hepatosplenomegaly, normal bowel sounds.  SKIN:  Dry to touch, No rash.     Pertinent laboratory and imaging data reviewed above in HPI         Impression:  34 year old male with a history of polysubstance abuse, ADHD, and recent admission for epididymoorchitis who presented to the ED for testicular/scrotal pain on 7/16/2024. The patient was recently admitted to the hospital 6/28 through 7/6 for acute epididymoorchitis with sepsis and ischemia.  The patient returned to the hospital for continued testicular pain.    Per recent discharge summary on 7/6/24, the patient was " initially started on vancomycin and IV Zosyn. Urology was consulted to see the patient. After urine cultures returned showing E. coli sensitive to multiple antibiotics (resistance to ampicillin noted), antibiotics were adjusted to IV ceftriaxone.  Chlamydia and gonorrhea PCR were negative.  Repeat ultrasound showed persistent edema of the left testicle and compromised blood flow to the testicle.  It sounds like surgery was recommended to the patient several times, however the patient repeatedly declined this recommendation. IV levofloxacin was added on 7/4.  On 7/6, the patient's white blood cell count was improving as was his inflammatory markers and the patient was requesting discharge. Urology recommended 4 weeks of oral Levaquin on discharge.    On presentation to the ER today, vital signs notable for systolic blood pressure near 135, heart rate near 100 bpm, no fever, no hypoxia    Pertinent lab workup in the ER included white cell count of 16.  BMP normal.  Blood cultures obtained and is pending    Imaging included testicular and scrotal ultrasound showing severely decreased/absent perfusion left testicle worrisome for testicular torsion.  Hyperemia of the right testicle and right epididymis suggesting right-sided epididymoorchitis    Urology was contacted by the emergency department.  Patient was taken urgently to the operating room today for concerns about torsion of the left testicle.  Ultimately the patient was found to have a scrotal and testicular abscess.  He underwent scrotal exploration with incision and drainage and debridement of left scrotal/testicular abscess along with left orchiectomy      1.  Left scrotal/left testicular abscess  -Status post incision and drainage with orchiectomy on 7/16  -Operative cultures pending from procedure today  -Blood cultures pending  -Treat with IV Zosyn/IV vancomycin, adjust antibiotics as needed based on culture data  -[facility] consult for wound management  -Pain  control with IV and oral pain medication options    2.  History of polysubstance abuse (methamphetamine and cannabis)    3.  History of ADHD    Several days in the hospital anticipated due to large wound and need for antibiotics/wound care postsurgery    CODE STATUS: Full code

## 2024-07-16 NOTE — ANESTHESIA PREPROCEDURE EVALUATION
Anesthesia Pre-Procedure Evaluation    Patient: Ricky Rowland   MRN: [id number] : 1990        Procedure : Procedure(s):  left orchiectomy, possible testicular detorsion and left orchiopexy          Past Medical History:   Diagnosis Date    Attention deficit disorder with hyperactivity(314.01)     ADHD  ( also had learning disability)    Mild intermittent asthma     Asthma      No past surgical history on file.   Allergies   Allergen Reactions    Cats       Social History     Tobacco Use    Smoking status: Every Day    Smokeless tobacco: Never   Substance Use Topics    Alcohol use: No      Wt Readings from Last 1 Encounters:   24 76.5 kg (168 lb 10.4 oz)        Anesthesia Evaluation            ROS/MED HX  ENT/Pulmonary:     (+)                     Intermittent, asthma  Treatment: Inhaler prn,                 Neurologic:  - neg neurologic ROS     Cardiovascular:  - neg cardiovascular ROS     METS/Exercise Tolerance:     Hematologic:  - neg hematologic  ROS     Musculoskeletal:  - neg musculoskeletal ROS     GI/Hepatic:  - neg GI/hepatic ROS     Renal/Genitourinary: Comment: Testicular torsion      Endo:     (+)               Obesity,       Psychiatric/Substance Use:     (+) psychiatric history other (comment) (ADHD)       Infectious Disease:  - neg infectious disease ROS     Malignancy:  - neg malignancy ROS     Other:  - neg other ROS          Physical Exam    Airway        Mallampati: II   TM distance: > 3 FB   Neck ROM: full   Mouth opening: > 3 cm    Respiratory Devices and Support         Dental       (+) Completely normal teeth      Cardiovascular   cardiovascular exam normal       Rhythm and rate: regular and normal     Pulmonary   pulmonary exam normal        breath sounds clear to auscultation           OUTSIDE LABS:  CBC:   Lab Results   Component Value Date    WBC 18.8 (H) 2024    WBC 19.5 (H) 2024    HGB 14.4 2024    HGB 14.9 2024    HCT 44.5 2024    HCT 44.7  "07/05/2024     (H) 07/06/2024     07/05/2024     BMP:   Lab Results   Component Value Date     07/06/2024     07/03/2024    POTASSIUM 4.8 07/06/2024    POTASSIUM 4.5 07/03/2024    CHLORIDE 98 07/06/2024    CHLORIDE 101 07/03/2024    CO2 25 07/06/2024    CO2 23 07/03/2024    BUN 20.6 (H) 07/06/2024    BUN 8.5 07/03/2024    CR 1.17 07/06/2024    CR 0.95 07/05/2024     (H) 07/06/2024     (H) 07/03/2024     COAGS:   Lab Results   Component Value Date    PTT 28 01/12/2007    INR 1.12 01/12/2007     POC: No results found for: \"BGM\", \"HCG\", \"HCGS\"  HEPATIC: No results found for: \"ALBUMIN\", \"PROTTOTAL\", \"ALT\", \"AST\", \"GGT\", \"ALKPHOS\", \"BILITOTAL\", \"BILIDIRECT\", \"GERMAINE\"  OTHER:   Lab Results   Component Value Date    LACT 0.6 (L) 07/02/2024    CHARLENE 9.6 07/06/2024       Anesthesia Plan    ASA Status:  2, emergent    NPO Status:  NPO Appropriate    Anesthesia Type: General.     - Airway: ETT   Induction: Intravenous.   Maintenance: Inhalation.        Consents    Anesthesia Plan(s) and associated risks, benefits, and realistic alternatives discussed. Questions answered and patient/representative(s) expressed understanding.     - Discussed: Risks, Benefits and Alternatives for the PROCEDURE were discussed     - Discussed with:  Patient       Use of blood products discussed: Yes.     - Discussed with: Patient.     - Consented: consented to blood products            Reason for refusal: other.     Postoperative Care    Pain management: IV analgesics, Oral pain medications.   PONV prophylaxis: Ondansetron (or other 5HT-3), Dexamethasone or Solumedrol     Comments:    Other Comments: IESHA Swift MD    I have reviewed the pertinent notes and labs in the chart from the past 30 days and (re)examined the patient.  Any updates or changes from those notes are reflected in this note.                  "

## 2024-07-16 NOTE — CONSULTS
Urology Consultation    Name: Ricky Rowland    MRN: [id number]   YOB: 1990  Date of Admission: 7/16/2024              Impression and Plan:   Impression / Plan:   Ricky Rowland is a 34 year old male with ischemic L testicle likely 2/2 severe edema within the tunica albuginea of the testicle related to prior epididymo-orchitis Dx. Exam is concerning for scrotal abscess.         -NPO.  -Anticipate scrotal I&D. Possible orchiectomy, possible testicular detorsion and left orchiopexy with Dr. Zhu this afternoon.  -Continue Abx for possible right-sided epididymo-orchitis.     Discussed with Dr. Zhu    Thank you for the opportunity to participate in the care of Ricky Rowland.     GABBI Romero, CNP  [facility] - Department of Urology  Office: [phone]  After business hours: [phone]  Securely message with [employer]            Chief Complaint:   Testicular Pain  History is obtained from patient & EMR          History of Present Illness:   Ricky Rowland is a 34 year old male with PMH of UTI/STIs, amphetamine and cannabis use, who was recently admitted 6/28 - 7/6/24 after presenting with 4 days of L testicular pain &swelling with associated subjective fever and chills, dysuria, and was found to have Acute Epididymoorchitis with Sepsis. Repeat ultrasounds were performed which showed decreasing blood flow to the left testicle likely related to severe infection. It was felt a torsion was unlikely and the decreased blood flow seen on ultrasound was due to severe edema within the tunica albuginea of the testicle. Orchiectomy was offered, however the patient declined. He ultimately was discharged on course of oral Levofloxacin x 4 wks, he has been compliant with this.    Today Ricky presented to the ED with worsening scrotal swelling and pain. He reports overall his testicular pain and swelling have been gradually improving, in fact he denies any testicular pain today. He reports all of his pain is  on the anterior scrotum where he has had a local increase in swelling with some drainage that has him concerned for abscess.     He had a US performed in the ED w results shown below.     CBC, BMP, UA, cultures in process.      Pertinent imaging reviewed:  US TESTICULAR AND SCROTUM WITH DOPPLER LIMITED 7/16/2024 11:07 AM   IMPRESSION:  1.  Severely decreased or absent perfusion of the left testicle with  diffusely heterogeneous appearance of the left testicle is again  noted. This is worrisome for testicular torsion that persists on the  left side. Superior to the left testicle are several engorged vessels  that could represent engorged upstream spermatic cord vessels.  2.  Hyperemia of the right testicle and right epididymis suggesting a  right-sided epididymoorchitis.  3.  Diffuse hyperemia of the scrotum.          Past Medical History:     Past Medical History:   Diagnosis Date    Attention deficit disorder with hyperactivity(314.01)     ADHD  ( also had learning disability)    Mild intermittent asthma     Asthma          Past Surgical History:   No past surgical history on file.       Social History:     Social History     Tobacco Use    Smoking status: Every Day    Smokeless tobacco: Never   Substance Use Topics    Alcohol use: No          Family History:   No family history on file.       Allergies:     Allergies   Allergen Reactions    Cats           Medications:     Current Facility-Administered Medications   Medication Dose Route Frequency Provider Last Rate Last Admin    cefTRIAXone (ROCEPHIN) 2 g vial to attach to  ml bag for ADULTS or NS 50 ml bag for PEDS  2 g Intravenous Once Joanna Lockwood PA-C         Current Outpatient Medications   Medication Sig Dispense Refill    ALBUTEROL INHALER 17GM 2 Puffs [id number] prn 2 3    ibuprofen (ADVIL/MOTRIN) 600 MG tablet Take 1 tablet (600 mg) by mouth every 8 hours as needed for moderate pain 30 tablet 0    levofloxacin (LEVAQUIN) 750 MG tablet Take 1 tablet  (750 mg) by mouth daily for 28 days 28 tablet 0          Review of Systems:    ROS: See HPI for pertinent details.  Remainder of 10-point ROS negative.         Physical Exam:   VS:  T: 98.1    HR: 102    BP: 135/83    RR: 20     GEN:  Alert.  NAD. Pt is not diaphoretic.   HEENT:  Sclerae anicteric.    CV:  No obvious jugular venous distension  LUNGS: No respiratory distress, breathing comfortably wo accessory muscle use.  ABD:  ND.  Soft.  NT.  No rebound or guarding.      :  diffuse scrotal swelling. Exquisitely tender and erythematous area over L anterior scrotum with small amount of drainage concerning for abscess.  EXT:  Warm, well perfused.  SKIN:  Warm.  Dry.   NEURO:  CN grossly intact.             Data:     Results for orders placed or performed during the hospital encounter of 06/28/24   Urine Culture    Specimen: Urine, NOS   Result Value Ref Range    Culture >100,000 CFU/mL Escherichia coli (A)        Susceptibility    Escherichia coli - MACY     Ampicillin >=32 Resistant ug/mL     Ampicillin/ Sulbactam 16 Intermediate ug/mL     Piperacillin/Tazobactam <=4 Susceptible ug/mL     Cefazolin* <=4 Susceptible ug/mL      * Cefazolin MACY breakpoints are for the treatment of uncomplicated urinary tract infections. For the treatment of systemic infections, please contact the laboratory for additional testing.     Cefoxitin <=4 Susceptible ug/mL     Ceftazidime <=1 Susceptible ug/mL     Ceftriaxone <=1 Susceptible ug/mL     Cefepime <=1 Susceptible ug/mL     Gentamicin <=1 Susceptible ug/mL     Tobramycin <=1 Susceptible ug/mL     Ciprofloxacin <=0.25 Susceptible ug/mL     Levofloxacin <=0.12 Susceptible ug/mL     Nitrofurantoin <=16 Susceptible ug/mL     Trimethoprim/Sulfamethoxazole <=1/19 Susceptible ug/mL   Results for orders placed or performed in visit on 02/07/24   Urine Culture    Specimen: Urine, Clean Catch   Result Value Ref Range    Culture >100,000 CFU/mL Escherichia coli (A)        Susceptibility     Escherichia coli - MACY     Ampicillin >=32 Resistant ug/mL     Ampicillin/ Sulbactam 16 Intermediate ug/mL     Piperacillin/Tazobactam <=4 Susceptible ug/mL     Cefazolin* <=4 Susceptible ug/mL      * Cefazolin MACY breakpoints are for the treatment of uncomplicated urinary tract infections. For the treatment of systemic infections, please contact the laboratory for additional testing.     Cefoxitin <=4 Susceptible ug/mL     Ceftazidime <=1 Susceptible ug/mL     Ceftriaxone <=1 Susceptible ug/mL     Cefepime <=1 Susceptible ug/mL     Gentamicin <=1 Susceptible ug/mL     Tobramycin <=1 Susceptible ug/mL     Ciprofloxacin <=0.25 Susceptible ug/mL     Levofloxacin <=0.12 Susceptible ug/mL     Nitrofurantoin <=16 Susceptible ug/mL     Trimethoprim/Sulfamethoxazole <=1/19 Susceptible ug/mL

## 2024-07-16 NOTE — ANESTHESIA PROCEDURE NOTES
Airway       Patient location during procedure: OR       Procedure Start/Stop Times: 7/16/2024 1:08 PM  Staff -        CRNA: Ricardo Dalal APRN CRNA       Performed By: CRNA  Consent for Airway        Urgency: elective  Indications and Patient Condition       Indications for airway management: jose de jesus-procedural       Induction type:intravenous       Mask difficulty assessment: 1 - vent by mask    Final Airway Details       Final airway type: endotracheal airway       Successful airway: ETT - single and Oral  Endotracheal Airway Details        ETT size (mm): 8.0       Cuffed: yes       Successful intubation technique: direct laryngoscopy       DL Blade Type: Robles 2       Grade View of Cords: 1       Adjucts: stylet       Position: Right       Measured from: gums/teeth       Secured at (cm): 20       Bite block used: None    Post intubation assessment        Placement verified by: capnometry, equal breath sounds and chest rise        Number of attempts at approach: 1       Number of other approaches attempted: 0       Secured with: tape       Ease of procedure: easy       Dentition: Intact    Medication(s) Administered   Medication Administration Time: 7/16/2024 1:08 PM

## 2024-07-16 NOTE — PHARMACY-VANCOMYCIN DOSING SERVICE
"Pharmacy Vancomycin Initial Note  Date of Service 2024  Patient's  1990  34 year old, male    Indication: Skin and Soft Tissue Infection    Current estimated CrCl = Estimated Creatinine Clearance: 119.7 mL/min (based on SCr of 0.95 mg/dL).    Creatinine for last 3 days  2024: 12:01 PM Creatinine 0.95 mg/dL    Recent Vancomycin Level(s) for last 3 days  No results found for requested labs within last 3 days.      Vancomycin IV Administrations (past 72 hours)        No vancomycin orders with administrations in past 72 hours.                    Nephrotoxins and other renal medications (From now, onward)      Start     Dose/Rate Route Frequency Ordered Stop    24 1900  vancomycin (VANCOCIN) 1,250 mg in 0.9% NaCl 250 mL intermittent infusion         1,250 mg  166.7 mL/hr over 90 Minutes Intravenous EVERY 12 HOURS 24 1835      24 1700  piperacillin-tazobactam (ZOSYN) 3.375 g vial to attach to  mL bag        Note to Pharmacy: For [facility], [facility] and [facility]: For Zosyn-naive patients, use the \"Zosyn initial dose + extended infusion\" order panel.    3.375 g  over 30 Minutes Intravenous EVERY 6 HOURS 24 1648              Contrast Orders - past 72 hours (72h ago, onward)      None            [employer] Prediction of Planned Initial Vancomycin Regimen  Regimen: 1250 mg IV every 12 hours.  Start time: 18:34 on 2024  Exposure target: AUC24 (range)400-600 mg/L.hr   AUC24,ss: 547 mg/L.hr  Probability of AUC24 > 400: 81 %  Ctrough,ss: 16.5 mg/L  Probability of Ctrough,ss > 20: 35 %  Probability of nephrotoxicity (Lodise MITCH ): 12 %        Plan:  Start vancomycin  1250 mg IV q12h.   Vancomycin monitoring method: AUC  Vancomycin therapeutic monitoring goal: 400-600 mg*h/L  Pharmacy will check vancomycin levels as appropriate in 1-3 Days.    Serum creatinine levels will be ordered daily for the first week of therapy and at least twice weekly for subsequent weeks.      Ap Bryant, " RPH

## 2024-07-16 NOTE — ED NOTES
Pt refused second set of blood cultures. Pt educated on reasons for obtaining 2 sets of cultures. Pt adamant that he does not want to be poked again. One set of cultures obtained from initial IV start.

## 2024-07-16 NOTE — ED NOTES
"[facility]  ED Nurse Handoff Report    ED Chief complaint: Testicular/scrotal Pain  . ED Diagnosis:   Final diagnoses:   Testicular torsion   Epididymoorchitis       Allergies:   Allergies   Allergen Reactions    Cats        Code Status: Full Code    Activity level - Baseline/Home:  independent.  Activity Level - Current:   independent.   Lift room needed: No.   Bariatric: No   Needed: No   Isolation: No.   Infection: Not Applicable.     Respiratory status: Room air    Vital Signs (within 30 minutes):   Vitals:    07/16/24 1030   BP: 135/83   Pulse: 102   Resp: 20   Temp: 98.1  F (36.7  C)   TempSrc: Oral   SpO2: 100%   Weight: 76.5 kg (168 lb 10.4 oz)   Height: 1.803 m (5' 11\")       Cardiac Rhythm:  ,      Pain level:    Patient confused: No.   Patient Falls Risk: activity supervised.   Elimination Status: has not voided due to pain    Patient Report - Initial Complaint: testicular pain.   Focused Assessment: right testicle is swollen and painful     Abnormal Results:   Labs Ordered and Resulted from Time of ED Arrival to Time of ED Departure - No data to display     US Testicular & Scrotum w Doppler Ltd   Preliminary Result   Abnormal   IMPRESSION:   1.  Severely decreased or absent perfusion of the left testicle with   diffusely heterogeneous appearance of the left testicle is again   noted. This is worrisome for testicular torsion that persists on the   left side. Superior to the left testicle are several engorged vessels   that could represent engorged upstream spermatic cord vessels.   2.  Hyperemia of the right testicle and right epididymis suggesting a   right-sided epididymoorchitis.   3.  Diffuse hyperemia of the scrotum.      [Critical Result: Findings worrisome for left testicular ischemia and   potential testicular torsion.]      Finding was identified on 7/16/2024 11:12 AM.       Emergency room staff was contacted by me on 7/16/2024 11:22 AM and   verbalized " understanding of the critical result.           Treatments provided: monitoring; pt declined pain meds  Family Comments: none present  OBS brochure/video discussed/provided to patient:  No  ED Medications:   Medications   cefTRIAXone (ROCEPHIN) 2 g vial to attach to  ml bag for ADULTS or NS 50 ml bag for PEDS (has no administration in time range)       Drips infusing:  No  For the majority of the shift this patient was Green.   Interventions performed were rounding.    Sepsis treatment initiated: No    Cares/treatment/interventions/medications to be completed following ED care: surgery    ED Nurse Name: Joanna Braun RN  12:03 PM   RECEIVING UNIT ED HANDOFF REVIEW    Above ED Nurse Handoff Report was reviewed: Yes  Reviewed by: Ally Maurer RN on July 16, 2024 at 6:14 PM   I Phillip called the ED to inform them the note was read: Yes

## 2024-07-16 NOTE — PHARMACY-ADMISSION MEDICATION HISTORY
Pharmacist Admission Medication History    Admission medication history is complete. The information provided in this note is only as accurate as the sources available at the time of the update.    Information Source(s): Patient and CareEverywhere/SureScripts via phone    Pertinent Information: Levofloxacin prescribed at discharge on 7/6/24 to take for 28 days.     Changes made to PTA medication list:  Added: None  Deleted: None  Changed: None    Allergies reviewed with patient and updates made in EHR: no    Medication History Completed By: TOMI GARDUNO RPH 7/16/2024 6:46 PM    PTA Med List   Medication Sig Last Dose    ALBUTEROL INHALER 17GM 2 Puffs [id number] prn Unknown at PRN    ibuprofen (ADVIL/MOTRIN) 600 MG tablet Take 1 tablet (600 mg) by mouth every 8 hours as needed for moderate pain Unknown at PRN    levofloxacin (LEVAQUIN) 750 MG tablet Take 1 tablet (750 mg) by mouth daily for 28 days 7/15/2024 at AM

## 2024-07-16 NOTE — ED PROVIDER NOTES
ED APC SUPERVISION NOTE:   I evaluated this patient in conjunction with Joanna Lockwood PA-C  I have participated in the care of the patient and personally performed key elements of the history, exam, and medical decision making.      HPI:   Ricky Rowland is a 34 year old male with a history of epididymoorchitis presents to the ED for testicular/scrotal pain. The patient was admitted to the hospital 6/28 through 7/6 for acute epididymoorchitis with sepsis and ischemia. He completed antibiotics for his diagnosis, but still is having L testicular pain with increased swelling as of a few days ago. The patient had denied surgery while hospitalized due to improving symptoms and decrease in pain levels and white blood cell count. Since he has changed his antibiotics, the symptoms began to worsen. The patient denies any urinary symptoms. He last had a bagel at 0600 with some orange juice.     Independent Historian:   None    Review of External Notes: Urology consult 6/28:      EXAM:   General:  Alert, interactive  Cardiovascular:  Normal rate  Lungs:  No respiratory distress, no accessory muscle use  Abdominal: No distension   Neuro:  Moving all 4 extremities  MSK: No gross deformities  Skin:  Warm, dry      Independent Interpretation (X-rays, CTs, rhythm strip):  None    Consultations/Discussion of Management or Tests:  None     Social Determinants of Health affecting care:   None     MEDICAL DECISION MAKING/ASSESSMENT AND PLAN:   Patient recently admitted for epididymoorchitis, now here with ongoing testicular pain and swelling.   exam deferred today.  Ultrasound showing complete lack of blood flow to the left testicle and signs concerning for ischemic testicular tissue.  Has been compliant with oral antibiotics.  Is nontoxic or septic appearing.  Urology was consulted and plans to take to the OR.  Preop antibiotics administered.  Slight leukocytosis.  Lactic and 1 set of blood cultures pending.  Patient refused second  set.       DIAGNOSIS:     ICD-10-CM    1. Torsion of left testicle  N44.00 Case Request: left orchiectomy, possible testicular detorsion and left orchiopexy     Case Request: left orchiectomy, possible testicular detorsion and left orchiopexy      2. Testicular torsion  N44.00       3. Epididymoorchitis  N45.3                DISPOSITION:   To OR     Scribe Disclosure:  I, Jacqueline Bhatt, am serving as a scribe at 11:57 AM on 7/16/2024 to document services personally performed by Joanna Lockwood PA-C based on my observations and the provider's statements to me.    [facility] EMERGENCY DEPT     Lawanda Pavon DO  07/16/24 1255

## 2024-07-16 NOTE — ED PROVIDER NOTES
"  Emergency Department Note      History of Present Illness     Chief Complaint   Testicular/scrotal Pain      LOPEZ Rowland is a 34 year old male with a history of epididymoorchitis who presents to the emergency department for evaluation of testicular/scrotal pain. He recently was admitted to the hospital on 6/28-7/6 for acute epididymoorchitis with sepsis and ischemia and was started on a course of antibiotics for this. He just finished this, but is still having left testicle pain and has also noticed some increased swelling again. He initially denied surgery because he felt as if he was doing better a few days into his hospitalization, his swelling was going down, and he saw an improvement in his pain levels and white blood cell count. He states that they changed his course of antibiotics, and his symptoms started to worsen again a few days ago and have continued to get worse. At bedside, his pain is mild but he feels that it is very swollen. He is not having any issues with urination or pain with this. He is able to produce a stream. The last time he had anything to eat was a bagel at 0600 am, as well as some orange juice but no food or beverages since this time.     Independent Historian   None    Review of External Notes   I reviewed the discharge summary from 6/28/24. Dx with epididymoorchitis with concern for intermittent torsion, declined surgical intervention.     Past Medical History   Medical History and Problem List   Mild intermittent asthma  ADHD  Epididymoorchitis    Medications   albuterol  levofloxacin    Surgical History   None  Physical Exam     Patient Vitals for the past 24 hrs:   BP Temp Temp src Pulse Resp SpO2 Height Weight   07/16/24 1030 135/83 98.1  F (36.7  C) Oral 102 20 100 % 1.803 m (5' 11\") 76.5 kg (168 lb 10.4 oz)     Physical Exam  General: Alert and cooperative with exam. Patient in no apparent distress. Normal mentation.  Head:  Scalp is NC/AT  Eyes:  No scleral icterus, " PERRL  ENT:  The external nose and ears are normal.   Neck:  Normal range of motion without rigidity.  CV:  Regular rate and rhythm    No pathologic murmur   Resp:  Breath sounds are clear bilaterally    Non-labored, no retractions or accessory muscle use  GI:  Abdomen is soft, no distension, no tenderness. No peritoneal signs  :  Significantly enlarged and tender left testicle with negative cremasteric reflex.   MS:  No lower extremity edema   Skin:  Warm and dry, No rash or lesions noted.  Neuro:  Oriented x 3. No gross motor deficits.      Diagnostics     Lab Results   Labs Ordered and Resulted from Time of ED Arrival to Time of ED Departure - No data to display    Imaging   US Testicular & Scrotum w Doppler Ltd   Preliminary Result   Abnormal   IMPRESSION:   1.  Severely decreased or absent perfusion of the left testicle with   diffusely heterogeneous appearance of the left testicle is again   noted. This is worrisome for testicular torsion that persists on the   left side. Superior to the left testicle are several engorged vessels   that could represent engorged upstream spermatic cord vessels.   2.  Hyperemia of the right testicle and right epididymis suggesting a   right-sided epididymoorchitis.   3.  Diffuse hyperemia of the scrotum.      [Critical Result: Findings worrisome for left testicular ischemia and   potential testicular torsion.]      Finding was identified on 7/16/2024 11:12 AM.       Emergency room staff was contacted by me on 7/16/2024 11:22 AM and   verbalized understanding of the critical result.           EKG   None    Independent Interpretation   None    ED Course      Medications Administered   Medications   cefTRIAXone (ROCEPHIN) 2 g vial to attach to  ml bag for ADULTS or NS 50 ml bag for PEDS (has no administration in time range)       Procedures   Procedures     Discussion of Management   Admitting Hospitalist, Meg MONTERO  Urology, Sophie MONTERO    ED Course   ED  Course as of 07/16/24 1201   Tue Jul 16, 2024   1128 I obtained history and examined the patient as noted above.     1132 I staffed the patient with Dr. Foley.   1140 I obtained the history and examined the patient as above.    1144 I spoke with Meg MONTERO from Hospitalist Services, who accepts the patient for admission.       Optional/Additional Documentation  None    Medical Decision Making / Diagnosis     CMS Diagnoses: None    MIPS       None    MDM   Ricky Rowland is a 34 year old male who presents with left-sided testicular pain.  Patient was seen recently in the hospital from 6/28-7/6 for acute epididymoorchitis with sepsis and concern for intermittent testicular torsion on the left.  At that time he was declining surgical intervention and was discharged home.  He presents today with worsening left testicular swelling with concern for possible abscess.  On exam, significant swelling and tenderness to the left testicle present.   Ultrasound was obtained and confirms absent blood flow to left testicle with concerns for ischemia.  I consulted with Sophie Whitley PA-C with urology, plan is for patient to undergo orchiectomy. Preop labs ordered along with IV ceftriaxone. Consulted with Meg Guzmán PA-C with inpatient hospitalist service who graciously accepts patient's admission for ongoing cares prior to surgical intervention.    Disposition   The patient was admitted to the hospital.     Diagnosis     ICD-10-CM    1. Torsion of left testicle  N44.00 Case Request: left orchiectomy, possible testicular detorsion and left orchiopexy     Case Request: left orchiectomy, possible testicular detorsion and left orchiopexy      2. Testicular torsion  N44.00       3. Epididymoorchitis  N45.3            Discharge Medications   New Prescriptions    No medications on file         Scribe Disclosure:  Ct CHAN, am serving as a scribe at 11:21 AM on 7/16/2024 to document services personally performed by Fabián  Joanna TAO PA-C based on my observations and the provider's statements to me.        Joanna Lockwood PA-C  07/16/24 9739

## 2024-07-16 NOTE — ANESTHESIA CARE TRANSFER NOTE
Patient: Aero U Rowland    Procedure: Procedure(s):  left orchiectomy, incision and drainage of left scrotal abcess/testis, left       Diagnosis: Torsion of left testicle [N44.00]  Diagnosis Additional Information: No value filed.    Anesthesia Type:   General     Note:    Oropharynx: oropharynx clear of all foreign objects and spontaneously breathing  Level of Consciousness: awake  Oxygen Supplementation: face mask  Level of Supplemental Oxygen (L/min / FiO2): 6  Independent Airway: airway patency satisfactory and stable  Dentition: dentition unchanged  Vital Signs Stable: post-procedure vital signs reviewed and stable  Report to RN Given: handoff report given  Patient transferred to: PACU    Handoff Report: Identifed the Patient, Identified the Reponsible Provider, Reviewed the pertinent medical history, Discussed the surgical course, Reviewed Intra-OP anesthesia mangement and issues during anesthesia, Set expectations for post-procedure period and Allowed opportunity for questions and acknowledgement of understanding      Vitals:  Vitals Value Taken Time   BP     Temp     Pulse     Resp     SpO2 95 % 07/16/24 1437   Vitals shown include unfiled device data.    Electronically Signed By: GABBI Wright CRNA  July 16, 2024  2:39 PM

## 2024-07-16 NOTE — OR NURSING
Patient declining to have his labs drawn currently.    Patient also declining monitoring during the last little while when he was in pacu.  Sleeping comfortably, arouses easily.  Pt's behavior was appropriate when I was caring for him.  5700 - 1815.  Did a lot of education re: standard narcotic administration during the OR/PACU experience, and the renal and reproductive systems.        Provider notified of patients refusal for lab draw via [employer] messaging.

## 2024-07-16 NOTE — PROGRESS NOTES
"Patient woke up in PACU VERY frustrated with the type of procedure done (getting his testicle removed) and upset that he has to spend the night. Patient wanted to \"just go to my room\" and \"eat real food\". Patient's agitation level started to increase with all of the education I was giving the patient. He had a lot of questions about the procedure (\"is it open or sutured?\" \"Why do I have to stay?\" \"Why did he take the whole testicle?\" Etc) Patient was very sleepy when the surgeon came and spoke with the patient. The patient denies remembering the surgeon speaking to him.    He was upset that the floor he was originally assigned to (4th floor) wouldn't take him due to his history (amphetamine abuse, 3-4 shots of alcohol daily) because he just wanted to go to his room. He relayed that he was frustrated that he has to stay and \"just wants to go home\". Patient said, \"I was on the 6th floor for 9 days last time, why don't you just send me there?!\" I told him that I am not in charge of assigning beds and I wasn't sure if all the beds were taken at this point.   I told him that my charge nurse was in the bed meeting now and trying to figure out a spot for him. Patient seemed to understand but expressed his frustration. Patient then started getting upset and wanted all of the monitors off of his body. Due to his increasing agitation, I took everything off besides the oximeter. The patient took that off later.    When answering a question regarding what kind of pain medication was used for surgery, I mentioned that he received fentanyl. The patient grew more upset and was adamant that \"it's all the same\" when I told him that it wasn't the street fentanyl. The patient kept saying \"I DID NOT CONSENT TO GETTING FENTANYL\" \"THE ANESTHESIOLOGIST DID NOT TELL ME THAT THEY WERE GOING TO USE FENTANYL\". The nurse who took over the patient added \"fentanyl\" to his list of allergies to avoid this in the future. The nurse who was helping " "discussed that fentanyl is used in procedures and is pretty standard. She mentioned that most MDAs do not discuss the type of medication usually used in sedation specifically.     The patient kept changing his answers about if he wanted something for pain (after he was rejected from the floor), which he was rating 7/10. Patient originally said he wanted to take pain medications upstairs but due to the change in plans, he said he would take oxycodone after we discussed it. I said he should take it with food to avoid nausea but then he insisted he wanted food upstairs. Patient then said \"just give me Toradol\" to which I told him I did give it to him when he was waking up. Patient didn't remember and said \"you gave it when I was under\". Patient also did not remember the surgeon coming to the bedside in PACU due to how sleepy he was. Patient said \"just give me Advil\" but my coworker explained that he already had Toradol which is similar to Advil. He did not want Tylenol because it is not an anti-inflammatory.    I tried to give report to the floor that he was assigned to but nobody picked up so I handed patient off to charge nurse.    Cherelle Luna RN on 7/16/2024 at 5:30 PM        "

## 2024-07-16 NOTE — OP NOTE
[facility]  Operative Note    Pre-operative diagnosis: Torsion of left testicle [N44.00]   Post-operative diagnosis Scrotal abscess  Testicular abscess   Procedure: Procedure(s):  Scrotal exploration  Incision, drainage, and debridement of left scrotal/testicular abscess (2 cm)  Left simple orchiectomy     Surgeon: Noah Zhu MD   Assistants(s): none   Anesthesia: General    Estimated blood loss: 10 ml    Total IV fluids: (See anesthesia record)   Blood transfusion: No transfusion was given during surgery   Total urine output: Not measured   Drains: Left Penrose drain in dependent scrotum     Specimens: ID Type Source Tests Collected by Time Destination   1 : scrotum abcess tissue Tissue Scrotum SURGICAL PATHOLOGY EXAM Noah Zhu MD 7/16/2024  1:25 PM    2 : left testis Tissue Testis, Left SURGICAL PATHOLOGY EXAM Noah Zhu MD 7/16/2024  2:01 PM    A : scrotum abcess fluid Abscess Scrotum ANAEROBIC BACTERIAL CULTURE ROUTINE, GRAM STAIN, AEROBIC BACTERIAL CULTURE ROUTINE Noah Zhu MD 7/16/2024  1:24 PM    B : scrotum tissue culture Tissue Scrotum ANAEROBIC BACTERIAL CULTURE ROUTINE, GRAM STAIN, AEROBIC BACTERIAL CULTURE ROUTINE Noah Zhu MD 7/16/2024  1:25 PM         Implants: None     Findings:   2 cm anterior left scrotal abscess, incised and drained, communicating with left testicular abscess and necrotic left testicle with ruptured tunica albuginea and necrotic seminiferous tubules. Orchiectomy performed   Complications: None.   Condition: Stable               Description of procedure:  35 yo M with recent prolonged admission for severe left epididymoorchitis now presenting with left scrotal pain, scrotal ultrasound with heterogenous left testicle without flow, also swirled appearance of the spermatic cord concerning for possible testicular torsion. I discussed with the patient that his presentation seems to be more of a scrotal abscess and will plan for  incision and drainage, but also explore the scrotum and see if there is a viable testicle. If the testicle appears to be torsed but salvageable I may perform a detorsion and orchiopexy, otherwise may do orchiectomy. Risks including damage to/loss of testicle, infection, pain, bleeding discussed. Informed consent obtained    Patient was brought to operating room #6.  Preop antibiotics were given prior to the procedure.  General anesthesia was induced, he was placed in supine position, prepped and draped in standard sterile fashion.  A timeout was performed.    A 2 cm fluctuant area was noted on the anterior left hemiscrotum near the midline with some dusky overlying skin changes.  The point of maximal fluctuance was incised with a #15 blade.  There was immediate return of purulent effluent which was collected and sent for culture.  With further expression I noticed a significant amount of debris within the abscess.  I removed some of this debris and sent it for pathology.  As I explored the abscess I realized that the abscess was going deep to the Dartos fascia and that the expressed debris was actually necrotic seminiferous tubules extruding from the left testicle.  I then decided to widen the incision into a transverse scrotal approach.  The skin was incised using #15 blade and cautery.  Dartos fascia was opened up.  The entire scrotal wall was thickened and inflamed. The left hemiscrotum was explored and the testicle was found have been involved with a large lower pole testicular abscess with subsequent rupture and communication with the anterior hemiscrotum; the remainder of the upper pole testicle appeared to be necrotic. I decided to perform an orchiectomy. The testicle was mobilized using blunt dissection and cautery. The spermatic cord was mobilized proximally; this appeared to be thickened and inflamed but not torsed. The cord was clamped off and then doubly ligated using 0 silk and 0 Vicryl suture  "ligatures; the cord was split into two packets and each individual packet was then additional ligated with 0 vicryl free tie.  The testicle was then excised sharply and sent for permanent pathology. The entire operate field was thoroughly irrigated with sterile saline. A 1/4\" Penrose drain was passed through the inferior left hemiscrotum and affixed to the skin with 2-0 nylon suture.  Some necrotic appearing skin was then excised sharply from around the incision and drainage, estimate 2 cm total of skin. The Dartos fascia was then partially closed with 3-0 vicryl suture, leaving a 1.5 cm opening in the anterior scrotum. Skin was then closed loosely with 3-0 chromic vertical mattress sutures. Half-inch Nu Gauze packing was then placed through the incision to pack the left hemiscrotum  Patient was cleaned up and fluffs and scrotal support were applied.  He was then awoken from anesthesia and brought to PACU in stable condition    Noah Zhu MD   [facility] Urology  [phone] clinic phone    "

## 2024-07-16 NOTE — ANESTHESIA POSTPROCEDURE EVALUATION
Patient: Aero U Rowland    Procedure: Procedure(s):  left orchiectomy, incision and drainage of left scrotal abcess/testis, left       Anesthesia Type:  General    Note:  Disposition: Outpatient   Postop Pain Control: Uneventful            Sign Out: Well controlled pain   PONV: No   Neuro/Psych: Uneventful            Sign Out: Acceptable/Baseline neuro status   Airway/Respiratory: Uneventful            Sign Out: Acceptable/Baseline resp. status   CV/Hemodynamics: Uneventful            Sign Out: Acceptable CV status; No obvious hypovolemia; No obvious fluid overload   Other NRE: NONE   DID A NON-ROUTINE EVENT OCCUR? No           Last vitals:  Vitals Value Taken Time   /89 07/16/24 1445   Temp 97  F (36.1  C) 07/16/24 1439   Pulse 69 07/16/24 1449   Resp 16 07/16/24 1449   SpO2 93 % 07/16/24 1448   Vitals shown include unfiled device data.    Electronically Signed By: Christen Swift MD  July 16, 2024  2:51 PM

## 2024-07-16 NOTE — ED TRIAGE NOTES
Arrives with complaints of left testicle pain, states he just finished antibiotics for epididymitis but testicle remains swollen and painful. Alert and oriented, ABCs intact.     Triage Assessment (Adult)       Row Name 07/16/24 1032          Triage Assessment    Airway WDL WDL        Respiratory WDL    Respiratory WDL WDL        Skin Circulation/Temperature WDL    Skin Circulation/Temperature WDL WDL        Cardiac WDL    Cardiac WDL WDL        Peripheral/Neurovascular WDL    Peripheral Neurovascular WDL WDL        Cognitive/Neuro/Behavioral WDL    Cognitive/Neuro/Behavioral WDL WDL

## 2024-07-17 LAB
ANION GAP SERPL CALCULATED.3IONS-SCNC: 9 MMOL/L (ref 7–15)
BUN SERPL-MCNC: 9.9 MG/DL (ref 6–20)
CALCIUM SERPL-MCNC: 8.1 MG/DL (ref 8.8–10.4)
CHLORIDE SERPL-SCNC: 105 MMOL/L (ref 98–107)
CREAT SERPL-MCNC: 0.81 MG/DL (ref 0.67–1.17)
EGFRCR SERPLBLD CKD-EPI 2021: >90 ML/MIN/1.73M2
ERYTHROCYTE [DISTWIDTH] IN BLOOD BY AUTOMATED COUNT: 13.5 % (ref 10–15)
GLUCOSE SERPL-MCNC: 125 MG/DL (ref 70–99)
HCO3 SERPL-SCNC: 23 MMOL/L (ref 22–29)
HCT VFR BLD AUTO: 37.8 % (ref 40–53)
HGB BLD-MCNC: 12.2 G/DL (ref 13.3–17.7)
LACTATE SERPL-SCNC: 1.3 MMOL/L (ref 0.7–2)
LACTATE SERPL-SCNC: 1.3 MMOL/L (ref 0.7–2)
LACTATE SERPL-SCNC: 1.8 MMOL/L (ref 0.7–2)
MCH RBC QN AUTO: 29.4 PG (ref 26.5–33)
MCHC RBC AUTO-ENTMCNC: 32.3 G/DL (ref 31.5–36.5)
MCV RBC AUTO: 91 FL (ref 78–100)
MRSA DNA SPEC QL NAA+PROBE: NEGATIVE
PATH REPORT.COMMENTS IMP SPEC: NORMAL
PATH REPORT.COMMENTS IMP SPEC: NORMAL
PATH REPORT.FINAL DX SPEC: NORMAL
PATH REPORT.GROSS SPEC: NORMAL
PATH REPORT.MICROSCOPIC SPEC OTHER STN: NORMAL
PATH REPORT.RELEVANT HX SPEC: NORMAL
PHOTO IMAGE: NORMAL
PLATELET # BLD AUTO: 643 10E3/UL (ref 150–450)
POTASSIUM SERPL-SCNC: 3.9 MMOL/L (ref 3.4–5.3)
RBC # BLD AUTO: 4.15 10E6/UL (ref 4.4–5.9)
SA TARGET DNA: NEGATIVE
SODIUM SERPL-SCNC: 137 MMOL/L (ref 135–145)
WBC # BLD AUTO: 18.4 10E3/UL (ref 4–11)

## 2024-07-17 PROCEDURE — 120N000001 HC R&B MED SURG/OB

## 2024-07-17 PROCEDURE — G0463 HOSPITAL OUTPT CLINIC VISIT: HCPCS

## 2024-07-17 PROCEDURE — 999N000127 HC STATISTIC PERIPHERAL IV START W US GUIDANCE

## 2024-07-17 PROCEDURE — 36415 COLL VENOUS BLD VENIPUNCTURE: CPT | Performed by: INTERNAL MEDICINE

## 2024-07-17 PROCEDURE — 250N000011 HC RX IP 250 OP 636: Performed by: INTERNAL MEDICINE

## 2024-07-17 PROCEDURE — 85027 COMPLETE CBC AUTOMATED: CPT | Performed by: INTERNAL MEDICINE

## 2024-07-17 PROCEDURE — 80048 BASIC METABOLIC PNL TOTAL CA: CPT | Performed by: INTERNAL MEDICINE

## 2024-07-17 PROCEDURE — 99232 SBSQ HOSP IP/OBS MODERATE 35: CPT | Performed by: STUDENT IN AN ORGANIZED HEALTH CARE EDUCATION/TRAINING PROGRAM

## 2024-07-17 PROCEDURE — 87640 STAPH A DNA AMP PROBE: CPT | Performed by: INTERNAL MEDICINE

## 2024-07-17 PROCEDURE — 99231 SBSQ HOSP IP/OBS SF/LOW 25: CPT | Performed by: PHYSICIAN ASSISTANT

## 2024-07-17 PROCEDURE — 258N000003 HC RX IP 258 OP 636: Performed by: INTERNAL MEDICINE

## 2024-07-17 PROCEDURE — 83605 ASSAY OF LACTIC ACID: CPT | Performed by: INTERNAL MEDICINE

## 2024-07-17 PROCEDURE — 87641 MR-STAPH DNA AMP PROBE: CPT | Performed by: INTERNAL MEDICINE

## 2024-07-17 PROCEDURE — 250N000013 HC RX MED GY IP 250 OP 250 PS 637: Performed by: INTERNAL MEDICINE

## 2024-07-17 RX ORDER — NALOXONE HYDROCHLORIDE 0.4 MG/ML
0.2 INJECTION, SOLUTION INTRAMUSCULAR; INTRAVENOUS; SUBCUTANEOUS
Status: DISCONTINUED | OUTPATIENT
Start: 2024-07-17 | End: 2024-07-18

## 2024-07-17 RX ORDER — NALOXONE HYDROCHLORIDE 0.4 MG/ML
0.4 INJECTION, SOLUTION INTRAMUSCULAR; INTRAVENOUS; SUBCUTANEOUS
Status: DISCONTINUED | OUTPATIENT
Start: 2024-07-17 | End: 2024-07-18

## 2024-07-17 RX ADMIN — PIPERACILLIN AND TAZOBACTAM 3.38 G: 3; .375 INJECTION, POWDER, FOR SOLUTION INTRAVENOUS at 10:42

## 2024-07-17 RX ADMIN — OXYCODONE HYDROCHLORIDE 10 MG: 10 TABLET ORAL at 17:18

## 2024-07-17 RX ADMIN — VANCOMYCIN HYDROCHLORIDE 1250 MG: 10 INJECTION, POWDER, LYOPHILIZED, FOR SOLUTION INTRAVENOUS at 08:40

## 2024-07-17 RX ADMIN — SODIUM CHLORIDE: 9 INJECTION, SOLUTION INTRAVENOUS at 03:30

## 2024-07-17 RX ADMIN — HYDROMORPHONE HYDROCHLORIDE 0.5 MG: 1 INJECTION, SOLUTION INTRAMUSCULAR; INTRAVENOUS; SUBCUTANEOUS at 22:22

## 2024-07-17 RX ADMIN — OXYCODONE HYDROCHLORIDE 10 MG: 10 TABLET ORAL at 21:06

## 2024-07-17 RX ADMIN — HYDROMORPHONE HYDROCHLORIDE 0.5 MG: 1 INJECTION, SOLUTION INTRAMUSCULAR; INTRAVENOUS; SUBCUTANEOUS at 09:39

## 2024-07-17 RX ADMIN — PIPERACILLIN AND TAZOBACTAM 3.38 G: 3; .375 INJECTION, POWDER, FOR SOLUTION INTRAVENOUS at 23:22

## 2024-07-17 RX ADMIN — SENNOSIDES AND DOCUSATE SODIUM 1 TABLET: 8.6; 5 TABLET ORAL at 19:59

## 2024-07-17 RX ADMIN — VANCOMYCIN HYDROCHLORIDE 1250 MG: 10 INJECTION, POWDER, LYOPHILIZED, FOR SOLUTION INTRAVENOUS at 20:02

## 2024-07-17 RX ADMIN — PIPERACILLIN AND TAZOBACTAM 3.38 G: 3; .375 INJECTION, POWDER, FOR SOLUTION INTRAVENOUS at 17:20

## 2024-07-17 RX ADMIN — OXYCODONE HYDROCHLORIDE 10 MG: 10 TABLET ORAL at 09:02

## 2024-07-17 RX ADMIN — PIPERACILLIN AND TAZOBACTAM 3.38 G: 3; .375 INJECTION, POWDER, FOR SOLUTION INTRAVENOUS at 03:25

## 2024-07-17 ASSESSMENT — ACTIVITIES OF DAILY LIVING (ADL)
ADLS_ACUITY_SCORE: 20
ADLS_ACUITY_SCORE: 20
ADLS_ACUITY_SCORE: 37
ADLS_ACUITY_SCORE: 37
ADLS_ACUITY_SCORE: 20
ADLS_ACUITY_SCORE: 35
ADLS_ACUITY_SCORE: 20
ADLS_ACUITY_SCORE: 37
ADLS_ACUITY_SCORE: 20
ADLS_ACUITY_SCORE: 37
ADLS_ACUITY_SCORE: 20
ADLS_ACUITY_SCORE: 37
ADLS_ACUITY_SCORE: 37

## 2024-07-17 NOTE — PLAN OF CARE
"pt alert and orientedx4. pain manged with Oxyx1. this morning reports no pain. frustrated from the surgery but waiting for review this morning and more clarifications. voiding well. SBA. Surgery and WOC to review this morning.  Problem: Adult Inpatient Plan of Care  Goal: Plan of Care Review  Description: The Plan of Care Review/Shift note should be completed every shift.  The Outcome Evaluation is a brief statement about your assessment that the patient is improving, declining, or no change.  This information will be displayed automatically on your shift  note.  7/17/2024 0752 by Angie Price RN  Outcome: Progressing  Flowsheets (Taken 7/17/2024 0752)  Outcome Evaluation: pt alert and orientedx4. pain manged with Oxyx1. this morning reports no pain. frustrated from the surgery but waiting for review this morning and more clarifications. voiding well. SBA.  Plan of Care Reviewed With: patient  Overall Patient Progress: improving  7/17/2024 0750 by Angie Price RN  Outcome: Progressing  Flowsheets (Taken 7/17/2024 0750)  Plan of Care Reviewed With: patient  Overall Patient Progress: improving  7/17/2024 0745 by Angie Price RN  Outcome: Progressing  Flowsheets (Taken 7/17/2024 0744)  Outcome Evaluation: Pt Pain managed with PRN NORCO 4hrly, Independent in the room. oxygen sating above 95%. CIWA-0, Neuro checks 4hrly and are WNL,  Plan of Care Reviewed With: patient  Overall Patient Progress: improving  Goal: Patient-Specific Goal (Individualized)  Description: You can add care plan individualizations to a care plan. Examples of Individualization might be:  \"Parent requests to be called daily at 9am for status\", \"I have a hard time hearing out of my right ear\", or \"Do not touch me to wake me up as it startles  me\".  7/17/2024 0752 by Angie Price RN  Outcome: Progressing  7/17/2024 0750 by Angie Price RN  Outcome: Progressing  7/17/2024 0745 by Angie Price RN  Outcome: Progressing  Goal: Absence of " Hospital-Acquired Illness or Injury  7/17/2024 0752 by Angie Price RN  Outcome: Progressing  7/17/2024 0750 by Angie Price RN  Outcome: Progressing  7/17/2024 0745 by Angie Price RN  Outcome: Progressing  Intervention: Identify and Manage Fall Risk  Recent Flowsheet Documentation  Taken 7/17/2024 0046 by Angie Price RN  Safety Promotion/Fall Prevention: activity supervised  Intervention: Prevent Skin Injury  Recent Flowsheet Documentation  Taken 7/17/2024 0046 by Angie Price RN  Body Position: position changed independently  Intervention: Prevent and Manage VTE (Venous Thromboembolism) Risk  Recent Flowsheet Documentation  Taken 7/17/2024 0046 by Angie Price RN  VTE Prevention/Management: SCDs off (sequential compression devices)  Intervention: Prevent Infection  Recent Flowsheet Documentation  Taken 7/17/2024 0046 by Angie Price RN  Infection Prevention: hand hygiene promoted  Goal: Optimal Comfort and Wellbeing  7/17/2024 0752 by Angie Price RN  Outcome: Progressing  7/17/2024 0750 by Angie Price RN  Outcome: Progressing  7/17/2024 0745 by Angie Price RN  Outcome: Progressing  Goal: Readiness for Transition of Care  7/17/2024 0752 by Angie Price RN  Outcome: Progressing  7/17/2024 0750 by Angie Price RN  Outcome: Progressing  7/17/2024 0745 by Angie Price RN  Outcome: Progressing   Goal Outcome Evaluation:      Plan of Care Reviewed With: patient    Overall Patient Progress: improvingOverall Patient Progress: improving    Outcome Evaluation: pt alert and orientedx4. pain manged with Oxyx1. this morning reports no pain. frustrated from the surgery but waiting for review this morning and more clarifications. voiding well. SBA.

## 2024-07-17 NOTE — PROGRESS NOTES
X-cover    Notified that lactic acid is currently 3.5    Patient currently on broad antibiotic coverage with IV vancomycin and IV Zosyn after undergoing incision and drainage for scrotal abscess with orchiectomy earlier today    Hemodynamics notable for mild tachycardia with heart rate 101.  Otherwise vital signs unremarkable    Plan:  Administer 1 liter NS bolus  Repeat LA at 0200

## 2024-07-17 NOTE — PROGRESS NOTES
[facility] Progress Note          Assessment and Plan:     Assessment:    POD 1 scrotal exploration, incision, drainage, and debridement of left scrotal and testicular abscess, 2 cm; left-sided orchiectomy, simple    Epididymoorchitis    Testicular and scrotal abscess    Necrotic left testicle with rupture tunica albuginea necrotic seminiferous tubules     Torsion of left testicle      Plan:   -Continue with broad-spectrum IV antibiotics.  Follow cultures.  Transition to culture specific as available and oral as available and when clinically appropriate.  -Continue with wound dressings per WOC.  As patient nears discharge, will need to demonstrate that he can do these with possible assistance.  Plan for twice daily wound dressings.  Will likely remove Penrose drain in a week or so.  -Cultures and surgical pathology pending.  -Pain and nausea management per primary service.  -Can use scrotal support as needed.  -Will continue to follow along.    Sophie Romero PA-C   [facility]  [phone]               Interval History:     Still having fair amount of discomfort in the left hemiscrotum.  Mad about having left orchiectomy.  Discussed with patient that there was a scrotal and testicular abscess and testicle was necrotic and dead.  Anaerobic and aerobic cultures are in process.  Patient is on IV Zosyn and IV vancomycin.  Mild tachycardia.  Creatinine 0.81 EGFR greater than 90.  WBC 18.4 (16.2).  Hemoglobin 12.2.  Left sided orchiectomy.  Open wound approximately 2 cm with depth of approximately 2 cm.  Bleeding well.  No eschar or crepitus.  Diffusely tender.  Sutures in place.  Bloody serosanguineous drainage from Penrose drain.              Review of Systems:     The 5 point Review of Systems is negative other than noted in the HPI             Medications:     Current Facility-Administered Medications   Medication Dose Route Frequency Provider Last Rate Last Admin    acetaminophen  (TYLENOL) tablet 650 mg  650 mg Oral Q4H PRN Manav Waggoner MD        Or    acetaminophen (TYLENOL) Suppository 650 mg  650 mg Rectal Q4H PRN Manav Waggoner MD        albuterol (PROVENTIL HFA/VENTOLIN HFA) inhaler  2 puff Inhalation 4x Daily PRN Manav Waggoner MD        calcium carbonate (TUMS) chewable tablet 1,000 mg  1,000 mg Oral 4x Daily PRN Manav Waggoner MD        HYDROmorphone (PF) (DILAUDID) injection 0.3 mg  0.3 mg Intravenous Q2H PRN Manav Waggoner MD        HYDROmorphone (PF) (DILAUDID) injection 0.5 mg  0.5 mg Intravenous Q2H PRN Manav Waggoner MD   0.5 mg at 07/17/24 0939    lidocaine (LMX4) cream   Topical Q1H PRN Manav Waggoner MD        lidocaine 1 % 0.1-1 mL  0.1-1 mL Other Q1H PRN Manav Waggoner MD        naloxone (NARCAN) injection 0.2 mg  0.2 mg Intravenous Q2 Min PRN Manav Waggoner MD        Or    naloxone (NARCAN) injection 0.4 mg  0.4 mg Intravenous Q2 Min PRN Manav Waggoner MD        Or    naloxone (NARCAN) injection 0.2 mg  0.2 mg Intramuscular Q2 Min PRN Manav Waggoner MD        Or    naloxone (NARCAN) injection 0.4 mg  0.4 mg Intramuscular Q2 Min PRN Manav Waggoner MD        ondansetron (ZOFRAN ODT) ODT tab 4 mg  4 mg Oral Q6H PRN Manav Waggoner MD        Or    ondansetron (ZOFRAN) injection 4 mg  4 mg Intravenous Q6H PRN Manav Waggoner MD        oxyCODONE (ROXICODONE) tablet 5 mg  5 mg Oral Q4H PRN Manav Waggoner MD        oxyCODONE IR (ROXICODONE) tablet 10 mg  10 mg Oral Q4H PRN Manav Waggoner MD   10 mg at 07/17/24 0902    piperacillin-tazobactam (ZOSYN) 3.375 g vial to attach to  mL bag  3.375 g Intravenous Q6H Manav Waggoner MD   3.375 g at 07/17/24 1042    senna-docusate (SENOKOT-S/PERICOLACE) 8.6-50 MG per tablet 1 tablet  1 tablet Oral BID PRN Manav Waggoner MD   1 tablet at 07/16/24 1932    Or    senna-docusate (SENOKOT-S/PERICOLACE) 8.6-50 MG per  "tablet 2 tablet  2 tablet Oral BID PRN Manav Waggoner MD        senna-docusate (SENOKOT-S/PERICOLACE) 8.6-50 MG per tablet 1 tablet  1 tablet Oral BID Manav Waggoner MD   1 tablet at 07/16/24 1937    Or    senna-docusate (SENOKOT-S/PERICOLACE) 8.6-50 MG per tablet 2 tablet  2 tablet Oral BID Manav Waggoner MD        sodium chloride (PF) 0.9% PF flush 3 mL  3 mL Intracatheter Q8H Manav Waggoner MD   3 mL at 07/16/24 1924    sodium chloride (PF) 0.9% PF flush 3 mL  3 mL Intracatheter q1 min prn Manav Waggoner MD        sodium chloride 0.9 % infusion   Intravenous Continuous Manav Waggoner  mL/hr at 07/17/24 0330 New Bag at 07/17/24 0330    vancomycin (VANCOCIN) 1,250 mg in 0.9% NaCl 250 mL intermittent infusion  1,250 mg Intravenous Q12H Manav Waggoner .7 mL/hr at 07/17/24 0840 1,250 mg at 07/17/24 0840                  Physical Exam:   Vitals were reviewed  Patient Vitals for the past 8 hrs:   BP Temp Temp src Pulse Resp SpO2   07/17/24 0815 115/51 97.6  F (36.4  C) Temporal 76 16 98 %     GEN: NAD, lying in bed  EYES: EOMI  MOUTH: MMM  NECK: Supple  RESP: Unlabored breathing  SKIN: Warm  NEURO: AAO  URO: Left sided orchiectomy.  Open wound approximately 2 cm with depth of approximately 2 cm.  Bleeding well.  No eschar or crepitus.  Diffusely tender.  Sutures in place.  Bloody serosanguineous drainage from Penrose drain.           Data:   No results found for: \"NTBNPI\", \"NTBNP\"  Lab Results   Component Value Date    WBC 18.4 (H) 07/17/2024    WBC 16.2 (H) 07/16/2024    WBC 18.8 (H) 07/06/2024    HGB 12.2 (L) 07/17/2024    HGB 13.0 (L) 07/16/2024    HGB 14.4 07/06/2024    HCT 37.8 (L) 07/17/2024    HCT 39.7 (L) 07/16/2024    HCT 44.5 07/06/2024    MCV 91 07/17/2024    MCV 90 07/16/2024    MCV 91 07/06/2024     (H) 07/17/2024     (H) 07/16/2024     (H) 07/06/2024     Lab Results   Component Value Date    INR 1.12 01/12/2007      Surgical " pathology: In process.  Anaerobic tissue culture: In process.  Aerobic tissue culture: In process.

## 2024-07-17 NOTE — PLAN OF CARE
"Goal Outcome Evaluation: Pt endorses frustration with his surgery and surgeon. States \"I did not consent to have my left testicle removed. The surgeon lied to me.\"        Plan of Care Reviewed With: patient    Overall Patient Progress: improving     Outcome Evaluation: Elevated Lactic Acid. Pt currently is being treated for sepsis. Reported moderate pain to left inguinal. Pain managed with PRN oxycodone.     Patient vital signs are at baseline: Yes  Patient able to ambulate as they were prior to admission or with assist devices provided by therapies during their stay:  Yes  Patient MUST void prior to discharge:  Yes  Patient able to tolerate oral intake:  Yes  Pain has adequate pain control using Oral analgesics:  Yes  Does patient have an identified :  Yes  Has goal D/C date and time been discussed with patient:  No,  Reason:  TBD      Problem: Adult Inpatient Plan of Care  Goal: Plan of Care Review  Description: The Plan of Care Review/Shift note should be completed every shift.  The Outcome Evaluation is a brief statement about your assessment that the patient is improving, declining, or no change.  This information will be displayed automatically on your shift  note.  Outcome: Progressing  Flowsheets (Taken 7/16/2024 2310)  Outcome Evaluation: Elevated Lactic Acid. Pt currently is being treated for sepsis. Reported moderate pain to left inguinal. Pain managed with PRN oxycodone.  Plan of Care Reviewed With: patient  Overall Patient Progress: improving  Goal: Patient-Specific Goal (Individualized)  Description: You can add care plan individualizations to a care plan. Examples of Individualization might be:  \"Parent requests to be called daily at 9am for status\", \"I have a hard time hearing out of my right ear\", or \"Do not touch me to wake me up as it startles  me\".  Outcome: Progressing  Goal: Absence of Hospital-Acquired Illness or Injury  Outcome: Progressing  Intervention: Identify and Manage Fall " Risk  Recent Flowsheet Documentation  Taken 7/16/2024 1945 by Ally Maurer, RN  Safety Promotion/Fall Prevention:   activity supervised   increased rounding and observation   clutter free environment maintained   increase visualization of patient   lighting adjusted   room organization consistent   safety round/check completed   supervised activity  Intervention: Prevent Skin Injury  Recent Flowsheet Documentation  Taken 7/16/2024 1945 by Ally Maurer, RN  Body Position: supine, head elevated  Skin Protection: transparent dressing maintained  Device Skin Pressure Protection:   absorbent pad utilized/changed   tubing/devices free from skin contact  Intervention: Prevent and Manage VTE (Venous Thromboembolism) Risk  Recent Flowsheet Documentation  Taken 7/16/2024 1945 by Ally Maurer, RN  VTE Prevention/Management: patient refused intervention  Intervention: Prevent Infection  Recent Flowsheet Documentation  Taken 7/16/2024 1945 by Ally Maurer RN  Infection Prevention:   hand hygiene promoted   rest/sleep promoted  Goal: Optimal Comfort and Wellbeing  Outcome: Progressing  Intervention: Monitor Pain and Promote Comfort  Recent Flowsheet Documentation  Taken 7/16/2024 1820 by Ally Maurer RN  Pain Management Interventions:   repositioned   rest   medication (see MAR)  Goal: Readiness for Transition of Care  Outcome: Progressing

## 2024-07-17 NOTE — PLAN OF CARE
Goal Outcome Evaluation:    Plan of Care Reviewed With: patient, significant other    Overall Patient Progress: no change    Outcome Evaluation: Poor appetite. NS 100ml/hr. New PIV placed via US. Independent in room. WOC changed dressing - change BID. Scrotal support as needed. Pain managed with oxy10. Home with SO when medically ready to discharge. WOC and urology following.    Problem: Adult Inpatient Plan of Care  Goal: Plan of Care Review  Outcome: Not Progressing  Flowsheets (Taken 7/17/2024 1812)  Outcome Evaluation: Poor appetite. NS 100ml/hr. New PIV placed via US. Independent in room. WOC changed dressing - change BID. Scrotal support as needed. Pain managed with oxy10. Home with SO when medically ready to discharge. WOC and urology following.  Plan of Care Reviewed With:   patient   significant other  Overall Patient Progress: no change  Goal: Patient-Specific Goal (Individualized)  Outcome: Not Progressing  Goal: Absence of Hospital-Acquired Illness or Injury  Outcome: Not Progressing  Intervention: Identify and Manage Fall Risk  Recent Flowsheet Documentation  Taken 7/17/2024 0840 by Teagan Leonard RN  Safety Promotion/Fall Prevention:   activity supervised   assistive device/personal items within reach   clutter free environment maintained   increased rounding and observation   increase visualization of patient   lighting adjusted   mobility aid in reach   nonskid shoes/slippers when out of bed   patient and family education   room organization consistent   safety round/check completed   supervised activity  Intervention: Prevent Skin Injury  Recent Flowsheet Documentation  Taken 7/17/2024 0840 by Teagan Leonard RN  Body Position: position changed independently  Intervention: Prevent and Manage VTE (Venous Thromboembolism) Risk  Recent Flowsheet Documentation  Taken 7/17/2024 0840 by Teagan Leonard RN  VTE Prevention/Management: patient refused intervention  Goal: Optimal Comfort and  Wellbeing  Outcome: Not Progressing  Intervention: Monitor Pain and Promote Comfort  Recent Flowsheet Documentation  Taken 7/17/2024 1803 by Teagan Leonard RN  Pain Management Interventions:   rest   repositioned   pillow support provided   pain management plan reviewed with patient/caregiver   emotional support  Taken 7/17/2024 1718 by Teagan Leonard RN  Pain Management Interventions: medication (see MAR)  Taken 7/17/2024 0954 by Teagan Leonard RN  Pain Management Interventions:   emotional support   rest   repositioned  Taken 7/17/2024 0939 by Teagan Leonard RN  Pain Management Interventions: medication (see MAR)  Taken 7/17/2024 0840 by Teagan Leonard RN  Pain Management Interventions:   medication (see MAR)   care clustered   emotional support   pillow support provided   repositioned   rest  Goal: Readiness for Transition of Care  Outcome: Not Progressing  Intervention: Mutually Develop Transition Plan  Recent Flowsheet Documentation  Taken 7/17/2024 0900 by Teagan Leonard RN  Equipment Currently Used at Home: none     Problem: Pain Acute  Goal: Optimal Pain Control and Function  Outcome: Not Progressing  Intervention: Develop Pain Management Plan  Recent Flowsheet Documentation  Taken 7/17/2024 1803 by Teagan Leonard RN  Pain Management Interventions:   rest   repositioned   pillow support provided   pain management plan reviewed with patient/caregiver   emotional support  Taken 7/17/2024 1718 by Teagan Leonard RN  Pain Management Interventions: medication (see MAR)  Taken 7/17/2024 0954 by Teagan Leonard RN  Pain Management Interventions:   emotional support   rest   repositioned  Taken 7/17/2024 0939 by Teagan Leonard RN  Pain Management Interventions: medication (see MAR)  Taken 7/17/2024 0840 by Teagan Leonard RN  Pain Management Interventions:   medication (see MAR)   care clustered   emotional support   pillow support provided   repositioned   rest

## 2024-07-17 NOTE — CONSULTS
[facility]  WO Nurse Inpatient Assessment     Consulted for: Scrotum    Patient History (according to provider note(s):      34 year old male with a history of polysubstance abuse, ADHD, and recent admission for epididymoorchitis who presented to the ED for testicular/scrotal pain on 7/16/2024. The patient was recently admitted to the hospital 6/28 through 7/6 for acute epididymoorchitis with sepsis and ischemia.  The patient returned to the hospital for continued testicular pain.     Assessment:      Areas visualized during today's visit: Focused:    Wound location: Scrotum  Last photo: 7/17/24    Wound due to: Surgical Wound  Wound history/plan of care: Patient s/p Incision, drainage, and debridement of left scrotal/testicular abscess, left simple orchiectomy on 7/16/24.  Wound base: 100 % red non-granular tissue, penrose drain in base     Palpation of the wound bed: normal      Drainage: copious     Description of drainage: serosanguinous     Measurements (length x width x depth, in cm): 1.5  x 2.5  x  3 cm      Tunneling: up to 2 cm at 3 o'clock     Undermining: N/A  Periwound skin:  sutured at 3 o'clock, penrose exits scrotum inferior to wound      Color: pink      Temperature: normal   Odor: none  Pain: severe, stabbing  Pain interventions prior to dressing change: oral oxycodone  Treatment goal: Heal , Infection control/prevention, and Increase granulation  STATUS: initial assessment  Supplies ordered: at bedside       Treatment Plan:     Scrotum wound(s): BID  Cleanse with Vashe  Pack with Vashe moistened 2x2 gauze  Cover with dry gauze     Orders: Written    RECOMMEND PRIMARY TEAM ORDER: None, at this time  Education provided: plan of care and Infection prevention   Discussed plan of care with: Patient, Nurse, and Physicians Assistant  WO nurse follow-up plan: weekly  Notify WO if wound(s) deteriorate.  Nursing to notify the Provider(s) and re-consult the [facility] Nurse if new skin  concern.    DATA:     Current support surface: Standard  Standard Isoflex gel  Containment of urine/stool: Continent of bladder and Continent of bowel  BMI: Body mass index is 23.52 kg/m .   Active diet order: Orders Placed This Encounter      Regular Diet Adult     Output: I/O last 3 completed shifts:  In: 1900 [P.O.:400; I.V.:1500]  Out: 1818 [Urine:1808; Blood:10]     Labs:   Recent Labs   Lab 07/17/24  0554   HGB 12.2*   WBC 18.4*     Pressure injury risk assessment:   Sensory Perception: 4-->no impairment  Moisture: 4-->rarely moist  Activity: 3-->walks occasionally  Mobility: 3-->slightly limited  Nutrition: 3-->adequate  Friction and Shear: 3-->no apparent problem  Karson Score: 20    Jared Lackey RN CWOCN  Contact [facility] Nurse ([place])  Dept. Office Number: [phone]

## 2024-07-17 NOTE — PROGRESS NOTES
[facility]    Medicine Progress Note - Hospitalist Service  Date of Admission: 7/16/2024     Assessment & Plan         Ricky Rowland is a 34 year old male with past medical history significant for recent admission 6/28/2024-7/6/2024 for epididymo-orchitis, polysubstance use disorder, and ADHD who presented to [facility] on 7/16/2024 with increasing testicular pain and was found to have absent testicular perfusion on ultrasound and was emergently taken to OR for concern of testicular torsion. During procedure, he was found to have testicular and scrotal abscesses with rupture of tunica albuginea, necrotic seminiferous tubules and left testicle requiring left orchiectomy.    Left Testicular & Scrotal Abscesses w/ Epididymo-orchitis  Necrosis of Left Testicle and Seminiferous Tubules  Left Orchiectomy (7/16/2024)  Recently admitted 6/28/2024-7/6/2024 with left-sided epididymo-orchitis for which he received IV antibiotics. Repeat ultrasound showed persistent edema of the left testicle and compromised blood flow to the testicle. Surgery was recommended at the time, but patient declined. Was discharged with 4 additional weeks of levaquin, but returning 7/16/2024 with worsening testicular pain. He was afebrile and did not appear to be in sepsis, but imaging showed absent perfusion to left testicle concerning for torsion. Urology was consulted for emergency department and patient emergently taken to OR. Unfortunately, was found to have abscess and necrosis of left testicle requiring orchiectomy (see OP note from 7/16/2024). Penrose drain in place, which sounds like will likely be there for a few days. Urology following. Will continue broad spectrum antibiotics with vancomycin + zosyn pending culture results; gram stain showing 1+ GPC, but will await final cultures before narrowing.    Polysubstance Use Disorder: Hx methamphetamine & cannabis use  ADHD: Not on stimulant medication.      "  Clinically Significant Risk Factors                                        Diet: Orders Placed This Encounter      Regular Diet Adult  DVT Prophylaxis: Pneumatic Compression Devices  Kim: None  Code Status: Full Code    Medically Ready for Discharge: Anticipated in 5+ Days       The patient's care was discussed with the Bedside Nurse, Patient, and Patient's Family.  I personally spent 45 minutes on chart review, documentation, coordination, and bedside management of patient.    Agustin Truong MD, S  Hospitalist Service  [facility]  ______________________________________________________________________    Interval History   Nursing notes reviewed. Patient without significant pain, though does note some at the incision site. No fevers or chills. Worried that the infection is going to spread. Requesting examination of right testicle to ensure infection is not spreading.    A full 10+ point review of systems was performed and found to be negative with the exception of those items noted here.    Physical Exam   Temp: 97.6  F (36.4  C) Temp src: Temporal BP: 115/51 Pulse: 76   Resp: 16 SpO2: 98 % O2 Device: None (Room air) Oxygen Delivery: 6 LPM Height: 180.3 cm (5' 11\") Weight: 76.5 kg (168 lb 10.4 oz)  Estimated body mass index is 23.52 kg/m  as calculated from the following:    Height as of this encounter: 1.803 m (5' 11\").    Weight as of this encounter: 76.5 kg (168 lb 10.4 oz).    General: Very pleasant male resting comfortably in hospital bed.  Awake, alert, interactive. Visitors at bedside.  HEENT: Normocephalic, atraumatic.  PERRL, EOMI.  Conjunctiva clear, sclerae anicteric.  Mucous membranes moist.  Cardiac: Regular rate and rhythm without murmur, gallop, or rub.  No peripheral edema.  Respiratory: Normal work of breathing.    GI: Normal, active bowel sounds.  Abdomen soft, nontender, nondistended.  : Scrotal sling. Left testicle bandaged. Right testicle without obvious erythema or " induration. Penis normal in appearance; without lesions, erythema, swelling.  Musculoskeletal: Moving all extremities appropriately.  Skin: No rashes or abrasions on exposed skin.  Neurologic: Alert and oriented x4.  Cranial nerves II through XII grossly intact.  Psychologic: Appropriate mood and affect.    Data   All laboratory results and other diagnostic data from the past 24 hours is available in Epic and has been personally reviewed.    Recent Labs   Lab 07/17/24  0554 07/16/24  1201   WBC 18.4* 16.2*   HGB 12.2* 13.0*   MCV 91 90   * 667*    143   POTASSIUM 3.9 4.1   CHLORIDE 105 105   CO2 23 25   BUN 9.9 12.9   CR 0.81 0.95   ANIONGAP 9 13   CHARLENE 8.1* 8.8   * 87       Imaging results reviewed over the past 24 hrs:   No results found for this or any previous visit (from the past 24 hour(s)).  I personally reviewed: no images or EKG's today.

## 2024-07-18 LAB
ANION GAP SERPL CALCULATED.3IONS-SCNC: 12 MMOL/L (ref 7–15)
BACTERIA TISS BX CULT: ABNORMAL
BUN SERPL-MCNC: 10.9 MG/DL (ref 6–20)
CALCIUM SERPL-MCNC: 7.9 MG/DL (ref 8.8–10.4)
CHLORIDE SERPL-SCNC: 106 MMOL/L (ref 98–107)
CREAT SERPL-MCNC: 0.94 MG/DL (ref 0.67–1.17)
CREAT SERPL-MCNC: 0.94 MG/DL (ref 0.67–1.17)
EGFRCR SERPLBLD CKD-EPI 2021: >90 ML/MIN/1.73M2
EGFRCR SERPLBLD CKD-EPI 2021: >90 ML/MIN/1.73M2
ERYTHROCYTE [DISTWIDTH] IN BLOOD BY AUTOMATED COUNT: 14 % (ref 10–15)
GLUCOSE SERPL-MCNC: 94 MG/DL (ref 70–99)
HCO3 SERPL-SCNC: 23 MMOL/L (ref 22–29)
HCT VFR BLD AUTO: 38.6 % (ref 40–53)
HGB BLD-MCNC: 12.4 G/DL (ref 13.3–17.7)
LACTATE SERPL-SCNC: 2.1 MMOL/L (ref 0.7–2)
LACTATE SERPL-SCNC: 2.2 MMOL/L (ref 0.7–2)
MCH RBC QN AUTO: 29.6 PG (ref 26.5–33)
MCHC RBC AUTO-ENTMCNC: 32.1 G/DL (ref 31.5–36.5)
MCV RBC AUTO: 92 FL (ref 78–100)
PLATELET # BLD AUTO: 620 10E3/UL (ref 150–450)
POTASSIUM SERPL-SCNC: 3.8 MMOL/L (ref 3.4–5.3)
RBC # BLD AUTO: 4.19 10E6/UL (ref 4.4–5.9)
SODIUM SERPL-SCNC: 141 MMOL/L (ref 135–145)
VANCOMYCIN SERPL-MCNC: 9.6 UG/ML
WBC # BLD AUTO: 11.8 10E3/UL (ref 4–11)

## 2024-07-18 PROCEDURE — 36415 COLL VENOUS BLD VENIPUNCTURE: CPT | Performed by: STUDENT IN AN ORGANIZED HEALTH CARE EDUCATION/TRAINING PROGRAM

## 2024-07-18 PROCEDURE — 99232 SBSQ HOSP IP/OBS MODERATE 35: CPT | Performed by: STUDENT IN AN ORGANIZED HEALTH CARE EDUCATION/TRAINING PROGRAM

## 2024-07-18 PROCEDURE — 80202 ASSAY OF VANCOMYCIN: CPT | Performed by: INTERNAL MEDICINE

## 2024-07-18 PROCEDURE — 120N000001 HC R&B MED SURG/OB

## 2024-07-18 PROCEDURE — 250N000011 HC RX IP 250 OP 636: Performed by: INTERNAL MEDICINE

## 2024-07-18 PROCEDURE — 36415 COLL VENOUS BLD VENIPUNCTURE: CPT | Performed by: INTERNAL MEDICINE

## 2024-07-18 PROCEDURE — 85041 AUTOMATED RBC COUNT: CPT | Performed by: STUDENT IN AN ORGANIZED HEALTH CARE EDUCATION/TRAINING PROGRAM

## 2024-07-18 PROCEDURE — 250N000013 HC RX MED GY IP 250 OP 250 PS 637: Performed by: INTERNAL MEDICINE

## 2024-07-18 PROCEDURE — 82374 ASSAY BLOOD CARBON DIOXIDE: CPT | Performed by: STUDENT IN AN ORGANIZED HEALTH CARE EDUCATION/TRAINING PROGRAM

## 2024-07-18 PROCEDURE — 84295 ASSAY OF SERUM SODIUM: CPT | Performed by: STUDENT IN AN ORGANIZED HEALTH CARE EDUCATION/TRAINING PROGRAM

## 2024-07-18 PROCEDURE — 82565 ASSAY OF CREATININE: CPT | Performed by: INTERNAL MEDICINE

## 2024-07-18 PROCEDURE — 258N000003 HC RX IP 258 OP 636: Performed by: INTERNAL MEDICINE

## 2024-07-18 PROCEDURE — 83605 ASSAY OF LACTIC ACID: CPT | Performed by: INTERNAL MEDICINE

## 2024-07-18 PROCEDURE — 250N000013 HC RX MED GY IP 250 OP 250 PS 637: Performed by: STUDENT IN AN ORGANIZED HEALTH CARE EDUCATION/TRAINING PROGRAM

## 2024-07-18 PROCEDURE — 99231 SBSQ HOSP IP/OBS SF/LOW 25: CPT | Performed by: PHYSICIAN ASSISTANT

## 2024-07-18 RX ORDER — NALOXONE HYDROCHLORIDE 0.4 MG/ML
0.2 INJECTION, SOLUTION INTRAMUSCULAR; INTRAVENOUS; SUBCUTANEOUS
Status: DISCONTINUED | OUTPATIENT
Start: 2024-07-18 | End: 2024-07-21 | Stop reason: HOSPADM

## 2024-07-18 RX ORDER — NALOXONE HYDROCHLORIDE 0.4 MG/ML
0.4 INJECTION, SOLUTION INTRAMUSCULAR; INTRAVENOUS; SUBCUTANEOUS
Status: DISCONTINUED | OUTPATIENT
Start: 2024-07-18 | End: 2024-07-21 | Stop reason: HOSPADM

## 2024-07-18 RX ORDER — NICOTINE 21 MG/24HR
1 PATCH, TRANSDERMAL 24 HOURS TRANSDERMAL DAILY
Status: DISCONTINUED | OUTPATIENT
Start: 2024-07-18 | End: 2024-07-21 | Stop reason: HOSPADM

## 2024-07-18 RX ORDER — VANCOMYCIN HYDROCHLORIDE
1500 EVERY 12 HOURS
Status: DISCONTINUED | OUTPATIENT
Start: 2024-07-18 | End: 2024-07-20

## 2024-07-18 RX ADMIN — PIPERACILLIN AND TAZOBACTAM 3.38 G: 3; .375 INJECTION, POWDER, FOR SOLUTION INTRAVENOUS at 17:40

## 2024-07-18 RX ADMIN — PIPERACILLIN AND TAZOBACTAM 3.38 G: 3; .375 INJECTION, POWDER, FOR SOLUTION INTRAVENOUS at 04:40

## 2024-07-18 RX ADMIN — SENNOSIDES AND DOCUSATE SODIUM 1 TABLET: 8.6; 5 TABLET ORAL at 09:50

## 2024-07-18 RX ADMIN — OXYCODONE HYDROCHLORIDE 10 MG: 10 TABLET ORAL at 06:57

## 2024-07-18 RX ADMIN — SENNOSIDES AND DOCUSATE SODIUM 2 TABLET: 50; 8.6 TABLET ORAL at 19:55

## 2024-07-18 RX ADMIN — OXYCODONE HYDROCHLORIDE 10 MG: 10 TABLET ORAL at 17:40

## 2024-07-18 RX ADMIN — PIPERACILLIN AND TAZOBACTAM 3.38 G: 3; .375 INJECTION, POWDER, FOR SOLUTION INTRAVENOUS at 12:07

## 2024-07-18 RX ADMIN — OXYCODONE HYDROCHLORIDE 10 MG: 10 TABLET ORAL at 11:10

## 2024-07-18 RX ADMIN — HYDROMORPHONE HYDROCHLORIDE 0.3 MG: 1 INJECTION, SOLUTION INTRAMUSCULAR; INTRAVENOUS; SUBCUTANEOUS at 12:07

## 2024-07-18 RX ADMIN — HYDROMORPHONE HYDROCHLORIDE 0.3 MG: 1 INJECTION, SOLUTION INTRAMUSCULAR; INTRAVENOUS; SUBCUTANEOUS at 09:42

## 2024-07-18 RX ADMIN — NICOTINE 1 PATCH: 14 PATCH, EXTENDED RELEASE TRANSDERMAL at 19:56

## 2024-07-18 RX ADMIN — HYDROMORPHONE HYDROCHLORIDE 0.3 MG: 1 INJECTION, SOLUTION INTRAMUSCULAR; INTRAVENOUS; SUBCUTANEOUS at 19:55

## 2024-07-18 RX ADMIN — OXYCODONE HYDROCHLORIDE 5 MG: 5 TABLET ORAL at 02:54

## 2024-07-18 RX ADMIN — VANCOMYCIN HYDROCHLORIDE 1250 MG: 10 INJECTION, POWDER, LYOPHILIZED, FOR SOLUTION INTRAVENOUS at 09:43

## 2024-07-18 ASSESSMENT — ACTIVITIES OF DAILY LIVING (ADL)
ADLS_ACUITY_SCORE: 20

## 2024-07-18 NOTE — PROGRESS NOTES
[facility] Urology Progress Note          Assessment and Plan:     Assessment:    POD 2 scrotal exploration, incision, drainage, and debridement of left scrotal and testicular abscess, 2 cm; left-sided orchiectomy, simple    Epididymoorchitis    Testicular and scrotal abscess    Necrotic left testicle with rupture tunica albuginea necrotic seminiferous tubules     Torsion of left testicle      Plan:   -Continue with IV antibiotics.  Follow cultures.  Transition to culture specific as available and oral as available and when clinically appropriate.  -Continue with wound dressings per WO. Patient to have first attempt of dressing change with WO, his nurse, and himself today.  Plan for twice daily wound dressings.  Plan on Penrose drain in in a week.  Discussed with Dr. Zhu.  -Pain and nausea management per primary service.  -Can use scrotal support as needed.  -Will continue to follow along, but anticipate discharge in 1-2 days if he continues to improve like today.    Sophie Romero PA-C   [facility]rology  [phone]               Interval History:     Left sided orchiectomy.  Open wound approximately 2 cm with depth of approximately 2 cm, packing.  Bleeding well.  No eschar or crepitus.  Tenderness has decreased.  Sutures in place.  Quite a bit of bloody serosanguineous drainage from Penrose drain.  Patient is feeling better and pain is under better control.  Pathology returned with no evidence of viable tissue.  Patient is afebrile without tachycardia.  Blood cultures have no growth to date.  Patient is on IV Rocephin and IV vancomycin.  Some decreased appetite yesterday.  Creatinine 0.94 EGFR greater than 90.  Aerobic wound culture shows 1+ Staphylococcus hemolyticus in the scrotum tissue.  Anaerobic cultures have no growth to date.              Review of Systems:     The 5 point Review of Systems is negative other than noted in the HPI             Medications:     Current  Facility-Administered Medications   Medication Dose Route Frequency Provider Last Rate Last Admin    acetaminophen (TYLENOL) tablet 650 mg  650 mg Oral Q4H PRN Manav Waggoner MD        Or    acetaminophen (TYLENOL) Suppository 650 mg  650 mg Rectal Q4H PRN Manav Waggoner MD        albuterol (PROVENTIL HFA/VENTOLIN HFA) inhaler  2 puff Inhalation 4x Daily PRN Manav Waggoner MD        calcium carbonate (TUMS) chewable tablet 1,000 mg  1,000 mg Oral 4x Daily PRN Manav Waggoner MD        HYDROmorphone (PF) (DILAUDID) injection 0.3 mg  0.3 mg Intravenous Q2H PRN Manav Waggoner MD   0.3 mg at 07/18/24 1207    HYDROmorphone (PF) (DILAUDID) injection 0.5 mg  0.5 mg Intravenous Q2H PRN Manav Waggoner MD   0.5 mg at 07/17/24 2222    lidocaine (LMX4) cream   Topical Q1H PRN Manav Waggoner MD        lidocaine 1 % 0.1-1 mL  0.1-1 mL Other Q1H PRN Manav Waggoner MD        naloxone (NARCAN) injection 0.2 mg  0.2 mg Intravenous Q2 Min PRN Manav Waggoner MD        Or    naloxone (NARCAN) injection 0.4 mg  0.4 mg Intravenous Q2 Min PRN Manav Waggoner MD        Or    naloxone (NARCAN) injection 0.2 mg  0.2 mg Intramuscular Q2 Min PRN Manav Waggoner MD        Or    naloxone (NARCAN) injection 0.4 mg  0.4 mg Intramuscular Q2 Min PRN Manav Waggoner MD        ondansetron (ZOFRAN ODT) ODT tab 4 mg  4 mg Oral Q6H PRN Manav Waggoner MD        Or    ondansetron (ZOFRAN) injection 4 mg  4 mg Intravenous Q6H PRN Manav Waggoner MD        oxyCODONE (ROXICODONE) tablet 5 mg  5 mg Oral Q4H PRN Manav Waggoner MD   5 mg at 07/18/24 0254    oxyCODONE IR (ROXICODONE) tablet 10 mg  10 mg Oral Q4H PRN Manav Waggoner MD   10 mg at 07/18/24 1110    piperacillin-tazobactam (ZOSYN) 3.375 g vial to attach to  mL bag  3.375 g Intravenous Q6H Manav Waggoner MD   3.375 g at 07/18/24 1207    senna-docusate (SENOKOT-S/PERICOLACE) 8.6-50 MG  "per tablet 1 tablet  1 tablet Oral BID PRManav Lopez MD   1 tablet at 07/16/24 1932    Or    senna-docusate (SENOKOT-S/PERICOLACE) 8.6-50 MG per tablet 2 tablet  2 tablet Oral BID PRN Manav Waggoner MD        senna-docusate (SENOKOT-S/PERICOLACE) 8.6-50 MG per tablet 1 tablet  1 tablet Oral BID Manav Waggoner MD   1 tablet at 07/18/24 0950    Or    senna-docusate (SENOKOT-S/PERICOLACE) 8.6-50 MG per tablet 2 tablet  2 tablet Oral BID Manav Waggoner MD        sodium chloride (PF) 0.9% PF flush 3 mL  3 mL Intracatheter Q8H Manav Waggoner MD   3 mL at 07/18/24 0944    sodium chloride (PF) 0.9% PF flush 3 mL  3 mL Intracatheter q1 min prn Manav Waggoner MD   3 mL at 07/18/24 0440    vancomycin (VANCOCIN) 1,250 mg in 0.9% NaCl 250 mL intermittent infusion  1,250 mg Intravenous Q12H Manav Waggoner .7 mL/hr at 07/18/24 0943 1,250 mg at 07/18/24 0943                  Physical Exam:   Vitals were reviewed  Patient Vitals for the past 8 hrs:   BP Temp Temp src Resp SpO2   07/18/24 1500 121/67 97.7  F (36.5  C) Temporal 16 96 %     GEN: NAD, lying in bed  EYES: EOMI  MOUTH: MMM  NECK: Supple  RESP: Unlabored breathing  SKIN: Warm  NEURO: AAO  URO: Left sided orchiectomy.  Open wound approximately 2 cm with depth of approximately 2 cm, packing.  Bleeding well.  No eschar or crepitus.  Tenderness has decreased.  Sutures in place.  Quite a bit of bloody serosanguineous drainage from Penrose drain.            Data:   No results found for: \"NTBNPI\", \"NTBNP\"  Lab Results   Component Value Date    WBC 11.8 (H) 07/18/2024    WBC 18.4 (H) 07/17/2024    WBC 16.2 (H) 07/16/2024    HGB 12.4 (L) 07/18/2024    HGB 12.2 (L) 07/17/2024    HGB 13.0 (L) 07/16/2024    HCT 38.6 (L) 07/18/2024    HCT 37.8 (L) 07/17/2024    HCT 39.7 (L) 07/16/2024    MCV 92 07/18/2024    MCV 91 07/17/2024    MCV 90 07/16/2024     (H) 07/18/2024     (H) 07/17/2024     (H) 07/16/2024 "     Lab Results   Component Value Date    INR 1.12 01/12/2007      All cultures:  Recent Labs   Lab 07/16/24  1325 07/16/24  1324 07/16/24  1201   CULTURE 1+ Staphylococcus haemolyticus*  No anaerobic organisms isolated after 1 day  See corresponding culture for results No anaerobic organisms isolated after 2 days  No growth after 1 day  See corresponding culture for results No growth after 2 days             Component  Resulting Agency   Case Report   Surgical Pathology Report                         Case: [id number]                                   Authorizing Provider:  Noah Zhu MD       Collected:           07/16/2024 01:25 PM           Ordering Location:     [facility]   Received:            07/16/2024 01:37 PM                                  Main OR                                                                       Pathologist:           Sukhwinder Solomon MD                                                                           Specimens:   A) - Scrotum, scrotum abcess tissue                                                                  B) - Testis, Left, left testis                                                            Final Diagnosis   A.  Scrotum, excision-  Necrotic-appearing testicular parenchyma with acute inflammatory change, clinical abscess     B.  Testis, left, resection-  Degenerated/necrotic soft tissue with marked acute and chronic inflammatory change and hemorrhage, intact testes not identified  No evidence of malignancy

## 2024-07-18 NOTE — PLAN OF CARE
"Goal Outcome Evaluation:      Plan of Care Reviewed With: patient    Overall Patient Progress: no changeOverall Patient Progress: no change    Outcome Evaluation: ABX given, PIV SL, pain controlled with PRN oxy and dilaudid, premedicated for dressing change, done BID, SO at bedside overnight, ind in room, verbalized frustrated and embarassed mood    Refused 11PM vital check    Problem: Adult Inpatient Plan of Care  Goal: Plan of Care Review  Description: The Plan of Care Review/Shift note should be completed every shift.  The Outcome Evaluation is a brief statement about your assessment that the patient is improving, declining, or no change.  This information will be displayed automatically on your shift  note.  Outcome: Progressing  Flowsheets (Taken 7/17/2024 2332)  Outcome Evaluation: ABX given, PIV SL, pain controlled with PRN oxy and dilaudid, premedicated for dressing change, done BID, SO at bedside overnight, ind in room, verbalized frustrated and embarassed mood  Plan of Care Reviewed With: patient  Overall Patient Progress: no change  Goal: Patient-Specific Goal (Individualized)  Description: You can add care plan individualizations to a care plan. Examples of Individualization might be:  \"Parent requests to be called daily at 9am for status\", \"I have a hard time hearing out of my right ear\", or \"Do not touch me to wake me up as it startles  me\".  Outcome: Progressing  Goal: Absence of Hospital-Acquired Illness or Injury  Outcome: Progressing  Intervention: Identify and Manage Fall Risk  Recent Flowsheet Documentation  Taken 7/17/2024 2222 by Steffany Szymanski, RN  Safety Promotion/Fall Prevention:   assistive device/personal items within reach   clutter free environment maintained   nonskid shoes/slippers when out of bed   room organization consistent   safety round/check completed  Intervention: Prevent Skin Injury  Recent Flowsheet Documentation  Taken 7/17/2024 2222 by Steffany Szymanski, RN  Body Position: " position changed independently  Intervention: Prevent and Manage VTE (Venous Thromboembolism) Risk  Recent Flowsheet Documentation  Taken 7/17/2024 2222 by Steffany Szymanski, RN  VTE Prevention/Management: patient refused intervention  Intervention: Prevent Infection  Recent Flowsheet Documentation  Taken 7/17/2024 2222 by Steffany Szymanski, RN  Infection Prevention:   hand hygiene promoted   rest/sleep promoted   single patient room provided  Goal: Optimal Comfort and Wellbeing  Outcome: Progressing  Intervention: Monitor Pain and Promote Comfort  Recent Flowsheet Documentation  Taken 7/17/2024 2222 by Steffany Szymanski, RN  Pain Management Interventions:   medication (see MAR)   care clustered   repositioned   quiet environment facilitated   rest  Goal: Readiness for Transition of Care  Outcome: Progressing     Problem: Pain Acute  Goal: Optimal Pain Control and Function  Outcome: Progressing  Intervention: Develop Pain Management Plan  Recent Flowsheet Documentation  Taken 7/17/2024 2222 by Steffany Szymanski, RN  Pain Management Interventions:   medication (see MAR)   care clustered   repositioned   quiet environment facilitated   rest

## 2024-07-18 NOTE — PROGRESS NOTES
[facility]    Medicine Progress Note - Hospitalist Service  Date of Admission: 7/16/2024     Assessment & Plan         Ricky Rowland is a 34 year old male with past medical history significant for recent admission 6/28/2024-7/6/2024 for epididymo-orchitis, polysubstance use disorder, and ADHD who presented to [facility] on 7/16/2024 with increasing testicular pain and was found to have absent testicular perfusion on ultrasound and was emergently taken to OR for concern of testicular torsion. During procedure, he was found to have testicular and scrotal abscesses with rupture of tunica albuginea, necrotic seminiferous tubules and left testicle requiring left orchiectomy.    Left Testicular & Scrotal Abscesses w/ Epididymo-orchitis  Necrosis of Left Testicle and Seminiferous Tubules  Left Orchiectomy (7/16/2024)  Recently admitted 6/28/2024-7/6/2024 with left-sided epididymo-orchitis for which he received IV antibiotics. Repeat ultrasound showed persistent edema of the left testicle and compromised blood flow to the testicle. Surgery was recommended at the time, but patient declined. Was discharged with 4 additional weeks of levaquin, but returning 7/16/2024 with worsening testicular pain. He was afebrile and did not appear to be in sepsis, but imaging showed absent perfusion to left testicle concerning for torsion. Urology was consulted for emergency department and patient emergently taken to OR. Unfortunately, was found to have abscess and necrosis of left testicle requiring orchiectomy (see OP note from 7/16/2024). Penrose drain in place, which sounds like will likely be there for a few days. Urology following. Will continue broad spectrum antibiotics with vancomycin + zosyn pending culture results; gram stain showing 1+ GPC, but will await final cultures before narrowing.  Patient initially refusing cares this morning, but after long discussion about the importance of  "monitoring his white blood cell count to ensure that the infection is being treated he stated he would allow labs to be obtained.  Subsequently stated that he did not want anyone with a \"hijab\" treating him.  I explained to the patient that we do not talk like this in our hospital, and did not determine who will be treating him based on the race.  I informed the patient that he would receive the best of care regardless of who was providing it for him and he must be respectful to all staff.    Polysubstance Use Disorder: Hx methamphetamine & cannabis use  ADHD: Not on stimulant medication.       Clinically Significant Risk Factors                                        Diet: Orders Placed This Encounter      Regular Diet Adult  DVT Prophylaxis: Pneumatic Compression Devices  Kim: None  Code Status: Full Code    Medically Ready for Discharge: Anticipated in 5+ Days       The patient's care was discussed with the Bedside Nurse, Patient, and Patient's Family.  I personally spent 45 minutes on chart review, documentation, coordination, and bedside management of patient.    Agustin Truong MD, S  Hospitalist Service  [facility]  ______________________________________________________________________    Interval History   Nursing notes reviewed. Patient has significant questions about utility of testing, why broad spectrum antibiotics cannot simply be used for entire treatment, and why he must remain in hospital to await culture finalization.    A full 10+ point review of systems was performed and found to be negative with the exception of those items noted here.    Physical Exam   Temp: 97.7  F (36.5  C) Temp src: Temporal BP: 121/67     Resp: 16 SpO2: 96 % O2 Device: None (Room air) Oxygen Delivery: 6 LPM Height: 180.3 cm (5' 11\") Weight: 76.5 kg (168 lb 10.4 oz)  Estimated body mass index is 23.52 kg/m  as calculated from the following:    Height as of this encounter: 1.803 m (5' 11\").    Weight as " of this encounter: 76.5 kg (168 lb 10.4 oz).    General: Very pleasant male resting comfortably in hospital bed.  Awake, alert, interactive. Visitors at bedside.  HEENT: Normocephalic, atraumatic.  PERRL, EOMI.  Conjunctiva clear, sclerae anicteric.  Mucous membranes moist.  Cardiac: Regular rate and rhythm without murmur, gallop, or rub.  No peripheral edema.  Respiratory: Normal work of breathing.    Musculoskeletal: Moving all extremities appropriately.  Skin: No rashes or abrasions on exposed skin.  Neurologic: Alert and oriented x4.  Cranial nerves II through XII grossly intact.  Psychologic: Appropriate mood and affect.    Data   All laboratory results and other diagnostic data from the past 24 hours is available in Epic and has been personally reviewed.    Recent Labs   Lab 07/18/24  1119 07/18/24  0542 07/17/24  0554 07/16/24  1201   WBC 11.8*  --  18.4* 16.2*   HGB 12.4*  --  12.2* 13.0*   MCV 92  --  91 90   *  --  643* 667*   NA  --  141 137 143   POTASSIUM  --  3.8 3.9 4.1   CHLORIDE  --  106 105 105   CO2  --  23 23 25   BUN  --  10.9 9.9 12.9   CR  --  0.94  0.94 0.81 0.95   ANIONGAP  --  12 9 13   CHARLENE  --  7.9* 8.1* 8.8   GLC  --  94 125* 87       Imaging results reviewed over the past 24 hrs:   No results found for this or any previous visit (from the past 24 hour(s)).  I personally reviewed: no images or EKG's today.

## 2024-07-18 NOTE — PROVIDER NOTIFICATION
Pt is refusing labs today. Pt was educated regarding the need for lab work to support his care. Dr Truong updated on the unit.

## 2024-07-18 NOTE — PLAN OF CARE
"Goal Outcome Evaluation:      Plan of Care Reviewed With: patient, significant other    Overall Patient Progress: improvingOverall Patient Progress: improving    Outcome Evaluation: Possible discharge home tomorrow.    A&Ox4, refused vitals and lab draw this morning. Cultures positive for staph haemolyticus. Continues on IV Zosyn and Vanco. Pt showered prior to dressing change. Premedicated with oxy 10mg and IV dilaudid prior to dressing change. Dressing change instructions in WOC note. Pinrose drain in place with moderate output. Voiding, no BM. Taj regular diet. Significant other at bedside. Urology and WOC following.      Problem: Adult Inpatient Plan of Care  Goal: Plan of Care Review  Description: The Plan of Care Review/Shift note should be completed every shift.  The Outcome Evaluation is a brief statement about your assessment that the patient is improving, declining, or no change.  This information will be displayed automatically on your shift  note.  Outcome: Progressing  Flowsheets (Taken 7/18/2024 1347)  Outcome Evaluation: Possible discharge home tomorrow.  Plan of Care Reviewed With:   patient   significant other  Overall Patient Progress: improving  Goal: Patient-Specific Goal (Individualized)  Description: You can add care plan individualizations to a care plan. Examples of Individualization might be:  \"Parent requests to be called daily at 9am for status\", \"I have a hard time hearing out of my right ear\", or \"Do not touch me to wake me up as it startles  me\".  Outcome: Progressing  Goal: Absence of Hospital-Acquired Illness or Injury  Outcome: Progressing  Intervention: Identify and Manage Fall Risk  Recent Flowsheet Documentation  Taken 7/18/2024 1100 by Catarina Duffy, RN  Safety Promotion/Fall Prevention:   assistive device/personal items within reach   clutter free environment maintained   nonskid shoes/slippers when out of bed   room organization consistent   safety round/check " completed  Intervention: Prevent Skin Injury  Recent Flowsheet Documentation  Taken 7/18/2024 1142 by Catarina Duffy RN  Body Position: position changed independently  Taken 7/18/2024 1100 by Catarina Duffy RN  Body Position: position changed independently  Taken 7/18/2024 0942 by Catarina Duffy RN  Body Position: position changed independently  Intervention: Prevent and Manage VTE (Venous Thromboembolism) Risk  Recent Flowsheet Documentation  Taken 7/18/2024 1100 by Catarina Duffy RN  VTE Prevention/Management:   SCDs off (sequential compression devices)   patient refused intervention  Intervention: Prevent Infection  Recent Flowsheet Documentation  Taken 7/18/2024 1100 by Catarina Duffy RN  Infection Prevention:   hand hygiene promoted   rest/sleep promoted   single patient room provided  Goal: Optimal Comfort and Wellbeing  Outcome: Progressing  Intervention: Monitor Pain and Promote Comfort  Recent Flowsheet Documentation  Taken 7/18/2024 0942 by Catarina Duffy RN  Pain Management Interventions:   medication (see MAR)   rest  Goal: Readiness for Transition of Care  Outcome: Progressing     Problem: Pain Acute  Goal: Optimal Pain Control and Function  Outcome: Progressing  Intervention: Develop Pain Management Plan  Recent Flowsheet Documentation  Taken 7/18/2024 0942 by Catarina Duffy RN  Pain Management Interventions:   medication (see MAR)   rest  Intervention: Prevent or Manage Pain  Recent Flowsheet Documentation  Taken 7/18/2024 1100 by Catarina Duffy RN  Sensory Stimulation Regulation: care clustered  Bowel Elimination Promotion:   adequate fluid intake promoted   ambulation promoted   privacy promoted  Intervention: Optimize Psychosocial Wellbeing  Recent Flowsheet Documentation  Taken 7/18/2024 1100 by Catarina Duffy RN  Supportive Measures:   active listening utilized   positive reinforcement provided   problem-solving facilitated   self-care encouraged   self-responsibility promoted

## 2024-07-19 LAB
CREAT SERPL-MCNC: 0.76 MG/DL (ref 0.67–1.17)
EGFRCR SERPLBLD CKD-EPI 2021: >90 ML/MIN/1.73M2
LACTATE SERPL-SCNC: 1.1 MMOL/L (ref 0.7–2)

## 2024-07-19 PROCEDURE — 250N000013 HC RX MED GY IP 250 OP 250 PS 637: Performed by: STUDENT IN AN ORGANIZED HEALTH CARE EDUCATION/TRAINING PROGRAM

## 2024-07-19 PROCEDURE — 250N000013 HC RX MED GY IP 250 OP 250 PS 637: Performed by: INTERNAL MEDICINE

## 2024-07-19 PROCEDURE — 120N000001 HC R&B MED SURG/OB

## 2024-07-19 PROCEDURE — 258N000003 HC RX IP 258 OP 636: Performed by: INTERNAL MEDICINE

## 2024-07-19 PROCEDURE — 36415 COLL VENOUS BLD VENIPUNCTURE: CPT | Performed by: INTERNAL MEDICINE

## 2024-07-19 PROCEDURE — 82565 ASSAY OF CREATININE: CPT | Performed by: INTERNAL MEDICINE

## 2024-07-19 PROCEDURE — 99232 SBSQ HOSP IP/OBS MODERATE 35: CPT | Performed by: STUDENT IN AN ORGANIZED HEALTH CARE EDUCATION/TRAINING PROGRAM

## 2024-07-19 PROCEDURE — 250N000011 HC RX IP 250 OP 636: Performed by: INTERNAL MEDICINE

## 2024-07-19 PROCEDURE — 83605 ASSAY OF LACTIC ACID: CPT | Performed by: INTERNAL MEDICINE

## 2024-07-19 RX ADMIN — SENNOSIDES AND DOCUSATE SODIUM 2 TABLET: 50; 8.6 TABLET ORAL at 19:02

## 2024-07-19 RX ADMIN — OXYCODONE HYDROCHLORIDE 10 MG: 10 TABLET ORAL at 01:40

## 2024-07-19 RX ADMIN — OXYCODONE HYDROCHLORIDE 10 MG: 10 TABLET ORAL at 13:27

## 2024-07-19 RX ADMIN — NICOTINE 1 PATCH: 14 PATCH, EXTENDED RELEASE TRANSDERMAL at 09:17

## 2024-07-19 RX ADMIN — PIPERACILLIN AND TAZOBACTAM 3.38 G: 3; .375 INJECTION, POWDER, FOR SOLUTION INTRAVENOUS at 16:39

## 2024-07-19 RX ADMIN — OXYCODONE HYDROCHLORIDE 10 MG: 10 TABLET ORAL at 17:30

## 2024-07-19 RX ADMIN — HYDROMORPHONE HYDROCHLORIDE 0.5 MG: 1 INJECTION, SOLUTION INTRAMUSCULAR; INTRAVENOUS; SUBCUTANEOUS at 09:14

## 2024-07-19 RX ADMIN — HYDROMORPHONE HYDROCHLORIDE 0.5 MG: 1 INJECTION, SOLUTION INTRAMUSCULAR; INTRAVENOUS; SUBCUTANEOUS at 20:36

## 2024-07-19 RX ADMIN — VANCOMYCIN HYDROCHLORIDE 1500 MG: 10 INJECTION, POWDER, LYOPHILIZED, FOR SOLUTION INTRAVENOUS at 14:11

## 2024-07-19 RX ADMIN — PIPERACILLIN AND TAZOBACTAM 3.38 G: 3; .375 INJECTION, POWDER, FOR SOLUTION INTRAVENOUS at 09:15

## 2024-07-19 RX ADMIN — PIPERACILLIN AND TAZOBACTAM 3.38 G: 3; .375 INJECTION, POWDER, FOR SOLUTION INTRAVENOUS at 03:24

## 2024-07-19 RX ADMIN — VANCOMYCIN HYDROCHLORIDE 1500 MG: 10 INJECTION, POWDER, LYOPHILIZED, FOR SOLUTION INTRAVENOUS at 00:51

## 2024-07-19 RX ADMIN — PIPERACILLIN AND TAZOBACTAM 3.38 G: 3; .375 INJECTION, POWDER, FOR SOLUTION INTRAVENOUS at 21:49

## 2024-07-19 ASSESSMENT — ACTIVITIES OF DAILY LIVING (ADL)
ADLS_ACUITY_SCORE: 20

## 2024-07-19 NOTE — PROGRESS NOTES
[facility]    Medicine Progress Note - Hospitalist Service  Date of Admission: 7/16/2024     Assessment & Plan         Ricky Rowland is a 34 year old male with past medical history significant for recent admission 6/28/2024-7/6/2024 for epididymo-orchitis, polysubstance use disorder, and ADHD who presented to [facility] on 7/16/2024 with increasing testicular pain and was found to have absent testicular perfusion on ultrasound and was emergently taken to OR for concern of testicular torsion. During procedure, he was found to have testicular and scrotal abscesses with rupture of tunica albuginea, necrotic seminiferous tubules and left testicle requiring left orchiectomy.    Left Testicular & Scrotal Abscesses w/ Epididymo-orchitis  Necrosis of Left Testicle and Seminiferous Tubules  Left Orchiectomy (7/16/2024)  Recently admitted 6/28/2024-7/6/2024 with left-sided epididymo-orchitis for which he received IV antibiotics. Repeat ultrasound showed persistent edema of the left testicle and compromised blood flow to the testicle. Surgery was recommended at the time, but patient declined. Was discharged with 4 additional weeks of levaquin, but returning 7/16/2024 with worsening testicular pain. He was afebrile and did not appear to be in sepsis, but imaging showed absent perfusion to left testicle concerning for torsion. Urology was consulted for emergency department and patient emergently taken to OR. Unfortunately, was found to have abscess and necrosis of left testicle requiring orchiectomy (see OP note from 7/16/2024). Penrose drain in place, which sounds like will likely be there for a few days. Urology following. Will continue broad spectrum antibiotics with vancomycin + zosyn pending culture results; gram stain showing 1+ GPC; cultures returning only growing staph hemolyticus but unclear if that is causing this very severe infection. Will continue to cover broadly and ask ID to  "assist with coverage plan.  Patient initially refusing cares this morning, but after long discussion about the importance of monitoring his white blood cell count to ensure that the infection is being treated he stated he would allow labs to be obtained.  Subsequently stated that he did not want anyone with a \"hijab\" treating him.  I explained to the patient that we do not talk like this in our hospital, and did not determine who will be treating him based on the race.  I informed the patient that he would receive the best of care regardless of who was providing it for him and he must be respectful to all staff.  -Continue current antibiotic regimen    Polysubstance Use Disorder: Hx methamphetamine & cannabis use  ADHD: Not on stimulant medication.       Clinically Significant Risk Factors                                        Diet: Orders Placed This Encounter      Regular Diet Adult  DVT Prophylaxis: Pneumatic Compression Devices  Kim: None  Code Status: Full Code    Medically Ready for Discharge: Anticipated in 5+ Days       The patient's care was discussed with the Bedside Nurse, Patient, and Patient's Family.  I personally spent 45 minutes on chart review, documentation, coordination, and bedside management of patient.    Agustin Truong MD, S  Hospitalist Service  [facility]  ______________________________________________________________________    Interval History   Nursing notes reviewed. Patient stating penrose drain occasionally hurts when he changes positions.    A full 10+ point review of systems was performed and found to be negative with the exception of those items noted here.    Physical Exam   Temp: 96.9  F (36.1  C) Temp src: Temporal BP: 110/60 Pulse: 85   Resp: 18 SpO2: 96 % O2 Device: None (Room air) Oxygen Delivery: 6 LPM Height: 180.3 cm (5' 11\") Weight: 76.5 kg (168 lb 10.4 oz)  Estimated body mass index is 23.52 kg/m  as calculated from the following:    Height as of " "this encounter: 1.803 m (5' 11\").    Weight as of this encounter: 76.5 kg (168 lb 10.4 oz).    General: Very pleasant male resting comfortably in bedside chair.  Awake, alert, interactive.  HEENT: Normocephalic, atraumatic.  PERRL, EOMI.  Conjunctiva clear, sclerae anicteric.  Mucous membranes moist.  Cardiac: Regular rate and rhythm without murmur, gallop, or rub.  No peripheral edema.  Respiratory: Normal work of breathing.    Musculoskeletal: Moving all extremities appropriately.  Skin: No rashes or abrasions on exposed skin.  Neurologic: Alert and oriented x4.  Cranial nerves II through XII grossly intact.  Psychologic: Appropriate mood and affect.    Data   All laboratory results and other diagnostic data from the past 24 hours is available in Epic and has been personally reviewed.    Recent Labs   Lab 07/19/24  0807 07/18/24  1119 07/18/24  0542 07/17/24  0554 07/16/24  1201   WBC  --  11.8*  --  18.4* 16.2*   HGB  --  12.4*  --  12.2* 13.0*   MCV  --  92  --  91 90   PLT  --  620*  --  643* 667*   NA  --   --  141 137 143   POTASSIUM  --   --  3.8 3.9 4.1   CHLORIDE  --   --  106 105 105   CO2  --   --  23 23 25   BUN  --   --  10.9 9.9 12.9   CR 0.76  --  0.94  0.94 0.81 0.95   ANIONGAP  --   --  12 9 13   CHARLENE  --   --  7.9* 8.1* 8.8   GLC  --   --  94 125* 87       Imaging results reviewed over the past 24 hrs:   No results found for this or any previous visit (from the past 24 hour(s)).  I personally reviewed: no images or EKG's today.    "

## 2024-07-19 NOTE — PLAN OF CARE
"Goal Outcome Evaluation:      Plan of Care Reviewed With: patient, significant other    Overall Patient Progress: improvingOverall Patient Progress: improving    Outcome Evaluation: Continues with IV abx, pain managment and wound care. Plans TBD.    A&Ox4. Continues on IV Zosyn and Vanco. Pt showered prior to dressing change this morning. Premedicated with 0.5mg oxy prior to dressing change. Penrose drain in place with small amount of output. Voiding, no BM. Taj regular diet. Significant other at bedside this morning. Pain improved today. Urology and WOC following.       Problem: Adult Inpatient Plan of Care  Goal: Plan of Care Review  Description: The Plan of Care Review/Shift note should be completed every shift.  The Outcome Evaluation is a brief statement about your assessment that the patient is improving, declining, or no change.  This information will be displayed automatically on your shift  note.  Outcome: Progressing  Flowsheets (Taken 7/19/2024 1725)  Outcome Evaluation: Continues with IV abx, pain managment and wound care. Plans TBD.  Plan of Care Reviewed With:   patient   significant other  Overall Patient Progress: improving  Goal: Patient-Specific Goal (Individualized)  Description: You can add care plan individualizations to a care plan. Examples of Individualization might be:  \"Parent requests to be called daily at 9am for status\", \"I have a hard time hearing out of my right ear\", or \"Do not touch me to wake me up as it startles  me\".  Outcome: Progressing  Goal: Absence of Hospital-Acquired Illness or Injury  Outcome: Progressing  Intervention: Identify and Manage Fall Risk  Recent Flowsheet Documentation  Taken 7/19/2024 0937 by Catarina Duffy RN  Safety Promotion/Fall Prevention:   lighting adjusted   increased rounding and observation   patient and family education   safety round/check completed  Intervention: Prevent Skin Injury  Recent Flowsheet Documentation  Taken 7/19/2024 0937 by " Catarina Duffy RN  Body Position: supine, head elevated  Taken 7/19/2024 0914 by Catarina Duffy RN  Body Position: position changed independently  Intervention: Prevent and Manage VTE (Venous Thromboembolism) Risk  Recent Flowsheet Documentation  Taken 7/19/2024 0937 by Catarina Duffy RN  VTE Prevention/Management:   SCDs off (sequential compression devices)   patient refused intervention  Intervention: Prevent Infection  Recent Flowsheet Documentation  Taken 7/19/2024 0937 by Catarina Duffy RN  Infection Prevention:   hand hygiene promoted   single patient room provided   rest/sleep promoted  Goal: Optimal Comfort and Wellbeing  Outcome: Progressing  Intervention: Monitor Pain and Promote Comfort  Recent Flowsheet Documentation  Taken 7/19/2024 0914 by Catarina Duffy RN  Pain Management Interventions:   medication (see MAR)   premedicated for activity   relaxation techniques promoted  Goal: Readiness for Transition of Care  Outcome: Progressing     Problem: Pain Acute  Goal: Optimal Pain Control and Function  Outcome: Progressing  Intervention: Develop Pain Management Plan  Recent Flowsheet Documentation  Taken 7/19/2024 0914 by Catarina Duffy RN  Pain Management Interventions:   medication (see MAR)   premedicated for activity   relaxation techniques promoted  Intervention: Prevent or Manage Pain  Recent Flowsheet Documentation  Taken 7/19/2024 0937 by Catarina Duffy RN  Sensory Stimulation Regulation:   care clustered   quiet environment promoted  Bowel Elimination Promotion:   adequate fluid intake promoted   ambulation promoted   privacy promoted  Medication Review/Management: medications reviewed  Intervention: Optimize Psychosocial Wellbeing  Recent Flowsheet Documentation  Taken 7/19/2024 0937 by Catarina Duffy RN  Supportive Measures:   active listening utilized   problem-solving facilitated   self-reflection promoted   self-responsibility promoted

## 2024-07-19 NOTE — PLAN OF CARE
"Goal Outcome Evaluation:      Plan of Care Reviewed With: patient    Overall Patient Progress: no changeOverall Patient Progress: no change    Outcome Evaluation: Independent in room, regular diet, compliant with VS, IV Zosyn given, pt ripped IV out before administering IV Vancomycin flyer contacted for placement, electronic cigarrete confiscated from room, pain managed with oxycodone & dilaudid, dressing change performed per POC,      Problem: Adult Inpatient Plan of Care  Goal: Plan of Care Review  Description: The Plan of Care Review/Shift note should be completed every shift.  The Outcome Evaluation is a brief statement about your assessment that the patient is improving, declining, or no change.  This information will be displayed automatically on your shift  note.  Outcome: Progressing  Flowsheets (Taken 7/18/2024 2218)  Outcome Evaluation: Independent in room, regular diet, compliant with VS, IV Zosyn given, pt ripped IV out before administering IV Vancomycin flyer contacted for placement, electronic cigarrete confiscated from room, pain managed with oxycodone & dilaudid, dressing change performed per POC,  Plan of Care Reviewed With: patient  Overall Patient Progress: no change  Goal: Patient-Specific Goal (Individualized)  Description: You can add care plan individualizations to a care plan. Examples of Individualization might be:  \"Parent requests to be called daily at 9am for status\", \"I have a hard time hearing out of my right ear\", or \"Do not touch me to wake me up as it startles  me\".  Outcome: Progressing  Goal: Absence of Hospital-Acquired Illness or Injury  Outcome: Progressing  Intervention: Identify and Manage Fall Risk  Recent Flowsheet Documentation  Taken 7/18/2024 1737 by Emilee Devine, RN  Safety Promotion/Fall Prevention: safety round/check completed  Intervention: Prevent Skin Injury  Recent Flowsheet Documentation  Taken 7/18/2024 1737 by Emilee Devine, RN  Body Position: " position changed independently  Skin Protection: adhesive use limited  Device Skin Pressure Protection: absorbent pad utilized/changed  Intervention: Prevent and Manage VTE (Venous Thromboembolism) Risk  Recent Flowsheet Documentation  Taken 7/18/2024 1737 by Emilee Devine RN  VTE Prevention/Management: SCDs off (sequential compression devices)  Intervention: Prevent Infection  Recent Flowsheet Documentation  Taken 7/18/2024 1737 by Emilee Devine RN  Infection Prevention: hand hygiene promoted  Goal: Optimal Comfort and Wellbeing  Outcome: Progressing  Intervention: Monitor Pain and Promote Comfort  Recent Flowsheet Documentation  Taken 7/18/2024 1737 by Emilee Devine RN  Pain Management Interventions: medication (see MAR)  Goal: Readiness for Transition of Care  Outcome: Progressing     Problem: Pain Acute  Goal: Optimal Pain Control and Function  Outcome: Progressing  Intervention: Develop Pain Management Plan  Recent Flowsheet Documentation  Taken 7/18/2024 1737 by Emilee Devine RN  Pain Management Interventions: medication (see MAR)  Intervention: Prevent or Manage Pain  Recent Flowsheet Documentation  Taken 7/18/2024 1737 by Emilee Devine RN  Sensory Stimulation Regulation:   care clustered   lighting decreased  Sleep/Rest Enhancement: awakenings minimized  Bowel Elimination Promotion:   adequate fluid intake promoted   ambulation promoted  Medication Review/Management: medications reviewed  Intervention: Optimize Psychosocial Wellbeing  Recent Flowsheet Documentation  Taken 7/18/2024 1737 by Emilee Devine RN  Supportive Measures: active listening utilized

## 2024-07-19 NOTE — PHARMACY-VANCOMYCIN DOSING SERVICE
"Pharmacy Vancomycin Note  Date of Service 2024  Patient's  1990   34 year old, male    Indication: Skin and Soft Tissue Infection  Day of Therapy: 3  Current vancomycin regimen:  1250 mg IV q12h  Current vancomycin monitoring method: AUC  Current vancomycin therapeutic monitoring goal: 400-600 mg*h/L      Current estimated CrCl = Estimated Creatinine Clearance: 120.9 mL/min (based on SCr of 0.94 mg/dL).    Creatinine for last 3 days  2024: 12:01 PM Creatinine 0.95 mg/dL  2024:  5:54 AM Creatinine 0.81 mg/dL  2024:  5:42 AM Creatinine 0.94 mg/dL;  5:42 AM Creatinine 0.94 mg/dL    Recent Vancomycin Levels (past 3 days)  2024:  6:43 PM Vancomycin 9.6 ug/mL    Vancomycin IV Administrations (past 72 hours)                     vancomycin (VANCOCIN) 1,250 mg in 0.9% NaCl 250 mL intermittent infusion (mg) 1,250 mg New Bag 24 0943     1,250 mg New Bag 24     1,250 mg New Bag  0840     1,250 mg New Bag 24                    Nephrotoxins and other renal medications (From now, onward)      Start     Dose/Rate Route Frequency Ordered Stop    24  vancomycin (VANCOCIN) 1,500 mg in 0.9% NaCl 250 mL intermittent infusion         1,500 mg  166.7 mL/hr over 90 Minutes Intravenous EVERY 12 HOURS 24 1700  piperacillin-tazobactam (ZOSYN) 3.375 g vial to attach to  mL bag        Note to Pharmacy: For [facility], [facility] and [facility]: For Zosyn-naive patients, use the \"Zosyn initial dose + extended infusion\" order panel.    3.375 g  over 30 Minutes Intravenous EVERY 6 HOURS 24 1648                 Contrast Orders - past 72 hours (72h ago, onward)      None            Interpretation of levels and current regimen:  Vancomycin level is reflective of AUC less than 400    Has serum creatinine changed greater than 50% in last 72 hours: No    Urine output:  unable to determine    Renal Function: Stable    [employer] Prediction of Planned New Vancomycin " Regimen  Regimen: 1500 mg IV every 12 hours.  Start time: 21:43 on 07/18/2024  Exposure target: AUC24 (range)400-600 mg/L.hr   AUC24,ss: 470 mg/L.hr  Probability of AUC24 > 400: 85 %  Ctrough,ss: 11 mg/L  Probability of Ctrough,ss > 20: 0 %  Probability of nephrotoxicity (Lodise MITCH 2009): 7 %  ere (delete text if not performing AUC-guided monitoring or planning to continue current dose).}    Plan:  Increase Dose to 1500 mg q12h  Vancomycin monitoring method: AUC  Vancomycin therapeutic monitoring goal: 400-600 mg*h/L  Pharmacy will check vancomycin levels as appropriate in 1-3 Days.  Serum creatinine levels will be ordered a minimum of twice weekly.    Kermit Severson, RPH

## 2024-07-19 NOTE — PROGRESS NOTES
[facility] Post-Op / Progress Note         Assessment and Plan:    Assessment:   Post-operative day #3  Incision, drainage and debridement of left scrotal and testicular abscess, with left orchiectomy     Doing well. Pain under good control. Scrotum healing well. Lactate normalized. Leukocytosis finally coming down. Persistent induration of the scrotal wall. Penrose drain remains in place. Wound packing is now superficial and not deep within the left hemiscrotum      Plan:   Continue changing wound packing twice a day until well healed.  Recommend 2 week course total of antibiotics given ongoing scrotal wall induration and risk for poor wound healing  Will leave Penrose drain in place at least one week prior to removal    Noah Zhu MD   [facility]  [phone] clinic phone             Interval History:   Doing well.  Continues to improve.  Pain is well-controlled.           Significant Problems:      Patient Active Problem List   Diagnosis    Attention deficit hyperactivity disorder (ADHD)    Epididymoorchitis    Testicular torsion    Torsion of left testicle             Review of Systems:    The Review of Systems is negative other than noted in the HPI          Medications:   All medications related to the patient's surgery have been reviewed  Current Facility-Administered Medications   Medication Dose Route Frequency Provider Last Rate Last Admin    acetaminophen (TYLENOL) tablet 650 mg  650 mg Oral Q4H PRN Manav Waggoner MD        Or    acetaminophen (TYLENOL) Suppository 650 mg  650 mg Rectal Q4H PRN Manav Waggoner MD        albuterol (PROVENTIL HFA/VENTOLIN HFA) inhaler  2 puff Inhalation 4x Daily PRN Manav Waggoner MD        calcium carbonate (TUMS) chewable tablet 1,000 mg  1,000 mg Oral 4x Daily PRN Manav Waggoner MD        HYDROmorphone (PF) (DILAUDID) injection 0.3 mg  0.3 mg Intravenous Q2H PRN Manav Waggoner MD   0.3 mg at 07/18/24 1955     HYDROmorphone (PF) (DILAUDID) injection 0.5 mg  0.5 mg Intravenous Q2H PRN Manav Waggoner MD   0.5 mg at 07/19/24 0914    lidocaine (LMX4) cream   Topical Q1H PRN Manav Waggoner MD        lidocaine 1 % 0.1-1 mL  0.1-1 mL Other Q1H PRN Manav Waggoner MD        naloxone (NARCAN) injection 0.2 mg  0.2 mg Intravenous Q2 Min PRN Manav Waggoner MD        Or    naloxone (NARCAN) injection 0.4 mg  0.4 mg Intravenous Q2 Min PRN Manav Waggoner MD        Or    naloxone (NARCAN) injection 0.2 mg  0.2 mg Intramuscular Q2 Min PRN Manav Waggoner MD        Or    naloxone (NARCAN) injection 0.4 mg  0.4 mg Intramuscular Q2 Min PRN Manav Waggoner MD        nicotine (NICODERM CQ) 14 MG/24HR 24 hr patch 1 patch  1 patch Transdermal Daily Agustin Truong MD   1 patch at 07/19/24 0917    ondansetron (ZOFRAN ODT) ODT tab 4 mg  4 mg Oral Q6H PRN Manav Waggoner MD        Or    ondansetron (ZOFRAN) injection 4 mg  4 mg Intravenous Q6H PRN Manav Waggoner MD        oxyCODONE (ROXICODONE) tablet 5 mg  5 mg Oral Q4H PRN Manav Waggoner MD   5 mg at 07/18/24 0254    oxyCODONE IR (ROXICODONE) tablet 10 mg  10 mg Oral Q4H PRN Manav Waggoner MD   10 mg at 07/19/24 0140    piperacillin-tazobactam (ZOSYN) 3.375 g vial to attach to  mL bag  3.375 g Intravenous Q6H Manav Waggoner MD   3.375 g at 07/19/24 0915    senna-docusate (SENOKOT-S/PERICOLACE) 8.6-50 MG per tablet 1 tablet  1 tablet Oral BID PRN Manav Waggoner MD   1 tablet at 07/16/24 1932    Or    senna-docusate (SENOKOT-S/PERICOLACE) 8.6-50 MG per tablet 2 tablet  2 tablet Oral BID PRN Manav Waggoner MD        senna-docusate (SENOKOT-S/PERICOLACE) 8.6-50 MG per tablet 1 tablet  1 tablet Oral BID Manav Waggoner MD   1 tablet at 07/18/24 0950    Or    senna-docusate (SENOKOT-S/PERICOLACE) 8.6-50 MG per tablet 2 tablet  2 tablet Oral BID Manav Waggoner MD   2 tablet at 07/18/24 1955     sodium chloride (PF) 0.9% PF flush 3 mL  3 mL Intracatheter Q8H Manav Waggoner MD   3 mL at 07/19/24 0917    sodium chloride (PF) 0.9% PF flush 3 mL  3 mL Intracatheter q1 min prn Manav Waggoner MD   3 mL at 07/18/24 0440    vancomycin (VANCOCIN) 1,500 mg in 0.9% NaCl 250 mL intermittent infusion  1,500 mg Intravenous Q12H Manav Waggoner .7 mL/hr at 07/19/24 0051 1,500 mg at 07/19/24 0051             Physical Exam:   All vitals stable  Temp: 96.9  F (36.1  C) Temp src: Temporal BP: 110/60 Pulse: 85   Resp: 18 SpO2: 96 % O2 Device: None (Room air)    Left hemiscrotum with persistent induration of the scrotal wall but no fluid collection.  Anterior scrotal incision and drainage packed  Penrose in place  Left hemiscrotal incision healing well, chromic sutures remain in place          Data:   All laboratory data related to this surgery reviewed  Results for orders placed or performed during the hospital encounter of 07/16/24 (from the past 24 hour(s))   Lactic acid whole blood   Result Value Ref Range    Lactic Acid 2.2 (H) 0.7 - 2.0 mmol/L   Vancomycin level   Result Value Ref Range    Vancomycin 9.6   ug/mL   Creatinine   Result Value Ref Range    Creatinine 0.76 0.67 - 1.17 mg/dL    GFR Estimate >90 >60 mL/min/1.73m2   Lactic acid whole blood   Result Value Ref Range    Lactic Acid 1.1 0.7 - 2.0 mmol/L     No imaging studies have been ordered    Noah Zhu MD

## 2024-07-19 NOTE — PLAN OF CARE
Goal Outcome Evaluation:      Plan of Care Reviewed With: patient    Overall Patient Progress: improvingOverall Patient Progress: improving    Outcome Evaluation: Pt compliant with IV abx. Stated that the reason he pulled out his previous iv was because there was not a green cap on it and he was worried about infection. Prn oxycodone given. WAGNER TBYONATHAN      Problem: Adult Inpatient Plan of Care  Goal: Plan of Care Review  Description: The Plan of Care Review/Shift note should be completed every shift.  The Outcome Evaluation is a brief statement about your assessment that the patient is improving, declining, or no change.  This information will be displayed automatically on your shift  note.  Outcome: Progressing  Flowsheets (Taken 7/19/2024 0524)  Outcome Evaluation: Pt compliant with IV abx. Stated that the reason he pulled out his previous iv was because there was not a green cap on it and he was worried about infection. Prn oxycodone given. WAGNER AMBROSIO  Plan of Care Reviewed With: patient  Overall Patient Progress: improving  Goal: Absence of Hospital-Acquired Illness or Injury  Intervention: Identify and Manage Fall Risk  Recent Flowsheet Documentation  Taken 7/19/2024 0100 by Jenny Dominguez RN  Safety Promotion/Fall Prevention:   assistive device/personal items within reach   clutter free environment maintained   room organization consistent   safety round/check completed  Intervention: Prevent Infection  Recent Flowsheet Documentation  Taken 7/18/2024 2343 by Jenny Dominguez RN  Infection Prevention: hand hygiene promoted     Problem: Pain Acute  Goal: Optimal Pain Control and Function  Intervention: Prevent or Manage Pain  Recent Flowsheet Documentation  Taken 7/19/2024 0100 by Jenny Dominguez RN  Medication Review/Management: medications reviewed

## 2024-07-20 LAB
CREAT SERPL-MCNC: 0.92 MG/DL (ref 0.67–1.17)
EGFRCR SERPLBLD CKD-EPI 2021: >90 ML/MIN/1.73M2
VANCOMYCIN SERPL-MCNC: 7.8 UG/ML

## 2024-07-20 PROCEDURE — 250N000013 HC RX MED GY IP 250 OP 250 PS 637: Performed by: INTERNAL MEDICINE

## 2024-07-20 PROCEDURE — 82565 ASSAY OF CREATININE: CPT | Performed by: INTERNAL MEDICINE

## 2024-07-20 PROCEDURE — 250N000011 HC RX IP 250 OP 636: Performed by: INTERNAL MEDICINE

## 2024-07-20 PROCEDURE — 80202 ASSAY OF VANCOMYCIN: CPT | Performed by: INTERNAL MEDICINE

## 2024-07-20 PROCEDURE — 36415 COLL VENOUS BLD VENIPUNCTURE: CPT | Performed by: INTERNAL MEDICINE

## 2024-07-20 PROCEDURE — 120N000001 HC R&B MED SURG/OB

## 2024-07-20 PROCEDURE — 99232 SBSQ HOSP IP/OBS MODERATE 35: CPT | Performed by: INTERNAL MEDICINE

## 2024-07-20 PROCEDURE — 99254 IP/OBS CNSLTJ NEW/EST MOD 60: CPT | Performed by: INTERNAL MEDICINE

## 2024-07-20 PROCEDURE — 250N000013 HC RX MED GY IP 250 OP 250 PS 637: Performed by: STUDENT IN AN ORGANIZED HEALTH CARE EDUCATION/TRAINING PROGRAM

## 2024-07-20 PROCEDURE — 258N000003 HC RX IP 258 OP 636: Performed by: INTERNAL MEDICINE

## 2024-07-20 RX ORDER — METRONIDAZOLE 500 MG/1
500 TABLET ORAL 2 TIMES DAILY
Status: DISCONTINUED | OUTPATIENT
Start: 2024-07-20 | End: 2024-07-21 | Stop reason: HOSPADM

## 2024-07-20 RX ORDER — SULFAMETHOXAZOLE/TRIMETHOPRIM 800-160 MG
1 TABLET ORAL 2 TIMES DAILY
Status: DISCONTINUED | OUTPATIENT
Start: 2024-07-20 | End: 2024-07-21 | Stop reason: HOSPADM

## 2024-07-20 RX ADMIN — SENNOSIDES AND DOCUSATE SODIUM 2 TABLET: 50; 8.6 TABLET ORAL at 02:15

## 2024-07-20 RX ADMIN — LEVOFLOXACIN 750 MG: 500 TABLET, FILM COATED ORAL at 18:19

## 2024-07-20 RX ADMIN — Medication: at 06:13

## 2024-07-20 RX ADMIN — PIPERACILLIN AND TAZOBACTAM 3.38 G: 3; .375 INJECTION, POWDER, FOR SOLUTION INTRAVENOUS at 03:52

## 2024-07-20 RX ADMIN — SULFAMETHOXAZOLE AND TRIMETHOPRIM 1 TABLET: 800; 160 TABLET ORAL at 21:43

## 2024-07-20 RX ADMIN — VANCOMYCIN HYDROCHLORIDE 1500 MG: 10 INJECTION, POWDER, LYOPHILIZED, FOR SOLUTION INTRAVENOUS at 00:39

## 2024-07-20 RX ADMIN — PIPERACILLIN AND TAZOBACTAM 3.38 G: 3; .375 INJECTION, POWDER, FOR SOLUTION INTRAVENOUS at 09:43

## 2024-07-20 RX ADMIN — NICOTINE 1 PATCH: 14 PATCH, EXTENDED RELEASE TRANSDERMAL at 08:37

## 2024-07-20 RX ADMIN — VANCOMYCIN HYDROCHLORIDE 1500 MG: 10 INJECTION, POWDER, LYOPHILIZED, FOR SOLUTION INTRAVENOUS at 14:11

## 2024-07-20 RX ADMIN — METRONIDAZOLE 500 MG: 500 TABLET ORAL at 21:43

## 2024-07-20 RX ADMIN — OXYCODONE HYDROCHLORIDE 10 MG: 10 TABLET ORAL at 10:23

## 2024-07-20 ASSESSMENT — ACTIVITIES OF DAILY LIVING (ADL)
ADLS_ACUITY_SCORE: 20

## 2024-07-20 NOTE — PROGRESS NOTES
Cross cover    I note that lactic acid levels were scheduled to be drawn twice a day as of 7/16/2024.  I do not see an indication for that and I do not see any comment about continuing that.  The nurse called me tonight stating that the lab had difficulty running the sample and wanted to know whether we could not draw it right now.    I have discontinued further scheduled lactic acid draws.  They should be reordered if needed.    KP

## 2024-07-20 NOTE — PROGRESS NOTES
[facility]    Medicine Progress Note - Hospitalist Service    Date of Admission:  7/16/2024    Assessment & Plan     Ricky Rowland is a 34 year old male with past medical history significant for recent admission 6/28/2024-7/6/2024 for epididymo-orchitis, polysubstance use disorder, and ADHD who presented to [facility] on 7/16/2024 with increasing testicular pain and was found to have absent testicular perfusion on ultrasound and was emergently taken to OR for concern of testicular torsion. During procedure, he was found to have testicular and scrotal abscesses with rupture of tunica albuginea, necrotic seminiferous tubules and left testicle requiring left orchiectomy.     Left testicular and scrotal abscess.  Epididymal orchitis.  Sepsis due to abscess.  Necrosis of left testicle and seminiferous tubules.  Status post left orchiectomy.  Lactic acidosis.  -Appreciate ongoing urology input.  -Appreciate infectious disease input.  -Cultures growing staph hemolyticus and gram-negative rods.  -Continue IV vancomycin and Zosyn.    Tobacco use disorder.  -Continue nicotine patch.  -Additional nicotine gum if needed.    History of polysubstance use disorder.  -Would benefit from long-term cessation.    Asthma.  -No acute exacerbation.  -Albuterol if needed.          Diet: Regular Diet Adult    DVT Prophylaxis: Pneumatic Compression Devices  Kim Catheter: Not present  Lines: None     Cardiac Monitoring: None  Code Status: Full Code      Clinically Significant Risk Factors                                          Disposition Plan     Medically Ready for Discharge: Anticipated in 2-4 Days             Lázaro Roberts DO  Hospitalist Service  [facility]  Securely message with Phillip (more info)  Text page via [employer][email or web address] Paging/Directory   ______________________________________________________________________    Interval History   Having some scrotal pain.  Denies  chest pain, shortness of breath, fevers, chills, nausea, vomiting.    Physical Exam   Vital Signs: Temp: 97.1  F (36.2  C) Temp src: Temporal BP: 131/85 Pulse: 81   Resp: 16 SpO2: 97 % O2 Device: None (Room air)    Weight: 168 lbs 10.43 oz    Gen:  NAD, A&Ox3.  Eyes:  PERRL, sclera anicteric.  OP:  MMM, no lesions.  Neck:  Supple.  CV:  Regular, no murmurs.  Lung:  CTA b/l, normal effort.  Ab:  +BS, soft.  Skin:  Warm, dry to touch.  No rash over examined skin.  Ext:  No pitting edema LE b/l.      Medical Decision Making       36 MINUTES SPENT BY ME on the date of service doing chart review, history, exam, documentation & further activities per the note.      Data     I have personally reviewed the following data over the past 24 hrs:    N/A  \   N/A   / N/A     N/A N/A N/A /  N/A   N/A N/A 0.92 \       Imaging results reviewed over the past 24 hrs:   No results found for this or any previous visit (from the past 24 hour(s)).

## 2024-07-20 NOTE — PROVIDER NOTIFICATION
Paged provider: pt lost IV access in the middle of Vanco infusion. Pharmacy notified.  Flyer attempt multiple times, but not successful. IV Zosyn is due. Provider notified. Will continue monitoring. Verbal order received by charge nurse for oral Abx.

## 2024-07-20 NOTE — PLAN OF CARE
"Goal Outcome Evaluation:      Plan of Care Reviewed With: patient    Overall Patient Progress: improvingOverall Patient Progress: improving    Outcome Evaluation: pain control with po oxy and IV dilaudid before dressing changes. surgical site with packing clean, pink. scrotal swelling. minimal drainage from site & penrose. up indep jeimy reg diet. voiding without difficulty. iv abx as ordered.      Problem: Adult Inpatient Plan of Care  Goal: Plan of Care Review  Description: The Plan of Care Review/Shift note should be completed every shift.  The Outcome Evaluation is a brief statement about your assessment that the patient is improving, declining, or no change.  This information will be displayed automatically on your shift  note.  Outcome: Progressing  Flowsheets (Taken 7/19/2024 2155)  Outcome Evaluation: pain control with po oxy and IV dilaudid before dressing changes. surgical site with packing clean, pink. scrotal swelling. minimal drainage from site & penrose. up indep jeimy reg diet. voiding without difficulty. iv abx as ordered.  Plan of Care Reviewed With: patient  Overall Patient Progress: improving  Goal: Patient-Specific Goal (Individualized)  Description: You can add care plan individualizations to a care plan. Examples of Individualization might be:  \"Parent requests to be called daily at 9am for status\", \"I have a hard time hearing out of my right ear\", or \"Do not touch me to wake me up as it startles  me\".  Outcome: Progressing  Goal: Absence of Hospital-Acquired Illness or Injury  Outcome: Progressing  Intervention: Identify and Manage Fall Risk  Recent Flowsheet Documentation  Taken 7/19/2024 1635 by Margi Rodríguez RN  Safety Promotion/Fall Prevention:   assistive device/personal items within reach   clutter free environment maintained   nonskid shoes/slippers when out of bed   patient and family education   safety round/check completed  Intervention: Prevent Skin Injury  Recent Flowsheet " Documentation  Taken 7/19/2024 1635 by Margi Rodríguez RN  Body Position: position changed independently  Intervention: Prevent and Manage VTE (Venous Thromboembolism) Risk  Recent Flowsheet Documentation  Taken 7/19/2024 1635 by Margi Rodríguez RN  VTE Prevention/Management: patient refused intervention  Goal: Optimal Comfort and Wellbeing  Outcome: Progressing  Intervention: Monitor Pain and Promote Comfort  Recent Flowsheet Documentation  Taken 7/19/2024 2036 by Margi Rodríguez RN  Pain Management Interventions: medication (see MAR)  Taken 7/19/2024 1730 by Margi Rodríguez RN  Pain Management Interventions: medication (see MAR)  Goal: Readiness for Transition of Care  Outcome: Progressing     Problem: Pain Acute  Goal: Optimal Pain Control and Function  Outcome: Progressing  Intervention: Develop Pain Management Plan  Recent Flowsheet Documentation  Taken 7/19/2024 2036 by Margi Rodríguez RN  Pain Management Interventions: medication (see MAR)  Taken 7/19/2024 1730 by Margi Rodríguez RN  Pain Management Interventions: medication (see MAR)  Intervention: Prevent or Manage Pain  Recent Flowsheet Documentation  Taken 7/19/2024 1635 by Margi Rodríguez RN  Sensory Stimulation Regulation: care clustered  Bowel Elimination Promotion:   adequate fluid intake promoted   ambulation promoted

## 2024-07-20 NOTE — PHARMACY-VANCOMYCIN DOSING SERVICE
"Pharmacy Vancomycin Note  Date of Service 2024  Patient's  1990   34 year old, male    Indication: Skin and Soft Tissue Infection  Day of Therapy: 3  Current vancomycin regimen:  1500 mg IV q12h  Current vancomycin monitoring method: AUC  Current vancomycin therapeutic monitoring goal: 400-600 mg*h/L    Current estimated CrCl = Estimated Creatinine Clearance: 123.6 mL/min (based on SCr of 0.92 mg/dL).    Creatinine for last 3 days  2024:  5:42 AM Creatinine 0.94 mg/dL;  5:42 AM Creatinine 0.94 mg/dL  2024:  8:07 AM Creatinine 0.76 mg/dL  2024:  1:30 PM Creatinine 0.92 mg/dL    Recent Vancomycin Levels (past 3 days)  2024:  6:43 PM Vancomycin 9.6 ug/mL    Vancomycin IV Administrations (past 72 hours)                     vancomycin (VANCOCIN) 1,500 mg in 0.9% NaCl 250 mL intermittent infusion (mg) 1,500 mg New Bag 24 0039     1,500 mg New Bag 24 1411     1,500 mg New Bag  0051    vancomycin (VANCOCIN) 1,250 mg in 0.9% NaCl 250 mL intermittent infusion (mg) 1,250 mg New Bag 24 0943     1,250 mg New Bag 24                    Nephrotoxins and other renal medications (From now, onward)      Start     Dose/Rate Route Frequency Ordered Stop    24  vancomycin (VANCOCIN) 1,500 mg in 0.9% NaCl 250 mL intermittent infusion         1,500 mg  166.7 mL/hr over 90 Minutes Intravenous EVERY 12 HOURS 24 1700  piperacillin-tazobactam (ZOSYN) 3.375 g vial to attach to  mL bag        Note to Pharmacy: For [facility], [facility] and [facility]: For Zosyn-naive patients, use the \"Zosyn initial dose + extended infusion\" order panel.    3.375 g  over 30 Minutes Intravenous EVERY 6 HOURS 24 1648                 Contrast Orders - past 72 hours (72h ago, onward)      None            Interpretation of levels and current regimen:  Vancomycin level is reflective of -600    Has serum creatinine changed greater than 50% in last 72 hours: No    Urine " output:  good urine output    Renal Function: Stable    Insight[employer] Prediction of Planned New Vancomycin Regimen  Loading dose: N/A  Regimen: 1500 mg IV every 12 hours.  Start time: 12:39 on 07/20/2024  Exposure target: AUC24 (range)400-600 mg/L.hr   AUC24,ss: 479 mg/L.hr  Probability of AUC24 > 400: 87 %  Ctrough,ss: 11.4 mg/L  Probability of Ctrough,ss > 20: 0 %  Probability of nephrotoxicity (Lodise MITCH 2009): 7 %      Plan:  Continue current dose    Jasmin Fonseca [facility]

## 2024-07-20 NOTE — PROGRESS NOTES
Pt asked  to leave, refused lab draw for creatinine, stated last time you damaged my vein, now all arm is hurting.  will sent somebody else later today. Per  after last blood draw, pt was telling her you did great job.  Earlier pt reported rash due to Vanco, no rash noted. Then he reported pain to IV site, almost refused Vanco. PIV flushes ok. Both abx infused with no trouble. Pt stated he wants PICC line if he will need to be on abx for few more days. Note left for provider.

## 2024-07-20 NOTE — PLAN OF CARE
"Goal Outcome Evaluation:      Plan of Care Reviewed With: patient    Overall Patient Progress: improvingOverall Patient Progress: improving    Outcome Evaluation: on IV Abx, penrose drain CDI.    Patient is A/O X4. VSS, on RA. Independent in room. Pain managed with oxycodone. penrose CDI. CMS intact. On IV Abx. Wound care to scrotum per plan of care. Voiding ok. Refused lab this morning- provider notified. ID following. Discharge plan home.     Patient agreed the lab draw for creatinine, and vanco level.     Problem: Adult Inpatient Plan of Care  Goal: Plan of Care Review  Description: The Plan of Care Review/Shift note should be completed every shift.  The Outcome Evaluation is a brief statement about your assessment that the patient is improving, declining, or no change.  This information will be displayed automatically on your shift  note.  Outcome: Progressing  Flowsheets (Taken 7/20/2024 1047)  Outcome Evaluation: on IV Abx, penrose drain CDI.  Plan of Care Reviewed With: patient  Overall Patient Progress: improving  Goal: Patient-Specific Goal (Individualized)  Description: You can add care plan individualizations to a care plan. Examples of Individualization might be:  \"Parent requests to be called daily at 9am for status\", \"I have a hard time hearing out of my right ear\", or \"Do not touch me to wake me up as it startles  me\".  Outcome: Progressing  Goal: Absence of Hospital-Acquired Illness or Injury  Outcome: Progressing  Intervention: Identify and Manage Fall Risk  Recent Flowsheet Documentation  Taken 7/20/2024 0908 by Nancy Schaeffer, RN  Safety Promotion/Fall Prevention:   safety round/check completed   nonskid shoes/slippers when out of bed   clutter free environment maintained   assistive device/personal items within reach   activity supervised   supervised activity  Intervention: Prevent Skin Injury  Recent Flowsheet Documentation  Taken 7/20/2024 0908 by Nancy Schaeffer, RN  Body Position: position " changed independently  Skin Protection: adhesive use limited  Intervention: Prevent and Manage VTE (Venous Thromboembolism) Risk  Recent Flowsheet Documentation  Taken 7/20/2024 0908 by Nancy Schaeffer RN  VTE Prevention/Management:   patient refused intervention   SCDs off (sequential compression devices)  Intervention: Prevent Infection  Recent Flowsheet Documentation  Taken 7/20/2024 0908 by Nancy Schaeffer RN  Infection Prevention:   single patient room provided   rest/sleep promoted   hand hygiene promoted  Goal: Optimal Comfort and Wellbeing  Outcome: Progressing  Intervention: Monitor Pain and Promote Comfort  Recent Flowsheet Documentation  Taken 7/20/2024 1023 by Nancy Schaeffer RN  Pain Management Interventions: medication (see MAR)  Goal: Readiness for Transition of Care  Outcome: Progressing     Problem: Pain Acute  Goal: Optimal Pain Control and Function  Outcome: Progressing  Intervention: Develop Pain Management Plan  Recent Flowsheet Documentation  Taken 7/20/2024 1023 by Nancy Schaeffer RN  Pain Management Interventions: medication (see MAR)  Intervention: Prevent or Manage Pain  Recent Flowsheet Documentation  Taken 7/20/2024 0908 by Nancy Schaeffer RN  Sensory Stimulation Regulation: care clustered  Bowel Elimination Promotion:   adequate fluid intake promoted   ambulation promoted  Medication Review/Management: medications reviewed  Intervention: Optimize Psychosocial Wellbeing  Recent Flowsheet Documentation  Taken 7/20/2024 0908 by Nancy Schaeffer RN  Supportive Measures:   active listening utilized   problem-solving facilitated   self-reflection promoted   self-responsibility promoted     Problem: Infection  Goal: Absence of Infection Signs and Symptoms  Outcome: Progressing

## 2024-07-20 NOTE — PLAN OF CARE
"Goal Outcome Evaluation:      Plan of Care Reviewed With: patient    Overall Patient Progress: improvingOverall Patient Progress: improving    Outcome Evaluation: Pt is alert and oriented x4, independent with transfers. Pt reported min pain, refused prn meds. per pt swelling and drainage improving, no dressing change needed. Abx given per order. Pt reported constipation, prn senna s given with no effect. fluids encouraged.      Problem: Adult Inpatient Plan of Care  Goal: Plan of Care Review  Description: The Plan of Care Review/Shift note should be completed every shift.  The Outcome Evaluation is a brief statement about your assessment that the patient is improving, declining, or no change.  This information will be displayed automatically on your shift  note.  Outcome: Progressing  Flowsheets (Taken 7/20/2024 9803)  Outcome Evaluation: Pt is alert and oriented x4, independent with transfers. Pt reported min pain, refused prn meds. per pt swelling and drainage improving, no dressing change needed. Abx given per order. Pt reported constipation, prn senna s given with no effect. fluids encouraged.  Plan of Care Reviewed With: patient  Overall Patient Progress: improving  Goal: Patient-Specific Goal (Individualized)  Description: You can add care plan individualizations to a care plan. Examples of Individualization might be:  \"Parent requests to be called daily at 9am for status\", \"I have a hard time hearing out of my right ear\", or \"Do not touch me to wake me up as it startles  me\".  Outcome: Progressing  Goal: Absence of Hospital-Acquired Illness or Injury  Outcome: Progressing  Intervention: Identify and Manage Fall Risk  Recent Flowsheet Documentation  Taken 7/20/2024 0144 by Suri Newton, RN  Safety Promotion/Fall Prevention:   assistive device/personal items within reach   clutter free environment maintained   nonskid shoes/slippers when out of bed   patient and family education   safety round/check " completed  Intervention: Prevent Skin Injury  Recent Flowsheet Documentation  Taken 7/20/2024 0144 by Suri Newton RN  Body Position: position changed independently  Goal: Optimal Comfort and Wellbeing  Outcome: Progressing  Intervention: Monitor Pain and Promote Comfort  Recent Flowsheet Documentation  Taken 7/20/2024 0144 by Suri Newton RN  Pain Management Interventions: medication (see MAR)  Goal: Readiness for Transition of Care  Outcome: Progressing     Problem: Pain Acute  Goal: Optimal Pain Control and Function  Outcome: Progressing  Intervention: Develop Pain Management Plan  Recent Flowsheet Documentation  Taken 7/20/2024 0144 by Suri Newton RN  Pain Management Interventions: medication (see MAR)  Intervention: Prevent or Manage Pain  Recent Flowsheet Documentation  Taken 7/20/2024 0144 by Suri Newton RN  Bowel Elimination Promotion:   adequate fluid intake promoted   ambulation promoted

## 2024-07-20 NOTE — CONSULTS
[facility]    Infectious Disease Consultation     Date of Admission:  7/16/2024  Date of Consult (When I saw the patient): 07/20/24    Assessment & Plan   Ricky Rowland is a 34 year old male who was admitted on 7/16/2024.     Impression:  35 yo with  recent admission 6/28/2024-7/6/2024 for epididymo-orchitis, polysubstance use disorder, and ADHD  Presented to [facility] on 7/16/2024 with increasing testicular pain and was found to have absent testicular perfusion on ultrasound and was emergently taken to OR for concern of testicular torsion.   During procedure, he was found to have testicular and scrotal abscesses with rupture of tunica albuginea, necrotic seminiferous tubules and left testicle requiring left orchiectomy.   Cultures from the OR are growing staph hemolyticus  Patient is currently on vanco and zosyn      Recommendations   Agree with broad spectrum antibiotics while in the hospital   Once ready for discharge 2 weeks of oral bactrim + levaquin + flagyl but follow up on the now positive anaerobic culture     Frazana Leonard MD    Reason for Consult   Reason for consult: I was asked to evaluate this patient for testicular infection.    Primary Care Physician   Physician No Ref-Primary    Chief Complaint   Testicular pain     History is obtained from the patient and medical records    History of Present Illness   Ricky Rowland is a 34 year old male with recent admission 6/28/2024-7/6/2024 for epididymo-orchitis, polysubstance use disorder, and ADHD who presented to [facility] on 7/16/2024 with increasing testicular pain and was found to have absent testicular perfusion on ultrasound and was emergently taken to OR for concern of testicular torsion. During procedure, he was found to have testicular and scrotal abscesses with rupture of tunica albuginea, necrotic seminiferous tubules and left testicle requiring left orchiectomy.     Past Medical History   I have  reviewed this patient's medical history and updated it with pertinent information if needed.   Past Medical History:   Diagnosis Date    Attention deficit disorder with hyperactivity(314.01)     ADHD  ( also had learning disability)    Mild intermittent asthma     Asthma       Past Surgical History   I have reviewed this patient's surgical history and updated it with pertinent information if needed.  Past Surgical History:   Procedure Laterality Date    ORCHIECTOMY SCROTAL Left 2024    Procedure: Scrotal exploration, incision, drainage, and debridement of left scrotal/testicular abscess (2 cm), left simple orchiectomy;  Surgeon: Noah Zhu MD;  Location:  OR       Prior to Admission Medications   Prior to Admission Medications   Prescriptions Last Dose Informant Patient Reported? Taking?   ALBUTEROL INHALER 17GM Unknown at PRN  No Yes   Si Puffs d1dniup prn   ibuprofen (ADVIL/MOTRIN) 600 MG tablet Unknown at PRN  No Yes   Sig: Take 1 tablet (600 mg) by mouth every 8 hours as needed for moderate pain   levofloxacin (LEVAQUIN) 750 MG tablet 7/15/2024 at AM  No Yes   Sig: Take 1 tablet (750 mg) by mouth daily for 28 days      Facility-Administered Medications: None     Allergies   Allergies   Allergen Reactions    Cats     Fentanyl Other (See Comments)     Pt states he does NOT want to receive Fentanyl       Immunization History   Immunization History   Administered Date(s) Administered    COVID-19 MONOVALENT 12+ (Pfizer) 2021    HIB (PRP-T) 03/15/2001, 2001, 2001    Historical DTP/aP 1992, 1992, 1993, 1997    MMR 1997, 2001    OPV, trivalent, live 1992, 1992, 03/15/2001    TD,PF 7+ (Tenivac) 2001, 2003    TDAP Vaccine (Adacel) 2014    Varicella 2001       Social History   I have reviewed this patient's social history and updated it with pertinent information if needed. Ricky Rowland  reports that he has been  "smoking. He has never used smokeless tobacco. He reports that he does not drink alcohol and does not use drugs.    Family History   I have reviewed this patient's family history and updated it with pertinent information if needed.   History reviewed. No pertinent family history.    Review of Systems   The 10 point Review of Systems is negative    Physical Exam   Temp: 97.1  F (36.2  C) Temp src: Temporal BP: 131/85 Pulse: 81   Resp: 16 SpO2: 97 % O2 Device: None (Room air)    Vital Signs with Ranges  Temp:  [97.1  F (36.2  C)-97.6  F (36.4  C)] 97.1  F (36.2  C)  Pulse:  [81-96] 81  Resp:  [16] 16  BP: (119-139)/(70-86) 131/85  SpO2:  [96 %-99 %] 97 %  168 lbs 10.43 oz  Body mass index is 23.52 kg/m .    GENERAL APPEARANCE:  awake  EYES: Eyes grossly normal to inspection  NECK: no adenopathy  RESP: lungs clear   CV: regular rates and rhythm  LYMPHATICS: normal ant/post cervical and supraclavicular nodes  ABDOMEN: soft, nontender  MS: extremities normal  SKIN: no suspicious lesions or rashes        Data   All laboratory and imaging data in the past 24 hours reviewed  No results for input(s): \"CULT\" in the last 168 hours.  No lab results found.    Invalid input(s): \"UC\"       All cultures:  Recent Labs   Lab 07/16/24  1325 07/16/24  1324 07/16/24  1201   CULTURE No anaerobic organisms isolated after 2 days  1+ Staphylococcus haemolyticus*  See corresponding culture for results No growth after 3 days  No anaerobic organisms isolated after 3 days  See corresponding culture for results No growth after 3 days      Blood culture:  Results for orders placed or performed during the hospital encounter of 07/16/24   Blood Culture Line, venous    Specimen: Line, venous; Blood   Result Value Ref Range    Culture No growth after 3 days    Results for orders placed or performed during the hospital encounter of 06/28/24   Blood Culture Arm, Left    Specimen: Arm, Left; Blood   Result Value Ref Range    Culture No Growth  "   Blood Culture Arm, Right    Specimen: Arm, Right; Blood   Result Value Ref Range    Culture No Growth       Urine culture:  Results for orders placed or performed during the hospital encounter of 06/28/24   Urine Culture    Specimen: Urine, NOS   Result Value Ref Range    Culture >100,000 CFU/mL Escherichia coli (A)        Susceptibility    Escherichia coli - MACY     Ampicillin >=32 Resistant ug/mL     Ampicillin/ Sulbactam 16 Intermediate ug/mL     Piperacillin/Tazobactam <=4 Susceptible ug/mL     Cefazolin* <=4 Susceptible ug/mL      * Cefazolin MACY breakpoints are for the treatment of uncomplicated urinary tract infections. For the treatment of systemic infections, please contact the laboratory for additional testing.     Cefoxitin <=4 Susceptible ug/mL     Ceftazidime <=1 Susceptible ug/mL     Ceftriaxone <=1 Susceptible ug/mL     Cefepime <=1 Susceptible ug/mL     Gentamicin <=1 Susceptible ug/mL     Tobramycin <=1 Susceptible ug/mL     Ciprofloxacin <=0.25 Susceptible ug/mL     Levofloxacin <=0.12 Susceptible ug/mL     Nitrofurantoin <=16 Susceptible ug/mL     Trimethoprim/Sulfamethoxazole <=1/19 Susceptible ug/mL   Results for orders placed or performed in visit on 02/07/24   Urine Culture    Specimen: Urine, Clean Catch   Result Value Ref Range    Culture >100,000 CFU/mL Escherichia coli (A)        Susceptibility    Escherichia coli - MACY     Ampicillin >=32 Resistant ug/mL     Ampicillin/ Sulbactam 16 Intermediate ug/mL     Piperacillin/Tazobactam <=4 Susceptible ug/mL     Cefazolin* <=4 Susceptible ug/mL      * Cefazolin MACY breakpoints are for the treatment of uncomplicated urinary tract infections. For the treatment of systemic infections, please contact the laboratory for additional testing.     Cefoxitin <=4 Susceptible ug/mL     Ceftazidime <=1 Susceptible ug/mL     Ceftriaxone <=1 Susceptible ug/mL     Cefepime <=1 Susceptible ug/mL     Gentamicin <=1 Susceptible ug/mL     Tobramycin <=1  Susceptible ug/mL     Ciprofloxacin <=0.25 Susceptible ug/mL     Levofloxacin <=0.12 Susceptible ug/mL     Nitrofurantoin <=16 Susceptible ug/mL     Trimethoprim/Sulfamethoxazole <=1/19 Susceptible ug/mL   Results for orders placed or performed in visit on 01/08/24   Urine Culture    Specimen: Urine, Clean Catch   Result Value Ref Range    Culture >100,000 CFU/mL Mixture of Urogenital Vikki

## 2024-07-21 VITALS
RESPIRATION RATE: 16 BRPM | DIASTOLIC BLOOD PRESSURE: 71 MMHG | WEIGHT: 168.65 LBS | OXYGEN SATURATION: 97 % | HEART RATE: 94 BPM | HEIGHT: 71 IN | TEMPERATURE: 97.2 F | BODY MASS INDEX: 23.61 KG/M2 | SYSTOLIC BLOOD PRESSURE: 128 MMHG

## 2024-07-21 LAB
BACTERIA ABSC ANAEROBE+AEROBE CULT: NO GROWTH
BACTERIA BLD CULT: NO GROWTH
CREAT SERPL-MCNC: 0.98 MG/DL (ref 0.67–1.17)
EGFRCR SERPLBLD CKD-EPI 2021: >90 ML/MIN/1.73M2

## 2024-07-21 PROCEDURE — 250N000013 HC RX MED GY IP 250 OP 250 PS 637: Performed by: INTERNAL MEDICINE

## 2024-07-21 PROCEDURE — 82565 ASSAY OF CREATININE: CPT | Performed by: INTERNAL MEDICINE

## 2024-07-21 PROCEDURE — 36415 COLL VENOUS BLD VENIPUNCTURE: CPT | Performed by: INTERNAL MEDICINE

## 2024-07-21 PROCEDURE — 250N000013 HC RX MED GY IP 250 OP 250 PS 637: Performed by: STUDENT IN AN ORGANIZED HEALTH CARE EDUCATION/TRAINING PROGRAM

## 2024-07-21 PROCEDURE — 99239 HOSP IP/OBS DSCHRG MGMT >30: CPT | Performed by: INTERNAL MEDICINE

## 2024-07-21 RX ORDER — METRONIDAZOLE 500 MG/1
500 TABLET ORAL 2 TIMES DAILY
Qty: 28 TABLET | Refills: 0 | Status: SHIPPED | OUTPATIENT
Start: 2024-07-21 | End: 2024-08-04

## 2024-07-21 RX ORDER — ACETAMINOPHEN 325 MG/1
650 TABLET ORAL EVERY 4 HOURS PRN
COMMUNITY
Start: 2024-07-21

## 2024-07-21 RX ORDER — SULFAMETHOXAZOLE/TRIMETHOPRIM 800-160 MG
1 TABLET ORAL 2 TIMES DAILY
Qty: 28 TABLET | Refills: 0 | Status: SHIPPED | OUTPATIENT
Start: 2024-07-21 | End: 2024-08-04

## 2024-07-21 RX ORDER — LEVOFLOXACIN 750 MG/1
750 TABLET, FILM COATED ORAL DAILY
Qty: 14 TABLET | Refills: 0 | Status: SHIPPED | OUTPATIENT
Start: 2024-07-22 | End: 2024-08-05

## 2024-07-21 RX ADMIN — NICOTINE 1 PATCH: 14 PATCH, EXTENDED RELEASE TRANSDERMAL at 09:22

## 2024-07-21 RX ADMIN — SULFAMETHOXAZOLE AND TRIMETHOPRIM 1 TABLET: 800; 160 TABLET ORAL at 09:19

## 2024-07-21 RX ADMIN — METRONIDAZOLE 500 MG: 500 TABLET ORAL at 09:20

## 2024-07-21 RX ADMIN — LEVOFLOXACIN 750 MG: 500 TABLET, FILM COATED ORAL at 09:19

## 2024-07-21 ASSESSMENT — ACTIVITIES OF DAILY LIVING (ADL)
ADLS_ACUITY_SCORE: 20

## 2024-07-21 NOTE — PROGRESS NOTES
Reviewed discharge instructions with patient. Questions answered. Patient discharged to home with meds. discharge instructions, and belongings given.

## 2024-07-21 NOTE — DISCHARGE SUMMARY
[facility]  Hospitalist Discharge Summary      Date of Admission:  7/16/2024  Date of Discharge:  7/21/2024  Discharging Provider: Lázaro Roberts DO  Discharge Service: Hospitalist Service    Discharge Diagnoses   Left testicular and scrotal abscess.  Epididymo-orchitis   Sepsis due to scrotal abscess.  Necrosis of left testicle and seminiferous tubule.  Lactic acidosis.  Tobacco use disorder.  Asthma.      Clinically Significant Risk Factors          Follow-ups Needed After Discharge   Follow-up Appointments     Follow-up and recommended labs and tests       Follow-up with urology within 1 week.            Discharge Disposition   Discharged to home  Condition at discharge: Stable    Hospital Course   Ricky Rowland is a 34 year old male with past medical history significant for recent admission 6/28/2024-7/6/2024 for epididymo-orchitis, polysubstance use disorder, and ADHD who presented to [facility] on 7/16/2024 with increasing testicular pain and was found to have absent testicular perfusion on ultrasound and was emergently taken to OR for concern of testicular torsion. During procedure, he was found to have testicular and scrotal abscesses with rupture of tunica albuginea, necrotic seminiferous tubules and left testicle requiring left orchiectomy.  Urology has continue to follow during hospital stay.  Seen in consultation by infectious disease.  Has been on IV antibiotics with vancomycin and Zosyn.  Cultures returning growing E. coli and staph hemolyticus.  Antibiotics now changed to levofloxacin, metronidazole, and Bactrim.  Does need to continue oral antibiotics for the next 2 weeks.  Continue with levofloxacin 750 mg a day, metronidazole 500 mg twice a day, and Bactrim double strength 1 tablet twice a day.  Follow-up with urology within 1 week.    Consultations This Hospital Stay   PHARMACY TO DOSE VANCO  WOUND OSTOMY CONTINENCE NURSE  IP CONSULT  NURSING TO CONSULT  FOR VASCULAR ACCESS CARE IP CONSULT  INFECTIOUS DISEASES IP CONSULT    Code Status   Full Code    Time Spent on this Encounter   I spent 35 minutes with Mr. Rowland and working on discharge on 7/21/2024.       Lázaro Roberts [facility]  [address] [facility] [address]  Phone: [phone]  Fax: [phone]  ______________________________________________________________________    Physical Exam   Vital Signs: Temp: 97.2  F (36.2  C) Temp src: Temporal BP: 128/71 Pulse: 94   Resp: 16 SpO2: 97 % O2 Device: None (Room air)    Weight: 168 lbs 10.43 oz  Gen:  NAD, A&Ox3.  Eyes:  PERRL, sclera anicteric.  OP:  MMM, no lesions.  Neck:  Supple.  CV:  Regular, no murmurs.  Lung:  CTA b/l, normal effort.  Ab:  +BS, soft.  Skin:  Warm, dry to touch.  No rash.  Ext:  No pitting edema LE b/l.         Primary Care Physician   Physician No Ref-Primary    Discharge Orders      Reason for your hospital stay    Scrotal abscess.     Follow-up and recommended labs and tests     Follow-up with urology within 1 week.     Activity    Your activity upon discharge: activity as tolerated     Diet    Follow this diet upon discharge: Regular         Discharge Medications   Current Discharge Medication List        START taking these medications    Details   acetaminophen (TYLENOL) 325 MG tablet Take 2 tablets (650 mg) by mouth every 4 hours as needed for mild pain or other (and adjunct with moderate or severe pain or per patient request)    Associated Diagnoses: Epididymoorchitis      metroNIDAZOLE (FLAGYL) 500 MG tablet Take 1 tablet (500 mg) by mouth 2 times daily for 14 days  Qty: 28 tablet, Refills: 0    Associated Diagnoses: Epididymoorchitis      sulfamethoxazole-trimethoprim (BACTRIM DS) 800-160 MG tablet Take 1 tablet by mouth 2 times daily for 14 days  Qty: 28 tablet, Refills: 0    Associated Diagnoses: Epididymoorchitis           CONTINUE these medications which have CHANGED    Details    levofloxacin (LEVAQUIN) 750 MG tablet Take 1 tablet (750 mg) by mouth daily for 14 days  Qty: 14 tablet, Refills: 0    Associated Diagnoses: Epididymoorchitis           CONTINUE these medications which have NOT CHANGED    Details   ALBUTEROL INHALER 17GM 2 Puffs [id number] prn  Qty: 2, Refills: 3    Associated Diagnoses: Mild intermittent asthma      ibuprofen (ADVIL/MOTRIN) 600 MG tablet Take 1 tablet (600 mg) by mouth every 8 hours as needed for moderate pain  Qty: 30 tablet, Refills: 0           Allergies   Allergies   Allergen Reactions    Cats     Fentanyl Other (See Comments)     Pt states he does NOT want to receive Fentanyl

## 2024-07-21 NOTE — PROGRESS NOTES
Infectious disease brief note   Chart checked cultures reviewed   Staph hemolyticus and E coli in the anaerobe culture   Once ready for discharge 2 weeks of oral bactrim + levaquin + flagyl      Farzana Leonard MD  Infectious Disease

## 2024-07-21 NOTE — PLAN OF CARE
"Goal Outcome Evaluation:      Plan of Care Reviewed With: patient    Overall Patient Progress: improvingOverall Patient Progress: improving    Outcome Evaluation: pt is alert and oriented x4, able to make needs known on oral antiobiotics . penrose drain, took a shower this evening    Problem: Adult Inpatient Plan of Care  Goal: Plan of Care Review  Description: The Plan of Care Review/Shift note should be completed every shift.  The Outcome Evaluation is a brief statement about your assessment that the patient is improving, declining, or no change.  This information will be displayed automatically on your shift  note.  Outcome: Progressing  Flowsheets (Taken 7/20/2024 2151)  Outcome Evaluation: pt is alert and oriented x4, able to make needs known on oral antiobiotics . penrose drain  Plan of Care Reviewed With: patient  Overall Patient Progress: improving  Goal: Patient-Specific Goal (Individualized)  Description: You can add care plan individualizations to a care plan. Examples of Individualization might be:  \"Parent requests to be called daily at 9am for status\", \"I have a hard time hearing out of my right ear\", or \"Do not touch me to wake me up as it startles  me\".  Outcome: Progressing  Goal: Absence of Hospital-Acquired Illness or Injury  Outcome: Progressing  Goal: Optimal Comfort and Wellbeing  Outcome: Progressing  Goal: Readiness for Transition of Care  Outcome: Progressing     Problem: Pain Acute  Goal: Optimal Pain Control and Function  Outcome: Progressing     Problem: Infection  Goal: Absence of Infection Signs and Symptoms  Outcome: Progressing         "

## 2024-07-22 ENCOUNTER — PATIENT OUTREACH (OUTPATIENT)
Dept: CARE COORDINATION | Facility: CLINIC | Age: 34
End: 2024-07-22
Payer: COMMERCIAL

## 2024-07-22 ENCOUNTER — TELEPHONE (OUTPATIENT)
Dept: UROLOGY | Facility: CLINIC | Age: 34
End: 2024-07-22
Payer: COMMERCIAL

## 2024-07-22 NOTE — PROGRESS NOTES
Clinic Care Coordination Contact  Transitions of Care Outreach    Chief Complaint   Patient presents with    Clinic Care Coordination - Initial     Scrotal abscess, Left testicular and scrotal abscess, Epididymo-orchitis, Sepsis due to scrotal, abscess, Necrosis of left testicle and seminiferous tubule, Lactic acidosis, Tobacco use disorder &   Asthma.           Most Recent Admission Date: 7/16/2024   Most Recent Admission Diagnosis: Epididymoorchitis - N45.3  Testicular torsion - N44.00  Torsion of left testicle - N44.00     Most Recent Discharge Date: 7/21/2024   Most Recent Discharge Diagnosis: Torsion of left testicle - N44.00  Testicular torsion - N44.00  Epididymoorchitis - N45.3  Wound, open, scrotum or testes with complication, initial encounter - S31.30XA     Transitions of Care Assessment    Discharge Assessment  How are you doing now that you are home?: Going to withdrawals. Feeling better and happy to be alive.  How are your symptoms? (Red Flag symptoms escalate to triage hotline per guidelines): Improved  Do you know how to contact your clinic care team if you have future questions or changes to your health status? : Yes  Does the patient have their discharge instructions? : Yes  Does the patient have questions regarding their discharge instructions? : No  Were you started on any new medications or were there changes to any of your previous medications? : Yes  Does the patient have all of their medications?: Yes  Do you have questions regarding any of your medications? : No  Do you have all of your needed medical supplies or equipment (DME)?  (i.e. oxygen tank, CPAP, cane, etc.): Yes              Care Management   Community Health Worker Initial Outreach      Follow up Plan     Discharge Follow-Up  Discharge follow up appointment scheduled in alignment with recommended follow up timeframe or Transitions of Risk Category? (Low = within 30 days; Moderate= within 14 days; High= within 7 days):  Yes  Discharge Follow Up Appointment Date: 07/31/24 (Urology - Sophie Romero PA-C. 07/31/24)  Discharge Follow Up Appointment Scheduled with?: Specialty Care Provider    Future Appointments   Date Time Provider Department Center   7/31/2024  3:30 PM Sophie Romero PA-C [facility]       Outpatient Plan as outlined on AVS reviewed with patient.      For any urgent concerns, please contact our 24 hour nurse triage line: [phone]       Sisi Flannery

## 2024-07-23 LAB
BACTERIA ABSC ANAEROBE+AEROBE CULT: NORMAL
BACTERIA TISS BX CULT: ABNORMAL
BACTERIA TISS BX CULT: ABNORMAL

## 2024-07-24 ENCOUNTER — TELEPHONE (OUTPATIENT)
Dept: UROLOGY | Facility: CLINIC | Age: 34
End: 2024-07-24
Payer: COMMERCIAL

## 2024-07-24 ENCOUNTER — ALLIED HEALTH/NURSE VISIT (OUTPATIENT)
Dept: UROLOGY | Facility: CLINIC | Age: 34
End: 2024-07-24
Payer: COMMERCIAL

## 2024-07-24 DIAGNOSIS — N44.00 TESTICULAR TORSION: Primary | ICD-10-CM

## 2024-07-24 PROCEDURE — 99207 PR NO CHARGE NURSE ONLY: CPT

## 2024-07-24 NOTE — PROGRESS NOTES
Ricky Rowland comes into clinic today at the request of Dr. Zhu Ordering Provider for penrose drain removal.    Pt was prepped in sterile fashion. Pt's stitch was located and removed. Drain was removed with ease. Bacitracin was applied to pt's drain site, and loosely covered with gauze.     This service provided today was under the supervising provider of the franklin Romero PA-C, who was available if needed.    Sharon Carlos, CMA

## 2024-07-24 NOTE — TELEPHONE ENCOUNTER
----- Message from Sophie Romero sent at 7/18/2024  3:55 PM CDT -----  Needs penrose drain out in around 7/23 or 7/24.  Move appointment with me from 7/31 to about 2 weeks after that for a wound check.

## 2024-07-24 NOTE — TELEPHONE ENCOUNTER
Pt Called looking to get some more medical supplies, he was referencing a previous call so I think he had meant to call you back and accidentally called our office instead. Can you help him get some more saline for his wound dressing? I talked to the nurses at our office and we don't keep that type of saline in stock so we can't accommodate patient.     Thanks,    Lashonda    [facility]

## 2024-08-14 ENCOUNTER — OFFICE VISIT (OUTPATIENT)
Dept: UROLOGY | Facility: CLINIC | Age: 34
End: 2024-08-14
Payer: COMMERCIAL

## 2024-08-14 VITALS
SYSTOLIC BLOOD PRESSURE: 116 MMHG | HEIGHT: 71 IN | WEIGHT: 168 LBS | DIASTOLIC BLOOD PRESSURE: 74 MMHG | BODY MASS INDEX: 23.52 KG/M2

## 2024-08-14 DIAGNOSIS — R39.15 URINARY URGENCY: ICD-10-CM

## 2024-08-14 DIAGNOSIS — N45.3 EPIDIDYMOORCHITIS: Primary | ICD-10-CM

## 2024-08-14 PROCEDURE — 99214 OFFICE O/P EST MOD 30 MIN: CPT | Performed by: PHYSICIAN ASSISTANT

## 2024-08-14 ASSESSMENT — ENCOUNTER SYMPTOMS
FREQUENCY: 1
HEMATURIA: 0
DYSURIA: 0
CHILLS: 0
FEVER: 0

## 2024-08-14 ASSESSMENT — PAIN SCALES - GENERAL: PAINLEVEL: MILD PAIN (2)

## 2024-08-14 NOTE — PROGRESS NOTES
Subjective      CHIEF COMPLAINT/REASON FOR VISIT   Patient of Dr. Zhu's seen on my calendar  Wound check    HISTORY OF PRESENT ILLNESS   Mr. Rowland is pleasant 34 year old year old male, who presents today for a wound check.  Patient was initially hospitalized on 06/28/2024 due to severe left sided epididymoorchitis.  He was readmitted to the hospital on 07/16/2024.  Given his severe pain, election was made for surgical evaluation.  This showed evidence of a necrotic testicle with evidence of rupture of the tunica albuginea and a testicular scrotal abscess measuring 2 cm.  Patient underwent a left-sided simple orchiectomy.    He did have block see him in the hospital.  They were not able to pack much in the wound more than a 4 x 4.  Patient also had a Penrose drain which was removed on 07/24/2024.  Patient completed an extended course of antibiotics for E. coli urinary tract infection as well as abscess infection.    He presents today for wound check.  He notes that he has not been packing the wound, as it was difficult to get anything in the wound by the time he left the hospital.  He does note some occasional pain in the scrotum.  He also notes that his urination is worsening.  He is having quite a bit of urgency to urinate.  He denies hematuria, dysuria, fevers, chills.    The following portions of the patient's history were reviewed and updated as appropriate: allergies, current medications, past family history, past medical history, past social history, past surgical history, and problem list.     REVIEW OF SYSTEMS   Review of Systems   Constitutional:  Negative for chills and fever.   Genitourinary:  Positive for frequency, testicular pain and urgency. Negative for dysuria and hematuria.      Per HPI.     Patient Active Problem List   Diagnosis    Attention deficit hyperactivity disorder (ADHD)    Epididymoorchitis    Testicular torsion    Torsion of left testicle      Past Medical History:   Diagnosis Date  "   Attention deficit disorder with hyperactivity(314.01)     ADHD  ( also had learning disability)    Mild intermittent asthma     Asthma      Objective      PHYSICAL EXAM   /74   Ht 1.803 m (5' 11\")   Wt 76.2 kg (168 lb)   BMI 23.43 kg/m     Physical Exam  Constitutional:       Appearance: Normal appearance.   HENT:      Head: Normocephalic.   Eyes:      General: No scleral icterus.  Pulmonary:      Effort: Pulmonary effort is normal.   Genitourinary:     Comments: Well-healed left-sided scrotal incision.  No rashes.  Approximately a small tender area in the left hemiscrotum that is approximately 1.5 to 2 cm and fluctuant.  Patient endorses tenderness with palpation of this.  Right-sided testicle appears normal.  Skin:     Findings: No rash.   Neurological:      General: No focal deficit present.      Mental Status: He is alert and oriented to person, place, and time.   Psychiatric:         Mood and Affect: Mood normal.         Behavior: Behavior normal.       Assessment & Plan    1. Epididymoorchitis    2. Urinary urgency        I had the pleasure today of meeting with Mr. Rowland to discuss his follow-up for his simple orchiectomy after severe epididymal orchitis.  On examination, he does have a small area that is somewhat fluctuant within the left hemiscrotum.  He is also tender in this area.  Patient also endorses symptoms of worsening urgency and feeling like he may have an infection in the urine again.  Unfortunately, he urinated right before he came today and is unable to leave a urine sample again.    I discussed with him that given the severity of his last infection I think we should review this area again with imaging.  I also would like a repeat urinalysis and urine culture.      Will plan on following:    -Will plan on repeat scrotal and testicular ultrasound due to tender and fluctuant area on exam.    -If positive urine culture, will place on culture specific antibiotics.  If area of concern " testicular ultrasound, such as recurrent abscess, will review with one of the urologist regarding recommended treatment such as long course of antibiotics with close monitoring versus possible aspiration or drainage.    -Will plan on a UA and urine culture tomorrow at [facility] lab due to worsening urgency.  Would have done today, but you just went to the bathroom.    -If worsening pain, fever, chills, or increase concern in scrotum, please return to ER for evaluation.    Contact us in the interim with questions, concerns, or changes in symptomatology    Signed by:       Sophie Romero PA-C 8/14/2024 3:52 PM

## 2024-08-14 NOTE — LETTER
8/14/2024       RE: Ricky Rowland  [address] [facility] [address]     Dear Colleague,    Thank you for referring your patient, Ricky Rowland, to the [facility]OLOGY [facility]IN[facility] [place] at [facility]. Please see a copy of my visit note below.    Subjective     CHIEF COMPLAINT/REASON FOR VISIT   Patient of Dr. Zhu's seen on my calendar  Wound check    HISTORY OF PRESENT ILLNESS   Mr. Rowland is pleasant 34 year old year old male, who presents today for a wound check.  Patient was initially hospitalized on 06/28/2024 due to severe left sided epididymoorchitis.  He was readmitted to the hospital on 07/16/2024.  Given his severe pain, election was made for surgical evaluation.  This showed evidence of a necrotic testicle with evidence of rupture of the tunica albuginea and a testicular scrotal abscess measuring 2 cm.  Patient underwent a left-sided simple orchiectomy.    He did have block see him in the hospital.  They were not able to pack much in the wound more than a 4 x 4.  Patient also had a Penrose drain which was removed on 07/24/2024.  Patient completed an extended course of antibiotics for E. coli urinary tract infection as well as abscess infection.    He presents today for wound check.  He notes that he has not been packing the wound, as it was difficult to get anything in the wound by the time he left the hospital.  He does note some occasional pain in the scrotum.  He also notes that his urination is worsening.  He is having quite a bit of urgency to urinate.  He denies hematuria, dysuria, fevers, chills.    The following portions of the patient's history were reviewed and updated as appropriate: allergies, current medications, past family history, past medical history, past social history, past surgical history, and problem list.     REVIEW OF SYSTEMS   Review of Systems   Constitutional:  Negative for chills and fever.   Genitourinary:   "Positive for frequency, testicular pain and urgency. Negative for dysuria and hematuria.      Per HPI.     Patient Active Problem List   Diagnosis     Attention deficit hyperactivity disorder (ADHD)     Epididymoorchitis     Testicular torsion     Torsion of left testicle      Past Medical History:   Diagnosis Date     Attention deficit disorder with hyperactivity(314.01)     ADHD  ( also had learning disability)     Mild intermittent asthma     Asthma      Objective     PHYSICAL EXAM   /74   Ht 1.803 m (5' 11\")   Wt 76.2 kg (168 lb)   BMI 23.43 kg/m     Physical Exam  Constitutional:       Appearance: Normal appearance.   HENT:      Head: Normocephalic.   Eyes:      General: No scleral icterus.  Pulmonary:      Effort: Pulmonary effort is normal.   Genitourinary:     Comments: Well-healed left-sided scrotal incision.  No rashes.  Approximately a small tender area in the left hemiscrotum that is approximately 1.5 to 2 cm and fluctuant.  Patient endorses tenderness with palpation of this.  Right-sided testicle appears normal.  Skin:     Findings: No rash.   Neurological:      General: No focal deficit present.      Mental Status: He is alert and oriented to person, place, and time.   Psychiatric:         Mood and Affect: Mood normal.         Behavior: Behavior normal.       Assessment & Plan   1. Epididymoorchitis    2. Urinary urgency        I had the pleasure today of meeting with Mr. Rowland to discuss his follow-up for his simple orchiectomy after severe epididymal orchitis.  On examination, he does have a small area that is somewhat fluctuant within the left hemiscrotum.  He is also tender in this area.  Patient also endorses symptoms of worsening urgency and feeling like he may have an infection in the urine again.  Unfortunately, he urinated right before he came today and is unable to leave a urine sample again.    I discussed with him that given the severity of his last infection I think we should " review this area again with imaging.  I also would like a repeat urinalysis and urine culture.      Will plan on following:    -Will plan on repeat scrotal and testicular ultrasound due to tender and fluctuant area on exam.    -If positive urine culture, will place on culture specific antibiotics.  If area of concern testicular ultrasound, such as recurrent abscess, will review with one of the urologist regarding recommended treatment such as long course of antibiotics with close monitoring versus possible aspiration or drainage.    -Will plan on a UA and urine culture tomorrow at [facility] lab due to worsening urgency.  Would have done today, but you just went to the bathroom.    -If worsening pain, fever, chills, or increase concern in scrotum, please return to ER for evaluation.    Contact us in the interim with questions, concerns, or changes in symptomatology    Signed by:       Sophie Romero PA-C 8/14/2024 3:52 PM       Again, thank you for allowing me to participate in the care of your patient.      Sincerely,    Sophie Romero PA-C

## 2024-08-14 NOTE — NURSING NOTE
Chief Complaint   Patient presents with    Surgical Followup     Wound check      Pt states wound has closed up, no pain or swelling. Pt states he does have pain that comes and goes    Sharon Carlos, CMA

## 2024-08-14 NOTE — PATIENT INSTRUCTIONS
Will plan on repeat scrotal and testicular ultrasound due to tender and fluctuant area on exam.    Will plan on a UA and urine culture tomorrow at [facility] lab due to worsening urgency.  Would have done today, but you just went to the bathroom.    If worsening pain, fever, chills, or increase concern in scrotum, please return to ER for evaluation.    Contact us in the interim with questions, concerns, or changes in symptomatology.  [phone]

## 2024-08-15 ENCOUNTER — LAB (OUTPATIENT)
Dept: LAB | Facility: CLINIC | Age: 34
End: 2024-08-15
Payer: COMMERCIAL

## 2024-08-15 DIAGNOSIS — N45.3 EPIDIDYMOORCHITIS: ICD-10-CM

## 2024-08-15 DIAGNOSIS — R39.15 URINARY URGENCY: ICD-10-CM

## 2024-08-26 ENCOUNTER — HOSPITAL ENCOUNTER (OUTPATIENT)
Dept: ULTRASOUND IMAGING | Facility: CLINIC | Age: 34
Discharge: HOME | End: 2024-08-26
Attending: PHYSICIAN ASSISTANT
Payer: COMMERCIAL

## 2024-08-26 ENCOUNTER — LAB (OUTPATIENT)
Dept: LAB | Facility: CLINIC | Age: 34
End: 2024-08-26
Attending: PHYSICIAN ASSISTANT
Payer: COMMERCIAL

## 2024-08-26 DIAGNOSIS — N45.3 EPIDIDYMOORCHITIS: ICD-10-CM

## 2024-08-26 DIAGNOSIS — R39.15 URINARY URGENCY: Primary | ICD-10-CM

## 2024-08-26 LAB
ALBUMIN UR-MCNC: 20 MG/DL
APPEARANCE UR: CLEAR
BILIRUB UR QL STRIP: NEGATIVE
COLOR UR AUTO: YELLOW
GLUCOSE UR STRIP-MCNC: NEGATIVE MG/DL
HGB UR QL STRIP: NEGATIVE
KETONES UR STRIP-MCNC: NEGATIVE MG/DL
LEUKOCYTE ESTERASE UR QL STRIP: NEGATIVE
MUCOUS THREADS #/AREA URNS LPF: PRESENT /LPF
NITRATE UR QL: NEGATIVE
PH UR STRIP: 6.5 [PH] (ref 5–7)
RBC URINE: 1 /HPF
SP GR UR STRIP: 1.03 (ref 1–1.03)
SPERM #/AREA URNS HPF: PRESENT /HPF
UROBILINOGEN UR STRIP-MCNC: NORMAL MG/DL
WBC URINE: 1 /HPF

## 2024-08-26 PROCEDURE — 87086 URINE CULTURE/COLONY COUNT: CPT

## 2024-08-26 PROCEDURE — 81001 URINALYSIS AUTO W/SCOPE: CPT

## 2024-08-26 PROCEDURE — 93976 VASCULAR STUDY: CPT

## 2024-08-26 PROCEDURE — 76870 US EXAM SCROTUM: CPT

## 2024-08-27 LAB — BACTERIA UR CULT: NO GROWTH

## 2024-08-28 DIAGNOSIS — N45.3 EPIDIDYMOORCHITIS: Primary | ICD-10-CM

## 2024-08-28 RX ORDER — LEVOFLOXACIN 500 MG/1
500 TABLET, FILM COATED ORAL DAILY
Qty: 10 TABLET | Refills: 0 | Status: SHIPPED | OUTPATIENT
Start: 2024-08-28 | End: 2024-09-07

## 2024-09-05 DIAGNOSIS — N45.3 EPIDIDYMOORCHITIS: Primary | ICD-10-CM

## 2024-09-05 DIAGNOSIS — N50.819 PAIN IN TESTICLE, UNSPECIFIED LATERALITY: ICD-10-CM

## 2024-09-05 DIAGNOSIS — N44.00 TESTICULAR TORSION: ICD-10-CM

## 2024-10-02 ENCOUNTER — LAB (OUTPATIENT)
Dept: LAB | Facility: CLINIC | Age: 34
End: 2024-10-02
Payer: COMMERCIAL

## 2024-10-02 DIAGNOSIS — N50.819 PAIN IN TESTICLE, UNSPECIFIED LATERALITY: ICD-10-CM

## 2024-10-02 DIAGNOSIS — N45.3 EPIDIDYMOORCHITIS: ICD-10-CM

## 2024-10-02 DIAGNOSIS — N50.819 PAIN IN TESTICLE, UNSPECIFIED LATERALITY: Primary | ICD-10-CM

## 2024-10-02 LAB
ALBUMIN UR-MCNC: NEGATIVE MG/DL
APPEARANCE UR: ABNORMAL
BILIRUB UR QL STRIP: NEGATIVE
COLOR UR AUTO: YELLOW
GLUCOSE UR STRIP-MCNC: NEGATIVE MG/DL
HGB UR QL STRIP: NEGATIVE
KETONES UR STRIP-MCNC: NEGATIVE MG/DL
LEUKOCYTE ESTERASE UR QL STRIP: NEGATIVE
NITRATE UR QL: NEGATIVE
PH UR STRIP: 6.5 [PH] (ref 5–7)
SP GR UR STRIP: 1.02 (ref 1–1.03)
UROBILINOGEN UR STRIP-MCNC: NORMAL MG/DL

## 2024-10-02 PROCEDURE — 87086 URINE CULTURE/COLONY COUNT: CPT

## 2024-10-02 PROCEDURE — 81003 URINALYSIS AUTO W/O SCOPE: CPT | Mod: QW

## 2024-10-03 DIAGNOSIS — N50.819 PAIN IN TESTICLE, UNSPECIFIED LATERALITY: Primary | ICD-10-CM

## 2024-10-03 LAB — BACTERIA UR CULT: NO GROWTH

## 2024-10-03 RX ORDER — LEVOFLOXACIN 500 MG/1
500 TABLET, FILM COATED ORAL DAILY
Qty: 3 TABLET | Refills: 0 | Status: SHIPPED | OUTPATIENT
Start: 2024-10-03

## 2024-10-04 ENCOUNTER — HOSPITAL ENCOUNTER (OUTPATIENT)
Dept: ULTRASOUND IMAGING | Facility: CLINIC | Age: 34
Discharge: HOME | End: 2024-10-04
Attending: PHYSICIAN ASSISTANT | Admitting: PHYSICIAN ASSISTANT
Payer: COMMERCIAL

## 2024-10-04 DIAGNOSIS — N50.819 PAIN IN TESTICLE, UNSPECIFIED LATERALITY: ICD-10-CM

## 2024-10-04 DIAGNOSIS — N45.3 EPIDIDYMOORCHITIS: ICD-10-CM

## 2024-10-04 PROCEDURE — 93976 VASCULAR STUDY: CPT

## 2024-10-08 ENCOUNTER — OFFICE VISIT (OUTPATIENT)
Dept: UROLOGY | Facility: CLINIC | Age: 34
End: 2024-10-08
Payer: COMMERCIAL

## 2024-10-08 VITALS — BODY MASS INDEX: 23.52 KG/M2 | WEIGHT: 168 LBS | HEIGHT: 71 IN

## 2024-10-08 DIAGNOSIS — R68.82 DECREASED LIBIDO: ICD-10-CM

## 2024-10-08 DIAGNOSIS — N50.819 PAIN IN TESTICLE, UNSPECIFIED LATERALITY: Primary | ICD-10-CM

## 2024-10-08 DIAGNOSIS — F52.32 INHIBITED MALE ORGASM: ICD-10-CM

## 2024-10-08 DIAGNOSIS — N45.3 EPIDIDYMOORCHITIS: ICD-10-CM

## 2024-10-08 PROCEDURE — 99024 POSTOP FOLLOW-UP VISIT: CPT | Performed by: STUDENT IN AN ORGANIZED HEALTH CARE EDUCATION/TRAINING PROGRAM

## 2024-10-08 ASSESSMENT — PAIN SCALES - GENERAL: PAINLEVEL: NO PAIN (0)

## 2024-10-08 NOTE — NURSING NOTE
Chief Complaint   Patient presents with    Surgical Followup     Pt here for exam      Pt doing ok today, no discomfort since finishing antibiotics.    Sharon Carlos, CMA

## 2024-10-08 NOTE — PROGRESS NOTES
"CHIEF COMPLAINT   Ricky Rowland who is a 34 year old male returns today for follow-up of severe left epididymoorchitis with eventual need for left orchiectomy 7/16/2024 with findings of necrotic ruptured testicle with abscess.      HPI   Ricky Rowland is a 34 year old male returns today for follow-up of severe left epididymoorchitis with eventual need for left orchiectomy 7/16/2024 with findings of necrotic ruptured testicle with abscess    After discharge from the hospital he had generalized tendonitis he felt related to levaquin.     Patient had been continuing to have right sided scrotal discomfort/testicular pain    His scrotal discomfort has finally resolved after another course of levaquin     He notes decreased libido and difficulty with orgasm      PHYSICAL EXAM  Patient is a 34 year old  male   Vitals: Height 1.803 m (5' 11\"), weight 76.2 kg (168 lb).  Body mass index is 23.43 kg/m .  General Appearance Adult:   Alert, no acute distress, oriented  HENT: throat/mouth:normal, good dentition  Lungs: no respiratory distress, or pursed lip breathing  Heart: No obvious jugular venous distension present  Abdomen: nondistended  Musculoskeltal: extremities normal, no peripheral edema  Skin: no suspicious lesions or rashes  Neuro: Alert, oriented, speech and mentation normal  Psych: affect and mood normal  Gait: Normal  : normal scrotal skin  Normal right testicle  Left hemiscrotum without any fluid collection. Spermatic cord present (s/p left simple orchiectomy). Well healed incision    All pertinent imaging reviewed:    Scrotal ultrasound 10/4/2024  IMPRESSION:  1.  Stable postoperative appearance in the left hemiscrotum, status  post orchiectomy. There are no fluid collections.  2.  Normal right testicle.    Component      Latest Ref Rng 10/2/2024  3:13 PM   Color Urine      Colorless, Straw, Light Yellow, Yellow  Yellow    Appearance Urine      Clear  Slightly Cloudy !    Glucose Urine      Negative mg/dL " Negative    Bilirubin Urine      Negative  Negative    Ketones Urine      Negative mg/dL Negative    Specific Gravity Urine      1.003 - 1.035  1.023    Blood Urine      Negative  Negative    pH Urine      5.0 - 7.0  6.5    Protein Albumin Urine      Negative mg/dL Negative    Urobilinogen mg/dL      Normal, 2.0 mg/dL Normal    Nitrite Urine      Negative  Negative    Leukocyte Esterase Urine      Negative  Negative       Legend:  ! Abnormal    ASSESSMENT and PLAN  34 year old male returns today for follow-up of severe left epididymoorchitis with eventual need for left orchiectomy 7/16/2024 with findings of necrotic ruptured testicle with abscess    Right testicular pain: resolved. Normal scrotal ultrasound. Benign exam    Solitary right testicle: recommend avoid contact sports and/or wear a cup    Re: decreased libido and difficulty with orgasm, could have disrupted testosterone production insetting of solitary testicle. If he continues to have issues, we can evaluate for hypogonadism and consider treatment with testosterone. Would hesitate to start exogenous testosterone in such a young patient unless absolutely needed    - follow up as needed as issues arise        Noah Zhu MD   [facility] Urology  [facility] Phone: [phone]

## 2024-10-08 NOTE — LETTER
"10/8/2024       RE: Ricky Rowland  [address] [address] [facility] [address]     Dear Colleague,    Thank you for referring your patient, Ricky Rowland, to the [facility] UROLOGY CL[facility] [place] at [facility]. Please see a copy of my visit note below.    CHIEF COMPLAINT   Ricky Rowland who is a 34 year old male returns today for follow-up of severe left epididymoorchitis with eventual need for left orchiectomy 7/16/2024 with findings of necrotic ruptured testicle with abscess.      HPI   Ricky Rowland is a 34 year old male returns today for follow-up of severe left epididymoorchitis with eventual need for left orchiectomy 7/16/2024 with findings of necrotic ruptured testicle with abscess    After discharge from the hospital he had generalized tendonitis he felt related to levaquin.     Patient had been continuing to have right sided scrotal discomfort/testicular pain    His scrotal discomfort has finally resolved after another course of levaquin       PHYSICAL EXAM  Patient is a 34 year old  male   Vitals: Height 1.803 m (5' 11\"), weight 76.2 kg (168 lb).  Body mass index is 23.43 kg/m .  General Appearance Adult:   Alert, no acute distress, oriented  HENT: throat/mouth:normal, good dentition  Lungs: no respiratory distress, or pursed lip breathing  Heart: No obvious jugular venous distension present  Abdomen: nondistended  Musculoskeltal: extremities normal, no peripheral edema  Skin: no suspicious lesions or rashes  Neuro: Alert, oriented, speech and mentation normal  Psych: affect and mood normal  Gait: Normal  : normal scrotal skin  Normal right testicle  Left hemiscrotum without any fluid collection. Spermatic cord present (s/p left simple orchiectomy). Well healed incision    All pertinent imaging reviewed:    Scrotal ultrasound 10/4/2024  IMPRESSION:  1.  Stable postoperative appearance in the left hemiscrotum, status  post orchiectomy. There are no " fluid collections.  2.  Normal right testicle.    Component      Latest Ref Rng 10/2/2024  3:13 PM   Color Urine      Colorless, Straw, Light Yellow, Yellow  Yellow    Appearance Urine      Clear  Slightly Cloudy !    Glucose Urine      Negative mg/dL Negative    Bilirubin Urine      Negative  Negative    Ketones Urine      Negative mg/dL Negative    Specific Gravity Urine      1.003 - 1.035  1.023    Blood Urine      Negative  Negative    pH Urine      5.0 - 7.0  6.5    Protein Albumin Urine      Negative mg/dL Negative    Urobilinogen mg/dL      Normal, 2.0 mg/dL Normal    Nitrite Urine      Negative  Negative    Leukocyte Esterase Urine      Negative  Negative       Legend:  ! Abnormal    ASSESSMENT and PLAN  34 year old male returns today for follow-up of severe left epididymoorchitis with eventual need for left orchiectomy 7/16/2024 with findings of necrotic ruptured testicle with abscess    Right testicular pain: resolved. Normal scrotal ultrasound. Benign exam    Solitary right testicle: recommend avoid contact sports and/or wear a cup    - follow up as needed as issues arise        Noah Zhu MD   [facility] Urology  [facility] Phone: [phone]      Again, thank you for allowing me to participate in the care of your patient.      Sincerely,    Noah Zhu MD

## 2024-10-08 NOTE — PATIENT INSTRUCTIONS
Come back if you continue to have problems with your libido or have any other concerns about your testicles or other urinary complaints

## 2024-11-20 ENCOUNTER — MYC MEDICAL ADVICE (OUTPATIENT)
Dept: UROLOGY | Facility: CLINIC | Age: 34
End: 2024-11-20
Payer: COMMERCIAL

## 2024-11-20 DIAGNOSIS — N50.811 RIGHT TESTICULAR PAIN: Primary | ICD-10-CM

## 2024-11-21 ENCOUNTER — TELEPHONE (OUTPATIENT)
Dept: UROLOGY | Facility: CLINIC | Age: 34
End: 2024-11-21
Payer: COMMERCIAL

## 2024-11-21 NOTE — TELEPHONE ENCOUNTER
[facility] Call Center    Phone Message    May a detailed message be left on voicemail: yes     Reason for Call: Other: Patient being referred for testicular pain and referring provider requesting that Pt be seen by Dr. Kunz. Per our guidelines, this is not a Dx that Dr. Kunz would see for. Also, Pt is already scheduled in January with Sophie for this and in the notes states he needs a US prior. Sending message for clinic review and follow-up with Pt to discuss. Thank you!     Action Taken: Message routed to:  Other: [facility]    Travel Screening: Not Applicable     Date of Service:

## 2024-11-26 ENCOUNTER — HOSPITAL ENCOUNTER (EMERGENCY)
Facility: CLINIC | Age: 34
Discharge: LEFT BEFORE DISCHARGE | End: 2024-11-26
Attending: EMERGENCY MEDICINE
Payer: COMMERCIAL

## 2024-11-26 ENCOUNTER — APPOINTMENT (OUTPATIENT)
Dept: ULTRASOUND IMAGING | Facility: CLINIC | Age: 34
End: 2024-11-26
Attending: EMERGENCY MEDICINE
Payer: COMMERCIAL

## 2024-11-26 VITALS
BODY MASS INDEX: 23.1 KG/M2 | WEIGHT: 165 LBS | TEMPERATURE: 98.1 F | OXYGEN SATURATION: 96 % | HEIGHT: 71 IN | HEART RATE: 95 BPM | DIASTOLIC BLOOD PRESSURE: 89 MMHG | SYSTOLIC BLOOD PRESSURE: 129 MMHG | RESPIRATION RATE: 16 BRPM

## 2024-11-26 DIAGNOSIS — Z90.79 HISTORY OF ORCHIECTOMY: ICD-10-CM

## 2024-11-26 DIAGNOSIS — N34.2 URETHRITIS: ICD-10-CM

## 2024-11-26 LAB
ALBUMIN UR-MCNC: NEGATIVE MG/DL
ANION GAP SERPL CALCULATED.3IONS-SCNC: 13 MMOL/L (ref 7–15)
APPEARANCE UR: CLEAR
BASOPHILS # BLD AUTO: 0.1 10E3/UL (ref 0–0.2)
BASOPHILS NFR BLD AUTO: 1 %
BILIRUB UR QL STRIP: NEGATIVE
BUN SERPL-MCNC: 16.2 MG/DL (ref 6–20)
CALCIUM SERPL-MCNC: 8.6 MG/DL (ref 8.8–10.4)
CHLORIDE SERPL-SCNC: 105 MMOL/L (ref 98–107)
COLOR UR AUTO: YELLOW
CREAT SERPL-MCNC: 1.11 MG/DL (ref 0.67–1.17)
EGFRCR SERPLBLD CKD-EPI 2021: 90 ML/MIN/1.73M2
EOSINOPHIL # BLD AUTO: 0.2 10E3/UL (ref 0–0.7)
EOSINOPHIL NFR BLD AUTO: 2 %
ERYTHROCYTE [DISTWIDTH] IN BLOOD BY AUTOMATED COUNT: 13.3 % (ref 10–15)
GLUCOSE SERPL-MCNC: 92 MG/DL (ref 70–99)
GLUCOSE UR STRIP-MCNC: NEGATIVE MG/DL
HCO3 SERPL-SCNC: 23 MMOL/L (ref 22–29)
HCT VFR BLD AUTO: 45.9 % (ref 40–53)
HGB BLD-MCNC: 16 G/DL (ref 13.3–17.7)
HGB UR QL STRIP: NEGATIVE
IMM GRANULOCYTES # BLD: 0 10E3/UL
IMM GRANULOCYTES NFR BLD: 0 %
KETONES UR STRIP-MCNC: NEGATIVE MG/DL
LEUKOCYTE ESTERASE UR QL STRIP: NEGATIVE
LYMPHOCYTES # BLD AUTO: 4 10E3/UL (ref 0.8–5.3)
LYMPHOCYTES NFR BLD AUTO: 38 %
MCH RBC QN AUTO: 29.6 PG (ref 26.5–33)
MCHC RBC AUTO-ENTMCNC: 34.9 G/DL (ref 31.5–36.5)
MCV RBC AUTO: 85 FL (ref 78–100)
MONOCYTES # BLD AUTO: 0.6 10E3/UL (ref 0–1.3)
MONOCYTES NFR BLD AUTO: 6 %
NEUTROPHILS # BLD AUTO: 5.6 10E3/UL (ref 1.6–8.3)
NEUTROPHILS NFR BLD AUTO: 54 %
NITRATE UR QL: NEGATIVE
NRBC # BLD AUTO: 0 10E3/UL
NRBC BLD AUTO-RTO: 0 /100
PH UR STRIP: 6 [PH] (ref 5–7)
PLATELET # BLD AUTO: 279 10E3/UL (ref 150–450)
POTASSIUM SERPL-SCNC: 3.8 MMOL/L (ref 3.4–5.3)
RBC # BLD AUTO: 5.4 10E6/UL (ref 4.4–5.9)
RBC URINE: 1 /HPF
SODIUM SERPL-SCNC: 141 MMOL/L (ref 135–145)
SP GR UR STRIP: 1.03 (ref 1–1.03)
UROBILINOGEN UR STRIP-MCNC: NORMAL MG/DL
WBC # BLD AUTO: 10.4 10E3/UL (ref 4–11)
WBC URINE: 2 /HPF

## 2024-11-26 PROCEDURE — 87491 CHLMYD TRACH DNA AMP PROBE: CPT | Performed by: EMERGENCY MEDICINE

## 2024-11-26 PROCEDURE — 82374 ASSAY BLOOD CARBON DIOXIDE: CPT | Performed by: EMERGENCY MEDICINE

## 2024-11-26 PROCEDURE — 80048 BASIC METABOLIC PNL TOTAL CA: CPT | Performed by: EMERGENCY MEDICINE

## 2024-11-26 PROCEDURE — 85004 AUTOMATED DIFF WBC COUNT: CPT | Performed by: EMERGENCY MEDICINE

## 2024-11-26 PROCEDURE — 36415 COLL VENOUS BLD VENIPUNCTURE: CPT | Performed by: EMERGENCY MEDICINE

## 2024-11-26 PROCEDURE — 81001 URINALYSIS AUTO W/SCOPE: CPT | Performed by: EMERGENCY MEDICINE

## 2024-11-26 PROCEDURE — 93976 VASCULAR STUDY: CPT

## 2024-11-26 PROCEDURE — 85041 AUTOMATED RBC COUNT: CPT | Performed by: EMERGENCY MEDICINE

## 2024-11-26 PROCEDURE — 99284 EMERGENCY DEPT VISIT MOD MDM: CPT | Mod: 25

## 2024-11-26 PROCEDURE — 87591 N.GONORRHOEAE DNA AMP PROB: CPT | Performed by: EMERGENCY MEDICINE

## 2024-11-26 PROCEDURE — 76870 US EXAM SCROTUM: CPT

## 2024-11-26 PROCEDURE — 85014 HEMATOCRIT: CPT | Performed by: EMERGENCY MEDICINE

## 2024-11-26 RX ORDER — DOXYCYCLINE 100 MG/1
100 CAPSULE ORAL 2 TIMES DAILY
Qty: 14 CAPSULE | Refills: 0 | Status: SHIPPED | OUTPATIENT
Start: 2024-11-26 | End: 2024-12-03

## 2024-11-26 RX ORDER — AZITHROMYCIN 250 MG/1
1000 TABLET, FILM COATED ORAL ONCE
Status: DISCONTINUED | OUTPATIENT
Start: 2024-11-26 | End: 2024-11-27 | Stop reason: HOSPADM

## 2024-11-26 RX ORDER — CEFIXIME 400 MG/1
800 CAPSULE ORAL ONCE
Qty: 2 CAPSULE | Refills: 0 | Status: SHIPPED | OUTPATIENT
Start: 2024-11-26 | End: 2024-11-26

## 2024-11-26 ASSESSMENT — COLUMBIA-SUICIDE SEVERITY RATING SCALE - C-SSRS
2. HAVE YOU ACTUALLY HAD ANY THOUGHTS OF KILLING YOURSELF IN THE PAST MONTH?: NO
1. IN THE PAST MONTH, HAVE YOU WISHED YOU WERE DEAD OR WISHED YOU COULD GO TO SLEEP AND NOT WAKE UP?: NO
6. HAVE YOU EVER DONE ANYTHING, STARTED TO DO ANYTHING, OR PREPARED TO DO ANYTHING TO END YOUR LIFE?: NO

## 2024-11-26 ASSESSMENT — ACTIVITIES OF DAILY LIVING (ADL)
ADLS_ACUITY_SCORE: 52
ADLS_ACUITY_SCORE: 52

## 2024-11-27 LAB
C TRACH DNA SPEC QL PROBE+SIG AMP: NEGATIVE
N GONORRHOEA DNA SPEC QL NAA+PROBE: NEGATIVE

## 2024-11-27 NOTE — ED TRIAGE NOTES
Had acute epididymitis. Concerned he might be having another episode.Recently had L testicle removed 2 months ago. Now having bleeding from penis and had been having some discomfort in R testicular area. No pain now.

## 2024-11-27 NOTE — ED PROVIDER NOTES
"  Emergency Department Note      History of Present Illness     Chief Complaint   Post-op Problem    HPI   Ricky Rowland is a 34 year old male with a history of gonorrhea and chlamydia presenting with post operative problem. The patient underwent surgery to manage epididymitis two months ago after antibiotics were not improving his symptoms. He notes pain accumulating this morning, then noticed bloody discharge from the penis. After the episode, his pain has resolved. The patient denies painful ejaculation.     Independent Historian   None    Review of External Notes   None    Past Medical History     Medical History and Problem List   Mild intermittent asthma  ADHD  Epididymoorchitis     Medications   Albuterol    Surgical history     Physical Exam     Patient Vitals for the past 24 hrs:   BP Temp Temp src Pulse Resp SpO2 Height Weight   11/26/24 2016 129/89 98.1  F (36.7  C) Oral 95 16 96 % 1.803 m (5' 11\") 74.8 kg (165 lb)     Physical Exam  Constitutional: Alert, attentive, GCS 15   Eyes: EOM are normal, anicteric, conjugate gaze  CV: distal extremities warm, well perfused  Chest: Non-labored breathing on RA  : dried blood without discharge. Left testicle absent. Right testicle little tenderness.   Neurological: Alert, attentive, moving all extremities equally.   Skin: Skin is warm and dry.    Diagnostics     Lab Results   Labs Ordered and Resulted from Time of ED Arrival to Time of ED Departure   BASIC METABOLIC PANEL - Abnormal       Result Value    Sodium 141      Potassium 3.8      Chloride 105      Carbon Dioxide (CO2) 23      Anion Gap 13      Urea Nitrogen 16.2      Creatinine 1.11      GFR Estimate 90      Calcium 8.6 (*)     Glucose 92     ROUTINE UA WITH MICROSCOPIC - Normal    Color Urine Yellow      Appearance Urine Clear      Glucose Urine Negative      Bilirubin Urine Negative      Ketones Urine Negative      Specific Gravity Urine 1.030      Blood Urine Negative      pH Urine 6.0      Protein " Albumin Urine Negative      Urobilinogen Urine Normal      Nitrite Urine Negative      Leukocyte Esterase Urine Negative      RBC Urine 1      WBC Urine 2     CBC WITH PLATELETS AND DIFFERENTIAL    WBC Count 10.4      RBC Count 5.40      Hemoglobin 16.0      Hematocrit 45.9      MCV 85      MCH 29.6      MCHC 34.9      RDW 13.3      Platelet Count 279      % Neutrophils 54      % Lymphocytes 38      % Monocytes 6      % Eosinophils 2      % Basophils 1      % Immature Granulocytes 0      NRBCs per 100 WBC 0      Absolute Neutrophils 5.6      Absolute Lymphocytes 4.0      Absolute Monocytes 0.6      Absolute Eosinophils 0.2      Absolute Basophils 0.1      Absolute Immature Granulocytes 0.0      Absolute NRBCs 0.0     CHLAMYDIA TRACHOMATIS/NEISSERIA GONORRHOEAE BY PCR     Imaging   US Testicular & Scrotum w Doppler Ltd   Final Result   IMPRESSION:   1.  No significant change. Left orchiectomy with stable inhomogeneous soft tissue in the hemiscrotum      2.  Normal right testicle.        Independent Interpretation   None    ED Course      Medications Administered   Medications   cefTRIAXone (ROCEPHIN) 500 mg in lidocaine injection (has no administration in time range)   azithromycin (ZITHROMAX) tablet 1,000 mg (has no administration in time range)       Procedures   Procedures     Discussion of Management   None    ED Course   ED Course as of 11/26/24 2326 Tue Nov 26, 2024   2100 I obtained the history and examined the patient as noted above.          Additional Documentation  None    Medical Decision Making / Diagnosis     ANABEL Rowland is a 34 year old male past medical history significant for left orchiectomy, recurrent epididymoorchitis, recurrent UTIs presenting for episode of painful penile discharge without testicular pain.  Ultrasound here is unremarkable, UA is without evidence of UTI, GC is pending, history I will treat him empirically for prior to ceftriaxone and 1 g of azithromycin but we were able  to call him probably tomorrow electronically prescribed p.o. cefixime and doxycycline.  Recommended urology follow-up prior to his eloping, I did review that he should inform his partners about being treated and abstain or wear condoms.    Disposition   The patient eloped.     Diagnosis     ICD-10-CM    1. Urethritis  N34.2       2. History of orchiectomy  Z90.79          Discharge Medications   Discharge Medication List as of 11/26/2024 10:50 PM        START taking these medications    Details   cefixime (SUPRAX) 400 MG capsule Take 2 capsules (800 mg) by mouth once for 1 dose., Disp-2 capsule, R-0, E-Prescribe      doxycycline hyclate (VIBRAMYCIN) 100 MG capsule Take 1 capsule (100 mg) by mouth 2 times daily for 7 days., Disp-14 capsule, R-0, E-Prescribe           Gildardo Gaines MD  [employer]  11:26 PM 11/26/24       Scribe Disclosure:  I, Lyla Messer, am serving as a scribe at 8:25 PM on 11/26/2024 to document services personally performed by Gildardo Gaines MD based on my observations and the provider's statements to me.        Gildardo Gaines MD  11/26/24 0411

## (undated) DEVICE — TUBING SUCTION 12"X1/4" N612

## (undated) DEVICE — GLOVE BIOGEL PI MICRO SZ 7.0 48570

## (undated) DEVICE — ESU GROUND PAD ADULT W/CORD E7507

## (undated) DEVICE — DRSG STERI STRIP 1/2X4" R1547

## (undated) DEVICE — LINEN FULL SHEET 5511

## (undated) DEVICE — DRAIN PENROSE 0.25"X12" LATEX FREE GR101

## (undated) DEVICE — SU VICRYL 0 CT-1 36" J346H

## (undated) DEVICE — PACKING NUGAUZE 1/2" PLAIN 7632

## (undated) DEVICE — SU SILK 0 SH 30" K834H

## (undated) DEVICE — SU VICRYL+ 3-0 27IN SH UND VCP416H

## (undated) DEVICE — PACK MINOR CUSTOM RIDGES SBA32RMRMA

## (undated) DEVICE — SYR BULB IRRIG DOVER 60 ML LATEX FREE 67000

## (undated) DEVICE — PREP CHLORHEXIDINE 4% 4OZ (HIBICLENS) 57504

## (undated) DEVICE — LINEN HALF SHEET 5512

## (undated) DEVICE — BAG CLEAR TRASH 1.3M 39X33" P4040C

## (undated) DEVICE — SU ETHILON 2-0 PS 18" 585H

## (undated) DEVICE — DRAPE LAP W/ARMBOARD 29410

## (undated) DEVICE — LINEN TOWEL PACK X10 5473

## (undated) DEVICE — SUPPORTER ATHLETIC LG LATEX 202636

## (undated) DEVICE — SUCTION TIP YANKAUER W/O VENT K86

## (undated) DEVICE — SU CHROMIC 3-0 PS-2 27" 1638H

## (undated) DEVICE — DRSG TELFA 2X3"

## (undated) DEVICE — GLOVE BIOGEL PI MICRO INDICATOR UNDERGLOVE SZ 7.0 48970

## (undated) DEVICE — DRSG KERLIX FLUFFS X5

## (undated) DEVICE — TRAY PREP DRY SKIN SCRUB 067

## (undated) RX ORDER — PROPOFOL 10 MG/ML
INJECTION, EMULSION INTRAVENOUS
Status: DISPENSED
Start: 2024-07-16

## (undated) RX ORDER — GINSENG 100 MG
CAPSULE ORAL
Status: DISPENSED
Start: 2024-07-16

## (undated) RX ORDER — FENTANYL CITRATE 50 UG/ML
INJECTION, SOLUTION INTRAMUSCULAR; INTRAVENOUS
Status: DISPENSED
Start: 2024-07-16

## (undated) RX ORDER — CEFAZOLIN SODIUM/WATER 2 G/20 ML
SYRINGE (ML) INTRAVENOUS
Status: DISPENSED
Start: 2024-07-16

## (undated) RX ORDER — DEXAMETHASONE SODIUM PHOSPHATE 4 MG/ML
INJECTION, SOLUTION INTRA-ARTICULAR; INTRALESIONAL; INTRAMUSCULAR; INTRAVENOUS; SOFT TISSUE
Status: DISPENSED
Start: 2024-07-16

## (undated) RX ORDER — KETOROLAC TROMETHAMINE 15 MG/ML
INJECTION, SOLUTION INTRAMUSCULAR; INTRAVENOUS
Status: DISPENSED
Start: 2024-07-16

## (undated) RX ORDER — LIDOCAINE HYDROCHLORIDE 10 MG/ML
INJECTION, SOLUTION EPIDURAL; INFILTRATION; INTRACAUDAL; PERINEURAL
Status: DISPENSED
Start: 2024-07-16

## (undated) RX ORDER — ONDANSETRON 2 MG/ML
INJECTION INTRAMUSCULAR; INTRAVENOUS
Status: DISPENSED
Start: 2024-07-16

## (undated) RX ORDER — GLYCOPYRROLATE 0.2 MG/ML
INJECTION, SOLUTION INTRAMUSCULAR; INTRAVENOUS
Status: DISPENSED
Start: 2024-07-16

## (undated) RX ORDER — BUPIVACAINE HYDROCHLORIDE 5 MG/ML
INJECTION, SOLUTION EPIDURAL; INTRACAUDAL
Status: DISPENSED
Start: 2024-07-16

## (undated) RX ORDER — NEOSTIGMINE METHYLSULFATE 1 MG/ML
VIAL (ML) INJECTION
Status: DISPENSED
Start: 2024-07-16